# Patient Record
Sex: MALE | Employment: UNEMPLOYED | URBAN - METROPOLITAN AREA
[De-identification: names, ages, dates, MRNs, and addresses within clinical notes are randomized per-mention and may not be internally consistent; named-entity substitution may affect disease eponyms.]

---

## 2017-06-28 ENCOUNTER — TELEPHONE (OUTPATIENT)
Dept: TRANSPLANT | Facility: CLINIC | Age: 32
End: 2017-06-28

## 2017-07-24 DIAGNOSIS — Z76.82 ORGAN TRANSPLANT CANDIDATE: Primary | ICD-10-CM

## 2017-08-02 ENCOUNTER — HOSPITAL ENCOUNTER (OUTPATIENT)
Dept: RADIOLOGY | Facility: HOSPITAL | Age: 32
Discharge: HOME | End: 2017-08-02
Attending: NURSE PRACTITIONER
Payer: MEDICARE

## 2017-08-02 ENCOUNTER — TELEPHONE (OUTPATIENT)
Dept: TRANSPLANT | Facility: CLINIC | Age: 32
End: 2017-08-02

## 2017-08-02 ENCOUNTER — OFFICE VISIT (OUTPATIENT)
Dept: TRANSPLANT | Facility: CLINIC | Age: 32
End: 2017-08-02
Payer: MEDICARE

## 2017-08-02 ENCOUNTER — HOSPITAL ENCOUNTER (OUTPATIENT)
Dept: RADIOLOGY | Facility: HOSPITAL | Age: 32
Discharge: HOME | End: 2017-08-02
Attending: TRANSPLANT SURGERY
Payer: MEDICARE

## 2017-08-02 ENCOUNTER — CLINICAL SUPPORT (OUTPATIENT)
Dept: INFECTIOUS DISEASES | Facility: CLINIC | Age: 32
End: 2017-08-02
Payer: MEDICARE

## 2017-08-02 VITALS
HEIGHT: 69 IN | HEART RATE: 74 BPM | WEIGHT: 162.69 LBS | DIASTOLIC BLOOD PRESSURE: 68 MMHG | OXYGEN SATURATION: 97 % | TEMPERATURE: 98 F | BODY MASS INDEX: 24.09 KG/M2 | SYSTOLIC BLOOD PRESSURE: 116 MMHG | RESPIRATION RATE: 19 BRPM

## 2017-08-02 DIAGNOSIS — Z76.82 ORGAN TRANSPLANT CANDIDATE: ICD-10-CM

## 2017-08-02 DIAGNOSIS — Z76.82 KIDNEY TRANSPLANT CANDIDATE: Primary | Chronic | ICD-10-CM

## 2017-08-02 DIAGNOSIS — Z86.69 HX OF SEIZURE DISORDER: Chronic | ICD-10-CM

## 2017-08-02 DIAGNOSIS — N18.6 ESRD ON DIALYSIS: Chronic | ICD-10-CM

## 2017-08-02 DIAGNOSIS — I15.0 RENOVASCULAR HYPERTENSION: ICD-10-CM

## 2017-08-02 DIAGNOSIS — N05.1 FSGS (FOCAL SEGMENTAL GLOMERULOSCLEROSIS): ICD-10-CM

## 2017-08-02 DIAGNOSIS — Z99.2 ESRD ON DIALYSIS: Chronic | ICD-10-CM

## 2017-08-02 DIAGNOSIS — Z01.818 PRE-TRANSPLANT EVALUATION FOR CHRONIC KIDNEY DISEASE: ICD-10-CM

## 2017-08-02 DIAGNOSIS — H90.3 SENSORINEURAL HEARING LOSS (SNHL) OF BOTH EARS: Chronic | ICD-10-CM

## 2017-08-02 DIAGNOSIS — Z86.73 HX-TIA (TRANSIENT ISCHEMIC ATTACK): Chronic | ICD-10-CM

## 2017-08-02 PROCEDURE — 99999 PR PBB SHADOW E&M-EST. PATIENT-LVL I: CPT | Mod: PBBFAC,TXP,,

## 2017-08-02 PROCEDURE — 76770 US EXAM ABDO BACK WALL COMP: CPT | Mod: TC,TXP

## 2017-08-02 PROCEDURE — 3008F BODY MASS INDEX DOCD: CPT | Mod: TXP,,, | Performed by: NURSE PRACTITIONER

## 2017-08-02 PROCEDURE — 71020 XR CHEST PA AND LATERAL: CPT | Mod: TC,TXP

## 2017-08-02 PROCEDURE — 99204 OFFICE O/P NEW MOD 45 MIN: CPT | Mod: S$PBB,TXP,, | Performed by: TRANSPLANT SURGERY

## 2017-08-02 PROCEDURE — 76770 US EXAM ABDO BACK WALL COMP: CPT | Mod: 26,GC,TXP, | Performed by: RADIOLOGY

## 2017-08-02 PROCEDURE — 90670 PCV13 VACCINE IM: CPT | Mod: PBBFAC,TXP

## 2017-08-02 PROCEDURE — 71020 XR CHEST PA AND LATERAL: CPT | Mod: 26,TXP,, | Performed by: RADIOLOGY

## 2017-08-02 PROCEDURE — 72170 X-RAY EXAM OF PELVIS: CPT | Mod: 26,TXP,, | Performed by: RADIOLOGY

## 2017-08-02 PROCEDURE — 99205 OFFICE O/P NEW HI 60 MIN: CPT | Mod: S$PBB,TXP,, | Performed by: NURSE PRACTITIONER

## 2017-08-02 PROCEDURE — 72170 X-RAY EXAM OF PELVIS: CPT | Mod: TC,TXP

## 2017-08-02 PROCEDURE — 99204 OFFICE O/P NEW MOD 45 MIN: CPT | Mod: S$PBB,TXP,, | Performed by: PHYSICIAN ASSISTANT

## 2017-08-02 PROCEDURE — 90632 HEPA VACCINE ADULT IM: CPT | Mod: PBBFAC,TXP

## 2017-08-02 PROCEDURE — 99999 PR PBB SHADOW E&M-EST. PATIENT-LVL III: CPT | Mod: PBBFAC,TXP,, | Performed by: NURSE PRACTITIONER

## 2017-08-02 PROCEDURE — 90472 IMMUNIZATION ADMIN EACH ADD: CPT | Mod: PBBFAC,TXP

## 2017-08-02 PROCEDURE — 90715 TDAP VACCINE 7 YRS/> IM: CPT | Mod: PBBFAC,TXP

## 2017-08-02 RX ORDER — CARVEDILOL 25 MG/1
25 TABLET ORAL 2 TIMES DAILY WITH MEALS
Status: ON HOLD | COMMUNITY
End: 2021-10-19 | Stop reason: SDUPTHER

## 2017-08-02 RX ORDER — AMLODIPINE BESYLATE 10 MG/1
10 TABLET ORAL NIGHTLY
COMMUNITY
End: 2019-09-05

## 2017-08-02 RX ORDER — MINOXIDIL 2.5 MG/1
5 TABLET ORAL NIGHTLY
COMMUNITY
End: 2018-08-28

## 2017-08-02 RX ORDER — ISOSORBIDE MONONITRATE 30 MG/1
30 TABLET, EXTENDED RELEASE ORAL DAILY
COMMUNITY
End: 2018-08-28

## 2017-08-02 RX ORDER — VALSARTAN 320 MG/1
320 TABLET ORAL NIGHTLY
Status: ON HOLD | COMMUNITY
End: 2021-10-19 | Stop reason: HOSPADM

## 2017-08-02 RX ORDER — HYDRALAZINE HYDROCHLORIDE 100 MG/1
100 TABLET, FILM COATED ORAL EVERY 8 HOURS
COMMUNITY
End: 2019-09-05

## 2017-08-02 NOTE — PROGRESS NOTES
PHARM.D. PRE-TRANSPLANT NOTE:    This patient's medication therapy was evaluated as part of his pre-transplant evaluation.    The following pharmacologic concerns were noted: none      Current Outpatient Prescriptions   Medication Sig Dispense Refill    amlodipine (NORVASC) 10 MG tablet Take 10 mg by mouth every evening.      carvedilol (COREG) 25 MG tablet Take 25 mg by mouth 2 (two) times daily with meals.      diphenhydramine-acetaminophen (TYLENOL PM)  mg Tab Take 1 tablet by mouth nightly as needed.      hydrALAZINE (APRESOLINE) 100 MG tablet Take 100 mg by mouth every 8 (eight) hours.      isosorbide mononitrate (IMDUR) 30 MG 24 hr tablet Take 30 mg by mouth once daily.      minoxidil (LONITEN) 2.5 MG tablet Take 5 mg by mouth every evening.      valsartan (DIOVAN) 320 MG tablet Take 320 mg by mouth every evening.       No current facility-administered medications for this visit.          Currently he is responsible for preparing / administering this patient's medications on a daily basis.  I am available for consultation and can be contacted, as needed by the other members of the Kidney Transplant team.

## 2017-08-02 NOTE — PROGRESS NOTES
Transplant Recipient Adult Psychosocial Assessment    Feliz Trevizo  [address]   [place] [address]    Telephone Information:   Mobile [phone]   Home  [phone] (home)  Work  [phone] (work)  E-mail  No e-mail address on record    Sex: male  YOB: 1985  Age: 32 y.o.    Encounter Date: 8/2/2017  [place] Citizen: yes  Primary Language: English   Needed: no    Emergency Contact:  Name: Meliza Trevizo  Relationship: sister  Address: [address] [place] [address] [address] [place] [address]  Phone Numbers:  [phone] (home),  same (mobile)    Family/Social Support:   Number of dependents/: 0  Marital history: pt reports not previous marriages  Other family dynamics: Pt's reports that both of his parents passed away two years ago. Pt reports that he has 2 brothers and 2 sisters that are supportive.    Household Composition:  Pt reports he lives alone.    Do you and your caregivers have access to reliable transportation? yes  PRIMARY CAREGIVER: Meliza Trevizo will be primary caregiver, phone number [phone].      provided in-depth information to patient and caregiver regarding pre- and post-transplant caregiver role.   strongly encourages patient and caregiver to have concrete plan regarding post-transplant care giving, including back-up caregiver(s) to ensure care giving needs are met as needed.    Patient and Caregiver states understanding all aspects of caregiver role/commitment and is able/willing/committed to being caregiver to the fullest extent necessary.    Patient and Caregiver verbalizes understanding of the education provided today and caregiver responsibilities.         remains available. Patient and Caregiver agree to contact  in a timely manner if concerns arise.      Able to take time off work without financial concerns: yes.     Additional Significant Others who will Assist with Transplant:  Name: Giuseppe Trevizo  Age: 37  City:  [place] State: [place]  Relationship: brother  Does person drive? yes    Name: Saira Dominguez  Age: 45  City: [facility] State: [place]  Relationship: sister  Does person drive? yes    Living Will: no  Healthcare Power of : no  Advance Directives on file: <<no information> per medical record.  Verbally reviewed LW/HCPA information.   provided patient with copy of LW/HCPA documents and provided education on completion of forms.    Living Donors: No.    Highest Education Level: High School (9-12) or GED  Reading Ability: 12th grade  Reports difficulty with: reading and hearing pt reports he lost his hearing when he was about 1 years old. Pt is able to hear some and can communicate well.   Learns Best By:  Hands on     Status: no  VA Benefits: no     Working for Income: No  If no, reason not working: Disability    Patient is disabled.  Prior to disability, patient  was employed as house keeper at local hospital..    Spouse/Significant Other Employment: Meliza reports she is retired from the     Disabled: yes: date disability began: 2014, due to: ESRD.    Monthly Income:   Disability: $1,060  Able to afford all costs now and if transplanted, including medications: yes  Patient and Caregiver verbalizes understanding of personal responsibilities related to transplant costs and the importance of having a financial plan to ensure that patients transplant costs are fully covered.      provided fundraising information/education.  Patient and Caregiver verbalizes understanding.   remains available.    Insurance:   Payor/Plan Subscr  Sex Relation Sub. Ins. ID Effective Group Num   1. MEDICARE - ME* JOSÉ DOMINGUEZ 1985 Male  [id number] 7/1/10                                    [address]   2. BLUE CROSS BL* JOSÉ DOMINGUEZ 1985 Male  [id number]* 6/1/10 [id number]                                   [address] [address]     Primary Insurance (for UNOS reporting):  Public Insurance - Medicare FFS (Fee For Service)  Secondary Insurance (for UNOS reporting): Private Insurance  Patient and Caregiver verbalizes clear understanding that patient may experience difficulty obtaining and/or be denied insurance coverage post-surgery. This includes and is not limited to disability insurance, life insurance, health insurance, burial insurance, long term care insurance, and other insurances.    Patient and Caregiver also reports understanding that future health concerns related to or unrelated to transplantation may not be covered by patient's insurance.  Resources and information provided and reviewed.      Patient and Caregiver provides verbal permission to release any necessary information to outside resources for patient care and discharge planning.  Resources and information provided are reviewed.      Dialysis Adherence:  Patient and Caregiver reports pt had issues in the past with transportation, but has since worked those complication out.  Dialysis center reports over last three months that the patient has had 0 AMAs, 1 no-shows on 7/4/17, and 0 signing off early. Spoke with  Kristan at home dialysis center. Kristan reports pt previously had issues with compliance when he first started, but she has seen a major improvement. Kristan reports she is extremely impressed with his family and they encouragement. Kristan reports that pt is in good mental health and previously struggled with depression due to the passing of his mother. Kristan reports pt is starting to become more independent and thinking ahead. Kristan recommends pt for transplant.     Infusion Service: patient utilizing? no  Home Health: patient utilizing? no  DME: yes pt reports he has BP cuff  Pulmonary/Cardiac Rehab: pt denies   ADLS:  Pt denies any issues with ADLs    Adherence:   Pt reports full adherence to all medical advice.  Adherence education and counseling provided.     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  Alcohol: none, patient denies any use.  Illicit Drugs/Non-prescribed Medications: none, patient denies any use.    Patient and Caregiver states clear understanding of the potential impact of substance use as it relates to transplant candidacy and is aware of possible random substance screening.  Substance abstinence/cessation counseling, education and resources provided and reviewed.     Arrests/DWI/Treatment/Rehab: patient denies    Psychiatric History:    Mental Health: depression pt reports he previously struggled with depression after his mother passed away.  Psychiatrist/Counselor: pt denies  Medications:  Pt reports he used to take anti-depressant, but no longer does.   Suicide/Homicide Issues: pt denies   Safety at home: pt confirms    Knowledge: Patient and Caregiver states having clear understanding and realistic expectations regarding the potential risks and potential benefits of organ transplantation and organ donation, agrees to discuss with health care team members and support system members and to utilize available resources and express questions and/or concerns in order to further facilitate the pt informed decision-making.  Resources and information provided and reviewed.     Patient and Caregiver is aware of [facility]'s affiliation and/or partnership with agencies in home health care, LTAC, SNF, [facility], and other hospitals and  clinics.    Understanding: Patient and Caregiver reports having a clear understanding of the many lifetime commitments involved with being a transplant recipient, including costs, compliance, medications, lab work, procedures, appointments, concrete and financial planning, preparedness, timely and appropriate communication of concerns, abstinence (ETOH, tobacco, illicit non-prescribed drugs), adherence to all health care team recommendations, support system and caregiver involvement, appropriate and timely resource utilization and follow-through, mental health counseling as needed/recommended, and patient and caregiver responsibilities.  Social Service Handbook, resources and detailed educational information provided and reviewed.  Educational information provided.    Patient and Caregiver also reports current and expected compliance with health care regime and states having a clear understanding of the importance of compliance.      Patient and Caregiver reports a clear understanding that risks and benefits may be involved with organ transplantation and with organ donation.      Patient and Caregiver also reports clear understanding that psychosocial risk factors may affect patient, and include but are not limited to feelings of depression, generalized anxiety, anxiety regarding dependence on others, post traumatic stress disorder, feelings of guilt and other emotional and/or mental concerns, and/or exacerbation of existing mental health concerns.  Detailed resources provided and discussed.     Patient and Caregiver agrees to access appropriate resources in a timely manner as needed and/or as recommended, and to communicate concerns appropriately.  Patient and Caregiver also reports a clear understanding of treatment options available.      reviewed education, provided additional information, and answered questions.    Feelings or Concerns: pt denies any feelings or concerns at this time.    Coping: pt  reports he likes to cope by coming to visit his family, attend his nephews football games, and watch tv.    Goals: pt would like to go back to work after transplant or major attend school.  Patient referred to Vocational Rehabilitation.    Interview Behavior: Patient and Caregiver presents as alert and oriented x 4, pleasant, good eye contact, well groomed, recall good, concentration/judgement good, average intelligence, calm, communicative, cooperative and asking and answering questions appropriately.          Transplant Social Work - Candidacy  Assessment/Plan:     Psychosocial Suitability: Patient presents as a suitable candidate for kidney transplant at this time. Based on psychosocial risk factors, patient presents as low risk, due to appropriate caregiver plan, stable financies, low risk mental health concerns, suitable environment, and positive report from dialysis unit. .    Recommendations/Additional Comments: LIBIA recommends that pt conduct fundraising to assist pt with pay for cost of medication, food,gas, and other transplant related expenses. LIBIA recommends that pt remain free of all tobacco,ETOH, and substance use. SW remains available to assist with any concerns that may arise as pt navigates through the transplant process.      Yoli De Anda LMSW

## 2017-08-02 NOTE — PROGRESS NOTES
Transplant Nephrology  Kidney Transplant Recipient Evaluation    Referring Physician: Ralf Crouch  Current Nephrologist: Ralf Crouch    Subjective:   CC:  Initial evaluation of kidney transplant candidacy.    HPI:  Mr. Trevizo is a 32 y.o. year old Patient Refused male who has presented to be evaluated as a potential kidney transplant recipient.  He has ESRD secondary to FSGS.  Patient is currently on hemodialysis started on  11/2/2014. Patient is dialyzing on TTS schedule, dialyzing for 4 hours.  Patient reports that he is tolerating dialysis well.. He has a LUE AV fistula for dialysis access.     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 Trevizo  has a past medical history of Anemia; Depression; Disorder of kidney and ureter; FSGS (focal segmental glomerulosclerosis); Glomerulonephritis; Hypertension; Seizures; Sensorineural hearing loss; and TIA (transient ischemic attack).  He has a past surgical history that includes AV fistula placement (Left).    Past Social and Family History: Mr. Trevizo reports that he has never smoked. He has never used smokeless tobacco. He reports that he does not drink alcohol or use drugs. His family history includes Birth defects in his maternal grandfather and maternal grandmother; Cancer in his mother; Heart defect in his brother; Heart disease in his maternal grandfather, maternal grandmother, and mother; Hypertension in his maternal grandfather, maternal grandmother, mother, and sister; Kidney disease in his father; Lung cancer in his maternal grandfather; No Known Problems in his sister; Seizures in his brother; Sickle cell trait in his brother;  Stomach cancer in his maternal grandmother.      Father  renal failure--unknown cause    HX TIA-- last episode about 10 years ago  Seizure d/o --last episode 15 years ago  Does not f/u with a provider c/o this    Has been knowing about kidney decline since . It was initially thought to be an infection .  In  he had a kidney BX showing FSGS.    (Per care everywhere)  Kidney BX at [facility]     End-stage renal disease 2014   Overview:     Notes from [facility] chart:   First HD [address] KRUGERU M/W/F  08 PATHOLOGIC DIAGNOSIS:     Kidney Biopsy (Needle):  Collapsing glomerulopathy (see report for   note). OF NOTE MINIMAL PROTEINURIA  Focal interstitial nephritis (see report for   note).    Trace mesangial staining for IgA but without   ultrastructural evidence of electron, dense   deposits in the mesangium (see report for   note).\         Review of Systems   Constitutional: Positive for fatigue. Negative for activity change, appetite change, chills, fever and unexpected weight change.   HENT: Positive for hearing loss. Negative for congestion, facial swelling, postnasal drip, rhinorrhea, sinus pressure, sore throat and trouble swallowing.         Sensorineural hearing loss     Eyes: Negative for pain, redness and visual disturbance.   Respiratory: Negative for cough, chest tightness, shortness of breath and wheezing.    Cardiovascular: Negative.  Negative for chest pain, palpitations and leg swelling.   Gastrointestinal: Positive for constipation. Negative for abdominal pain, diarrhea, nausea and vomiting.        Indigestion     Genitourinary: Positive for decreased urine volume. Negative for dysuria, flank pain and urgency.        Still makes some urine    Musculoskeletal: Positive for arthralgias. Negative for gait problem, neck pain and neck stiffness.   Skin: Negative for rash.   Allergic/Immunologic: Negative for environmental allergies, food allergies and immunocompromised state.  "  Neurological: Negative for dizziness, weakness, light-headedness and headaches.   Psychiatric/Behavioral: Negative for agitation and confusion. The patient is not nervous/anxious.        Objective:   Blood pressure 116/68, pulse 74, temperature 98.1 °F (36.7 °C), temperature source Oral, resp. rate 19, height 5' 9.29" (1.76 m), weight 73.8 kg (162 lb 11.2 oz), SpO2 97 %.body mass index is 23.83 kg/m².    Physical Exam   Constitutional: He is oriented to person, place, and time. He appears well-developed and well-nourished.   HENT:   Head: Normocephalic.   Mouth/Throat: Oropharynx is clear and moist. No oropharyngeal exudate.   Eyes: Conjunctivae and EOM are normal. Pupils are equal, round, and reactive to light. No scleral icterus.   Neck: Normal range of motion. Neck supple.   Cardiovascular: Normal rate, regular rhythm and normal heart sounds.    Pulmonary/Chest: Effort normal and breath sounds normal.   Abdominal: Soft. Normal appearance and bowel sounds are normal. He exhibits no distension and no mass. There is no splenomegaly or hepatomegaly. There is no tenderness. There is no rebound, no guarding, no CVA tenderness, no tenderness at McBurney's point and negative Loyd's sign.   Musculoskeletal: Normal range of motion. He exhibits no edema.        Arms:  Lymphadenopathy:     He has no cervical adenopathy.   Neurological: He is alert and oriented to person, place, and time. He exhibits normal muscle tone. Coordination normal.   Skin: Skin is warm and dry.   Psychiatric: He has a normal mood and affect. His behavior is normal.   Vitals reviewed.      Labs:  Lab Results   Component Value Date    WBC 5.17 08/02/2017    HGB 12.1 (L) 08/02/2017    HCT 38.8 (L) 08/02/2017     08/02/2017    K 4.2 08/02/2017     08/02/2017    CO2 24 08/02/2017    BUN 25 (H) 08/02/2017    CREATININE 10.9 (H) 08/02/2017    EGFRNONAA 5.4 (A) 08/02/2017    CALCIUM 9.3 08/02/2017    PHOS 6.2 (H) 08/02/2017    ALBUMIN 4.2 " 08/02/2017    AST 9 (L) 08/02/2017    ALT <5 (L) 08/02/2017       No results found for: PREALBUMIN, BILIRUBINUA, GGT, AMYLASE, LIPASE, PROTEINUA, NITRITE, RBCUA, WBCUA    No results found for: HLAABCTYPE    Labs were reviewed with the patient.    Assessment:     1. Kidney transplant candidate    2. Organ transplant candidate    3. Pre-transplant evaluation for chronic kidney disease    4. ESRD on dialysis    5. FSGS (focal segmental glomerulosclerosis)    6. Renovascular hypertension    7. Hx of seizure disorder    8. Hx-TIA (transient ischemic attack)    9. Sensorineural hearing loss (SNHL) of both ears        Plan:   Kidney BX pathology--> 2008 [facility]     Baseline UP       Transplant Candidacy:   Based on available information, Mr. Trevizo is a suitable kidney transplant candidate.  Final determination of transplant candidacy will be made once workup is complete and reviewed by the selection committee.    Cara Chavis NP       UNOS Patient Status  Functional Status: 60% - Requires occasional assistance but is able to care for needs  Physical Capacity: No Limitations

## 2017-08-02 NOTE — TELEPHONE ENCOUNTER
Reviewed pt transplant labs.  Notified dialysis unit dietitian of the following abnormal labs via fax.     Phosphorus   6.2mg/dl      Cara Mijares MS RD LDN

## 2017-08-02 NOTE — PROGRESS NOTES
Transplant Surgery  Kidney Transplant Recipient Evaluation    Referring Physician: Ralf Crouch  Current Nephrologist: Ralf Crouch    Subjective:     Reason for Visit: evaluate transplant candidacy    History of Present Illness: Feliz Trevizo is a 32 y.o. year old male undergoing transplant evaluation.    Dialysis History: Feliz is on hemodialysis.      Transplant History: N/A    Etiology of Renal Disease: Focal Glomerular Sclerosis (Focal Segmental - FSG) (based on medical records from referral).    Review of Systems    Objective:     Physical Exam:  Constitutional:   Vitals reviewed: yes   Well-nourished and well-groomed: yes  Eyes:   Sclerae icteric: no   Extraocular movements intact: yes  GI:    Bowel sounds normal: yes   Tenderness: no    If yes, quadrant/location: not applicable   Palpable masses: no    If yes, quadrant/location: not applicable   Hepatosplenomegaly: no   Ascites: no   Hernia: no    If yes, type/location: not applicable   Surgical scars: no    If yes, type/location: not applicable  Resp:   Effort normal: yes   Breath sounds normal: yes    CV:   Regular rate and rhythm: yes   Heart sounds normal: yes   Femoral pulses normal: yes   Extremities edematous: no  Skin:   Rashes or lesions present: no    If yes, describe:not applicable   Jaundice:: no    Musculoskeletal:   Gait normal: yes   Strength normal: yes  Psych:   Oriented to person, place, and time: yes   Affect and mood normal: yes    Additional comments: not applicable    Counseling: We provided Feliz Trevizo with a group education session today.  We discussed kidney transplantation at length with him, including risks, potential complications, and alternatives in the management of his renal failure.  The discussion included complications related to anesthesia, bleeding, infection, primary nonfunction, and ATN.  I discussed the typical postoperative course, length of hospitalization, the need for long-term immunosuppression, and  the need for long-term routine follow-up.  I discussed living-donor and -donor transplantation and the relative advantages and disadvantages of each.  I also discussed average waiting times for both living donation and  donation.  I discussed national and center-specific survival rates.  I also mentioned the potential benefit of multicenter listing to candidates listed with centers within more than one organ procurement organization.  All questions were answered.    Final determination of transplant candidacy will be made once evaluation is complete and reviewed by the Kidney & Kidney/Pancreas Selection Committee.         Transplant Surgery - Candidacy   Assessment/Plan:   Feliz Trevizo has end stage renal disease (ESRD) on dialysis. I see no surgical contraindication to placing a kidney transplant. Based on available information, Feliz Trevizo is an excellent kidney transplant candidate.     Bertin Cordova MD

## 2017-08-02 NOTE — PROGRESS NOTES
Pre Transplant Infectious Diseases Consult  Kidney Transplant Recipient Evaluation    Reason for Visit:    Chief Complaint   Patient presents with    Kidney Transplant Evaluation     History of Present Illness  Feliz Trevizo is a 32 y.o. year old Patient Refused male with advanced Kidney disease currently being evaluated for Kidney transplant. Patient is currently on HD since 2014. He had a LUE AV fistula for access.  Patient denies any recent fever, chills, or infective illnesses.      1) Do you have a history of:   YES NO   Diabetes      [] [x]     Diabetic Foot Infection/Bone Infection  []        [x]     Surgical Removal of Spleen   []  [x]                  2) Have you had recurrent infections involving:             YES NO  Sinus infections  []         [x]   Sore Throat   []         [x]                 Prostate Infections  []         [x]              Bladder Infections  []         [x]                     Kidney Infections  []         [x]                               Intestinal Infections  []         [x]      Skin Infections   []         [x]       Reproductive Infections          []  [x]   Periodontal Disease  []         [x]        3)Have you ever had: YES     NO UNKNOWN      Chicken Pox   []         []  [x]   Shingles   []         [x]  []   Orolabial Herpes             []  [x]  []   Genital Herpes  []         [x]  []   Cytomegalovirus  []         [x]  []   Josefa-Barr Virus  []         [x]  []   Hepatitis A   []         [x]  []   Hepatitis B   []         [x]  []   Hepatitis C   []         [x]  []   Syphilis   []         [x]  []   Gonorrhea   []         [x]  []   Pelvic Inflammatory   Disease   []         [x]  []   Chlamydia Infection  []         [x]  []   Intestinal parasites   or worms   []         [x]  []   Fungal Infections  []         [x]  []   Blood Infections  []         [x]  []           4) Have you ever been exposed   YES NO  To someone with tuberculosis?  []   [x]   If yes, what treatment did you  receive:     5) What states have you lived in?  [place]    6) What countries have you visited for more than 2 weeks? no                        YES NO  7) Did you have any associated infections?  []  [x]       8) Are you planning to travel outside the    []  [x]   [place] after your transplant?    9) Household                   YES NO  Do you have pets living in your house?    []         [x]   If yes, describe:     Do you spend time or live on a farm or     []         [x]   have livestock or other farm animals?  If yes, which ones:    Do you have a fish tank?          []  [x]       Do you have a litter box?      []         [x]     Do you fish or hunt?       []         [x]     Do you clean or skin fish or animals?    []         [x]     Do you consume raw or undercooked    []         [x]   meat, fish, or shellfish?      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           12)Do you garden or otherwise  YES NO   work in the soil?    []         [x]   13)Do you hike, camp, or spend     time in wooded areas?   []         [x]        14) The patient's immunization history was reviewed.    Have you ever received:  YES NO UNKNOWN DATES   Routine Childhood vaccines  [x]         []  []      Influenza vaccine   [x]  []  []    Pneumovax    []  []  [x]     Tetanus-diptheria   []         []  [x]    Hepatitis A vaccine series       []  [x]  []    Hepatitis B vaccine series         [x]  []  []    Meningitis vaccine   []         [x]  []    Varicella vaccine   []         [x]  []        Based on the patients immunization history and serologies, immunizations were ordered:         Ordered  Not Ordered  Influenza Vaccine     []    [x]   Hepatitis A series at 0,  6 months   [x]    []   Hepatitis B seriesat 0, 1, and 6 months  []    [x]   Hepatitis B High Dose 0,1, and 6 months  []    [x]   Prevnar      [x]    []   Pneumovax      []    [x]    TDap       [x]    []    Zoster       []     [x]   Menactra      []    [x]            The patient was encouraged to contact us about any problems that may develop after immunization and possible side effects were reviewed.      Previous Transplant: no    Etiology of Kidney Disease: HTN    Allergies: Review of patient's allergies indicates not on file.    There is no immunization history on file for this patient.  History reviewed. No pertinent past medical history.  History reviewed. No pertinent surgical history.   Social History     Social History    Marital status: Unknown     Spouse name: N/A    Number of children: N/A    Years of education: N/A     Occupational History    Not on file.     Social History Main Topics    Smoking status: Not on file    Smokeless tobacco: Not on file    Alcohol use Not on file    Drug use: Unknown    Sexual activity: Not on file     Other Topics Concern    Not on file     Social History Narrative    No narrative on file       Review of Systems   Constitution: Positive for decreased appetite, weakness and malaise/fatigue. Negative for chills, fever, night sweats, weight gain and weight loss.   HENT: Negative for congestion, ear pain, headaches, hearing loss, hoarse voice, sore throat and tinnitus.    Eyes: Negative for blurred vision, redness and visual disturbance.   Cardiovascular: Negative for chest pain, leg swelling and palpitations.   Respiratory: Negative for cough, hemoptysis, shortness of breath, sputum production and wheezing.    Endocrine: Negative for cold intolerance and heat intolerance.   Hematologic/Lymphatic: Negative for adenopathy. Does not bruise/bleed easily.   Skin: Negative for dry skin, itching, rash and suspicious lesions.   Musculoskeletal: Negative for back pain, joint pain, myalgias and neck pain.   Gastrointestinal: Negative for abdominal pain, constipation, diarrhea, heartburn, nausea and vomiting.   Genitourinary: Negative for dysuria, flank pain, frequency, hematuria, hesitancy and  urgency.   Neurological: Negative for dizziness, numbness and paresthesias.   Psychiatric/Behavioral: Negative for depression and memory loss. The patient does not have insomnia and is not nervous/anxious.    Allergic/Immunologic: Negative for environmental allergies, HIV exposure, hives and persistent infections.     Physical Exam   Constitutional: He is oriented to person, place, and time. He appears well-developed and well-nourished. No distress.   HENT:   Head: Normocephalic and atraumatic.   Mouth/Throat: Uvula is midline, oropharynx is clear and moist and mucous membranes are normal. No oral lesions.   Eyes: EOM are normal. Pupils are equal, round, and reactive to light. No scleral icterus.   Cardiovascular: Normal rate, regular rhythm and normal heart sounds.  Exam reveals no gallop and no friction rub.    No murmur heard.  Pulmonary/Chest: Effort normal and breath sounds normal. No respiratory distress. He has no wheezes. He has no rales.   Abdominal: Soft. Bowel sounds are normal. He exhibits no distension and no mass. There is no hepatosplenomegaly. There is no tenderness. There is no rebound and no guarding.   Musculoskeletal: He exhibits no edema.   Neurological: He is alert and oriented to person, place, and time.   Skin: Skin is warm, dry and intact. No rash noted.        Psychiatric: He has a normal mood and affect. His behavior is normal.     Diagnostics: No results found for: RPR  No results found for: CMVANTIBODIE  No results found for: HIV1X2  No results found for: HTLVIIIANTIB  No results found for: HEPBSAG  No results found for: HEPBCAB  No results found for: HEPCAB  No results found for: TOXOIGG  No components found for: TOXOIGGINTER  No results found for: NEJ6VJB  No results found for: ZVC0KQI  No results found for: VARICELLAZOS  No results found for: VARICELLAINT  No results found for: STRONGANTIGG  No results found for: EPSTEINBARRV  No results found for: HEPBSAB  No results found for:  QUANTIFERON  No results found for: HEPAIGM  No results found for: PPD  No results found for this or any previous visit.         Transplant Infectious Diseases - Candidacy    Assessment/Plan:     Transplant Candidacy: Based on available information, there are no identified significant barriers to transplantation from an infectious disease standpoint pending acceptable serologies.      Quantiferon gold, HIV, strongyloides and RPR pending. If positive, please refer to ID clinic.    Vaccines:  Prevnar-13 today; pneumovax- 23 in 8 weeks  Hepatitis A series initiated today  TDAP  If Hep B surface antibody negative, recommend high dose Hep B vaccine at dialysis unit.    Final determination of transplant candidacy will be made once evaluation is complete and reviewed by the Transplant Selection Committee.    AMIRAH Brown         Counseling:I discussed with Feliz the risk for increased susceptibility to infections following transplantation including increased risk for infection right after transplant and if rejection should occur.  The patients has been counseled on the importance of vaccinations including but not limited to a yearly flu vaccine.  Specific guidance has been provided to the patient regarding the patients occupation, hobbies and activities to avoid future infectious complications including but not limited to avoiding undercooked meats and seafood, proper hygiene, and contact with animals.

## 2017-08-02 NOTE — LETTER
August 3, 2017        Ralf Crouch  [address] [facility] [place] [address]  Phone: [phone]  Fax: [phone]             [facility]  [address]  [facility] [address]  Phone: [phone]   Patient: Feliz Trevizo   MR Number: [id number]   YOB: 1985   Date of Visit: 8/2/2017       Dear Dr. Ralf Crouch    Thank you for referring Feliz Trevizo to me for evaluation. Attached you will find relevant portions of my assessment and plan of care.    If you have questions, please do not hesitate to call me. I look forward to following Feliz Trevizo along with you.    Sincerely,    Cara Chavis, NP    Enclosure    If you would like to receive this communication electronically, please contact [email or web address] or [phone] to request [employer] access.    [employer] is a tool which provides read-only access to select patient information with whom you have a relationship. Its easy to use and provides real time access to review your patients record including encounter summaries, notes, results, and demographic information.    If you feel you have received this communication in error or would no longer like to receive these types of communications, please e-mail [email or web address]

## 2017-08-02 NOTE — PROGRESS NOTES
CLINIC TEACHING NOTE    Feliz Trevizo was seen in transplant clinic today.  Coordinator verified that the patient viewed the teaching video and received written educational material.    The following information was reviewed:  · Waiting list time for cadaveric vs. living donation  · Waiting list process including: back-up calls, notification of changes in contact information, dialysis/physician information to the transplant coordinator, notifications of changes in health or if hospitalized, and notification of travel out of the area  · Monthly antibody testing to be obtained by the dialysis unit or arranged through a local lab and mailed to the transplant center.  Patient informed that if blood is not sent monthly and a kidney becomes available, the patient will need to come to the hospital to provide a fresh sample of blood for testing.    Patient was instructed that once on the waiting list, an appointment with the transplant physician will be scheduled yearly or every 6 months to ensure patient remains an acceptable transplant candidate.    Patient also informed that at the time of transplant, participation in a research protocol may be offered.  Notified that this is strictly voluntary and will not affect the quality of care received.      The option to have name placed on multiple transplant lists to increase opportunity for transplant was reviewed.    All patient questions were answered.  Patient verbalized understanding of above information.    Patient presents as a suitable candidate for transplant.

## 2017-08-14 ENCOUNTER — TELEPHONE (OUTPATIENT)
Dept: TRANSPLANT | Facility: CLINIC | Age: 32
End: 2017-08-14

## 2017-08-14 NOTE — TELEPHONE ENCOUNTER
Nutritional assessment from dialysis unit received and reviewed--no nutritional changes to plan needed at this time.  Pt to continue to follow-up with renal dietitians recommendations.      Cara Mijares MS RD LDN

## 2017-08-16 ENCOUNTER — HOSPITAL ENCOUNTER (OUTPATIENT)
Dept: RADIOLOGY | Facility: HOSPITAL | Age: 32
Discharge: HOME | End: 2017-08-16
Attending: NURSE PRACTITIONER
Payer: MEDICARE

## 2017-08-16 ENCOUNTER — HOSPITAL ENCOUNTER (OUTPATIENT)
Dept: CARDIOLOGY | Facility: HOSPITAL | Age: 32
Discharge: HOME | End: 2017-08-16
Attending: NURSE PRACTITIONER
Payer: MEDICARE

## 2017-08-16 DIAGNOSIS — Z76.82 ORGAN TRANSPLANT CANDIDATE: ICD-10-CM

## 2017-08-16 DIAGNOSIS — I51.7 CARDIOMEGALY: ICD-10-CM

## 2017-08-16 LAB
DIASTOLIC DYSFUNCTION: NO
DIASTOLIC DYSFUNCTION: NO
ESTIMATED PA SYSTOLIC PRESSURE: 21.15
RETIRED EF AND QEF - SEE NOTES: 71 (ref 55–65)

## 2017-08-16 PROCEDURE — 93017 CV STRESS TEST TRACING ONLY: CPT | Mod: TXP

## 2017-08-16 PROCEDURE — 78452 HT MUSCLE IMAGE SPECT MULT: CPT | Mod: TC,TXP

## 2017-08-16 PROCEDURE — 93016 CV STRESS TEST SUPVJ ONLY: CPT | Mod: TXP,,, | Performed by: INTERNAL MEDICINE

## 2017-08-16 PROCEDURE — 78452 HT MUSCLE IMAGE SPECT MULT: CPT | Mod: 26,TXP,, | Performed by: INTERNAL MEDICINE

## 2017-08-16 PROCEDURE — A9502 TC99M TETROFOSMIN: HCPCS | Mod: TXP

## 2017-08-16 PROCEDURE — 93306 TTE W/DOPPLER COMPLETE: CPT | Mod: 26,TXP,, | Performed by: INTERNAL MEDICINE

## 2017-08-16 PROCEDURE — 63600175 PHARM REV CODE 636 W HCPCS: Mod: TXP

## 2017-08-16 PROCEDURE — 93306 TTE W/DOPPLER COMPLETE: CPT | Mod: TXP

## 2017-08-16 PROCEDURE — 93018 CV STRESS TEST I&R ONLY: CPT | Mod: TXP,,, | Performed by: INTERNAL MEDICINE

## 2017-08-16 RX ORDER — REGADENOSON 0.08 MG/ML
INJECTION, SOLUTION INTRAVENOUS
Status: DISPENSED
Start: 2017-08-16 | End: 2017-08-16

## 2017-09-01 ENCOUNTER — COMMITTEE REVIEW (OUTPATIENT)
Dept: TRANSPLANT | Facility: CLINIC | Age: 32
End: 2017-09-01

## 2017-09-01 DIAGNOSIS — Z01.818 PRE-OP EXAMINATION: Primary | ICD-10-CM

## 2017-09-01 NOTE — COMMITTEE REVIEW
Native Organ Dx: Focal Glomerular Sclerosis (Focal Segmental - FSG)      SELECTION COMMITTEE NOTE    Felizvicki Trevizo was presented at selection committee on 9/1/2017.  Patient met selection criteria for kidney transplant related to ESRD due to  FSGS Focal Glomerular Sclerosis (Focal Segmental - FSG).  No absolute contraindications to transplant at this time.  Patient will be placed on the cadaveric wait list pending final financial approval from insurance company.  Patient will return to clinic for routine appointment in 1 year. Patient does not meet criteria for High KDPI kidney offer due to age. Needs FSGS protocol - baseline PCR and daily after transplant.            Note written by Andreia Duong RN    ===============================================  I was present at the meeting and attest to the decision of the committee

## 2017-09-06 ENCOUNTER — TELEPHONE (OUTPATIENT)
Dept: TRANSPLANT | Facility: CLINIC | Age: 32
End: 2017-09-06

## 2017-09-06 DIAGNOSIS — Z76.82 AWAITING ORGAN TRANSPLANT STATUS: Primary | ICD-10-CM

## 2017-09-06 DIAGNOSIS — Z01.818 PREOP EXAMINATION: Primary | ICD-10-CM

## 2017-09-06 NOTE — TELEPHONE ENCOUNTER
"  KIDNEY WAIT LISTING NOTE    Date of Financial clearance to list: 17    [facility]:     Organ: Kidney  Name:       Feliz Trevizo   : 1985          Gender:     male    MRN#: [id number]                                 State of Permanent Residence:  [place] [address]  Ethnicity: Patient Refused   Race:      Patient Refused    CLINICAL INFORMATION   Candidate Medical Urgency Status: Active  Number of Previous Kidney Transplants:   Number of Previous Solid Organ Transplants:   Is this Candidate a Prior Living Donor: no  (If yes, please generate letter to [facility] with patient's date of donation, recipient SSN, signed by Surgical Director after patient is listed in order to receive priority points).      ABO  ABO Blood Group:   B POS     ABO Confirmation: (THESE DATES MUST BE PRIOR TO THE LIST DATE AND SUPPORTED BY SEPARATE LAB REPORTS)    Internal Results    Lab Results   Component Value Date    GROUPTRH B POS 2017     No results found for: ABO    External Results    ABO Date 1:   ABO Date 2: 11  Are either of these ABO results based on External Labs? yes  (If Yes, STOP and go to source document in Media Tab for verification).    VITALS  Height:  5'9.3"  Weight:  73.8 kg (162 lb, 11.2 oz)  (Use height from Transplant clinic visits only).  Did you enter height/weight? Yes    HLA    Class I:  Lab Results   Component Value Date    XNPR1QX 1 2017    KLEN9MA 66 2017    RMSA2HV 8 2017    YKSK5PC 81 2017    LJQKF0SS 6 2017    DRECP1ZB XX 2017    UDTZV5HQ 7 2017    UHCRR3QL 8 2017       Class II:  Lab Results   Component Value Date    WIVRZP79AE 9 2017    PLVKOE30QQ 16 2017    [id number] 53 2017    HELXNO2930 51 2017    PFHPU5HS 9 2017    XCKNL9SC 5 2017       Tested for HLA Antibodies: Yes, no antibodies detected     If result is "Positive" antibodies are detected     If result is "Negative or " "questionable" no antibodies detected    Lab Results   Component Value Date    CIPRAS Negative 08/02/2017    CIIPRAS Negative 08/02/2017       DIALYSIS INFORMATION  Is patient Pre-Dialysis: No     Report GFR being used as the criteria for placement on the kidney list. If not, leave blank  GFR < or = 20 ml/min? n/a  If Yes, Specify value  ___   ml/min     Initial date GFR became 20 or less:   Is GFR obtained from an Outside lab Result? No  (If YES verify with source document scanned into media)    If patient on Dialysis:    Is candidate currently on dialysis for ESRD? Yes  If Yes,  Date Chronic Dialysis Started:   11/2/2014  (verify with source document in Media Tab)   Dialysis Unit Name: [facility]  [facility] [facility] [address]                        Physician Name:  Dr. Dayanara Yi  NPI#: [id number]    DIABETES INFORMATION  Primary Native Kidney Diagnosis: Focal Glomerular Sclerosis (Focal Segmental - FSG)  C-Peptide Value - No results found for: CPEPTIDE  Current Diabetes Status: None    FOR NON-KIDNEY DEPARTMENT USE ONLY:  Additional Organs Registered? none    Maximum Acceptable Number of HLA Mismatches  ABDR:     6      (0-6)               AB:               (0-4)  ADR:   _____  (0-4)              BDR: _____ (0-4)  A:        _____  (0-2)              B:      _____ (0-2)          DR: ______ (0-2)    Will Recipient Accept?   Accept HBcAB Positive Organ:            Yes  Accept HBV MARGARET Positive Organ:        no  Accept HCV Antibody Positive Organ: no   Accept HCV MARGARET Positive Organ: no  Accept KDPI > 85 Donor ?: No                        Local: No                           Import: No    ### NURSE TO VERIFY CONSENT AND MAKE ANY NECESSARY CHANGES NEEDED IN UNET AT THE TIME OF VERIFICATION ###    Unacceptible Antigens  If yes, list     No results found for: YU1FCQN, CIABCLM, CIIAB, ABCMT    ### DO NOT LIST IF ANTIGEN VALUE WEAK ###    ### DO NOT LIST ANTIGEN AS UNACCEPTABLE IF IT HAS * OR : IN THE " RESULT  VALUE ###

## 2017-09-08 ENCOUNTER — LAB VISIT (OUTPATIENT)
Dept: LAB | Facility: HOSPITAL | Age: 32
End: 2017-09-08
Payer: MEDICARE

## 2017-09-08 DIAGNOSIS — Z01.818 PRE-OP EXAMINATION: ICD-10-CM

## 2017-09-08 PROCEDURE — 86833 HLA CLASS II HIGH DEFIN QUAL: CPT | Mod: PO,TXP

## 2017-09-08 PROCEDURE — 86832 HLA CLASS I HIGH DEFIN QUAL: CPT | Mod: PO,TXP

## 2017-09-21 LAB — HPRA INTERPRETATION: NORMAL

## 2017-10-04 LAB
CLASS I ANTIBODIES - LUMINEX: NORMAL
CLASS II ANTIBODY COMMENTS - LUMINEX: NORMAL
CPRA %: 1
SERUM COLLECTION DT - LUMINEX CLASS I: NORMAL
SERUM COLLECTION DT - LUMINEX CLASS II: NORMAL
SPCL1 TESTING DATE: NORMAL
SPCL2 TESTING DATE: NORMAL
SPCLU TESTING DATE: NORMAL

## 2017-12-07 ENCOUNTER — LAB VISIT (OUTPATIENT)
Dept: LAB | Facility: HOSPITAL | Age: 32
End: 2017-12-07
Payer: MEDICARE

## 2017-12-07 DIAGNOSIS — Z01.818 PRE-OP EXAMINATION: ICD-10-CM

## 2017-12-07 PROCEDURE — 86833 HLA CLASS II HIGH DEFIN QUAL: CPT | Mod: PO,TXP

## 2017-12-07 PROCEDURE — 86832 HLA CLASS I HIGH DEFIN QUAL: CPT | Mod: PO,TXP

## 2017-12-20 LAB — HPRA INTERPRETATION: NORMAL

## 2017-12-23 LAB
CLASS I ANTIBODIES - LUMINEX: NORMAL
CLASS II ANTIBODIES - LUMINEX: NEGATIVE
CPRA %: 0
SERUM COLLECTION DT - LUMINEX CLASS I: NORMAL
SERUM COLLECTION DT - LUMINEX CLASS II: NORMAL
SPCL1 TESTING DATE: NORMAL
SPCL2 TESTING DATE: NORMAL
SPCLU TESTING DATE: NORMAL

## 2018-03-09 ENCOUNTER — LAB VISIT (OUTPATIENT)
Dept: LAB | Facility: HOSPITAL | Age: 33
End: 2018-03-09
Payer: MEDICARE

## 2018-03-09 DIAGNOSIS — Z01.818 PRE-OP EXAMINATION: ICD-10-CM

## 2018-03-09 PROCEDURE — 86832 HLA CLASS I HIGH DEFIN QUAL: CPT | Mod: PO,TXP

## 2018-03-09 PROCEDURE — 86833 HLA CLASS II HIGH DEFIN QUAL: CPT | Mod: PO,TXP

## 2018-03-15 LAB — HPRA INTERPRETATION: NORMAL

## 2018-03-27 LAB
CLASS I ANTIBODIES - LUMINEX: NORMAL
CLASS II ANTIBODY COMMENTS - LUMINEX: NORMAL
CPRA %: 0
SERUM COLLECTION DT - LUMINEX CLASS I: NORMAL
SERUM COLLECTION DT - LUMINEX CLASS II: NORMAL
SPCL1 TESTING DATE: NORMAL
SPCL2 TESTING DATE: NORMAL
SPCLU TESTING DATE: NORMAL

## 2018-06-08 ENCOUNTER — LAB VISIT (OUTPATIENT)
Dept: LAB | Facility: HOSPITAL | Age: 33
End: 2018-06-08
Payer: MEDICARE

## 2018-06-08 DIAGNOSIS — Z01.818 PRE-OP EXAMINATION: ICD-10-CM

## 2018-06-08 PROCEDURE — 86832 HLA CLASS I HIGH DEFIN QUAL: CPT | Mod: PO,TXP

## 2018-06-08 PROCEDURE — 86833 HLA CLASS II HIGH DEFIN QUAL: CPT | Mod: PO,TXP

## 2018-06-16 LAB — HPRA INTERPRETATION: NORMAL

## 2018-07-16 DIAGNOSIS — Z76.82 ORGAN TRANSPLANT CANDIDATE: Primary | ICD-10-CM

## 2018-08-28 ENCOUNTER — HOSPITAL ENCOUNTER (OUTPATIENT)
Dept: CARDIOLOGY | Facility: CLINIC | Age: 33
Discharge: HOME | End: 2018-08-28
Attending: NURSE PRACTITIONER
Payer: MEDICARE

## 2018-08-28 ENCOUNTER — OFFICE VISIT (OUTPATIENT)
Dept: TRANSPLANT | Facility: CLINIC | Age: 33
End: 2018-08-28
Payer: MEDICARE

## 2018-08-28 ENCOUNTER — HOSPITAL ENCOUNTER (OUTPATIENT)
Dept: RADIOLOGY | Facility: HOSPITAL | Age: 33
Discharge: HOME | End: 2018-08-28
Attending: NURSE PRACTITIONER
Payer: MEDICARE

## 2018-08-28 VITALS
HEIGHT: 70 IN | RESPIRATION RATE: 18 BRPM | SYSTOLIC BLOOD PRESSURE: 137 MMHG | DIASTOLIC BLOOD PRESSURE: 97 MMHG | HEART RATE: 66 BPM | BODY MASS INDEX: 24.14 KG/M2 | OXYGEN SATURATION: 98 % | TEMPERATURE: 98 F | WEIGHT: 168.63 LBS

## 2018-08-28 DIAGNOSIS — Z99.2 ESRD ON DIALYSIS: Chronic | ICD-10-CM

## 2018-08-28 DIAGNOSIS — N18.6 ESRD ON DIALYSIS: Chronic | ICD-10-CM

## 2018-08-28 DIAGNOSIS — I15.0 RENOVASCULAR HYPERTENSION: ICD-10-CM

## 2018-08-28 DIAGNOSIS — N05.1 FSGS (FOCAL SEGMENTAL GLOMERULOSCLEROSIS): Primary | ICD-10-CM

## 2018-08-28 DIAGNOSIS — Z76.82 ORGAN TRANSPLANT CANDIDATE: ICD-10-CM

## 2018-08-28 DIAGNOSIS — H90.3 SENSORINEURAL HEARING LOSS (SNHL) OF BOTH EARS: Chronic | ICD-10-CM

## 2018-08-28 DIAGNOSIS — Z86.69 HX OF SEIZURE DISORDER: Chronic | ICD-10-CM

## 2018-08-28 DIAGNOSIS — Z76.82 PATIENT ON WAITING LIST FOR KIDNEY TRANSPLANT: Chronic | ICD-10-CM

## 2018-08-28 LAB
DIASTOLIC DYSFUNCTION: NO
ESTIMATED PA SYSTOLIC PRESSURE: 20.14
GLOBAL PERICARDIAL EFFUSION: NORMAL
MITRAL VALVE MOBILITY: NORMAL
MITRAL VALVE REGURGITATION: NORMAL
RETIRED EF AND QEF - SEE NOTES: 60 (ref 55–65)
TRICUSPID VALVE REGURGITATION: NORMAL

## 2018-08-28 PROCEDURE — 71046 X-RAY EXAM CHEST 2 VIEWS: CPT | Mod: 26,TXP,, | Performed by: RADIOLOGY

## 2018-08-28 PROCEDURE — 99214 OFFICE O/P EST MOD 30 MIN: CPT | Mod: PBBFAC,25,TXP | Performed by: NURSE PRACTITIONER

## 2018-08-28 PROCEDURE — 93306 TTE W/DOPPLER COMPLETE: CPT | Mod: TXP,,, | Performed by: INTERNAL MEDICINE

## 2018-08-28 PROCEDURE — 99999 PR PBB SHADOW E&M-EST. PATIENT-LVL IV: CPT | Mod: PBBFAC,TXP,, | Performed by: NURSE PRACTITIONER

## 2018-08-28 PROCEDURE — 99215 OFFICE O/P EST HI 40 MIN: CPT | Mod: S$PBB,TXP,, | Performed by: NURSE PRACTITIONER

## 2018-08-28 PROCEDURE — 71046 X-RAY EXAM CHEST 2 VIEWS: CPT | Mod: TC,TXP

## 2018-08-28 NOTE — PROGRESS NOTES
LISTED PATIENT EDUCATION NOTE    Mr. Feliz Trevizo was seen in pre-kidney transplant clinic for evaluation for kidney, kidney/pancreas or pancreas only transplant.  The patient attended a group education session that discussed/reviewed the following aspects of transplantation: evaluation and selection committee process, [facility] waitlist management/multiple listings, types of organs offered (KDPI < 85%, KDPI > 85%, PHS increased risk, DCD), financial aspects, surgical procedures, dietary instruction pre- and post-transplant, health maintenance pre- and post-transplant, post-transplant hospitalization and outpatient follow-up, potential to participate in a research protocol, and medication management and side effects.  A question and answer session was provided after the presentation.    The patient was seen by all members of the multi-disciplinary team to include: Nephrologist/PA, Surgeon, , Transplant Coordinator, , Pharmacist and Dietician (if applicable).    The patient reviewed and signed all consents for evaluation which were witnessed and sent to scanning into the EPIC chart.    The patient was given an education book and plan for further evaluation based on his individual assessment.      The patient was encouraged to call with any questions or concerns.

## 2018-08-28 NOTE — PROGRESS NOTES
Kidney Transplant Recipient Reevalulation    Referring Physician: Ralf Crouch  Current Nephrologist: Ralf Crouch  Waitlist Status: active  Dialysis Start Date: 11/2/2014    Subjective:     CC:  Annual reassessment of kidney transplant candidacy.    HPI:  Mr. Trevizo is a 33 y.o. year old Patient Refused male with ESRD secondary to FSGS.  He has been on the wait list for a kidney transplant at [facility] since 11/2/2014. Patient is currently on hemodialysis started on 11/2/2014. Patient is dialyzing on MWF schedule.  Patient reports that he is tolerating dialysis well.. He has a LUE AV fistula.  He dialyzes for 4 hours. Recent hospitalizations or ED visits.  Reports going to the ED, possibly [facility], at some point during the past year. He is uncertain as to when. He thinks it was due to High BP.   Care giver is not at the clinic visit. Pt reports he does have a caregiver.   Tries to stay active and likes to go to the [employer] to watch his nephew play.     8/28/2018 CXR  FINDINGS:  Heart size upper limit of normal or mildly enlarged.  The lungs are clear.  No pleural effusion      Impression     See above       8/28/2018 2 D echo  CONCLUSIONS     1 - Normal left ventricular systolic function (EF 60-65%).     2 - Normal right ventricular systolic function .     3 - Normal left ventricular diastolic function.     4 - Biatrial enlargement.     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   HENT: Negative for congestion, facial swelling, postnasal  "drip, rhinorrhea, sinus pressure, sore throat and trouble swallowing.    Eyes: Negative for pain, redness and visual disturbance.   Respiratory: Negative for cough, chest tightness, shortness of breath and wheezing.    Cardiovascular: Negative.  Negative for chest pain, palpitations and leg swelling.   Gastrointestinal: Negative for abdominal pain, diarrhea, nausea and vomiting.   Genitourinary: Negative for dysuria, flank pain and urgency.   Musculoskeletal: Negative for gait problem, neck pain and neck stiffness.   Skin: Negative for rash.   Allergic/Immunologic: Negative for environmental allergies, food allergies and immunocompromised state.   Neurological: Negative for dizziness, weakness, light-headedness and headaches.   Psychiatric/Behavioral: Negative for agitation and confusion. The patient is not nervous/anxious.        Objective:   body mass index is 24.55 kg/m².  BP (!) 137/97 (BP Location: Right arm, Patient Position: Sitting, BP Method: Medium (Automatic))   Pulse 66   Temp 97.5 °F (36.4 °C) (Oral)   Resp 18   Ht 5' 9.5" (1.765 m)   Wt 76.5 kg (168 lb 10.4 oz)   SpO2 98%   BMI 24.55 kg/m²     Physical Exam   Constitutional: He is oriented to person, place, and time. He appears well-developed and well-nourished.   HENT:   Head: Normocephalic.   Mouth/Throat: Oropharynx is clear and moist. No oropharyngeal exudate.   Eyes: Conjunctivae and EOM are normal. Pupils are equal, round, and reactive to light. No scleral icterus.   Neck: Normal range of motion. Neck supple.   Cardiovascular: Normal rate, regular rhythm and normal heart sounds.   Pulmonary/Chest: Effort normal and breath sounds normal.   Abdominal: Soft. Normal appearance and bowel sounds are normal. He exhibits no distension and no mass. There is no splenomegaly or hepatomegaly. There is no tenderness. There is no rebound, no guarding, no CVA tenderness, no tenderness at McBurney's point and negative Loyd's sign.   Musculoskeletal: " Normal range of motion. He exhibits no edema.        Arms:  Lymphadenopathy:     He has no cervical adenopathy.   Neurological: He is alert and oriented to person, place, and time. He exhibits normal muscle tone. Coordination normal.   Skin: Skin is warm and dry.   Psychiatric: He has a normal mood and affect. His behavior is normal.   Vitals reviewed.      Labs:  Lab Results   Component Value Date    PSA 0.59 08/28/2018       Lab Results   Component Value Date    WBC 5.17 08/02/2017    HGB 12.1 (L) 08/02/2017    HCT 38.8 (L) 08/02/2017     08/02/2017    K 4.2 08/02/2017     08/02/2017    CO2 24 08/02/2017    BUN 25 (H) 08/02/2017    CREATININE 10.9 (H) 08/02/2017    EGFRNONAA 5.4 (A) 08/02/2017    CALCIUM 9.3 08/02/2017    PHOS 6.2 (H) 08/02/2017    ALBUMIN 4.2 08/02/2017    AST 9 (L) 08/02/2017    ALT <5 (L) 08/02/2017    .0 (H) 08/28/2018       No results found for: PREALBUMIN, BILIRUBINUA, GGT, AMYLASE, LIPASE, PROTEINUA, NITRITE, RBCUA, WBCUA    No results found for: HLAABCTYPE    Lab Results   Component Value Date    CPRA 0 06/06/2018    HS2XZNX B76 06/06/2018    CIIAB Negative 06/06/2018    ABCMT UNDETERMINED 03/07/2018       Labs were reviewed with the patient.    Pre-transplant Workup:   Reviewed with the patient.    Assessment:     1. FSGS (focal segmental glomerulosclerosis)    2. Patient on waiting list for kidney transplant    3. Renovascular hypertension    4. Hx of seizure disorder    5. ESRD on dialysis    6. Sensorineural hearing loss (SNHL) of both ears        Plan:   Lab results from reviewed with patient today.  Fax labs to dialysis/nephrologist concerning  Elevated PTH    Transplant Candidacy:   Mr. Trevizo is a suitable kidney transplant candidate.  He remains in overall stable health, and will remain active on the transplant list.    Cara Chavis NP       Follow-up:   In addition to the tests noted in the plan, Mr. Trevizo will continue to have reevaluation as per the  standing pre-kidney transplant protocol:  1. Monthly blood for PRA  2. Annual return to clinic, except HIV positive, > 65 years of age, or pancreas transplant candidates who will be scheduled to see transplant every 6 months while in pre-transplant phase  3. Annual re-testing: CXR, EKG, yearly mammograms for women over 40 and PSA for males over 40, cardiology follow-up as recommended by initial cardiology pre-transplant evaluation  4. Renal ultrasound every 2 years  5. Baseline colonoscopy after age 50 and repeated as recommended    UNOS Patient Status  Functional Status: 60% - Requires occasional assistance but is able to care for needs  Physical Capacity: No Limitations

## 2018-08-28 NOTE — LETTER
August 29, 2018        Ralf Crouch  [address] [facility] [place] [address]  Phone: [phone]  Fax: [phone]             [facility]  [address]  [facility] [address]  Phone: [phone]   Patient: Feliz Trevizo   MR Number: [id number]   YOB: 1985   Date of Visit: 8/28/2018       Dear Dr. Ralf Crouch    Thank you for referring Feliz Trevizo to me for evaluation. Attached you will find relevant portions of my assessment and plan of care.    If you have questions, please do not hesitate to call me. I look forward to following Feliz Trevizo along with you.    Sincerely,    Cara Chavis, NP    Enclosure    If you would like to receive this communication electronically, please contact [email or web address] or [phone] to request C-Note Link access.    C-Note Link is a tool which provides read-only access to select patient information with whom you have a relationship. Its easy to use and provides real time access to review your patients record including encounter summaries, notes, results, and demographic information.    If you feel you have received this communication in error or would no longer like to receive these types of communications, please e-mail [email or web address]

## 2018-08-29 ENCOUNTER — TELEPHONE (OUTPATIENT)
Dept: TRANSPLANT | Facility: CLINIC | Age: 33
End: 2018-08-29

## 2018-08-29 NOTE — PROGRESS NOTES
"SW met with pt during [facility] to update pt's kidney transplant assessment. Pt did not present with a caregiver at this assessment. Pt reports that he knew he had to bring a caregiver, but reports that his sister: Meliza was not able to come. SW asked how come pt wasn't able to bring a back up caregiver. Pt reports that his brother:Giuseppe was out of town for work and reports that his sister: Saira would likely not want to do it.    SW explained that pt needs to talk with his family and come back for a social work only appointment to go to the education and meet with the  so that SW can re-evaluate pt's caregiver plan. Patient verbalized understanding and agreement. SW offered to call pt's sisters and brother to discuss what's needed from caregiver. SW gave permission to talk to pt's siblings.     LIBIA spoke with pt's sister: Meliza ([phone]). She reports that although she can be there for pt sometimes, but not all the time. She explained that she hurt her knee, is currently on crutches, and has a 3 year old son to care for. She reports that pt's other siblings need to assist as well. She also reports that pt has adult nieces who should be able to help. LIBIA called pt's brother: Giuseppe ([phone]) and left a voicemail. LIBIA also spoke to pt's sister: Saira ([phone], cell; [phone], work). She reports that she believed that her sister: Meliza was going to be pt's caregiver because Meliza doesn't work and she does. LIBIA explained that pt could have multiple caregivers step in to assist as needed. Saira reports that she would need to know in advance because she would need to call off from work. LIBIA explained that with  donor transplants, we are unable to know in advance. Saira reports that SW can "put me down as a back-up caregiver then". SW explained that pt would need to come back again to be seen by the  and attend education with his caregiver. LIBIA explained that " this would be a scheduled appointment and she could know in advance. Saira agreed to come to this appointment with pt.    LIBIA contacted pt and relayed the above information. LIBIA explained that as soon as pt's appointment is scheduled he needs to tell Saira so she can take off from work. Patient verbalized understanding and agreement. LIBIA also encouraged pt to talk to his brother and nieces that Meliza mentioned to serve as additional caregiver. Patient verbalized understanding and agreement.     LIBIA remains available to pt, pt's family, and transplant team at [phone].

## 2018-08-29 NOTE — TELEPHONE ENCOUNTER
SW returned pt's brother's call and left a voicemail. SW awaits a return call. SW remains available to pt, pt's family, and transplant team at [phone].    ----- Message from Daphney Salguero sent at 8/29/2018 11:28 AM CDT -----  Contact: Pt's brother-Dre Trevizo  Patient Returning Call from [facility]    Who Left Message for Patient: Home Deutsch  Communication Preference: [phone]  Additional Information: He stated between 1:30-3 o'clock would be best for a return call due to him being in [place] at a Seminar

## 2018-09-06 ENCOUNTER — OFFICE VISIT (OUTPATIENT)
Dept: TRANSPLANT | Facility: CLINIC | Age: 33
End: 2018-09-06
Payer: MEDICARE

## 2018-09-06 DIAGNOSIS — Z76.82 PATIENT ON WAITING LIST FOR KIDNEY TRANSPLANT: Primary | Chronic | ICD-10-CM

## 2018-09-06 PROCEDURE — 99212 OFFICE O/P EST SF 10 MIN: CPT | Mod: PBBFAC,TXP

## 2018-09-06 PROCEDURE — 99499 UNLISTED E&M SERVICE: CPT | Mod: S$PBB,TXP,, | Performed by: INTERNAL MEDICINE

## 2018-09-06 PROCEDURE — 99999 PR PBB SHADOW E&M-EST. PATIENT-LVL II: CPT | Mod: PBBFAC,TXP,,

## 2018-09-18 NOTE — PROGRESS NOTES
"Dialysis Adherence:    Ary Barrett, nurse at pt's dialysis unit reports over the last three months that patient has had 0 AMAs, 0 no shows and no issues with labs, transportation or caregiver support. Nurse reported pt is "a success story". Nurse had no concerns or questions.    SWI remains available at [phone].    "

## 2018-09-21 NOTE — PROGRESS NOTES
YEARLY LIST MANAGEMENT NOTE    Feliz Trevizo's kidney transplant listing status reviewed.  Patient is due for follow-up appointments on 8/2019.  Appointments will be scheduled per protocol.

## 2018-10-12 NOTE — PROGRESS NOTES
Transplant Recipient Adult Psychosocial Assessment (Last Assessment completed on 08/02/2017)    Feliz Trevizo  [address] [place] [address]    Telephone Information:   Mobile [phone]   Home  [phone] (home)  Work  [phone] (work)  E-mail  No e-mail address on record    Sex: male  YOB: 1985  Age: 33 y.o.    Encounter Date: 9/6/2018  [place] Citizen: yes  Primary Language: English   Needed: no (Pt has significant hearing loss since childhood, but reports is able to hear some and read lips)    Emergency Contact:  Name: Meliza Trevizo  Relationship: sister  Address: [address] [place] [address], [place] [address]  Phone Numbers:  [phone] (mobile)    Family/Social Support:   Number of dependents/: Pt reports no dependents.  Marital history: Pt reports never being . Pt reports no current significant other.  Other family dynamics: Pt's reports that both of his parents passed away two years ago. Pt reports that he has 2 brothers and 2 sisters that are supportive.    Household Composition:  Name: Feliz Trevizo  Age: 33  Relationship: patient  Does person drive? yes    Do you and your caregivers have access to reliable transportation? yes  PRIMARY CAREGIVER: Giuseppe Trevizo (brother) will be primary caregiver, phone number [phone]      provided in-depth information to patient and caregiver regarding pre- and post-transplant caregiver role.   strongly encourages patient and caregiver to have concrete plan regarding post-transplant care giving, including back-up caregiver(s) to ensure care giving needs are met as needed.    Patient and Caregiver states understanding all aspects of caregiver role/commitment and is able/willing/committed to being caregiver to the fullest extent necessary.    Patient and Caregiver verbalizes understanding of the education provided today and caregiver responsibilities.         remains available. Patient  and Caregiver agree to contact  in a timely manner if concerns arise.      Able to take time off work without financial concerns: yes. Giuseppe reports that his daughter will be cared for by her biological mother.    Additional Significant Others who will Assist with Transplant:  Name: Dimas Mckay  Age: 56  Phone: [phone]  City: [place] State: [place]  Relationship: Giuseppe's significant other  Does person drive? yes    Name: Saira Trevizo  Age: 45  Phone: [phone] (mobile) [phone] (work)  [facility] State: [place]  Relationship: sister  Does person drive? yes   +  Name: Shilpa Trevizo  Age: 48  Phone: [phone]  City: [place] State: [place]  Relationship: sister  Does person drive? yes    Living Will: no  Healthcare Power of : no  Advance Directives on file: <<no information> per medical record.  Verbally reviewed LW/HCPA information.   provided patient with copy of LW/HCPA documents and provided education on completion of forms.    Living Donors: No.    Highest Education Level: High School (9-12) or GED  Reading Ability: 12th grade  Reports difficulty with: reading and hearing pt reports he lost his hearing when he was about 1 years old. Pt is able to hear some and can communicate well by reading lips.   Learns Best By:  Pt reports learning best by verbal and visual instruction.     Status: no  VA Benefits: no     Working for Income: No  If no, reason not working: Disability    Patient is disabled.  Prior to disability, patient  was employed as house keeper at local hospital..    Spouse/Significant Other Employment: Meliza reports she is retired from the . Giuseppe reports that he is an [employer]igator    Disabled: yes: date disability began: June 2014, due to: ESRD.    Monthly Income:   Disability: $1,060  Food [place]: $17  Able to afford all costs now and if transplanted, including medications: yes. Giuseppe reports he will be able to assist as  needed  Patient and Caregiver verbalizes understanding of personal responsibilities related to transplant costs and the importance of having a financial plan to ensure that patients transplant costs are fully covered.      provided fundraising information/education.  Patient and Caregiver verbalizes understanding.   remains available.    Insurance:   Payor/Plan Subscr  Sex Relation Sub. Ins. ID Effective Group Num   1. MEDICARE - ME* JOSÉ DOMINGUEZ 1985 Male  [id number] 7/1/10                                    [address]   2. BLUE CROSS BL* JOSÉ DOMINGUEZ 1985 Male  [id number]* 6/1/10 [id number]                                   [address] [address]     Primary Insurance (for UNOS reporting): Public Insurance - Medicare FFS (Fee For Service)  Secondary Insurance (for UNOS reporting): Private Insurance  Pt reports BCBS Supplement is being paid for by the [employer]. SW provide patient and brother with information regarding pt's responsibility for payment after transplant. Patient and Caregiver verbalized understanding.     Patient and Caregiver verbalizes clear understanding that patient may experience difficulty obtaining and/or be denied insurance coverage post-surgery. This includes and is not limited to disability insurance, life insurance, health insurance, burial insurance, long term care insurance, and other insurances.    Patient and Caregiver also reports understanding that future health concerns related to or unrelated to transplantation may not be covered by patient's insurance.  Resources and information provided and reviewed.      Patient and Caregiver provides verbal permission to release any necessary information to outside resources for patient care and discharge planning.  Resources and information provided are reviewed.      Dialysis Adherence:  Patient and Caregiver reports being on hemodialysis in center, attending all dialysis appointments, and staying for  the entire course of treatment. Please see SWI note dated 10/08/2018 for adherence check. Pt deemed adherent.    Infusion Service: patient utilizing? no  Home Health: patient utilizing? no but reports using once in the past  DME: yes BP Cuff   Pulmonary/Cardiac Rehab: Pt denies   ADLS:  Pt reports no difficulties with driving, walking, bathing, cooking, housekeeping, eating, shopping, and taking medication.    Adherence:   Pt reports suitable adherence with medications, dialysis, and health regimen. Adherence education and counseling provided.     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  Alcohol: Pt reports social use.  Illicit Drugs/Non-prescribed Medications: none, patient denies any use.    Patient and Caregiver states clear understanding of the potential impact of substance use as it relates to transplant candidacy and is aware of possible random substance screening.  Substance abstinence/cessation counseling, education and resources provided and reviewed.     Arrests/DWI/Treatment/Rehab: patient denies    Psychiatric History:    Mental Health: depression pt reports he previously struggled with depression after his mother passed away. Pt reports no current issues with mental health.  Psychiatrist/Counselor: Pt denies seeing a mental health professional and reports being  open to seeing the psych department for talk therapy if necessary.  Medications:  Pt reports previously taking an antidepressant, but no longer takes.  Suicide/Homicide Issues: Pt denies any history of or current suicidal or homicidal ideations.   Safety at home: Pt reports no current or history of safety concerns in household; including mental, physical, verbal, or sexual abuse.    Knowledge: Patient and Caregiver states having clear understanding and realistic expectations regarding the potential risks and potential benefits of organ transplantation and organ donation, agrees to discuss with health care team members and support system members and to utilize available resources and express questions and/or concerns in order to further facilitate the pt informed decision-making.  Resources and information provided and reviewed.     Patient and Caregiver is aware of [facility] affiliation and/or partnership with agencies in home health care, LTAC, SNF, [facility], and other hospitals and clinics.    Understanding: Patient and Caregiver reports having a clear understanding of the many lifetime commitments involved with being a transplant recipient, including costs, compliance, medications, lab work, procedures, appointments, concrete and financial planning, preparedness, timely and appropriate communication of concerns, abstinence (ETOH, tobacco, illicit non-prescribed drugs), adherence to all health care team recommendations, support system and caregiver involvement, appropriate and timely resource utilization and follow-through, mental health counseling as needed/recommended, and patient and caregiver responsibilities.  Social Service Handbook, resources and detailed educational information provided and reviewed.  Educational information provided.    Patient and Caregiver also reports current and expected compliance with health care regime and states having a clear understanding of the importance of compliance.      Patient  and Caregiver reports a clear understanding that risks and benefits may be involved with organ transplantation and with organ donation.      Patient and Caregiver also reports clear understanding that psychosocial risk factors may affect patient, and include but are not limited to feelings of depression, generalized anxiety, anxiety regarding dependence on others, post traumatic stress disorder, feelings of guilt and other emotional and/or mental concerns, and/or exacerbation of existing mental health concerns.  Detailed resources provided and discussed.     Patient and Caregiver agrees to access appropriate resources in a timely manner as needed and/or as recommended, and to communicate concerns appropriately.  Patient and Caregiver also reports a clear understanding of treatment options available.      reviewed education, provided additional information, and answered questions.    Feelings or Concerns: Patient and Caregiver did not express any concerns at this time.    Coping: Pt reports coping well with the transplant process at this time and reports shopping, eating out, attending his [employer] football games at [facility], and spending time with family and friends as ways to cope.    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 Patient referred to Vocational Rehabilitation.    Interview Behavior: Patient and Caregiver presents as alert and oriented x 4, pleasant, good eye contact, well groomed, recall good, concentration/judgement good, average intelligence, calm, communicative, cooperative and asking and answering questions appropriately. Pt presents with Giuseppe Trevizo pt's brother at pt's request.         Transplant Social Work - Candidacy  Assessment/Plan:     Psychosocial Suitability: Patient presents as a suitable candidate for kidney transplant at this time. Based on psychosocial risk factors,  "patient presents as low risk, due to suitable caregiver plan (albeit it has changed since last assessment), financial plan in place with assistance form pt's brother, suitable dialysis adherence (dialysis unit called him "a success story").    Recommendations/Additional Comments: SW recommends that pt conduct fundraising to assist pt with pay for cost of medications, food, gas, and other transplant related needs. SW recommends that pt remain aware of potential mental health concerns and contact the team if any concerns arise. SW recommends that pt remain abstinent from tobacco, ETOH, and drug use. SW supports pt's continued adherence. SW remains available to answer any questions or concerns that arise as the pt moves through the transplant process.     Home Deutsch, ZOEW       "

## 2019-07-02 DIAGNOSIS — Z76.82 ORGAN TRANSPLANT CANDIDATE: Primary | ICD-10-CM

## 2019-07-19 ENCOUNTER — TELEPHONE (OUTPATIENT)
Dept: TRANSPLANT | Facility: CLINIC | Age: 34
End: 2019-07-19

## 2019-07-19 DIAGNOSIS — Z76.82 ORGAN TRANSPLANT CANDIDATE: Primary | ICD-10-CM

## 2019-08-09 DIAGNOSIS — Z76.82 ORGAN TRANSPLANT CANDIDATE: Primary | ICD-10-CM

## 2019-08-15 ENCOUNTER — HOSPITAL ENCOUNTER (OUTPATIENT)
Dept: RADIOLOGY | Facility: HOSPITAL | Age: 34
Discharge: HOME | End: 2019-08-15
Attending: NURSE PRACTITIONER
Payer: MEDICARE

## 2019-08-15 ENCOUNTER — HOSPITAL ENCOUNTER (OUTPATIENT)
Dept: CARDIOLOGY | Facility: HOSPITAL | Age: 34
Discharge: HOME | End: 2019-08-15
Attending: NURSE PRACTITIONER
Payer: MEDICARE

## 2019-08-15 VITALS — HEART RATE: 50 BPM | SYSTOLIC BLOOD PRESSURE: 163 MMHG | DIASTOLIC BLOOD PRESSURE: 100 MMHG

## 2019-08-15 DIAGNOSIS — Z76.82 ORGAN TRANSPLANT CANDIDATE: ICD-10-CM

## 2019-08-15 LAB
CV STRESS BASE HR: 48 BPM
DIASTOLIC BLOOD PRESSURE: 100 MMHG
OHS CV CPX 85 PERCENT MAX PREDICTED HEART RATE MALE: 156
OHS CV CPX MAX PREDICTED HEART RATE: 184
OHS CV CPX PATIENT IS FEMALE: 0
OHS CV CPX PATIENT IS MALE: 1
OHS CV CPX PEAK DIASTOLIC BLOOD PRESSURE: 107 MMHG
OHS CV CPX PEAK HEAR RATE: 101 BPM
OHS CV CPX PEAK RATE PRESSURE PRODUCT: NORMAL
OHS CV CPX PEAK SYSTOLIC BLOOD PRESSURE: 204 MMHG
OHS CV CPX PERCENT MAX PREDICTED HEART RATE ACHIEVED: 55
OHS CV CPX RATE PRESSURE PRODUCT PRESENTING: 7824
STRESS ECHO POST EXERCISE DUR MIN: 3 MINUTES
STRESS ECHO POST EXERCISE DUR SEC: 30 SECONDS
STRESS ECHO TARGET HR: 156.4 BPM
SYSTOLIC BLOOD PRESSURE: 163 MMHG

## 2019-08-15 PROCEDURE — 78452 NM MYOCARDIAL PERFUSION SPECT MULTI PHARM: ICD-10-PCS | Mod: 26,TXP,, | Performed by: RADIOLOGY

## 2019-08-15 PROCEDURE — 63600175 PHARM REV CODE 636 W HCPCS: Mod: TXP | Performed by: INTERNAL MEDICINE

## 2019-08-15 PROCEDURE — A9502 TC99M TETROFOSMIN: HCPCS | Mod: TXP

## 2019-08-15 PROCEDURE — 93016 CV STRESS TEST SUPVJ ONLY: CPT | Mod: TXP,,, | Performed by: INTERNAL MEDICINE

## 2019-08-15 PROCEDURE — 93018 TREADMILL STRESS TEST (CUPID ONLY): ICD-10-PCS | Mod: TXP,,, | Performed by: INTERNAL MEDICINE

## 2019-08-15 PROCEDURE — 93018 CV STRESS TEST I&R ONLY: CPT | Mod: TXP,,, | Performed by: INTERNAL MEDICINE

## 2019-08-15 PROCEDURE — 93016 TREADMILL STRESS TEST (CUPID ONLY): ICD-10-PCS | Mod: TXP,,, | Performed by: INTERNAL MEDICINE

## 2019-08-15 PROCEDURE — 93017 CV STRESS TEST TRACING ONLY: CPT | Mod: TXP

## 2019-08-15 PROCEDURE — 78452 HT MUSCLE IMAGE SPECT MULT: CPT | Mod: 26,TXP,, | Performed by: RADIOLOGY

## 2019-08-15 RX ORDER — REGADENOSON 0.08 MG/ML
0.4 INJECTION, SOLUTION INTRAVENOUS ONCE
Status: COMPLETED | OUTPATIENT
Start: 2019-08-15 | End: 2019-08-15

## 2019-08-15 RX ADMIN — REGADENOSON 0.4 MG: 0.08 INJECTION, SOLUTION INTRAVENOUS at 09:08

## 2019-09-05 ENCOUNTER — HOSPITAL ENCOUNTER (OUTPATIENT)
Dept: RADIOLOGY | Facility: HOSPITAL | Age: 34
Discharge: HOME | End: 2019-09-05
Attending: NURSE PRACTITIONER
Payer: MEDICARE

## 2019-09-05 ENCOUNTER — OFFICE VISIT (OUTPATIENT)
Dept: TRANSPLANT | Facility: CLINIC | Age: 34
End: 2019-09-05
Payer: MEDICARE

## 2019-09-05 VITALS
HEIGHT: 70 IN | BODY MASS INDEX: 24.37 KG/M2 | RESPIRATION RATE: 16 BRPM | WEIGHT: 170.19 LBS | SYSTOLIC BLOOD PRESSURE: 156 MMHG | DIASTOLIC BLOOD PRESSURE: 91 MMHG | OXYGEN SATURATION: 98 % | TEMPERATURE: 98 F | HEART RATE: 51 BPM

## 2019-09-05 DIAGNOSIS — Z76.82 PATIENT ON WAITING LIST FOR KIDNEY TRANSPLANT: Chronic | ICD-10-CM

## 2019-09-05 DIAGNOSIS — Z86.73 HX-TIA (TRANSIENT ISCHEMIC ATTACK): Chronic | ICD-10-CM

## 2019-09-05 DIAGNOSIS — Z99.2 ESRD ON DIALYSIS: Chronic | ICD-10-CM

## 2019-09-05 DIAGNOSIS — Z76.82 ORGAN TRANSPLANT CANDIDATE: ICD-10-CM

## 2019-09-05 DIAGNOSIS — H90.3 SENSORINEURAL HEARING LOSS (SNHL) OF BOTH EARS: Chronic | ICD-10-CM

## 2019-09-05 DIAGNOSIS — N05.1 FSGS (FOCAL SEGMENTAL GLOMERULOSCLEROSIS): Primary | ICD-10-CM

## 2019-09-05 DIAGNOSIS — Z86.69 HX OF SEIZURE DISORDER: Chronic | ICD-10-CM

## 2019-09-05 DIAGNOSIS — I15.0 RENOVASCULAR HYPERTENSION: ICD-10-CM

## 2019-09-05 DIAGNOSIS — N18.6 ESRD ON DIALYSIS: Chronic | ICD-10-CM

## 2019-09-05 DIAGNOSIS — Z76.82 AWAITING ORGAN TRANSPLANT STATUS: ICD-10-CM

## 2019-09-05 LAB
CREAT UR-MCNC: 56 MG/DL (ref 23–375)
PROT UR-MCNC: 243 MG/DL (ref 0–15)
PROT/CREAT UR: 4.34 MG/G{CREAT} (ref 0–0.2)

## 2019-09-05 PROCEDURE — 72170 X-RAY EXAM OF PELVIS: CPT | Mod: TC,TXP

## 2019-09-05 PROCEDURE — 99215 OFFICE O/P EST HI 40 MIN: CPT | Mod: S$PBB,TXP,, | Performed by: INTERNAL MEDICINE

## 2019-09-05 PROCEDURE — 99999 PR PBB SHADOW E&M-EST. PATIENT-LVL V: CPT | Mod: PBBFAC,TXP,, | Performed by: INTERNAL MEDICINE

## 2019-09-05 PROCEDURE — 72170 X-RAY EXAM OF PELVIS: CPT | Mod: 26,TXP,, | Performed by: RADIOLOGY

## 2019-09-05 PROCEDURE — 84156 ASSAY OF PROTEIN URINE: CPT | Mod: TXP

## 2019-09-05 PROCEDURE — 71046 X-RAY EXAM CHEST 2 VIEWS: CPT | Mod: TC,TXP

## 2019-09-05 PROCEDURE — 99999 PR PBB SHADOW E&M-EST. PATIENT-LVL V: ICD-10-PCS | Mod: PBBFAC,TXP,, | Performed by: INTERNAL MEDICINE

## 2019-09-05 PROCEDURE — 72170 XR PELVIS ROUTINE AP: ICD-10-PCS | Mod: 26,TXP,, | Performed by: RADIOLOGY

## 2019-09-05 PROCEDURE — 76770 US EXAM ABDO BACK WALL COMP: CPT | Mod: TC,TXP

## 2019-09-05 PROCEDURE — 99215 OFFICE O/P EST HI 40 MIN: CPT | Mod: PBBFAC,25,TXP | Performed by: INTERNAL MEDICINE

## 2019-09-05 PROCEDURE — 71046 XR CHEST PA AND LATERAL: ICD-10-PCS | Mod: 26,TXP,, | Performed by: RADIOLOGY

## 2019-09-05 PROCEDURE — 99215 PR OFFICE/OUTPT VISIT, EST, LEVL V, 40-54 MIN: ICD-10-PCS | Mod: S$PBB,TXP,, | Performed by: INTERNAL MEDICINE

## 2019-09-05 PROCEDURE — 76770 US EXAM ABDO BACK WALL COMP: CPT | Mod: 26,TXP,, | Performed by: RADIOLOGY

## 2019-09-05 PROCEDURE — 76770 US RETROPERITONEAL COMPLETE: ICD-10-PCS | Mod: 26,TXP,, | Performed by: RADIOLOGY

## 2019-09-05 PROCEDURE — 71046 X-RAY EXAM CHEST 2 VIEWS: CPT | Mod: 26,TXP,, | Performed by: RADIOLOGY

## 2019-09-05 RX ORDER — ISOSORBIDE DINITRATE 10 MG/1
1 TABLET ORAL 2 TIMES DAILY
Refills: 5 | Status: ON HOLD | COMMUNITY
Start: 2019-08-28 | End: 2021-10-21 | Stop reason: HOSPADM

## 2019-09-05 RX ORDER — MINOXIDIL 10 MG/1
1 TABLET ORAL 2 TIMES DAILY
Refills: 11 | Status: ON HOLD | COMMUNITY
Start: 2019-08-12 | End: 2021-10-21 | Stop reason: HOSPADM

## 2019-09-05 RX ORDER — ASPIRIN 81 MG/1
81 TABLET ORAL DAILY PRN
Status: ON HOLD | COMMUNITY
End: 2023-04-02 | Stop reason: SDUPTHER

## 2019-09-05 RX ORDER — LIDOCAINE AND PRILOCAINE 25; 25 MG/G; MG/G
CREAM TOPICAL
Refills: 0 | Status: ON HOLD | COMMUNITY
Start: 2019-07-22 | End: 2021-10-21 | Stop reason: HOSPADM

## 2019-09-05 RX ORDER — DILTIAZEM HYDROCHLORIDE 120 MG/1
120 CAPSULE, EXTENDED RELEASE ORAL DAILY
COMMUNITY
Start: 2019-02-11 | End: 2020-02-11

## 2019-09-05 RX ORDER — CALCIUM ACETATE 667 MG/1
2001 CAPSULE ORAL
Status: ON HOLD | COMMUNITY
End: 2021-10-21 | Stop reason: HOSPADM

## 2019-09-05 RX ORDER — SEVELAMER CARBONATE 800 MG/1
2400 TABLET, FILM COATED ORAL
Status: ON HOLD | COMMUNITY
Start: 2019-06-14 | End: 2021-10-21 | Stop reason: HOSPADM

## 2019-09-05 NOTE — PATIENT INSTRUCTIONS
1. You will need to see a dermatologist for the recurrent skin boils/infections you seem to be having   2. Try to exercise more   3. Have any potential donors contact the living donor coordinator   4. Work with your kidney doctor to better control your blood pressure   5. If your repeat blood pressure here in clinic is high we would recommend you to go to the ER  6. Your white blood cell counts have been low in the labs from [place] so we will have you see a blood specialist to help evaluate this further   7. Give a urine sample today

## 2019-09-05 NOTE — LETTER
September 6, 2019        Ralf Crouch  [address] [place] [place] [address]  Phone: [phone]  Fax: [phone]             [facility]  [address]  [facility] [address]  Phone: [phone]   Patient: Feliz Trevizo   MR Number: [id number]   YOB: 1985   Date of Visit: 9/5/2019       Dear Dr. Ralf Crouch    Thank you for referring Feliz Trevizo to me for evaluation. Attached you will find relevant portions of my assessment and plan of care.    If you have questions, please do not hesitate to call me. I look forward to following Feliz Trevizo along with you.    Sincerely,    Nadege Cedillo MD    Enclosure    If you would like to receive this communication electronically, please contact [email or web address] or [phone] to request [employer] Link access.    [employer] Link is a tool which provides read-only access to select patient information with whom you have a relationship. Its easy to use and provides real time access to review your patients record including encounter summaries, notes, results, and demographic information.    If you feel you have received this communication in error or would no longer like to receive these types of communications, please e-mail [email or web address]
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
## 2019-09-05 NOTE — PROGRESS NOTES
INITIAL PATIENT EDUCATION NOTE    Mr. Feliz Trevizo was seen in pre-kidney transplant clinic for evaluation for kidney, kidney/pancreas or pancreas only transplant.  The patient attended a group education session that discussed/reviewed the following aspects of transplantation: evaluation and selection committee process, UNOS waitlist management/multiple listings, types of organs offered (KDPI < 85%, KDPI > 85%, PHS increased risk, DCD, HCV+), financial aspects, surgical procedures, dietary instruction pre- and post-transplant, health maintenance pre- and post-transplant, post-transplant hospitalization and outpatient follow-up, potential to participate in a research protocol, and medication management and side effects.  A question and answer session was provided after the presentation.    The patient was seen by all members of the multi-disciplinary team to include: Nephrologist/PA,  , Transplant Coordinator, , Pharmacist and Dietician (if applicable).    The patient reviewed and signed all consents for evaluation which were witnessed and sent to scanning into the EPIC chart.    The patient was given an education book and plan for further evaluation based on his individual assessment.      The patient was encouraged to call with any questions or concerns.

## 2019-09-05 NOTE — PROGRESS NOTES
PHARM.D. PRE-TRANSPLANT NOTE:    This patient's medication therapy was evaluated as part of his pre-transplant evaluation.      The following general pharmacologic concerns were noted: diltiazem will interact with tacrolimus; hold aspirin perioperatively     The following pharmacologic concerns related to HCV therapy were noted: none      This patient's medication profile was reviewed for contraindications for DAA Hepatitis C therapy:    [x]  No current inducers of CYP 3A4 or PGP  [x]  No amiodarone on this patient's EMR profile in the last 24 months  [x]  No past or current atrial fibrillation on this patient's EMR profile       Current Outpatient Medications   Medication Sig Dispense Refill    aspirin (ECOTRIN) 81 MG EC tablet Take 81 mg by mouth once daily.      B complex-vitamin C-folic acid (NEPHRO-AVELINA) 0.8 mg Tab Take 0.8 mg by mouth once daily.      calcium acetate (PHOSLO) 667 mg capsule Take 2,001 mg by mouth 3 (three) times daily with meals.      carvedilol (COREG) 25 MG tablet Take 25 mg by mouth 2 (two) times daily with meals.      diltiaZEM HCl (TIAZAC) 120 mg 24 hr capsule Take 120 mg by mouth once daily.      diphenhydramine-acetaminophen (TYLENOL PM)  mg Tab Take 1 tablet by mouth nightly as needed.      isosorbide dinitrate (ISORDIL) 10 MG tablet Take 1 tablet by mouth 2 (two) times daily.  5    lidocaine-prilocaine (EMLA) cream APPLY TO ACCESS AREA 1 HOUR PRIOR TO TREATMENT  0    minoxidil (LONITEN) 10 MG Tab Take 1 tablet by mouth 2 (two) times daily.  11    sevelamer carbonate (RENVELA) 800 mg Tab Take 2,400 mg by mouth 3 (three) times daily with meals.      valsartan (DIOVAN) 320 MG tablet Take 320 mg by mouth every evening.       No current facility-administered medications for this visit.        I am available for consultation and can be contacted, as needed by the other members of the Kidney Transplant team.

## 2019-09-06 ENCOUNTER — TELEPHONE (OUTPATIENT)
Dept: TRANSPLANT | Facility: CLINIC | Age: 34
End: 2019-09-06

## 2019-09-06 DIAGNOSIS — Z76.82 ORGAN TRANSPLANT CANDIDATE: Primary | ICD-10-CM

## 2019-09-06 NOTE — PROGRESS NOTES
YEARLY LIST MANAGEMENT NOTE    Feliz Trevizo's kidney transplant listing status reviewed.  Patient is due for follow-up appointments on 9/5/2020.  Appointments will be scheduled per protocol.

## 2019-09-06 NOTE — TELEPHONE ENCOUNTER
----- Message from Cara Haysa sent at 9/6/2019 11:28 AM CDT -----  Regarding: Lab Client Services  Contact: [phone]  Hi my name is Cara I work in the Lab Client Services. We had a problem with some lab work on this patient. If someone from your office could call us at [phone] or ext [phone] that would be great. Anyone in my department can help. Thank you

## 2019-09-06 NOTE — PROGRESS NOTES
Transplant Recipient Adult Psychosocial Assessment UPDATE     Feliz Trevizo  [address] [place] [address]  Telephone Information:   Mobile [phone]   Home  [phone] (home)  Work  [phone] (work)  E-mail  No e-mail address on record    Sex: male  YOB: 1985  Age: 34 y.o.    Encounter Date: 9/5/2019  [place] Citizen: yes  Primary Language: English   Needed: no (Pt has significant hearing loss since childhood, but reports is able to hear some and read lips)    Emergency Contact:  Name: Meliza Trevizo  Relationship: sister  Address: [address] [place] [address], [place] [address]  Phone Numbers:  [phone] (mobile)    Family/Social Support:   Number of dependents/: Pt reports no dependents.  Marital history: Pt reports never being . Pt reports no current significant other.  Other family dynamics: Pt's reports that both of his parents passed away two years ago and he still struggles with this grief. Pt reports that he has 2 brothers and 2 sisters that are supportive.    Household Composition:  Name: Feliz Trevizo  Age: 34  Relationship: patient  Does person drive? yes    Do you and your caregivers have access to reliable transportation? yes  PRIMARY CAREGIVER: Giuseppe Trevizo (brother) will be primary caregiver, phone number [phone]      provided in-depth information to patient and caregiver regarding pre- and post-transplant caregiver role.   strongly encourages patient and caregiver to have concrete plan regarding post-transplant care giving, including back-up caregiver(s) to ensure care giving needs are met as needed.    Patient and Caregiver states understanding all aspects of caregiver role/commitment and is able/willing/committed to being caregiver to the fullest extent necessary.    Patient and Caregiver verbalizes understanding of the education provided today and caregiver responsibilities.         remains available. Patient  and Caregiver agree to contact  in a timely manner if concerns arise.      Able to take time off work without financial concerns: yes. Giuseppe reports that his daughter will be cared for by her biological mother.    Additional Significant Others who will Assist with Transplant:  Name: Dimas Mckay  Age: 56  Phone: [phone]  City: [place] State: [place]  Relationship: Giuseppe's significant other  Does person drive? yes    Name: Saira Trevizo  Age: 45  Phone: [phone] (mobile) [phone] (work)  City: [facility] State: [place]  Relationship: sister  Does person drive? yes   +  Name: Shilpa Trevizo  Age: 48  Phone: [phone]  City: [place] State: [place]  Relationship: sister  Does person drive? yes    Living Will: no  Healthcare Power of : nopt reports trusting siblings with medical decisions   Advance Directives on file: <<no information> per medical record.  Verbally reviewed LW/HCPA information.   provided patient with copy of LW/HCPA documents and provided education on completion of forms.    Living Donors: No.    Highest Education Level: High School (9-12) or GED  Reading Ability: 12th grade  Reports difficulty with: reading and hearing pt reports he lost his hearing when he was about 1 years old. Pt is able to hear some and can communicate well by reading lips.   Learns Best By:  Pt reports learning best by verbal and visual instruction.     Status: no  VA Benefits: no     Working for Income: No  If no, reason not working: Disability    Patient is disabled.  Prior to disability, patient  was employed as house keeper at local hospital..    Spouse/Significant Other Employment: Meliza reports she is retired from the . Giuseppe reports that he is an [employer]igator    Disabled: yes: date disability began: June 2014, due to: ESRD.    Monthly Income:   Disability: $1,060  [employer] [place]: $17  Able to afford all costs now and if transplanted, including medications:  yes. Pt reports Giuseppe  will be able to assist as needed  Patient and Caregiver verbalizes understanding of personal responsibilities related to transplant costs and the importance of having a financial plan to ensure that patients transplant costs are fully covered.      provided fundraising information/education.  Patient and Caregiver verbalizes understanding.   remains available.    Insurance:   Payor/Plan Subscr  Sex Relation Sub. Ins. ID Effective Group Num   1. MEDICARE - [place]* JOSÉ DOMINGUEZ 1985 Male  [id number] 7/1/10                                    [address]   2. BLUE CROSS BL* JOSÉ DOMINGUEZ 1985 Male  [id number]* 6/1/10 [id number]                                   [address] [address]     Primary Insurance (for UNOS reporting): Public Insurance - Medicare FFS (Fee For Service) Pt should not loose Medicare due to significant hearing loss. SW encouraged pt to confirm disability at the SS office.    Secondary Insurance (for UNOS reporting): Private Insurance  Pt reports BCBS Supplement is being paid for by the [employer]. SW provide patient and brother with information regarding pt's responsibility for payment after transplant. Patient and Caregiver verbalized understanding.     Patient and Caregiver verbalizes clear understanding that patient may experience difficulty obtaining and/or be denied insurance coverage post-surgery. This includes and is not limited to disability insurance, life insurance, health insurance, burial insurance, long term care insurance, and other insurances.    Patient and Caregiver also reports understanding that future health concerns related to or unrelated to transplantation may not be covered by patient's insurance.  Resources and information provided and reviewed.      Patient and Caregiver provides verbal permission to release any necessary information to outside resources for patient care and discharge planning.  Resources and  information provided are reviewed.      Dialysis Adherence:  Patient and Caregiver reports being on hemodialysis in center, attending all dialysis appointments, and staying for the entire course of treatment. Pt has history of good compliance.     Infusion Service: patient utilizing? no  Home Health: patient utilizing? no but reports using once in the past  DME: yes BP Cuff   Pulmonary/Cardiac Rehab: Pt denies   ADLS:  Pt reports no difficulties with driving, walking, bathing, cooking, housekeeping, eating, shopping, and taking medication.    Adherence:   Pt reports suitable adherence with medications, dialysis, and health regimen. Adherence education and counseling provided.     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  Alcohol: Pt reports social use.  Illicit Drugs/Non-prescribed Medications: none, patient denies any use.    Patient and Caregiver states clear understanding of the potential impact of substance use as it relates to transplant candidacy and is aware of possible random substance screening.  Substance abstinence/cessation counseling, education and resources provided and reviewed.     Arrests/DWI/Treatment/Rehab: patient denies    Psychiatric History:    Mental Health: depression pt reports he previously struggled with depression after his mother passed away. Pt  reports no current issues with mental health. Pt reports as highly supported by siblings.   Psychiatrist/Counselor: Pt denies seeing a mental health professional and reports being open to seeing the psych department for talk therapy if necessary.  Medications:  Pt reports previously taking an antidepressant, but no longer takes.  Suicide/Homicide Issues: Pt denies any history of or current suicidal or homicidal ideations.   Safety at home: Pt reports no current or history of safety concerns in household; including mental, physical, verbal, or sexual abuse.    Knowledge: Patient and Caregiver states having clear understanding and realistic expectations regarding the potential risks and potential benefits of organ transplantation and organ donation, agrees to discuss with health care team members and support system members and to utilize available resources and express questions and/or concerns in order to further facilitate the pt informed decision-making.  Resources and information provided and reviewed.     Patient and Caregiver is aware of [facility]'s affiliation and/or partnership with agencies in home health care, LTAC, SNF, [facility], [facility], and other hospitals and clinics.    Understanding: Patient and Caregiver reports having a clear understanding of the many lifetime commitments involved with being a transplant recipient, including costs, compliance, medications, lab work, procedures, appointments, concrete and financial planning, preparedness, timely and appropriate communication of concerns, abstinence (ETOH, tobacco, illicit non-prescribed drugs), adherence to all health care team recommendations, support system and caregiver involvement, appropriate and timely resource utilization and follow-through, mental health counseling as needed/recommended, and patient and caregiver responsibilities.  Social Service Handbook, resources and detailed educational information provided and reviewed. Educational information  provided.    Patient and Caregiver also reports current and expected compliance with health care regime and states having a clear understanding of the importance of compliance.      Patient and Caregiver reports a clear understanding that risks and benefits may be involved with organ transplantation and with organ donation.      Patient and Caregiver also reports clear understanding that psychosocial risk factors may affect patient, and include but are not limited to feelings of depression, generalized anxiety, anxiety regarding dependence on others, post traumatic stress disorder, feelings of guilt and other emotional and/or mental concerns, and/or exacerbation of existing mental health concerns.  Detailed resources provided and discussed.     Patient and Caregiver agrees to access appropriate resources in a timely manner as needed and/or as recommended, and to communicate concerns appropriately.  Patient and Caregiver also reports a clear understanding of treatment options available.      reviewed education, provided additional information, and answered questions.    Feelings or Concerns: Patient and Caregiver did not express any concerns at this time.    Coping: Pt reports coping well with the transplant process at this time and reports shopping, eating out, attending his [employer] football games at [facility], and spending time with family and friends as ways to cope.    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 Patient referred to Vocational Rehabilitation.    Interview Behavior: Patient and Caregiver presents as alert and oriented x 4, pleasant, good eye contact, well groomed, recall good, concentration/judgement good, average intelligence, calm, communicative, cooperative and asking and answering questions appropriately. Pt presents with Saira Trevizo, pt's brother at pt's request.          Transplant Social Work - Candidacy  Assessment/Plan:     Psychosocial Suitability: Patient presents as a suitable candidate for kidney transplant at this time. Based on psychosocial risk factors, patient presents as low risk, due to absence of overwhelming psychosocial concerns. .    Recommendations/Additional Comments: SW recommends that pt conduct fundraising to assist pt with pay for cost of medications, food, gas, and other transplant related needs. SW recommends that pt remain aware of potential mental health concerns and contact the team if any concerns arise. SW recommends that pt remain abstinent from tobacco, ETOH, and drug use. SW supports pt's continued adherence. SW remains available to answer any questions or concerns that arise as the pt moves through the transplant process.     Mendel Brown, PAULINE, LMSW

## 2019-09-09 ENCOUNTER — TELEPHONE (OUTPATIENT)
Dept: TRANSPLANT | Facility: CLINIC | Age: 34
End: 2019-09-09

## 2019-10-03 ENCOUNTER — TELEPHONE (OUTPATIENT)
Dept: HEMATOLOGY/ONCOLOGY | Facility: CLINIC | Age: 34
End: 2019-10-03

## 2019-10-07 ENCOUNTER — TELEPHONE (OUTPATIENT)
Dept: HEMATOLOGY/ONCOLOGY | Facility: CLINIC | Age: 34
End: 2019-10-07

## 2019-10-13 ENCOUNTER — TELEPHONE (OUTPATIENT)
Dept: TRANSPLANT | Facility: CLINIC | Age: 34
End: 2019-10-13

## 2019-10-13 NOTE — TELEPHONE ENCOUNTER
ON-CALL NOTE    UNOS# [id number]    Notified by Laith Velez, , that Feliz Trevizo is eligible for kidney offer. During chart review noted patient having recurrent skin infections (boils) and has dermatology appt upcoming. He currently has a hard, swollen area on his back that his dialysis unit is treating. This is reviewed with Dr Jules who gave VORB to make inactive until cleared by dermatology.

## 2019-10-14 DIAGNOSIS — Z76.82 ORGAN TRANSPLANT CANDIDATE: Primary | ICD-10-CM

## 2019-10-15 ENCOUNTER — TELEPHONE (OUTPATIENT)
Dept: HEMATOLOGY/ONCOLOGY | Facility: CLINIC | Age: 34
End: 2019-10-15

## 2019-11-04 ENCOUNTER — TELEPHONE (OUTPATIENT)
Dept: TRANSPLANT | Facility: CLINIC | Age: 34
End: 2019-11-04

## 2019-11-05 ENCOUNTER — INITIAL CONSULT (OUTPATIENT)
Dept: HEMATOLOGY/ONCOLOGY | Facility: CLINIC | Age: 34
End: 2019-11-05
Payer: MEDICARE

## 2019-11-05 ENCOUNTER — LAB VISIT (OUTPATIENT)
Dept: LAB | Facility: HOSPITAL | Age: 34
End: 2019-11-05
Attending: INTERNAL MEDICINE
Payer: MEDICARE

## 2019-11-05 VITALS
OXYGEN SATURATION: 98 % | WEIGHT: 170.19 LBS | HEIGHT: 70 IN | DIASTOLIC BLOOD PRESSURE: 102 MMHG | TEMPERATURE: 98 F | SYSTOLIC BLOOD PRESSURE: 166 MMHG | BODY MASS INDEX: 24.37 KG/M2 | HEART RATE: 73 BPM | RESPIRATION RATE: 16 BRPM

## 2019-11-05 DIAGNOSIS — D52.0 DIETARY FOLATE DEFICIENCY ANEMIA: ICD-10-CM

## 2019-11-05 DIAGNOSIS — Z76.82 KIDNEY TRANSPLANT CANDIDATE: ICD-10-CM

## 2019-11-05 DIAGNOSIS — E53.8 FOLIC ACID DEFICIENCY: Primary | ICD-10-CM

## 2019-11-05 DIAGNOSIS — D64.9 ANEMIA, UNSPECIFIED TYPE: ICD-10-CM

## 2019-11-05 DIAGNOSIS — D72.819 LEUKOPENIA, UNSPECIFIED TYPE: Primary | ICD-10-CM

## 2019-11-05 DIAGNOSIS — D72.819 LEUKOPENIA, UNSPECIFIED TYPE: ICD-10-CM

## 2019-11-05 LAB
ALBUMIN SERPL BCP-MCNC: 4.3 G/DL (ref 3.5–5.2)
ALP SERPL-CCNC: 50 U/L (ref 55–135)
ALT SERPL W/O P-5'-P-CCNC: 5 U/L (ref 10–44)
ANION GAP SERPL CALC-SCNC: 10 MMOL/L (ref 8–16)
ANISOCYTOSIS BLD QL SMEAR: SLIGHT
AST SERPL-CCNC: 10 U/L (ref 10–40)
BASOPHILS # BLD AUTO: 0.04 K/UL (ref 0–0.2)
BASOPHILS NFR BLD: 1 % (ref 0–1.9)
BILIRUB SERPL-MCNC: 0.7 MG/DL (ref 0.1–1)
BUN SERPL-MCNC: 28 MG/DL (ref 6–20)
CALCIUM SERPL-MCNC: 9.8 MG/DL (ref 8.7–10.5)
CHLORIDE SERPL-SCNC: 108 MMOL/L (ref 95–110)
CO2 SERPL-SCNC: 26 MMOL/L (ref 23–29)
CREAT SERPL-MCNC: 11.9 MG/DL (ref 0.5–1.4)
DIFFERENTIAL METHOD: ABNORMAL
EOSINOPHIL # BLD AUTO: 0.1 K/UL (ref 0–0.5)
EOSINOPHIL NFR BLD: 3.4 % (ref 0–8)
ERYTHROCYTE [DISTWIDTH] IN BLOOD BY AUTOMATED COUNT: 17.3 % (ref 11.5–14.5)
EST. GFR  (AFRICAN AMERICAN): 5.6 ML/MIN/1.73 M^2
EST. GFR  (NON AFRICAN AMERICAN): 4.8 ML/MIN/1.73 M^2
FERRITIN SERPL-MCNC: 248 NG/ML (ref 20–300)
FOLATE SERPL-MCNC: 2.8 NG/ML (ref 4–24)
GLUCOSE SERPL-MCNC: 87 MG/DL (ref 70–110)
HAPTOGLOB SERPL-MCNC: 45 MG/DL (ref 30–250)
HCT VFR BLD AUTO: 37.4 % (ref 40–54)
HGB BLD-MCNC: 11 G/DL (ref 14–18)
HYPOCHROMIA BLD QL SMEAR: ABNORMAL
IGA SERPL-MCNC: 174 MG/DL (ref 40–350)
IGG SERPL-MCNC: 1666 MG/DL (ref 650–1600)
IGM SERPL-MCNC: 76 MG/DL (ref 50–300)
IMM GRANULOCYTES # BLD AUTO: 0.01 K/UL (ref 0–0.04)
IMM GRANULOCYTES NFR BLD AUTO: 0.3 % (ref 0–0.5)
IRON SERPL-MCNC: 68 UG/DL (ref 45–160)
LDH SERPL L TO P-CCNC: 164 U/L (ref 110–260)
LYMPHOCYTES # BLD AUTO: 0.9 K/UL (ref 1–4.8)
LYMPHOCYTES NFR BLD: 22.3 % (ref 18–48)
MCH RBC QN AUTO: 23.6 PG (ref 27–31)
MCHC RBC AUTO-ENTMCNC: 29.4 G/DL (ref 32–36)
MCV RBC AUTO: 80 FL (ref 82–98)
MONOCYTES # BLD AUTO: 0.5 K/UL (ref 0.3–1)
MONOCYTES NFR BLD: 11.8 % (ref 4–15)
NEUTROPHILS # BLD AUTO: 2.3 K/UL (ref 1.8–7.7)
NEUTROPHILS NFR BLD: 61.2 % (ref 38–73)
NRBC BLD-RTO: 0 /100 WBC
PLATELET # BLD AUTO: 184 K/UL (ref 150–350)
PMV BLD AUTO: 9.7 FL (ref 9.2–12.9)
POLYCHROMASIA BLD QL SMEAR: ABNORMAL
POTASSIUM SERPL-SCNC: 4.3 MMOL/L (ref 3.5–5.1)
PROT SERPL-MCNC: 7.8 G/DL (ref 6–8.4)
RBC # BLD AUTO: 4.66 M/UL (ref 4.6–6.2)
RETICS/RBC NFR AUTO: 1.1 % (ref 0.4–2)
SATURATED IRON: 28 % (ref 20–50)
SODIUM SERPL-SCNC: 144 MMOL/L (ref 136–145)
TOTAL IRON BINDING CAPACITY: 240 UG/DL (ref 250–450)
TRANSFERRIN SERPL-MCNC: 162 MG/DL (ref 200–375)
VIT B12 SERPL-MCNC: 301 PG/ML (ref 210–950)
WBC # BLD AUTO: 3.82 K/UL (ref 3.9–12.7)

## 2019-11-05 PROCEDURE — 82746 ASSAY OF FOLIC ACID SERUM: CPT | Mod: TXP

## 2019-11-05 PROCEDURE — 99999 PR PBB SHADOW E&M-EST. PATIENT-LVL III: ICD-10-PCS | Mod: PBBFAC,,, | Performed by: INTERNAL MEDICINE

## 2019-11-05 PROCEDURE — 80053 COMPREHEN METABOLIC PANEL: CPT | Mod: TXP

## 2019-11-05 PROCEDURE — 83010 ASSAY OF HAPTOGLOBIN QUANT: CPT | Mod: TXP

## 2019-11-05 PROCEDURE — 82607 VITAMIN B-12: CPT | Mod: TXP

## 2019-11-05 PROCEDURE — 99213 OFFICE O/P EST LOW 20 MIN: CPT | Mod: PBBFAC | Performed by: INTERNAL MEDICINE

## 2019-11-05 PROCEDURE — 82728 ASSAY OF FERRITIN: CPT | Mod: TXP

## 2019-11-05 PROCEDURE — 86334 PATHOLOGIST INTERPRETATION IFE: ICD-10-PCS | Mod: 26,TXP,, | Performed by: PATHOLOGY

## 2019-11-05 PROCEDURE — 86334 IMMUNOFIX E-PHORESIS SERUM: CPT | Mod: 26,TXP,, | Performed by: PATHOLOGY

## 2019-11-05 PROCEDURE — 83540 ASSAY OF IRON: CPT | Mod: TXP

## 2019-11-05 PROCEDURE — 84165 PATHOLOGIST INTERPRETATION SPE: ICD-10-PCS | Mod: 26,TXP,, | Performed by: PATHOLOGY

## 2019-11-05 PROCEDURE — 99205 OFFICE O/P NEW HI 60 MIN: CPT | Mod: S$PBB,,, | Performed by: INTERNAL MEDICINE

## 2019-11-05 PROCEDURE — 99999 PR PBB SHADOW E&M-EST. PATIENT-LVL III: CPT | Mod: PBBFAC,,, | Performed by: INTERNAL MEDICINE

## 2019-11-05 PROCEDURE — 83615 LACTATE (LD) (LDH) ENZYME: CPT | Mod: TXP

## 2019-11-05 PROCEDURE — 86334 IMMUNOFIX E-PHORESIS SERUM: CPT | Mod: TXP

## 2019-11-05 PROCEDURE — 82784 ASSAY IGA/IGD/IGG/IGM EACH: CPT | Mod: 59,TXP

## 2019-11-05 PROCEDURE — 82525 ASSAY OF COPPER: CPT | Mod: TXP

## 2019-11-05 PROCEDURE — 84165 PROTEIN E-PHORESIS SERUM: CPT | Mod: 26,TXP,, | Performed by: PATHOLOGY

## 2019-11-05 PROCEDURE — 83021 HEMOGLOBIN CHROMOTOGRAPHY: CPT | Mod: TXP

## 2019-11-05 PROCEDURE — 85025 COMPLETE CBC W/AUTO DIFF WBC: CPT | Mod: TXP

## 2019-11-05 PROCEDURE — 85045 AUTOMATED RETICULOCYTE COUNT: CPT | Mod: TXP

## 2019-11-05 PROCEDURE — 84165 PROTEIN E-PHORESIS SERUM: CPT | Mod: TXP

## 2019-11-05 PROCEDURE — 36415 COLL VENOUS BLD VENIPUNCTURE: CPT | Mod: TXP

## 2019-11-05 PROCEDURE — 99205 PR OFFICE/OUTPT VISIT, NEW, LEVL V, 60-74 MIN: ICD-10-PCS | Mod: S$PBB,,, | Performed by: INTERNAL MEDICINE

## 2019-11-05 RX ORDER — FOLIC ACID 1 MG/1
1 TABLET ORAL DAILY
Qty: 100 TABLET | Refills: 3 | Status: ON HOLD | OUTPATIENT
Start: 2019-11-05 | End: 2021-10-21 | Stop reason: HOSPADM

## 2019-11-05 NOTE — PROGRESS NOTES
SECTION OF HEMATOLOGY AND BONE MARROW TRANSPLANT  New Patient Visit   11/06/2019  Referred by:  Dr. Nadege Cedillo  Referred for: leukopenia/anemia/pre renal transplant hematologic evaluation    CHIEF COMPLAINT: No chief complaint on file.      HISTORY OF PRESENT ILLNESS:   34 y.o. male; history of ESRD due to FSGS currently on TIW HD in [place].    Presents as pre renal transplant eval for leukopenia/anemia. Denies fever, chills, nightsweats, bleeding, brusing, lymphadenopathy, signs/symptoms of splenomegaly.    Upon review of historical labs, wbc count appears to have been normal until feb 2019 when declined mildly and has been stable since then with mild lymphopenia.  Denies mucositis, recurrent infections.    With regard to anemia appears baseline is 9-11 grams going back to at least 2016.  Denies bleeding, bruising.  Denies fever, chills, nightsweats, lymphadenopathy, signs/symptoms of splenomegaly.    Denies familial anemia.      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 He has never used smokeless tobacco. He reports that he does not drink alcohol or use drugs.    FAMILY HISTORY:  Family History   Problem Relation Age of Onset    Cancer Mother     Hypertension Mother     Heart disease Mother     Kidney disease Father     Hypertension Sister     Seizures Brother     Sickle cell trait Brother     Heart defect Brother     No Known Problems Sister     Heart disease Maternal Grandmother     Hypertension Maternal Grandmother     Birth defects Maternal  Grandmother     Stomach cancer Maternal Grandmother     Heart disease Maternal Grandfather     Hypertension Maternal Grandfather     Birth defects Maternal Grandfather     Lung cancer Maternal Grandfather        CURRENT MEDICATIONS:   Current Outpatient Medications   Medication Sig    aspirin (ECOTRIN) 81 MG EC tablet Take 81 mg by mouth once daily.    B complex-vitamin C-folic acid (NEPHRO-AVELINA) 0.8 mg Tab Take 0.8 mg by mouth once daily.    calcium acetate (PHOSLO) 667 mg capsule Take 2,001 mg by mouth 3 (three) times daily with meals.    carvedilol (COREG) 25 MG tablet Take 25 mg by mouth 2 (two) times daily with meals.    diltiaZEM HCl (TIAZAC) 120 mg 24 hr capsule Take 120 mg by mouth once daily.    diphenhydramine-acetaminophen (TYLENOL PM)  mg Tab Take 1 tablet by mouth nightly as needed.    isosorbide dinitrate (ISORDIL) 10 MG tablet Take 1 tablet by mouth 2 (two) times daily.    lidocaine-prilocaine (EMLA) cream APPLY TO ACCESS AREA 1 HOUR PRIOR TO TREATMENT    minoxidil (LONITEN) 10 MG Tab Take 1 tablet by mouth 2 (two) times daily.    sevelamer carbonate (RENVELA) 800 mg Tab Take 2,400 mg by mouth 3 (three) times daily with meals.    valsartan (DIOVAN) 320 MG tablet Take 320 mg by mouth every evening.    folic acid (FOLVITE) 1 MG tablet Take 1 tablet (1 mg total) by mouth once daily.     No current facility-administered medications for this visit.      ALLERGIES:   Review of patient's allergies indicates:  No Known Allergies          REVIEW OF SYSTEMS:   General ROS: negative  Psychological ROS: negative  Ophthalmic ROS: negative  ENT ROS: negative  Allergy and Immunology ROS: negative  Hematological and Lymphatic ROS: negative  Endocrine ROS: negative  Respiratory ROS: negative  Cardiovascular ROS: negative  Gastrointestinal ROS: negative  Genito-Urinary ROS: negative  Musculoskeletal ROS: negative  Neurological ROS: negative  Dermatological ROS: negative    PHYSICAL EXAM:    Vitals:    11/05/19 1651   BP: (!) 166/102   Pulse:    Resp:    Temp:        General - well developed, well nourished, no apparent distress  Head & Face - no sinus tenderness  Eyes - normal conjunctivae and lids   ENT - normal external auditory canals and tympanic membranes bilaterally oropharynx clear,  Normal dentition and gums  Neck - normal thyroid  Chest and Lung - normal respiratory effort, clear to auscultation bilaterally   Cardiovascular - RRR with no MGR, normal S1 and S2; no pedal edema  Abdomen -  soft, nontender, no palpable hepatomegaly or splenomegaly  Lymph - no palpable lymphadenopathy  Extremities - LUE fistula   Heme - no bruising, petechiae, pallor  Skin - no rashes or lesions  Psych - appropriate mood and affect      ECOG Performance Status: (foot note - ECOG PS provided by Eastern Cooperative Oncology Group) 1 - Symptomatic but completely ambulatory    Karnofsky Performance Score:  90%- Able to Carry on Normal Activity: Minor Symptoms of Disease  DATA:   Results for JOSÉ DOMINGUEZ (MRN [id number]) as of 11/6/2019 14:39   Ref. Range 11/5/2019 15:21   WBC Latest Ref Range: 3.90 - 12.70 K/uL 3.82 (L)   RBC Latest Ref Range: 4.60 - 6.20 M/uL 4.66   Hemoglobin Latest Ref Range: 14.0 - 18.0 g/dL 11.0 (L)   Hematocrit Latest Ref Range: 40.0 - 54.0 % 37.4 (L)   MCV Latest Ref Range: 82 - 98 fL 80 (L)   MCH Latest Ref Range: 27.0 - 31.0 pg 23.6 (L)   MCHC Latest Ref Range: 32.0 - 36.0 g/dL 29.4 (L)   RDW Latest Ref Range: 11.5 - 14.5 % 17.3 (H)   Platelets Latest Ref Range: 150 - 350 K/uL 184   MPV Latest Ref Range: 9.2 - 12.9 fL 9.7   Gran% Latest Ref Range: 38.0 - 73.0 % 61.2   Gran # (ANC) Latest Ref Range: 1.8 - 7.7 K/uL 2.3   Lymph% Latest Ref Range: 18.0 - 48.0 % 22.3   Lymph # Latest Ref Range: 1.0 - 4.8 K/uL 0.9 (L)   Mono% Latest Ref Range: 4.0 - 15.0 % 11.8   Mono # Latest Ref Range: 0.3 - 1.0 K/uL 0.5   Eosinophil% Latest Ref Range: 0.0 - 8.0 % 3.4   Eos # Latest Ref Range: 0.0 - 0.5 K/uL  0.1   Basophil% Latest Ref Range: 0.0 - 1.9 % 1.0   Baso # Latest Ref Range: 0.00 - 0.20 K/uL 0.04   nRBC Latest Ref Range: 0 /100 WBC 0   Aniso Unknown Slight   Poly Unknown Occasional   Hypo Unknown Occasional   Differential Method Unknown Automated   Immature Grans (Abs) Latest Ref Range: 0.00 - 0.04 K/uL 0.01   Immature Granulocytes Latest Ref Range: 0.0 - 0.5 % 0.3   Iron Latest Ref Range: 45 - 160 ug/dL 68   TIBC Latest Ref Range: 250 - 450 ug/dL 240 (L)   Saturated Iron Latest Ref Range: 20 - 50 % 28   Transferrin Latest Ref Range: 200 - 375 mg/dL 162 (L)   Ferritin Latest Ref Range: 20.0 - 300.0 ng/mL 248   Folate Latest Ref Range: 4.0 - 24.0 ng/mL 2.8 (L)   Vitamin B-12 Latest Ref Range: 210 - 950 pg/mL 301   Haptoglobin Latest Ref Range: 30 - 250 mg/dL 45   Retic Latest Ref Range: 0.4 - 2.0 % 1.1       ASSESSMENT AND PLAN:   Encounter Diagnoses   Name Primary?    Folic acid deficiency Yes    Anemia, unspecified type     Leukopenia, unspecified type     Kidney transplant candidate      -suspect anemia of renal insufficiency and folic acid def anemia; start folic acid 1mg daily (11/5/19)  -suspect benign physiologic leukopenia  -low clinical index of suspicion for underlying bone marrow process   -will complete peripheral nutritional/infectious/hemolytic/hereditary/TSH/plasma cell disorder workup for other etiologies of cytopenias  -if his cytopenias do not recover he will ultimately will need bone marrow biopsy as going for possible solid organ transplant   -will recheck his cbc see patient back 12/10/19 when he is back in town for another appt; if cbc normalized can likely defer marrow; if still with cytopenias will plan on obtaining bone marrow biopsy that day       Follow Up: cbc with DIFF, folic acid  Lab and MD appt, followed by in  clinic bone marrow biopsy on 12/10/19  David Adams MD  Hematology/Oncology/Bone Marrow Transplant

## 2019-11-05 NOTE — Clinical Note
cbc with diff, folic acid  Lab and MD appt, followed by in  clinic bone marrow biopsy on 12/10/19; all needs to be same day as travelling from out of town

## 2019-11-05 NOTE — LETTER
November 6, 2019      Nadege Cedillo MD  [address]  [facility] [address]           [facility]  [address]  [facility] [address]  Phone: [phone]          Patient: Feliz Trevizo   MR Number: [id number]   YOB: 1985   Date of Visit: 11/5/2019       Dear Dr. Nadege Cedillo:    Thank you for referring Feliz Trevizo to me for evaluation. Attached you will find relevant portions of my assessment and plan of care.    If you have questions, please do not hesitate to call me. I look forward to following Feliz Trevizo along with you.    Sincerely,    Bruno Adams MD    Enclosure  CC:  No Recipients    If you would like to receive this communication electronically, please contact [email or web address] or [phone] to request more information on [employer] Link access.    For providers and/or their staff who would like to refer a patient to [facility], please contact us through our one-stop-shop provider referral line, [facility], at [phone].    If you feel you have received this communication in error or would no longer like to receive these types of communications, please e-mail [email or web address]

## 2019-11-06 LAB
ALBUMIN SERPL ELPH-MCNC: 4.57 G/DL (ref 3.35–5.55)
ALPHA1 GLOB SERPL ELPH-MCNC: 0.32 G/DL (ref 0.17–0.41)
ALPHA2 GLOB SERPL ELPH-MCNC: 0.48 G/DL (ref 0.43–0.99)
B-GLOBULIN SERPL ELPH-MCNC: 0.67 G/DL (ref 0.5–1.1)
GAMMA GLOB SERPL ELPH-MCNC: 1.57 G/DL (ref 0.67–1.58)
INTERPRETATION SERPL IFE-IMP: NORMAL
PROT SERPL-MCNC: 7.6 G/DL (ref 6–8.4)

## 2019-11-07 LAB
HGB A2 MFR BLD HPLC: 3.5 % (ref 2.2–3.2)
HGB FRACT BLD ELPH-IMP: ABNORMAL
HGB FRACT BLD ELPH-IMP: ABNORMAL
PATHOLOGIST INTERPRETATION IFE: NORMAL
PATHOLOGIST INTERPRETATION SPE: NORMAL
STFR SERPL-MCNC: 3.1 MG/L (ref 1.8–4.6)

## 2019-11-08 ENCOUNTER — TELEPHONE (OUTPATIENT)
Dept: HEMATOLOGY/ONCOLOGY | Facility: CLINIC | Age: 34
End: 2019-11-08

## 2019-11-08 DIAGNOSIS — E53.8 FOLIC ACID DEFICIENCY: ICD-10-CM

## 2019-11-08 DIAGNOSIS — D64.9 ANEMIA, UNSPECIFIED TYPE: Primary | ICD-10-CM

## 2019-11-08 LAB — COPPER SERPL-MCNC: 749 UG/L (ref 665–1480)

## 2019-11-08 NOTE — TELEPHONE ENCOUNTER
----- Message from Pina Macdonald sent at 2019  3:53 PM CST -----  Regardin/10-bmbx,Dr. OSBORNE appt  Message   Received: Today   Message Contents   Bruno Adams MD  P [facility] Bmt  Pool         cbc with diff, folic acid  Lab and MD adamt, followed by in  clinic bone marrow biopsy on 12/10/19; all needs to be same day as travelling from out of [place]

## 2019-11-08 NOTE — TELEPHONE ENCOUNTER
Called pt to schedule all appts on the same day as pt travels from out of town. Also was able to reschedule derm appt to the same day as other BMT appts.

## 2019-11-13 LAB
ALPHA GLOBIN RELEASED BY: NORMAL
ALPHA-GLOBIN SPECIMEN: NORMAL
GENETICIST REVIEW: NORMAL
HBA1 GENE MUT ANL BLD/T: NORMAL
HBA1 GENE MUT ANL BLD/T: NORMAL
REF LAB TEST METHOD: NORMAL
SERVICE CMNT-IMP: NORMAL
SPECIMEN SOURCE: NORMAL

## 2019-12-17 ENCOUNTER — OFFICE VISIT (OUTPATIENT)
Dept: HEMATOLOGY/ONCOLOGY | Facility: CLINIC | Age: 34
End: 2019-12-17
Payer: MEDICARE

## 2019-12-17 ENCOUNTER — LAB VISIT (OUTPATIENT)
Dept: LAB | Facility: HOSPITAL | Age: 34
End: 2019-12-17
Payer: MEDICARE

## 2019-12-17 VITALS
HEART RATE: 70 BPM | SYSTOLIC BLOOD PRESSURE: 146 MMHG | DIASTOLIC BLOOD PRESSURE: 83 MMHG | TEMPERATURE: 98 F | RESPIRATION RATE: 20 BRPM | OXYGEN SATURATION: 98 %

## 2019-12-17 DIAGNOSIS — Z76.82 KIDNEY TRANSPLANT CANDIDATE: ICD-10-CM

## 2019-12-17 DIAGNOSIS — D64.9 ANEMIA, UNSPECIFIED TYPE: ICD-10-CM

## 2019-12-17 DIAGNOSIS — D72.819 LEUKOPENIA, UNSPECIFIED TYPE: ICD-10-CM

## 2019-12-17 DIAGNOSIS — E53.8 FOLIC ACID DEFICIENCY: ICD-10-CM

## 2019-12-17 DIAGNOSIS — D64.9 ANEMIA, UNSPECIFIED TYPE: Primary | ICD-10-CM

## 2019-12-17 LAB
BASOPHILS # BLD AUTO: 0.03 K/UL (ref 0–0.2)
BASOPHILS NFR BLD: 0.9 % (ref 0–1.9)
DIFFERENTIAL METHOD: ABNORMAL
EOSINOPHIL # BLD AUTO: 0.1 K/UL (ref 0–0.5)
EOSINOPHIL NFR BLD: 2.6 % (ref 0–8)
ERYTHROCYTE [DISTWIDTH] IN BLOOD BY AUTOMATED COUNT: 18.1 % (ref 11.5–14.5)
FOLATE SERPL-MCNC: >40 NG/ML (ref 4–24)
HCT VFR BLD AUTO: 38.3 % (ref 40–54)
HGB BLD-MCNC: 11.2 G/DL (ref 14–18)
IMM GRANULOCYTES # BLD AUTO: 0.02 K/UL (ref 0–0.04)
IMM GRANULOCYTES NFR BLD AUTO: 0.6 % (ref 0–0.5)
LYMPHOCYTES # BLD AUTO: 0.8 K/UL (ref 1–4.8)
LYMPHOCYTES NFR BLD: 23.8 % (ref 18–48)
MCH RBC QN AUTO: 23.9 PG (ref 27–31)
MCHC RBC AUTO-ENTMCNC: 29.2 G/DL (ref 32–36)
MCV RBC AUTO: 82 FL (ref 82–98)
MONOCYTES # BLD AUTO: 0.3 K/UL (ref 0.3–1)
MONOCYTES NFR BLD: 9.9 % (ref 4–15)
NEUTROPHILS # BLD AUTO: 2.1 K/UL (ref 1.8–7.7)
NEUTROPHILS NFR BLD: 62.2 % (ref 38–73)
NRBC BLD-RTO: 0 /100 WBC
PLATELET # BLD AUTO: 193 K/UL (ref 150–350)
PMV BLD AUTO: 10.5 FL (ref 9.2–12.9)
RBC # BLD AUTO: 4.68 M/UL (ref 4.6–6.2)
WBC # BLD AUTO: 3.44 K/UL (ref 3.9–12.7)

## 2019-12-17 PROCEDURE — 82746 ASSAY OF FOLIC ACID SERUM: CPT | Mod: TXP

## 2019-12-17 PROCEDURE — 88184 FLOWCYTOMETRY/ TC 1 MARKER: CPT | Performed by: PATHOLOGY

## 2019-12-17 PROCEDURE — 88305 TISSUE EXAM BY PATHOLOGIST: CPT | Mod: TXP | Performed by: PATHOLOGY

## 2019-12-17 PROCEDURE — 88341 IMHCHEM/IMCYTCHM EA ADD ANTB: CPT | Mod: TXP | Performed by: PATHOLOGY

## 2019-12-17 PROCEDURE — 88342 IMHCHEM/IMCYTCHM 1ST ANTB: CPT | Mod: TXP | Performed by: PATHOLOGY

## 2019-12-17 PROCEDURE — 38222 PR BONE MARROW BIOPSY(IES) W/ASPIRATION(S); DIAGNOSTIC: ICD-10-PCS | Mod: S$PBB,LT,, | Performed by: NURSE PRACTITIONER

## 2019-12-17 PROCEDURE — 88313 SPECIAL STAINS GROUP 2: CPT | Mod: 59,TXP | Performed by: PATHOLOGY

## 2019-12-17 PROCEDURE — 88305 TISSUE EXAM BY PATHOLOGIST: CPT | Mod: 26,,, | Performed by: PATHOLOGY

## 2019-12-17 PROCEDURE — 38221 DX BONE MARROW BIOPSIES: CPT | Mod: PBBFAC,TXP | Performed by: NURSE PRACTITIONER

## 2019-12-17 PROCEDURE — 88342 CHG IMMUNOCYTOCHEMISTRY: ICD-10-PCS | Mod: 26,59,, | Performed by: PATHOLOGY

## 2019-12-17 PROCEDURE — 38222 DX BONE MARROW BX & ASPIR: CPT | Mod: S$PBB,LT,, | Performed by: NURSE PRACTITIONER

## 2019-12-17 PROCEDURE — 99999 PR PBB SHADOW E&M-EST. PATIENT-LVL II: CPT | Mod: PBBFAC,,, | Performed by: NURSE PRACTITIONER

## 2019-12-17 PROCEDURE — 88341 PR IHC OR ICC EACH ADD'L SINGLE ANTIBODY  STAINPR: ICD-10-PCS | Mod: 26,59,, | Performed by: PATHOLOGY

## 2019-12-17 PROCEDURE — 88311 PR  DECALCIFY TISSUE: ICD-10-PCS | Mod: 26,,, | Performed by: PATHOLOGY

## 2019-12-17 PROCEDURE — 99499 NO LOS: ICD-10-PCS | Mod: S$PBB,,, | Performed by: NURSE PRACTITIONER

## 2019-12-17 PROCEDURE — 88185 FLOWCYTOMETRY/TC ADD-ON: CPT | Performed by: PATHOLOGY

## 2019-12-17 PROCEDURE — 36415 COLL VENOUS BLD VENIPUNCTURE: CPT | Mod: TXP

## 2019-12-17 PROCEDURE — 88189 FLOWCYTOMETRY/READ 16 & >: CPT | Mod: ,,, | Performed by: PATHOLOGY

## 2019-12-17 PROCEDURE — 99212 OFFICE O/P EST SF 10 MIN: CPT | Mod: PBBFAC,25,TXP | Performed by: NURSE PRACTITIONER

## 2019-12-17 PROCEDURE — 85025 COMPLETE CBC W/AUTO DIFF WBC: CPT | Mod: TXP

## 2019-12-17 PROCEDURE — 88313 SPECIAL STAINS GROUP 2: CPT | Mod: 26,,, | Performed by: PATHOLOGY

## 2019-12-17 PROCEDURE — 99499 UNLISTED E&M SERVICE: CPT | Mod: S$PBB,,, | Performed by: NURSE PRACTITIONER

## 2019-12-17 PROCEDURE — 88305 TISSUE EXAM BY PATHOLOGIST: ICD-10-PCS | Mod: 26,,, | Performed by: PATHOLOGY

## 2019-12-17 PROCEDURE — 38220 DX BONE MARROW ASPIRATIONS: CPT | Mod: PBBFAC | Performed by: NURSE PRACTITIONER

## 2019-12-17 PROCEDURE — 38222 DX BONE MARROW BX & ASPIR: CPT | Mod: PBBFAC,TXP | Performed by: NURSE PRACTITIONER

## 2019-12-17 PROCEDURE — 85097 PR  BONE MARROW,SMEAR INTERPRETATION: ICD-10-PCS | Mod: ,,, | Performed by: PATHOLOGY

## 2019-12-17 PROCEDURE — 88342 IMHCHEM/IMCYTCHM 1ST ANTB: CPT | Mod: 26,59,, | Performed by: PATHOLOGY

## 2019-12-17 PROCEDURE — 85097 BONE MARROW INTERPRETATION: CPT | Mod: ,,, | Performed by: PATHOLOGY

## 2019-12-17 PROCEDURE — 88341 IMHCHEM/IMCYTCHM EA ADD ANTB: CPT | Mod: 26,59,, | Performed by: PATHOLOGY

## 2019-12-17 PROCEDURE — 88311 DECALCIFY TISSUE: CPT | Mod: 26,,, | Performed by: PATHOLOGY

## 2019-12-17 PROCEDURE — 99999 PR PBB SHADOW E&M-EST. PATIENT-LVL II: ICD-10-PCS | Mod: PBBFAC,,, | Performed by: NURSE PRACTITIONER

## 2019-12-17 PROCEDURE — 88313 PR  SPECIAL STAINS,GROUP II: ICD-10-PCS | Mod: 26,,, | Performed by: PATHOLOGY

## 2019-12-17 PROCEDURE — 88189 PR  FLOWCYTOMETRY/READ, 16 & > MARKERS: ICD-10-PCS | Mod: ,,, | Performed by: PATHOLOGY

## 2019-12-17 PROCEDURE — 88305 TISSUE EXAM BY PATHOLOGIST: CPT | Mod: 59

## 2019-12-17 NOTE — PROCEDURES
Bone marrow  Date/Time: 12/17/2019 10:00 AM  Performed by: Ethel Echavarria NP  Authorized by: Ethel Echavarria NP     Aspiration?: Yes    Biopsy?: Yes      PROCEDURE NOTE:  Date of Procedure: 12/17/2019  Bone Marrow Biopsy and Aspiration  Indication: anemia, leukopenia, kidney transplant candidate  Consent: Informed consent was obtained from patient.  Timeout: Done and documented.  Position: prone  Site: Left posterior illiac crest.  Prep: Betadine.  Needle used: 11 gauge Jamshidi needle.  Anesthetic: 2% lidocaine 7 cc.  Biopsy: The biopsy needle was introduced into the marrow cavity and an aspirate was obtained without complications and sent for flow cytometry, dna/rna hold, and cytogenetics. Core biopsy obtained without difficulty and sent for routine histologic examination.  Complications: None.  Disposition: The patient was left in room with RN and instructed to remain on back for 20 minutes prior to d/c home. Instructed to remove bandaid in 24 hours.  Blood loss: Minimal.     Ethel Echavarria DNP, NP  Hematology/Oncology

## 2019-12-17 NOTE — PROGRESS NOTES
34 y.o. male with anemia and leukopenia, kidney transplant candidate here for bm bx. See procedure note. He was scheduled to see Dr. Adams today, but we will cancel appt and Dr. Adams will call pt in 1 week with bm bx results instead.    Ethel Echavarria, TRACY, NP  Hematology/Oncology

## 2019-12-19 LAB
BODY SITE - BONE MARROW: NORMAL
CLINICAL DIAGNOSIS - BONE MARROW: NORMAL
FLOW CYTOMETRY ANTIBODIES ANALYZED - BONE MARROW: NORMAL
FLOW CYTOMETRY COMMENT - BONE MARROW: NORMAL
FLOW CYTOMETRY INTERPRETATION - BONE MARROW: NORMAL

## 2019-12-21 LAB
DNA/RNA EXTRACT AND HOLD RESULT: NORMAL
DNA/RNA EXTRACTION: NORMAL
EXHR SPECIMEN TYPE: NORMAL

## 2019-12-23 DIAGNOSIS — Z76.82 ORGAN TRANSPLANT CANDIDATE: ICD-10-CM

## 2019-12-23 DIAGNOSIS — L02.92 RECURRENT BOILS: Primary | ICD-10-CM

## 2019-12-24 LAB
CHROM BANDING METHOD: NORMAL
CHROMOSOME ANALYSIS BM ADDITIONAL INFORMATION: NORMAL
CHROMOSOME ANALYSIS BM RELEASED BY: NORMAL
CHROMOSOME ANALYSIS BM RESULT SUMMARY: NORMAL
CLINICAL CYTOGENETICIST REVIEW: NORMAL
COMMENT: NORMAL
FINAL PATHOLOGIC DIAGNOSIS: NORMAL
GROSS: NORMAL
KARYOTYP MAR: NORMAL
Lab: NORMAL
MICROSCOPIC EXAM: NORMAL
REASON FOR REFERRAL (NARRATIVE): NORMAL
REF LAB TEST METHOD: NORMAL
SPECIMEN SOURCE: NORMAL
SPECIMEN: NORMAL
SUPPLEMENTAL DIAGNOSIS: NORMAL

## 2020-01-02 ENCOUNTER — TELEPHONE (OUTPATIENT)
Dept: TRANSPLANT | Facility: CLINIC | Age: 35
End: 2020-01-02

## 2020-01-27 ENCOUNTER — TELEPHONE (OUTPATIENT)
Dept: DERMATOLOGY | Facility: CLINIC | Age: 35
End: 2020-01-27

## 2020-01-27 NOTE — TELEPHONE ENCOUNTER
Called and spoke with  Pt. Rescheduled his appt with [facility] transplant clinic per [facility]. Pt was okay with rescheduling.    TB      ----- Message from Jes Villatoro PA-C sent at 1/27/2020  8:31 AM CST -----  Please try to reschedule pt with Dr. Patel - transplant clinic. Thanks

## 2020-02-11 ENCOUNTER — OFFICE VISIT (OUTPATIENT)
Dept: DERMATOLOGY | Facility: CLINIC | Age: 35
End: 2020-02-11
Payer: MEDICARE

## 2020-02-11 DIAGNOSIS — L81.4 LENTIGO: ICD-10-CM

## 2020-02-11 DIAGNOSIS — D22.9 MULTIPLE BENIGN NEVI: ICD-10-CM

## 2020-02-11 DIAGNOSIS — Z12.83 SCREENING EXAM FOR SKIN CANCER: Primary | ICD-10-CM

## 2020-02-11 PROCEDURE — 99203 OFFICE O/P NEW LOW 30 MIN: CPT | Mod: S$PBB,,, | Performed by: DERMATOLOGY

## 2020-02-11 PROCEDURE — 99999 PR PBB SHADOW E&M-EST. PATIENT-LVL II: ICD-10-PCS | Mod: PBBFAC,,, | Performed by: DERMATOLOGY

## 2020-02-11 PROCEDURE — 99212 OFFICE O/P EST SF 10 MIN: CPT | Mod: PBBFAC | Performed by: DERMATOLOGY

## 2020-02-11 PROCEDURE — 99203 PR OFFICE/OUTPT VISIT, NEW, LEVL III, 30-44 MIN: ICD-10-PCS | Mod: S$PBB,,, | Performed by: DERMATOLOGY

## 2020-02-11 PROCEDURE — 99999 PR PBB SHADOW E&M-EST. PATIENT-LVL II: CPT | Mod: PBBFAC,,, | Performed by: DERMATOLOGY

## 2020-02-11 NOTE — PROGRESS NOTES
Subjective:       Patient ID:  Feliz Trevizo is a 35 y.o. male who presents for   Chief Complaint   Patient presents with    Skin Check     tbse     Patient is a 36 yo male present for tbse. Patient is Pre Transplant. Patient has no new concerns No history of skin cancer   Pt with Hx FSGS, ESRD on dialysis comes for TBSE pre-renal transplant. Denied concerning skin lesions. Denied PMH or FH skin diseases.   The patient denies any moles or growths of the skin that are rapidly growing, hurting, itching, bleeding, or changing colors.    Review of Systems   Skin: Positive for wears hat. Negative for daily sunscreen use, activity-related sunscreen use and recent sunburn.   Hematologic/Lymphatic: Does not bruise/bleed easily.        Objective:    Physical Exam   Constitutional: He appears well-developed and well-nourished.   Neurological: He is alert and oriented to person, place, and time.   Psychiatric: He has a normal mood and affect.   Skin:   Areas Examined (abnormalities noted in diagram):   Scalp / Hair Palpated and Inspected  Head / Face Inspection Performed  Neck Inspection Performed  Chest / Axilla Inspection Performed  Abdomen Inspection Performed  Genitals / Buttocks / Groin Inspection Performed  Back Inspection Performed  RUE Inspected  LUE Inspection Performed  RLE Inspected  LLE Inspection Performed  Nails and Digits Inspection Performed                  Diagram Legend     Erythematous scaling macule/papule c/w actinic keratosis       Vascular papule c/w angioma      Pigmented verrucoid papule/plaque c/w seborrheic keratosis      Yellow umbilicated papule c/w sebaceous hyperplasia      Irregularly shaped tan macule c/w lentigo     1-2 mm smooth white papules consistent with Milia      Movable subcutaneous cyst with punctum c/w epidermal inclusion cyst      Subcutaneous movable cyst c/w pilar cyst      Firm pink to brown papule c/w dermatofibroma      Pedunculated fleshy papule(s) c/w skin tag(s)       Evenly pigmented macule c/w junctional nevus     Mildly variegated pigmented, slightly irregular-bordered macule c/w mildly atypical nevus      Flesh colored to evenly pigmented papule c/w intradermal nevus       Pink pearly papule/plaque c/w basal cell carcinoma      Erythematous hyperkeratotic cursted plaque c/w SCC      Surgical scar with no sign of skin cancer recurrence      Open and closed comedones      Inflammatory papules and pustules      Verrucoid papule consistent consistent with wart     Erythematous eczematous patches and plaques     Dystrophic onycholytic nail with subungual debris c/w onychomycosis     Umbilicated papule    Erythematous-base heme-crusted tan verrucoid plaque consistent with inflamed seborrheic keratosis     Erythematous Silvery Scaling Plaque c/w Psoriasis     See annotation      Assessment / Plan:        Screening exam for skin cancer    Total body skin examination performed today including at least 12 points as noted in physical examination. No lesions suspicious for malignancy noted.      The following benign skin lesions were found on today's skin exam and do not require treatment:    Multiple benign nevi    Lentigo             Follow up if symptoms worsen or fail to improve.

## 2020-02-11 NOTE — LETTER
February 11, 2020      Nadege Cedillo MD  [address]  [facility] [address]           [facility]  [address]  [facility] [address]  Phone: [phone]  Fax: [phone]          Patient: Feliz Trevizo   MR Number: [id number]   YOB: 1985   Date of Visit: 2/11/2020       Dear Dr. Nadege Cedillo:    Thank you for referring Feliz Trevizo to me for evaluation. Attached you will find relevant portions of my assessment and plan of care.    If you have questions, please do not hesitate to call me. I look forward to following Feliz Trevizo along with you.    Sincerely,    Nan Patel MD    Enclosure  CC:  No Recipients    If you would like to receive this communication electronically, please contact [email or web address] or [phone] to request more information on [employer] [facility] access.    For providers and/or their staff who would like to refer a patient to [facility], please contact us through our one-stop-shop provider referral line, [facility], at [phone].    If you feel you have received this communication in error or would no longer like to receive these types of communications, please e-mail [email or web address]

## 2020-02-13 ENCOUNTER — TELEPHONE (OUTPATIENT)
Dept: TRANSPLANT | Facility: CLINIC | Age: 35
End: 2020-02-13

## 2020-02-13 NOTE — TELEPHONE ENCOUNTER
Returned call, requested labs to assess WBC count. Pt is inactive pending Derm clearance for boils. He has been cleared. Awaiting labs to send message to Dr. Adams regarding leukopenia. Pt had bone marrow biopsy on 12/17/19.

## 2020-02-13 NOTE — TELEPHONE ENCOUNTER
----- Message from Cata West RN sent at 2/13/2020  3:13 PM CST -----  Contact: [facility]// Cherise Rosen      ----- Message -----  From: Meli PARKER August  Sent: 2/13/2020   2:11 PM CST  To: Kidney Waitlisted Coordinator    Reason: calling to speak with staff pertaining to transplant update status    Communication: [phone]

## 2020-03-12 ENCOUNTER — TELEPHONE (OUTPATIENT)
Dept: TRANSPLANT | Facility: CLINIC | Age: 35
End: 2020-03-12

## 2020-03-12 NOTE — TELEPHONE ENCOUNTER
----- Message from Bruno Adams MD sent at 3/12/2020  8:56 AM CDT -----  Apologies; normal bone marrow biopsy and unconcerning peripheral blood studies; can proceed with renal transplant from hematology perspective.     ----- Message -----  From: Jay Velasco MD  Sent: 3/11/2020   2:04 PM CDT  To: Andreia Duong, Bruno Adams MD    David,    This brian is a possible kidney transplant candidate with pancytopenia: Chronic. He was seen by hematology and a bone marrow biopsy was done in December 2019. He was scheduled to see you but apparently he saw Ms Charlene CARABALLO and she was going to review the bone marrow with you.  Also his Transferrin saturation last time was >90% (see care everywhere)    Is he OK from your point of view to be active on our waiting list or Does he need further work up. Thanks    Jay

## 2020-03-12 NOTE — TELEPHONE ENCOUNTER
----- Message from Jay Velasco MD sent at 3/12/2020  1:37 PM CDT -----  Let's discuss immunosuppression with the group. His WBC is low, I would recommend to consider induction with Simulect if he is called and the admit WBC is less than 3.5 or as per induction protocol. I think we have a cut off values for campath. otherwise from the hematologic point of view he was cleared by hematology    Jay   ----- Message -----  From: Bruno Adams MD  Sent: 3/12/2020   8:56 AM CDT  To: Jay Velasco MD, Andreia Duong    Apologies; normal bone marrow biopsy and unconcerning peripheral blood studies; can proceed with renal transplant from hematology perspective.     ----- Message -----  From: Jay Velasco MD  Sent: 3/11/2020   2:04 PM CDT  To: Andreia Duong, Bruno Adams MD    David,    This brian is a possible kidney transplant candidate with pancytopenia: Chronic. He was seen by hematology and a bone marrow biopsy was done in December 2019. He was scheduled to see you but apparently he saw Ms Charlene CARABALLO and she was going to review the bone marrow with you.  Also his Transferrin saturation last time was >90% (see care everywhere)    Is he OK from your point of view to be active on our waiting list or Does he need further work up. Thanks    Jay

## 2020-06-05 DIAGNOSIS — Z76.82 PRE-KIDNEY TRANSPLANT, LISTED: Primary | ICD-10-CM

## 2020-06-18 DIAGNOSIS — Z76.82 ORGAN TRANSPLANT CANDIDATE: Primary | ICD-10-CM

## 2020-09-07 PROCEDURE — 99001 SPECIMEN HANDLING PT-LAB: CPT | Mod: PO,TXP

## 2020-09-09 ENCOUNTER — LAB VISIT (OUTPATIENT)
Dept: LAB | Facility: HOSPITAL | Age: 35
End: 2020-09-09
Payer: MEDICARE

## 2020-09-09 DIAGNOSIS — Z76.82 PRE-KIDNEY TRANSPLANT, LISTED: ICD-10-CM

## 2020-09-11 ENCOUNTER — TELEPHONE (OUTPATIENT)
Dept: TRANSPLANT | Facility: CLINIC | Age: 35
End: 2020-09-11

## 2020-09-11 NOTE — TELEPHONE ENCOUNTER
Pt confirmed appt for 9/15/20. Appt detailed faxed to dialysis unit for them to give to him on Monday.

## 2020-09-15 ENCOUNTER — HOSPITAL ENCOUNTER (OUTPATIENT)
Dept: RADIOLOGY | Facility: HOSPITAL | Age: 35
Discharge: HOME | End: 2020-09-15
Attending: NURSE PRACTITIONER
Payer: MEDICARE

## 2020-09-15 ENCOUNTER — HOSPITAL ENCOUNTER (OUTPATIENT)
Dept: CARDIOLOGY | Facility: HOSPITAL | Age: 35
Discharge: HOME | End: 2020-09-15
Attending: NURSE PRACTITIONER
Payer: MEDICARE

## 2020-09-15 ENCOUNTER — OFFICE VISIT (OUTPATIENT)
Dept: TRANSPLANT | Facility: CLINIC | Age: 35
End: 2020-09-15
Payer: MEDICARE

## 2020-09-15 VITALS
SYSTOLIC BLOOD PRESSURE: 136 MMHG | DIASTOLIC BLOOD PRESSURE: 88 MMHG | HEIGHT: 70 IN | WEIGHT: 156.5 LBS | HEART RATE: 58 BPM | BODY MASS INDEX: 22.41 KG/M2

## 2020-09-15 VITALS
HEIGHT: 70 IN | BODY MASS INDEX: 23.95 KG/M2 | WEIGHT: 167.31 LBS | TEMPERATURE: 98 F | HEART RATE: 59 BPM | SYSTOLIC BLOOD PRESSURE: 130 MMHG | DIASTOLIC BLOOD PRESSURE: 67 MMHG | RESPIRATION RATE: 16 BRPM | OXYGEN SATURATION: 99 %

## 2020-09-15 DIAGNOSIS — Z76.82 ORGAN TRANSPLANT CANDIDATE: ICD-10-CM

## 2020-09-15 DIAGNOSIS — H90.3 SENSORINEURAL HEARING LOSS (SNHL) OF BOTH EARS: Chronic | ICD-10-CM

## 2020-09-15 DIAGNOSIS — Z99.2 ESRD ON DIALYSIS: Chronic | ICD-10-CM

## 2020-09-15 DIAGNOSIS — N05.1 FSGS (FOCAL SEGMENTAL GLOMERULOSCLEROSIS): ICD-10-CM

## 2020-09-15 DIAGNOSIS — I15.0 RENOVASCULAR HYPERTENSION: ICD-10-CM

## 2020-09-15 DIAGNOSIS — N25.81 SECONDARY HYPERPARATHYROIDISM: ICD-10-CM

## 2020-09-15 DIAGNOSIS — Z76.82 PATIENT ON WAITING LIST FOR KIDNEY TRANSPLANT: Primary | Chronic | ICD-10-CM

## 2020-09-15 DIAGNOSIS — D63.1 ANEMIA IN ESRD (END-STAGE RENAL DISEASE): ICD-10-CM

## 2020-09-15 DIAGNOSIS — N18.6 ANEMIA IN ESRD (END-STAGE RENAL DISEASE): ICD-10-CM

## 2020-09-15 DIAGNOSIS — N18.6 ESRD ON DIALYSIS: Chronic | ICD-10-CM

## 2020-09-15 LAB
ASCENDING AORTA: 2.88 CM
AV INDEX (PROSTH): 0.92
AV MEAN GRADIENT: 4 MMHG
AV PEAK GRADIENT: 9 MMHG
AV VALVE AREA: 4.48 CM2
AV VELOCITY RATIO: 0.86
BSA FOR ECHO PROCEDURE: 1.87 M2
CV ECHO LV RWT: 0.45 CM
DOP CALC AO PEAK VEL: 1.47 M/S
DOP CALC AO VTI: 30.93 CM
DOP CALC LVOT AREA: 4.9 CM2
DOP CALC LVOT DIAMETER: 2.49 CM
DOP CALC LVOT PEAK VEL: 1.27 M/S
DOP CALC LVOT STROKE VOLUME: 138.71 CM3
DOP CALCLVOT PEAK VEL VTI: 28.5 CM
E WAVE DECELERATION TIME: 194.39 MSEC
E/A RATIO: 1.35
E/E' RATIO: 7.05 M/S
ECHO LV POSTERIOR WALL: 1.07 CM (ref 0.6–1.1)
FRACTIONAL SHORTENING: 34 % (ref 28–44)
INTERVENTRICULAR SEPTUM: 1.07 CM (ref 0.6–1.1)
IVRT: 91.34 MSEC
LA MAJOR: 6.26 CM
LA MINOR: 6.1 CM
LA WIDTH: 4.31 CM
LEFT ATRIUM SIZE: 3.39 CM
LEFT ATRIUM VOLUME INDEX: 40.8 ML/M2
LEFT ATRIUM VOLUME: 76.74 CM3
LEFT INTERNAL DIMENSION IN SYSTOLE: 3.17 CM (ref 2.1–4)
LEFT VENTRICLE DIASTOLIC VOLUME INDEX: 56.58 ML/M2
LEFT VENTRICLE DIASTOLIC VOLUME: 106.42 ML
LEFT VENTRICLE MASS INDEX: 99 G/M2
LEFT VENTRICLE SYSTOLIC VOLUME INDEX: 21.2 ML/M2
LEFT VENTRICLE SYSTOLIC VOLUME: 39.93 ML
LEFT VENTRICULAR INTERNAL DIMENSION IN DIASTOLE: 4.78 CM (ref 3.5–6)
LEFT VENTRICULAR MASS: 185.44 G
LV LATERAL E/E' RATIO: 6.73 M/S
LV SEPTAL E/E' RATIO: 7.4 M/S
MV PEAK A VEL: 0.55 M/S
MV PEAK E VEL: 0.74 M/S
MV STENOSIS PRESSURE HALF TIME: 56.37 MS
MV VALVE AREA P 1/2 METHOD: 3.9 CM2
PISA TR MAX VEL: 2.08 M/S
PULM VEIN S/D RATIO: 1.36
PV PEAK D VEL: 0.42 M/S
PV PEAK S VEL: 0.57 M/S
RA MAJOR: 5.56 CM
RA PRESSURE: 3 MMHG
RA WIDTH: 4.62 CM
RIGHT VENTRICULAR END-DIASTOLIC DIMENSION: 4.5 CM
RV TISSUE DOPPLER FREE WALL SYSTOLIC VELOCITY 1 (APICAL 4 CHAMBER VIEW): 16.06 CM/S
SINUS: 3.86 CM
STJ: 2.99 CM
TDI LATERAL: 0.11 M/S
TDI SEPTAL: 0.1 M/S
TDI: 0.11 M/S
TR MAX PG: 17 MMHG
TRICUSPID ANNULAR PLANE SYSTOLIC EXCURSION: 2.64 CM
TV REST PULMONARY ARTERY PRESSURE: 20 MMHG

## 2020-09-15 PROCEDURE — 93356 ECHO (CUPID ONLY): ICD-10-PCS | Mod: 26,TXP,, | Performed by: INTERNAL MEDICINE

## 2020-09-15 PROCEDURE — 93306 TTE W/DOPPLER COMPLETE: CPT | Mod: 26,TXP,, | Performed by: INTERNAL MEDICINE

## 2020-09-15 PROCEDURE — 71046 XR CHEST PA AND LATERAL: ICD-10-PCS | Mod: 26,TXP,, | Performed by: RADIOLOGY

## 2020-09-15 PROCEDURE — 99999 PR PBB SHADOW E&M-EST. PATIENT-LVL IV: CPT | Mod: PBBFAC,TXP,, | Performed by: NURSE PRACTITIONER

## 2020-09-15 PROCEDURE — 71046 X-RAY EXAM CHEST 2 VIEWS: CPT | Mod: TC,TXP

## 2020-09-15 PROCEDURE — 93306 TTE W/DOPPLER COMPLETE: CPT | Mod: TXP

## 2020-09-15 PROCEDURE — 76770 US EXAM ABDO BACK WALL COMP: CPT | Mod: 26,TXP,, | Performed by: RADIOLOGY

## 2020-09-15 PROCEDURE — 76770 US EXAM ABDO BACK WALL COMP: CPT | Mod: TC,TXP

## 2020-09-15 PROCEDURE — 71046 X-RAY EXAM CHEST 2 VIEWS: CPT | Mod: 26,TXP,, | Performed by: RADIOLOGY

## 2020-09-15 PROCEDURE — 93356 MYOCRD STRAIN IMG SPCKL TRCK: CPT | Mod: 26,TXP,, | Performed by: INTERNAL MEDICINE

## 2020-09-15 PROCEDURE — 99215 PR OFFICE/OUTPT VISIT, EST, LEVL V, 40-54 MIN: ICD-10-PCS | Mod: S$PBB,TXP,, | Performed by: NURSE PRACTITIONER

## 2020-09-15 PROCEDURE — 99999 PR PBB SHADOW E&M-EST. PATIENT-LVL IV: ICD-10-PCS | Mod: PBBFAC,TXP,, | Performed by: NURSE PRACTITIONER

## 2020-09-15 PROCEDURE — 76770 US RETROPERITONEAL COMPLETE: ICD-10-PCS | Mod: 26,TXP,, | Performed by: RADIOLOGY

## 2020-09-15 PROCEDURE — 99215 OFFICE O/P EST HI 40 MIN: CPT | Mod: S$PBB,TXP,, | Performed by: NURSE PRACTITIONER

## 2020-09-15 PROCEDURE — 99214 OFFICE O/P EST MOD 30 MIN: CPT | Mod: PBBFAC,25,TXP | Performed by: NURSE PRACTITIONER

## 2020-09-15 PROCEDURE — 93306 ECHO (CUPID ONLY): ICD-10-PCS | Mod: 26,TXP,, | Performed by: INTERNAL MEDICINE

## 2020-09-15 NOTE — LETTER
September 17, 2020        Ralf Crouch  [address] [facility] [place] [address]  Phone: [phone]  Fax: [phone]             [facility]- Transplant 1st Fl  [address]  [facility] [address]  Phone: [phone]   Patient: Feliz Trevizo   MR Number: [id number]   YOB: 1985   Date of Visit: 9/15/2020       Dear Dr. Ralf Crouch    Thank you for referring Feliz Trevizo to me for evaluation. Attached you will find relevant portions of my assessment and plan of care.    If you have questions, please do not hesitate to call me. I look forward to following Feliz Trevizo along with you.    Sincerely,    Vernell Nicolas NP    Enclosure    If you would like to receive this communication electronically, please contact [email or web address] or [phone] to request [employer] Link access.    [employer] Link is a tool which provides read-only access to select patient information with whom you have a relationship. Its easy to use and provides real time access to review your patients record including encounter summaries, notes, results, and demographic information.    If you feel you have received this communication in error or would no longer like to receive these types of communications, please e-mail [email or web address]

## 2020-09-15 NOTE — PROGRESS NOTES
"   Kidney Transplant Recipient Reevalulation    Referring Physician: Ralf Crouch  Current Nephrologist: Ralf Crouch  Waitlist Status: active  Dialysis Start Date: 11/2/2014    Subjective:     CC:  Annual reassessment of kidney transplant candidacy.    HPI:  Mr. Trevizo is a 35 y.o. year old  male with ESRD secondary to FSGS.  He has been on the wait list for a kidney transplant at [facility] since 11/2/2014. Patient is currently on hemodialysis started on 11/2/2014. Patient is dialyzing on MWF schedule.  Patient reports that he is tolerating dialysis well.. He has a LUE AV fistula.  Running 4 hours each session, no issues with hypotension during sessions. Does not remember dry weight.    Patient denies recent hospitalizations and ER visits.     No caregiver present at today's visit. Pt reports that he came alone because all siblings had to work today.    History of recurrent boils/infections. Saw dermatology Dr. Patel 2/11/2020 for total body skin exam/screening exam for skin cancer. Findings of multiple benign nevi.  Patient reports no boils or infections since derm visit.    History of leukopenia. Saw heme-onc and had bone marrow biopsy.    Per Dr. Adams 3/12/2020 "normal bone marrow biopsy and unconcerning peripheral blood studies; can proceed with renal transplant from hematology perspective."    Functional Status: Not working, on disability. Does not exercise, but is able to climb a flight of stairs without CP, SOB, or leg cramping. Does not appear frail.  Previous Transplant: no  Previous Blood Transfusion: yes  Previous neurogenic bladder/ urine incontinence: no issues, no LUTS; voiding 3x/day.  Anticoagulation/ antiplatelet therapy and reason: no  Potential Donor: no  High KDPI candidate: no  Meets center eligibility for accepting HCV+ donor offer: yes    CXR 9/15/2020:   FINDINGS: Cardiac silhouette is at the upper limits of normal in size.  Mildly prominent interstitial lung markings " similar to prior exam.  No pleural effusion.  No pneumothorax.  Osseous structures are unremarkable.    Renal US 9/15/2020:  Impression: Renal cysts bilaterally.  No hydronephrosis or renal masses.    Echo 9/15/2020:   Results for orders placed during the hospital encounter of 09/15/20   Echo Color Flow Doppler? Yes    Narrative · Normal left ventricular systolic function. The estimated ejection   fraction is 65%.  · The left ventricular global longitudinal strain is -18%.  · Concentric left ventricular remodeling.  · Normal LV diastolic function.  · No wall motion abnormalities.  · Normal right ventricular systolic function.  · Mild biatrial enlargement.  · Mild tricuspid regurgitation.  · The estimated PA systolic pressure is 20 mmHg.  · Normal central venous pressure (3 mmHg).  · Trivial circumferential pericardial effusion.          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   HENT: Positive for hearing loss. Negative for congestion, facial swelling, postnasal drip, rhinorrhea, sinus pressure, sore throat and trouble swallowing.    Eyes: Negative for pain, redness and visual disturbance.   Respiratory: Negative for cough, chest tightness and shortness of breath.    Cardiovascular: Negative.  Negative for chest pain, palpitations and leg swelling.   Gastrointestinal: Negative for abdominal pain, diarrhea, nausea and vomiting.   Genitourinary: Positive for decreased urine volume (voiding 3x/day). Negative for dysuria, flank pain and urgency.   Musculoskeletal: Negative for gait problem, neck pain and neck stiffness.  "  Skin: Negative for rash and wound.   Allergic/Immunologic: Negative for environmental allergies, food allergies and immunocompromised state.   Neurological: Negative for dizziness, weakness, light-headedness and headaches.   Hematological: Does not bruise/bleed easily.   Psychiatric/Behavioral: Negative for agitation and confusion. The patient is not nervous/anxious.        Objective:   body mass index is 24.23 kg/m².  /67 (BP Location: Right arm, Patient Position: Sitting, BP Method: Medium (Automatic))   Pulse (!) 59   Temp 98 °F (36.7 °C) (Oral)   Resp 16   Ht 5' 9.69" (1.77 m)   Wt 75.9 kg (167 lb 5.3 oz)   SpO2 99%   BMI 24.23 kg/m²     Physical Exam  Vitals signs reviewed.   Constitutional:       Appearance: Normal appearance. He is well-developed.   HENT:      Head: Normocephalic.      Right Ear: Decreased hearing noted.      Left Ear: Decreased hearing noted.      Mouth/Throat:      Pharynx: No oropharyngeal exudate.   Eyes:      Conjunctiva/sclera: Conjunctivae normal.      Pupils: Pupils are equal, round, and reactive to light.   Neck:      Musculoskeletal: Normal range of motion and neck supple.   Cardiovascular:      Rate and Rhythm: Normal rate and regular rhythm.      Heart sounds: Normal heart sounds.   Pulmonary:      Effort: Pulmonary effort is normal.      Breath sounds: Normal breath sounds.   Abdominal:      General: Bowel sounds are normal. There is no distension.      Palpations: Abdomen is soft. There is no mass.      Tenderness: There is no abdominal tenderness. There is no guarding or rebound.   Musculoskeletal: Normal range of motion.        Arms:    Lymphadenopathy:      Cervical: No cervical adenopathy.   Skin:     General: Skin is warm and dry.      Findings: No abscess, lesion or wound.      Comments: No visible boils   Neurological:      Mental Status: He is alert and oriented to person, place, and time.      Motor: No abnormal muscle tone.      Coordination: " Coordination normal.   Psychiatric:         Behavior: Behavior normal.       Labs:  Lab Results   Component Value Date    PSA 0.59 08/28/2018       Lab Results   Component Value Date    WBC 3.44 (L) 12/17/2019    HGB 11.2 (L) 12/17/2019    HCT 38.3 (L) 12/17/2019     11/05/2019    K 4.3 11/05/2019     11/05/2019    CO2 26 11/05/2019    BUN 28 (H) 11/05/2019    CREATININE 11.9 (H) 11/05/2019    EGFRNONAA 4.8 (A) 11/05/2019    CALCIUM 9.8 11/05/2019    PHOS 6.2 (H) 08/02/2017    ALBUMIN 4.3 11/05/2019    AST 10 11/05/2019    ALT 5 (L) 11/05/2019    UTPCR 4.34 (H) 09/05/2019    .0 (H) 09/15/2020       Lab Results   Component Value Date    CPRA 0 08/03/2020    CPRA 0 08/03/2020    CPRA 0 08/03/2020    JL9FIXS Negative 08/03/2020    CIIAB Negative 08/03/2020    ABCMT UNDETERMINED 03/07/2018       Labs were reviewed with the patient.    Pre-transplant Workup:   Reviewed with the patient.    Assessment:     1. Patient on waiting list for kidney transplant    2. ESRD on dialysis    3. FSGS (focal segmental glomerulosclerosis)    4. Renovascular hypertension    5. Sensorineural hearing loss (SNHL) of both ears    6. Anemia in ESRD (end-stage renal disease)    7. Secondary hyperparathyroidism    8. BMI 24.0-24.9, adult        Plan:     Transplant Candidacy:   Mr. Trevizo is a suitable kidney transplant candidate.  Meets center eligibility for accepting HCV+ donor offer - yes.  Patient educated on HCV+ donors. Feliz is willing to accept HCV+ donor offer - yes   Patient is a candidate for KDPI > 85 kidney donor offer - no.  Mr. Trevizo is a suitable kidney transplant candidate.  He remains in overall stable health, and will remain active on the transplant list.    Vernell Nicolas NP       Follow-up:   In addition to the tests noted in the plan, Mr. Trevizo will continue to have reevaluation as per the standing pre-kidney transplant protocol:  1. Monthly blood for PRA  2. Annual return to clinic, except HIV  positive, > 65 years of age, or pancreas transplant candidates who will be scheduled to see transplant every 6 months while in pre-transplant phase  3. Annual re-testing: CXR, EKG, yearly mammograms for women over 40 and PSA for males over 40, cardiology follow-up as recommended by initial cardiology pre-transplant evaluation  4. Renal ultrasound every 2 years  5. Baseline colonoscopy after age 50 and repeated as recommended    UNOS Patient Status  Functional Status: 60% - Requires occasional assistance but is able to care for needs  Physical Capacity: No Limitations

## 2020-09-16 LAB
HLA FREEZE AND HOLD INTERPRETATION: NORMAL
HLAFH COLLECTION DATE: NORMAL

## 2020-09-16 NOTE — PROGRESS NOTES
INITIAL PATIENT EDUCATION NOTE    Mr. Feliz Trevizo was seen in pre-kidney transplant clinic for evaluation for kidney, kidney/pancreas or pancreas only transplant.  The patient attended a an individual video education session that discussed/reviewed the following aspects of transplantation: evaluation and selection committee process, UNOS waitlist management/multiple listings, types of organs offered (KDPI < 85%, KDPI > 85%, PHS increased risk, DCD, HCV+, HIV+ for HIV+ recipients and enbloc/dual), financial aspects, surgical procedures, dietary instruction pre- and post-transplant, health maintenance pre- and post-transplant, post-transplant hospitalization and outpatient follow-up, potential to participate in a research protocol, and medication management and side effects.  A question and answer session was provided after the presentation.    The patient was seen by all members of the multi-disciplinary team to include: Nephrologist/PA, Surgeon, , Transplant Coordinator, , Pharmacist and Dietician (if applicable).    The patient reviewed and signed all consents for evaluation which were witnessed and sent to scanning into the [facility] chart.    The patient was given an education book and plan for further evaluation based on his individual assessment.      The patient was encouraged to call with any questions or concerns.

## 2020-09-16 NOTE — PROGRESS NOTES
"Transplant Recipient Adult Psychosocial Assessment UPDATE (Last update completed on 09/05/2019)    Patient presented to clinic without caregiver.  SW able to speak with pt's back-up caregiver Feliz Mckay via phone during visit to review and confirm caregiver plan.    Feliz Trevizo  [address] [facility] [address]  Telephone Information:   Mobile [phone]   Mobile [phone]   Home  [phone] (home)  Work  [phone] (work)  E-mail  No e-mail address on record     Pt reports currently residing with his sister Saira Banda (Shawn) in [place], [place].  Pt unable to provide address.    Sex: male  YOB: 1985  Age: 35 y.o.    Encounter Date: 9/15/2020  U.[place] Citizen: yes  Primary Language: English   Needed: no (Pt has significant hearing loss since childhood, but reports is able to hear some and read lips)    Emergency Contact:  Name: Meliza Trevizo  Relationship: sister  Address: [address] [place] [address]; [place] [address]  Phone Numbers:  [phone] (mobile)    Family/Social Support:   Number of dependents/: Pt reports no dependents.  Marital history: Pt reports never being . Pt reports no current significant other.  Other family dynamics: Pt's reports that both of his parents passed away 3 years ago. Pt reports that he has 2 brothers and 2 sisters that are supportive.    Household Composition:  Name: Feliz Trevizo  Age: 35  Relationship: patient  Does person drive? yes     Name: Saira Banda (Shawn)  Age: 48  Relationship: sister  Does person drive? yes    Do you and your caregivers have access to reliable transportation? yes  PRIMARY CAREGIVER: Giuseppe Trevizo (pt's brother) will be primary caregiver, phone number [phone].  Giuseppe was not present for assessment update on 9/15/2020.  Pt reported Giuseppe was working at time of update.  SW able to speak with Giuseppe's significant other / pt's back-up caregiver Feliz Mckay via phone during visit to " "confirm caregiver plan.  Per Feliz, Giuseppe remains pt's primary caregiver and available to provide caregiver support.  Giuseppe works for the [employer].  Feliz also works but reports having a flexible schedule and will be available to provide back up caregiver support for pt as needed.     provided in-depth information to patient and caregiver regarding pre- and post-transplant caregiver role.   strongly encourages patient and caregiver to have concrete plan regarding post-transplant care giving, including back-up caregiver(s) to ensure care giving needs are met as needed.    Patient and Caregiver states understanding all aspects of caregiver role/commitment and is able/willing/committed to being caregiver to the fullest extent necessary.    Patient and Caregiver verbalizes understanding of the education provided today and caregiver responsibilities.         remains available. Patient and Caregiver agree to contact  in a timely manner if concerns arise.      Able to take time off work without financial concerns: yes. Giuseppe reports that his daughter will be cared for by her biological mother.    Additional Significant Others who will Assist with Transplant:  Name: Feliz Mckay  Age: 58  Phone: [phone]  City: [place] State: [place]  Relationship: Giuseppe's significant other  Does person drive? yes    Name: Saira Banda (Shawn)  Age: 48  Phone: [phone]  City: [facility] State: [place]  Relationship: sister  Does person drive? yes     Name: Shilpa Trevizo  Age: 47  Phone: unknown - pt to provide at a later time  City: [place] State: [place]  Relationship: sister  Does person drive? yes    Living Will: no  Healthcare Power of : no - pt reports trusting siblings with medical decisions   Advance Directives on file: <<no information> per medical record.  Verbally reviewed LW/HCPA information.   provided patient with copy of LW/HCPA " documents and provided education on completion of forms.    Living Donors: No.    Highest Education Level: High School (9-12) or GED  Reading Ability: 12th grade  Reports difficulty with: reading and hearing pt reports he lost his hearing when he was about 1 years old. Pt is able to hear some and can communicate well by reading lips.   Learns Best By:  Pt reports learning best by verbal and visual instruction.     Status: no  VA Benefits: no     Working for Income: No  If no, reason not working: Disability    Patient is disabled.  Prior to disability, patient  was employed as house keeper at local hospital..    Spouse/Significant Other Employment: Meliza has previously reported she is retired from the . Giuseppe has previously reported that he is an [employer].    Disabled: yes: date disability began: 2014, due to: ESRD.  SW encouraged pt to contact  office to confirm pt's hearing loss is documented to maintain disability eligibility outside of ESRD diagnosis.    Monthly Income:   Disability: $1,160  Able to afford all costs now and if transplanted, including medications: yes. Pt reports Giuseppe will be able to assist as needed.  Patient and Caregiver verbalizes understanding of personal responsibilities related to transplant costs and the importance of having a financial plan to ensure that patients transplant costs are fully covered.      provided fundraising information/education.  Patient and Caregiver verbalizes understanding.   remains available.    Insurance:   Payor/Plan Subscr  Sex Relation Sub. Ins. ID Effective Group Num   1. BLUE CROSS OF* JOSÉ DOMINGUEZ 1985 Male  [id number]* 6/1/10 [id number]                                   [address] [address], [place] [address]   2. MEDICARE - [place]* JOSÉ DOMINGUEZ 1985 Male  [id number] 7/1/10                                    [address]     Primary Insurance (for UNOS reporting): Public Insurance -  Medicare FFS (Fee For Service) Pt should not lose Medicare due to disability eligibility related to significant hearing loss. SW encouraged pt to confirm disability at the SS office.    Secondary Insurance (for UNOS reporting): Private Insurance - Pt reports BCBS Supplement is being paid for by the [employer]. SW provide patient with information regarding pt's responsibility for payment after transplant. Patient verbalized understanding.     Patient and Caregiver verbalizes clear understanding that patient may experience difficulty obtaining and/or be denied insurance coverage post-surgery. This includes and is not limited to disability insurance, life insurance, health insurance, burial insurance, long term care insurance, and other insurances.    Patient and Caregiver also reports understanding that future health concerns related to or unrelated to transplantation may not be covered by patient's insurance.  Resources and information provided and reviewed.      Patient and Caregiver provides verbal permission to release any necessary information to outside resources for patient care and discharge planning.  Resources and information provided are reviewed.      Dialysis Adherence:  Patient reports being on hemodialysis in center, attending all dialysis appointments, and staying for the entire course of treatment. Pt reports being on HD for past 5 years and has history of good compliance.  [employer] faxed adherence form to pt's dialysis unit and will await returned form to complete compliance check.    Infusion Service: patient utilizing? no  Home Health: patient utilizing? no  DME: yes BP Cuff   Pulmonary/Cardiac Rehab: Pt denies   ADLS:  Pt reports no difficulties with driving, walking, bathing, cooking, housekeeping, eating, shopping, and taking medication.    Adherence:   Pt reports suitable adherence with medications, dialysis, and health regimen. Adherence education and counseling provided.     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  Alcohol: Pt reports social use.  Illicit Drugs/Non-prescribed Medications: none, patient denies any use.    Patient and Caregiver states clear understanding of the potential impact of substance use as it relates to transplant candidacy and is aware of possible random substance screening.  Substance abstinence/cessation counseling, education and resources provided and reviewed.     Arrests/DWI/Treatment/Rehab: patient denies    Psychiatric History:    Mental Health: depression pt reports he previously struggled with depression after his mother passed away. Pt reports no current issues with mental health. Pt reports as highly supported by siblings.   Psychiatrist/Counselor: Pt denies seeing a mental health professional and reports being open to seeing the psych department for talk therapy if necessary.  Medications:  Pt reports previously taking an antidepressant, but no longer takes.  Suicide/Homicide Issues: Pt denies any history of or current suicidal or homicidal ideations.   Safety at home: Pt reports no current or history of safety concerns in household; including mental, physical, verbal, or sexual abuse.    Knowledge: Patient and Caregiver states having clear understanding and realistic expectations regarding the potential risks and  potential benefits of organ transplantation and organ donation, agrees to discuss with health care team members and support system members and to utilize available resources and express questions and/or concerns in order to further facilitate the pt informed decision-making.  Resources and information provided and reviewed.     Patient and Caregiver is aware of [facility]'s affiliation and/or partnership with agencies in home health care, LTAC, SNF, [facility], and other hospitals and clinics.    Understanding: Patient and Caregiver reports having a clear understanding of the many lifetime commitments involved with being a transplant recipient, including costs, compliance, medications, lab work, procedures, appointments, concrete and financial planning, preparedness, timely and appropriate communication of concerns, abstinence (ETOH, tobacco, illicit non-prescribed drugs), adherence to all health care team recommendations, support system and caregiver involvement, appropriate and timely resource utilization and follow-through, mental health counseling as needed/recommended, and patient and caregiver responsibilities.  Social Service Handbook, resources and detailed educational information provided and reviewed. Educational information provided.    Patient and Caregiver also reports current and expected compliance with health care regime and states having a clear understanding of the importance of compliance.      Patient and Caregiver reports a clear understanding that risks and benefits may be involved with organ transplantation and with organ donation.      Patient and Caregiver also reports clear understanding that psychosocial risk factors may affect patient, and include but are not limited to feelings of depression, generalized anxiety, anxiety regarding dependence on others, post traumatic stress disorder, feelings of guilt and other emotional and/or mental concerns, and/or exacerbation of existing mental health  concerns.  Detailed resources provided and discussed.     Patient and Caregiver agrees to access appropriate resources in a timely manner as needed and/or as recommended, and to communicate concerns appropriately.  Patient and Caregiver also reports a clear understanding of treatment options available.      reviewed education, provided additional information, and answered questions.    Feelings or Concerns: Patient and Caregiver did not express any concerns at this time.  Pt verbalizes motivation to continue pursuing transplant at this time.    Coping: Pt reports coping well with the transplant process at this time and reports shopping, eating out, attending his [employer] games at [facility], and spending time with family and friends as ways to cope.    Goals: Pt reports getting off dialysis and starting a career as a  as goals for post transplant. Patient referred to Vocational Rehabilitation.    Interview Behavior: Patient and Caregiver presents as alert and oriented x 4, pleasant, good eye contact, well groomed, recall good, concentration/judgement good, average intelligence, calm, communicative, cooperative and asking and answering questions appropriately. Pt presents without caregiver for assessment update.  SW able to speak with Feliz Mckay, pt's brother's significant other / pt's back-up caregiver via phone during visit.  Feliz confirmed caregiver plan and expressed family commitment to providing pt with necessary caregiver support for transplant.         Transplant Social Work - Candidacy  Assessment/Plan:     Psychosocial Suitability: Patient presents as a suitable candidate for kidney transplant at this time. Based on psychosocial risk factors, patient presents as low risk, due to absence of overwhelming psychosocial concerns.  Dialysis compliance check is pending.  SW concern raised due to pt presenting to clinic without caregiver.  SW discussed with pt  importance of having caregiver present during pt's return visits to clinic to confirm caregiver availability.  SW strongly emphasized need for pt to have caregiver present with him at subsequent update visits.  Pt verbalized understanding regarding same.    Recommendations/Additional Comments: SW recommends that pt conduct fundraising to assist pt with pay for cost of medications, food, gas, and other transplant related needs. SW recommends that pt remain aware of potential mental health concerns and contact the team if any concerns arise. SW recommends that pt remain abstinent from tobacco, ETOH, and drug use. SW supports pt's continued adherence. SW remains available to answer any questions or concerns that arise as the pt moves through the transplant process.     Obdulio Kuhn

## 2020-09-18 LAB — HPRA INTERPRETATION: NORMAL

## 2020-09-21 NOTE — PROGRESS NOTES
YEARLY LIST MANAGEMENT NOTE    Feliz Trevizo's kidney transplant listing status reviewed.  Patient is due for follow-up appointments on 9/15/21.  Appointments will be scheduled per protocol.

## 2020-11-15 ENCOUNTER — TELEPHONE (OUTPATIENT)
Dept: TRANSPLANT | Facility: CLINIC | Age: 35
End: 2020-11-15

## 2020-11-16 NOTE — TELEPHONE ENCOUNTER
ON-CALL NOTE    UNOS# [id number]  11/15/20 at 1900    Notified by Jakob Medel, , that Feliz Trevizo is eligible for kidney offer.  Spoke with patient and identified no acute medical issues with telephone assessment. Protocol script read to patient regarding N/A, standard donor offer. Patient verbalized understanding, all questions answered, patient accepts organ offer. Notified by Jakob Medel that virtual crossmatch is negative.  Current sample of blood is available from date 10/5/20 for crossmatch.  Patient reports no sensitizing event since last blood sample for PRA received. Notified Sherman in [facility] Lab to perform a retrospective  crossmatch per guideline.    Patient notified of plan that he is a backup at this time and states understanding.  Instructed to go to dialysis tomorrow morning as scheduled and keep his phone nearby for updates related to the case.

## 2020-11-17 ENCOUNTER — TELEPHONE (OUTPATIENT)
Dept: TRANSPLANT | Facility: CLINIC | Age: 35
End: 2020-11-17

## 2020-11-17 NOTE — TELEPHONE ENCOUNTER
ON-CALL NOTE  11/16/2020 16:00pm Care assume report received from LISANDRO Pope RN.  Initial assessment completed by RAFI Duong RN on 11/15/2020.     UNOS# [id number]    Notified by Jakob Medel, , that Feliz Trevizo is eligible for a Back up  kidney offer.  Spoke with patient and identified no acute medical issues with telephone assessment. Protocol script read to patient regarding N/A, standard donor offer. Patient verbalized understanding, all questions answered, patient accepts organ offer. Notified by Jakob Medel that virtual crossmatch is negative.  Current sample of blood is available from date 10/5/2020  for crossmatch.  Patient reports no sensitizing event since last blood sample for PRA received. Notified By LISANDRO Medel to release patient. Kidney to other listed patient.     Patient notified of plan released to normal activity  and states understanding.

## 2020-12-20 ENCOUNTER — TELEPHONE (OUTPATIENT)
Dept: TRANSPLANT | Facility: CLINIC | Age: 35
End: 2020-12-20

## 2020-12-21 NOTE — TELEPHONE ENCOUNTER
ON-CALL NOTE    [facility]# [id number]    Notified by Chris Dye, , that Feliz Trevizo is eligible for kidney offer.  Spoke with patient and identified no acute medical issues with telephone assessment. Protocol script read to patient regarding PHS increased risk and HCV+ donor offer. Patient verbalized understanding, all questions answered, patient accepts organ offer. Notified by Chris Dye that virtual crossmatch is negative.  Current sample of blood is available from date 12/7/20 for crossmatch.  Patient reports no sensitizing event since last blood sample for PRA received.     Patient notified of plan that organ went to primary recipient and states understanding.

## 2021-03-16 DIAGNOSIS — Z76.82 PRE-KIDNEY TRANSPLANT, LISTED: Primary | ICD-10-CM

## 2021-05-26 ENCOUNTER — TELEPHONE (OUTPATIENT)
Dept: TRANSPLANT | Facility: HOSPITAL | Age: 36
End: 2021-05-26

## 2021-05-26 ENCOUNTER — TELEPHONE (OUTPATIENT)
Dept: TRANSPLANT | Facility: CLINIC | Age: 36
End: 2021-05-26

## 2021-05-27 ENCOUNTER — TELEPHONE (OUTPATIENT)
Dept: TRANSPLANT | Facility: CLINIC | Age: 36
End: 2021-05-27

## 2021-07-01 ENCOUNTER — PATIENT MESSAGE (OUTPATIENT)
Dept: ADMINISTRATIVE | Facility: OTHER | Age: 36
End: 2021-07-01

## 2021-07-12 ENCOUNTER — TELEPHONE (OUTPATIENT)
Dept: TRANSPLANT | Facility: CLINIC | Age: 36
End: 2021-07-12

## 2021-07-12 DIAGNOSIS — Z76.82 AWAITING ORGAN TRANSPLANT STATUS: Primary | ICD-10-CM

## 2021-07-13 ENCOUNTER — TELEPHONE (OUTPATIENT)
Dept: TRANSPLANT | Facility: CLINIC | Age: 36
End: 2021-07-13

## 2021-07-15 ENCOUNTER — TELEPHONE (OUTPATIENT)
Dept: TRANSPLANT | Facility: CLINIC | Age: 36
End: 2021-07-15

## 2021-07-15 DIAGNOSIS — Z76.82 ORGAN TRANSPLANT CANDIDATE: Primary | ICD-10-CM

## 2021-07-16 ENCOUNTER — TELEPHONE (OUTPATIENT)
Dept: TRANSPLANT | Facility: CLINIC | Age: 36
End: 2021-07-16

## 2021-09-11 ENCOUNTER — TELEPHONE (OUTPATIENT)
Dept: TRANSPLANT | Facility: CLINIC | Age: 36
End: 2021-09-11

## 2021-10-04 PROCEDURE — 86833 HLA CLASS II HIGH DEFIN QUAL: CPT | Mod: PO | Performed by: NURSE PRACTITIONER

## 2021-10-04 PROCEDURE — 86977 RBC SERUM PRETX INCUBJ/INHIB: CPT | Mod: PO | Performed by: NURSE PRACTITIONER

## 2021-10-04 PROCEDURE — 86832 HLA CLASS I HIGH DEFIN QUAL: CPT | Mod: PO | Performed by: NURSE PRACTITIONER

## 2021-10-05 ENCOUNTER — LAB VISIT (OUTPATIENT)
Dept: LAB | Facility: HOSPITAL | Age: 36
End: 2021-10-05
Payer: MEDICARE

## 2021-10-05 ENCOUNTER — TELEPHONE (OUTPATIENT)
Dept: CARDIOLOGY | Facility: CLINIC | Age: 36
End: 2021-10-05

## 2021-10-05 DIAGNOSIS — Z76.82 PRE-KIDNEY TRANSPLANT, LISTED: ICD-10-CM

## 2021-10-07 ENCOUNTER — HOSPITAL ENCOUNTER (OUTPATIENT)
Dept: RADIOLOGY | Facility: HOSPITAL | Age: 36
Discharge: HOME | End: 2021-10-07
Attending: NURSE PRACTITIONER
Payer: MEDICARE

## 2021-10-07 ENCOUNTER — HOSPITAL ENCOUNTER (OUTPATIENT)
Dept: CARDIOLOGY | Facility: HOSPITAL | Age: 36
Discharge: HOME | End: 2021-10-07
Attending: NURSE PRACTITIONER
Payer: MEDICARE

## 2021-10-07 ENCOUNTER — OFFICE VISIT (OUTPATIENT)
Dept: TRANSPLANT | Facility: CLINIC | Age: 36
End: 2021-10-07
Attending: NURSE PRACTITIONER
Payer: MEDICARE

## 2021-10-07 VITALS
DIASTOLIC BLOOD PRESSURE: 78 MMHG | HEART RATE: 58 BPM | SYSTOLIC BLOOD PRESSURE: 127 MMHG | RESPIRATION RATE: 16 BRPM | WEIGHT: 170.44 LBS | HEIGHT: 70 IN | BODY MASS INDEX: 24.4 KG/M2 | TEMPERATURE: 97 F | OXYGEN SATURATION: 100 %

## 2021-10-07 VITALS — HEIGHT: 69 IN | WEIGHT: 157 LBS | BODY MASS INDEX: 23.25 KG/M2

## 2021-10-07 DIAGNOSIS — H90.3 SENSORINEURAL HEARING LOSS (SNHL) OF BOTH EARS: Chronic | ICD-10-CM

## 2021-10-07 DIAGNOSIS — Z86.69 HX OF SEIZURE DISORDER: Chronic | ICD-10-CM

## 2021-10-07 DIAGNOSIS — Z76.82 ORGAN TRANSPLANT CANDIDATE: ICD-10-CM

## 2021-10-07 DIAGNOSIS — N05.1 FSGS (FOCAL SEGMENTAL GLOMERULOSCLEROSIS): ICD-10-CM

## 2021-10-07 DIAGNOSIS — Z99.2 ESRD ON DIALYSIS: Chronic | ICD-10-CM

## 2021-10-07 DIAGNOSIS — N18.6 ESRD ON DIALYSIS: Chronic | ICD-10-CM

## 2021-10-07 DIAGNOSIS — Z86.73 HX-TIA (TRANSIENT ISCHEMIC ATTACK): Chronic | ICD-10-CM

## 2021-10-07 DIAGNOSIS — Z76.82 PATIENT ON WAITING LIST FOR KIDNEY TRANSPLANT: Primary | Chronic | ICD-10-CM

## 2021-10-07 DIAGNOSIS — I15.0 RENOVASCULAR HYPERTENSION: ICD-10-CM

## 2021-10-07 LAB
CV PHARM DOSE: 0.4 MG
CV STRESS BASE HR: 64 BPM
DIASTOLIC BLOOD PRESSURE: 86 MMHG
EJECTION FRACTION- HIGH: 65 %
END DIASTOLIC INDEX-HIGH: 153 ML/M2
END DIASTOLIC INDEX-LOW: 93 ML/M2
END SYSTOLIC INDEX-HIGH: 71 ML/M2
END SYSTOLIC INDEX-LOW: 31 ML/M2
NUC REST DIASTOLIC VOLUME INDEX: 143
NUC REST EJECTION FRACTION: 57
NUC REST SYSTOLIC VOLUME INDEX: 61
NUC STRESS DIASTOLIC VOLUME INDEX: 156
NUC STRESS EJECTION FRACTION: 70 %
NUC STRESS SYSTOLIC VOLUME INDEX: 47
OHS CV CPX 1 MINUTE RECOVERY HEART RATE: 90 BPM
OHS CV CPX 85 PERCENT MAX PREDICTED HEART RATE MALE: 155
OHS CV CPX MAX PREDICTED HEART RATE: 182
OHS CV CPX PATIENT IS FEMALE: 0
OHS CV CPX PATIENT IS MALE: 1
OHS CV CPX PEAK DIASTOLIC BLOOD PRESSURE: 86 MMHG
OHS CV CPX PEAK HEAR RATE: 71 BPM
OHS CV CPX PEAK RATE PRESSURE PRODUCT: 9940
OHS CV CPX PEAK SYSTOLIC BLOOD PRESSURE: 140 MMHG
OHS CV CPX PERCENT MAX PREDICTED HEART RATE ACHIEVED: 39
OHS CV CPX RATE PRESSURE PRODUCT PRESENTING: 8960
RETIRED EF AND QEF - SEE NOTES: 53 %
SYSTOLIC BLOOD PRESSURE: 140 MMHG

## 2021-10-07 PROCEDURE — 78452 STRESS TEST WITH MYOCARDIAL PERFUSION (CUPID ONLY): ICD-10-PCS | Mod: 26,TXP,, | Performed by: INTERNAL MEDICINE

## 2021-10-07 PROCEDURE — 99999 PR PBB SHADOW E&M-EST. PATIENT-LVL IV: CPT | Mod: PBBFAC,TXP,, | Performed by: NURSE PRACTITIONER

## 2021-10-07 PROCEDURE — 71046 XR CHEST PA AND LATERAL: ICD-10-PCS | Mod: 26,TXP,, | Performed by: RADIOLOGY

## 2021-10-07 PROCEDURE — 78452 HT MUSCLE IMAGE SPECT MULT: CPT | Mod: TXP

## 2021-10-07 PROCEDURE — 93018 STRESS TEST WITH MYOCARDIAL PERFUSION (CUPID ONLY): ICD-10-PCS | Mod: TXP,,, | Performed by: INTERNAL MEDICINE

## 2021-10-07 PROCEDURE — 72170 X-RAY EXAM OF PELVIS: CPT | Mod: TC,FY,TXP

## 2021-10-07 PROCEDURE — 78452 HT MUSCLE IMAGE SPECT MULT: CPT | Mod: 26,TXP,, | Performed by: INTERNAL MEDICINE

## 2021-10-07 PROCEDURE — 72170 XR PELVIS ROUTINE AP: ICD-10-PCS | Mod: 26,TXP,, | Performed by: RADIOLOGY

## 2021-10-07 PROCEDURE — 99215 OFFICE O/P EST HI 40 MIN: CPT | Mod: S$PBB,TXP,, | Performed by: NURSE PRACTITIONER

## 2021-10-07 PROCEDURE — 71046 X-RAY EXAM CHEST 2 VIEWS: CPT | Mod: 26,TXP,, | Performed by: RADIOLOGY

## 2021-10-07 PROCEDURE — 93018 CV STRESS TEST I&R ONLY: CPT | Mod: TXP,,, | Performed by: INTERNAL MEDICINE

## 2021-10-07 PROCEDURE — 72170 X-RAY EXAM OF PELVIS: CPT | Mod: 26,TXP,, | Performed by: RADIOLOGY

## 2021-10-07 PROCEDURE — 99999 PR PBB SHADOW E&M-EST. PATIENT-LVL IV: ICD-10-PCS | Mod: PBBFAC,TXP,, | Performed by: NURSE PRACTITIONER

## 2021-10-07 PROCEDURE — 71046 X-RAY EXAM CHEST 2 VIEWS: CPT | Mod: TC,FY,TXP

## 2021-10-07 PROCEDURE — 99215 PR OFFICE/OUTPT VISIT, EST, LEVL V, 40-54 MIN: ICD-10-PCS | Mod: S$PBB,TXP,, | Performed by: NURSE PRACTITIONER

## 2021-10-07 PROCEDURE — 63600175 PHARM REV CODE 636 W HCPCS: Mod: TXP | Performed by: NURSE PRACTITIONER

## 2021-10-07 PROCEDURE — 93016 STRESS TEST WITH MYOCARDIAL PERFUSION (CUPID ONLY): ICD-10-PCS | Mod: TXP,,, | Performed by: INTERNAL MEDICINE

## 2021-10-07 PROCEDURE — 93016 CV STRESS TEST SUPVJ ONLY: CPT | Mod: TXP,,, | Performed by: INTERNAL MEDICINE

## 2021-10-07 PROCEDURE — 99214 OFFICE O/P EST MOD 30 MIN: CPT | Mod: PBBFAC,25,TXP | Performed by: NURSE PRACTITIONER

## 2021-10-07 PROCEDURE — A9502 TC99M TETROFOSMIN: HCPCS | Mod: TXP

## 2021-10-07 RX ORDER — REGADENOSON 0.08 MG/ML
0.4 INJECTION, SOLUTION INTRAVENOUS ONCE
Status: COMPLETED | OUTPATIENT
Start: 2021-10-07 | End: 2021-10-07

## 2021-10-07 RX ADMIN — REGADENOSON 0.4 MG: 0.08 INJECTION, SOLUTION INTRAVENOUS at 10:10

## 2021-10-11 ENCOUNTER — TELEPHONE (OUTPATIENT)
Dept: TRANSPLANT | Facility: CLINIC | Age: 36
End: 2021-10-11

## 2021-10-18 ENCOUNTER — HOSPITAL ENCOUNTER (INPATIENT)
Facility: HOSPITAL | Age: 36
LOS: 4 days | Discharge: HOME HEALTH | DRG: 652 | End: 2021-10-22
Attending: TRANSPLANT SURGERY | Admitting: TRANSPLANT SURGERY
Payer: MEDICARE

## 2021-10-18 ENCOUNTER — TELEPHONE (OUTPATIENT)
Dept: TRANSPLANT | Facility: CLINIC | Age: 36
End: 2021-10-18

## 2021-10-18 ENCOUNTER — ANESTHESIA (OUTPATIENT)
Dept: SURGERY | Facility: HOSPITAL | Age: 36
DRG: 652 | End: 2021-10-18
Payer: MEDICARE

## 2021-10-18 ENCOUNTER — ANESTHESIA EVENT (OUTPATIENT)
Dept: SURGERY | Facility: HOSPITAL | Age: 36
DRG: 652 | End: 2021-10-18
Payer: MEDICARE

## 2021-10-18 DIAGNOSIS — Z79.60 LONG-TERM USE OF IMMUNOSUPPRESSANT MEDICATION: ICD-10-CM

## 2021-10-18 DIAGNOSIS — Z01.818 PRE-OP EVALUATION: ICD-10-CM

## 2021-10-18 DIAGNOSIS — Z76.82 AWAITING ORGAN TRANSPLANT STATUS: ICD-10-CM

## 2021-10-18 DIAGNOSIS — Z76.82 ORGAN TRANSPLANT CANDIDATE: ICD-10-CM

## 2021-10-18 DIAGNOSIS — Z29.89 PROPHYLACTIC IMMUNOTHERAPY: ICD-10-CM

## 2021-10-18 DIAGNOSIS — N05.1 FSGS (FOCAL SEGMENTAL GLOMERULOSCLEROSIS): ICD-10-CM

## 2021-10-18 DIAGNOSIS — I15.0 RENOVASCULAR HYPERTENSION: ICD-10-CM

## 2021-10-18 DIAGNOSIS — Z01.818 PRE-OP EXAM: ICD-10-CM

## 2021-10-18 DIAGNOSIS — N18.6 ESRD (END STAGE RENAL DISEASE): ICD-10-CM

## 2021-10-18 DIAGNOSIS — N25.81 SECONDARY HYPERPARATHYROIDISM: ICD-10-CM

## 2021-10-18 DIAGNOSIS — D63.8 ANEMIA OF CHRONIC DISEASE: ICD-10-CM

## 2021-10-18 DIAGNOSIS — N18.6 ESRD ON DIALYSIS: Chronic | ICD-10-CM

## 2021-10-18 DIAGNOSIS — Z91.89 AT RISK FOR OPPORTUNISTIC INFECTIONS: ICD-10-CM

## 2021-10-18 DIAGNOSIS — Z99.2 ESRD ON DIALYSIS: Chronic | ICD-10-CM

## 2021-10-18 DIAGNOSIS — H90.3 SENSORINEURAL HEARING LOSS (SNHL) OF BOTH EARS: Chronic | ICD-10-CM

## 2021-10-18 DIAGNOSIS — Z76.82 AWAITING ORGAN TRANSPLANT STATUS: Primary | ICD-10-CM

## 2021-10-18 DIAGNOSIS — Z94.0 S/P KIDNEY TRANSPLANT: Primary | ICD-10-CM

## 2021-10-18 DIAGNOSIS — D72.819 CHRONIC LEUKOPENIA: ICD-10-CM

## 2021-10-18 DIAGNOSIS — Z76.82 PATIENT ON WAITING LIST FOR KIDNEY TRANSPLANT: Primary | ICD-10-CM

## 2021-10-18 LAB
ASCENDING AORTA: 3.22 CM
AV INDEX (PROSTH): 1.02
AV MEAN GRADIENT: 4 MMHG
AV PEAK GRADIENT: 7 MMHG
AV VALVE AREA: 4.28 CM2
AV VELOCITY RATIO: 0.91
BSA FOR ECHO PROCEDURE: 1.91 M2
CREAT UR-MCNC: 41 MG/DL (ref 23–375)
CV ECHO LV RWT: 0.56 CM
DOP CALC AO PEAK VEL: 1.31 M/S
DOP CALC AO VTI: 26.52 CM
DOP CALC LVOT AREA: 4.2 CM2
DOP CALC LVOT DIAMETER: 2.31 CM
DOP CALC LVOT PEAK VEL: 1.19 M/S
DOP CALC LVOT STROKE VOLUME: 113.43 CM3
DOP CALCLVOT PEAK VEL VTI: 27.08 CM
E WAVE DECELERATION TIME: 237.82 MSEC
E/A RATIO: 1
E/E' RATIO: 8 M/S
ECHO LV POSTERIOR WALL: 1.24 CM (ref 0.6–1.1)
EJECTION FRACTION: 65 %
FRACTIONAL SHORTENING: 41 % (ref 28–44)
HPRA INTERPRETATION: NORMAL
INTERVENTRICULAR SEPTUM: 1.26 CM (ref 0.6–1.1)
LA MAJOR: 6.49 CM
LA MINOR: 5.68 CM
LA WIDTH: 3.41 CM
LEFT ATRIUM SIZE: 3.84 CM
LEFT ATRIUM VOLUME INDEX: 35.5 ML/M2
LEFT ATRIUM VOLUME: 67.43 CM3
LEFT INTERNAL DIMENSION IN SYSTOLE: 2.59 CM (ref 2.1–4)
LEFT VENTRICLE DIASTOLIC VOLUME INDEX: 45.96 ML/M2
LEFT VENTRICLE DIASTOLIC VOLUME: 87.33 ML
LEFT VENTRICLE MASS INDEX: 106 G/M2
LEFT VENTRICLE SYSTOLIC VOLUME INDEX: 12.8 ML/M2
LEFT VENTRICLE SYSTOLIC VOLUME: 24.29 ML
LEFT VENTRICULAR INTERNAL DIMENSION IN DIASTOLE: 4.39 CM (ref 3.5–6)
LEFT VENTRICULAR MASS: 202.34 G
LV LATERAL E/E' RATIO: 7.38 M/S
LV SEPTAL E/E' RATIO: 8.73 M/S
MV PEAK A VEL: 0.96 M/S
MV PEAK E VEL: 0.96 M/S
MV STENOSIS PRESSURE HALF TIME: 68.97 MS
MV VALVE AREA P 1/2 METHOD: 3.19 CM2
PISA TR MAX VEL: 1.92 M/S
PROT UR-MCNC: 97 MG/DL (ref 0–15)
PROT/CREAT UR: 2.37 MG/G{CREAT} (ref 0–0.2)
RA MAJOR: 4.86 CM
RA PRESSURE: 8 MMHG
RA WIDTH: 3.18 CM
RIGHT VENTRICULAR END-DIASTOLIC DIMENSION: 3.24 CM
SINUS: 3.37 CM
STJ: 3.51 CM
TDI LATERAL: 0.13 M/S
TDI SEPTAL: 0.11 M/S
TDI: 0.12 M/S
TR MAX PG: 15 MMHG
TRICUSPID ANNULAR PLANE SYSTOLIC EXCURSION: 2.7 CM
TV REST PULMONARY ARTERY PRESSURE: 23 MMHG

## 2021-10-18 PROCEDURE — 93010 ELECTROCARDIOGRAM REPORT: CPT | Mod: NTX,,, | Performed by: INTERNAL MEDICINE

## 2021-10-18 PROCEDURE — 36620 PR INSERT CATH,ART,PERCUT,SHORTTERM: ICD-10-PCS | Mod: 59,,, | Performed by: ANESTHESIOLOGY

## 2021-10-18 PROCEDURE — 84156 ASSAY OF PROTEIN URINE: CPT | Mod: NTX | Performed by: NURSE PRACTITIONER

## 2021-10-18 PROCEDURE — 71000033 HC RECOVERY, INTIAL HOUR: Performed by: TRANSPLANT SURGERY

## 2021-10-18 PROCEDURE — 71000015 HC POSTOP RECOV 1ST HR: Performed by: TRANSPLANT SURGERY

## 2021-10-18 PROCEDURE — D9220A PRA ANESTHESIA: Mod: ANES,,, | Performed by: ANESTHESIOLOGY

## 2021-10-18 PROCEDURE — 36620 INSERTION CATHETER ARTERY: CPT | Mod: 59,,, | Performed by: ANESTHESIOLOGY

## 2021-10-18 PROCEDURE — 37000009 HC ANESTHESIA EA ADD 15 MINS: Performed by: TRANSPLANT SURGERY

## 2021-10-18 PROCEDURE — 20600001 HC STEP DOWN PRIVATE ROOM: Mod: NTX

## 2021-10-18 PROCEDURE — D9220A PRA ANESTHESIA: ICD-10-PCS | Mod: CRNA,,, | Performed by: NURSE ANESTHETIST, CERTIFIED REGISTERED

## 2021-10-18 PROCEDURE — 99223 1ST HOSP IP/OBS HIGH 75: CPT | Mod: AI,NTX,, | Performed by: NURSE PRACTITIONER

## 2021-10-18 PROCEDURE — 36000931 HC OR TIME LEV VII EA ADD 15 MIN: Performed by: TRANSPLANT SURGERY

## 2021-10-18 PROCEDURE — 37000008 HC ANESTHESIA 1ST 15 MINUTES: Performed by: TRANSPLANT SURGERY

## 2021-10-18 PROCEDURE — 27800505 HC CATH, RADIAL ARTERY KIT: Mod: NTX | Performed by: ANESTHESIOLOGY

## 2021-10-18 PROCEDURE — 27100025 HC TUBING, SET FLUID WARMER: Mod: NTX | Performed by: ANESTHESIOLOGY

## 2021-10-18 PROCEDURE — 99223 PR INITIAL HOSPITAL CARE,LEVL III: ICD-10-PCS | Mod: AI,NTX,, | Performed by: NURSE PRACTITIONER

## 2021-10-18 PROCEDURE — 71000039 HC RECOVERY, EACH ADD'L HOUR: Performed by: TRANSPLANT SURGERY

## 2021-10-18 PROCEDURE — C2617 STENT, NON-COR, TEM W/O DEL: HCPCS | Performed by: TRANSPLANT SURGERY

## 2021-10-18 PROCEDURE — 12000002 HC ACUTE/MED SURGE SEMI-PRIVATE ROOM

## 2021-10-18 PROCEDURE — 93005 ELECTROCARDIOGRAM TRACING: CPT | Mod: NTX

## 2021-10-18 PROCEDURE — 27201423 OPTIME MED/SURG SUP & DEVICES STERILE SUPPLY: Performed by: TRANSPLANT SURGERY

## 2021-10-18 PROCEDURE — D9220A PRA ANESTHESIA: ICD-10-PCS | Mod: ANES,,, | Performed by: ANESTHESIOLOGY

## 2021-10-18 PROCEDURE — 93010 EKG 12-LEAD: ICD-10-PCS | Mod: NTX,,, | Performed by: INTERNAL MEDICINE

## 2021-10-18 PROCEDURE — D9220A PRA ANESTHESIA: Mod: CRNA,,, | Performed by: NURSE ANESTHETIST, CERTIFIED REGISTERED

## 2021-10-18 PROCEDURE — 36000930 HC OR TIME LEV VII 1ST 15 MIN: Performed by: TRANSPLANT SURGERY

## 2021-10-18 RX ORDER — ACETAMINOPHEN 650 MG/20.3ML
650 LIQUID ORAL ONCE
Status: COMPLETED | OUTPATIENT
Start: 2021-10-18 | End: 2021-10-19

## 2021-10-18 RX ORDER — ACETAMINOPHEN 650 MG/20.3ML
650 LIQUID ORAL ONCE
Status: DISCONTINUED | OUTPATIENT
Start: 2021-10-18 | End: 2021-10-19

## 2021-10-18 RX ORDER — CEFAZOLIN SODIUM 1 G/3ML
2 INJECTION, POWDER, FOR SOLUTION INTRAMUSCULAR; INTRAVENOUS
Status: COMPLETED | OUTPATIENT
Start: 2021-10-18 | End: 2021-10-19

## 2021-10-18 RX ORDER — MUPIROCIN 20 MG/G
OINTMENT TOPICAL
Status: DISCONTINUED | OUTPATIENT
Start: 2021-10-18 | End: 2021-10-19

## 2021-10-18 RX ORDER — PREGABALIN 75 MG/1
75 CAPSULE ORAL
Status: DISCONTINUED | OUTPATIENT
Start: 2021-10-18 | End: 2021-10-19

## 2021-10-18 RX ORDER — DIPHENHYDRAMINE HYDROCHLORIDE 50 MG/ML
50 INJECTION INTRAMUSCULAR; INTRAVENOUS ONCE
Status: COMPLETED | OUTPATIENT
Start: 2021-10-18 | End: 2021-10-19

## 2021-10-18 RX ORDER — HEPARIN SODIUM 5000 [USP'U]/ML
5000 INJECTION, SOLUTION INTRAVENOUS; SUBCUTANEOUS ONCE
Status: DISCONTINUED | OUTPATIENT
Start: 2021-10-18 | End: 2021-10-19

## 2021-10-19 ENCOUNTER — TELEPHONE (OUTPATIENT)
Dept: TRANSPLANT | Facility: CLINIC | Age: 36
End: 2021-10-19

## 2021-10-19 PROBLEM — N25.81 SECONDARY HYPERPARATHYROIDISM: Status: ACTIVE | Noted: 2021-10-19

## 2021-10-19 PROBLEM — Z29.89 PROPHYLACTIC IMMUNOTHERAPY: Status: ACTIVE | Noted: 2021-10-19

## 2021-10-19 PROBLEM — D63.8 ANEMIA OF CHRONIC DISEASE: Status: ACTIVE | Noted: 2021-10-19

## 2021-10-19 PROBLEM — Z79.60 LONG-TERM USE OF IMMUNOSUPPRESSANT MEDICATION: Status: ACTIVE | Noted: 2021-10-19

## 2021-10-19 PROBLEM — Z99.2 ESRD ON DIALYSIS: Chronic | Status: RESOLVED | Noted: 2017-08-02 | Resolved: 2021-10-19

## 2021-10-19 PROBLEM — N18.6 ESRD ON DIALYSIS: Chronic | Status: RESOLVED | Noted: 2017-08-02 | Resolved: 2021-10-19

## 2021-10-19 PROBLEM — Z91.89 AT RISK FOR OPPORTUNISTIC INFECTIONS: Status: ACTIVE | Noted: 2021-10-19

## 2021-10-19 PROBLEM — Z94.0 S/P KIDNEY TRANSPLANT: Status: ACTIVE | Noted: 2021-10-19

## 2021-10-19 PROBLEM — N18.6 ESRD (END STAGE RENAL DISEASE): Status: RESOLVED | Noted: 2021-10-18 | Resolved: 2021-10-19

## 2021-10-19 LAB
ALBUMIN SERPL BCP-MCNC: 3.5 G/DL (ref 3.5–5.2)
ALBUMIN SERPL BCP-MCNC: 3.5 G/DL (ref 3.5–5.2)
ANION GAP SERPL CALC-SCNC: 12 MMOL/L (ref 8–16)
ANION GAP SERPL CALC-SCNC: 13 MMOL/L (ref 8–16)
ANION GAP SERPL CALC-SCNC: 14 MMOL/L (ref 8–16)
ANION GAP SERPL CALC-SCNC: 15 MMOL/L (ref 8–16)
BASOPHILS # BLD AUTO: 0.01 K/UL (ref 0–0.2)
BASOPHILS NFR BLD: 0.1 % (ref 0–1.9)
BUN SERPL-MCNC: 31 MG/DL (ref 6–20)
BUN SERPL-MCNC: 32 MG/DL (ref 6–20)
BUN SERPL-MCNC: 34 MG/DL (ref 6–20)
BUN SERPL-MCNC: 35 MG/DL (ref 6–20)
CALCIUM SERPL-MCNC: 8.1 MG/DL (ref 8.7–10.5)
CALCIUM SERPL-MCNC: 8.1 MG/DL (ref 8.7–10.5)
CALCIUM SERPL-MCNC: 8.2 MG/DL (ref 8.7–10.5)
CALCIUM SERPL-MCNC: 8.4 MG/DL (ref 8.7–10.5)
CHLORIDE SERPL-SCNC: 102 MMOL/L (ref 95–110)
CHLORIDE SERPL-SCNC: 105 MMOL/L (ref 95–110)
CO2 SERPL-SCNC: 18 MMOL/L (ref 23–29)
CO2 SERPL-SCNC: 20 MMOL/L (ref 23–29)
CO2 SERPL-SCNC: 20 MMOL/L (ref 23–29)
CO2 SERPL-SCNC: 21 MMOL/L (ref 23–29)
CREAT SERPL-MCNC: 10.4 MG/DL (ref 0.5–1.4)
CREAT SERPL-MCNC: 11.9 MG/DL (ref 0.5–1.4)
CREAT SERPL-MCNC: 11.9 MG/DL (ref 0.5–1.4)
CREAT SERPL-MCNC: 9.1 MG/DL (ref 0.5–1.4)
CREAT UR-MCNC: 59 MG/DL (ref 23–375)
DIFFERENTIAL METHOD: ABNORMAL
EOSINOPHIL # BLD AUTO: 0 K/UL (ref 0–0.5)
EOSINOPHIL NFR BLD: 0 % (ref 0–8)
ERYTHROCYTE [DISTWIDTH] IN BLOOD BY AUTOMATED COUNT: 16.2 % (ref 11.5–14.5)
ERYTHROCYTE [DISTWIDTH] IN BLOOD BY AUTOMATED COUNT: 16.8 % (ref 11.5–14.5)
ERYTHROCYTE [DISTWIDTH] IN BLOOD BY AUTOMATED COUNT: 16.9 % (ref 11.5–14.5)
EST. GFR  (AFRICAN AMERICAN): 5.5 ML/MIN/1.73 M^2
EST. GFR  (AFRICAN AMERICAN): 5.5 ML/MIN/1.73 M^2
EST. GFR  (AFRICAN AMERICAN): 6.5 ML/MIN/1.73 M^2
EST. GFR  (AFRICAN AMERICAN): 7.6 ML/MIN/1.73 M^2
EST. GFR  (NON AFRICAN AMERICAN): 4.8 ML/MIN/1.73 M^2
EST. GFR  (NON AFRICAN AMERICAN): 4.8 ML/MIN/1.73 M^2
EST. GFR  (NON AFRICAN AMERICAN): 5.6 ML/MIN/1.73 M^2
EST. GFR  (NON AFRICAN AMERICAN): 6.6 ML/MIN/1.73 M^2
GLUCOSE SERPL-MCNC: 128 MG/DL (ref 70–110)
GLUCOSE SERPL-MCNC: 130 MG/DL (ref 70–110)
GLUCOSE SERPL-MCNC: 181 MG/DL (ref 70–110)
GLUCOSE SERPL-MCNC: 201 MG/DL (ref 70–110)
HCT VFR BLD AUTO: 29.7 % (ref 40–54)
HCT VFR BLD AUTO: 31.5 % (ref 40–54)
HCT VFR BLD AUTO: 31.5 % (ref 40–54)
HCT VFR BLD AUTO: 32.3 % (ref 40–54)
HGB BLD-MCNC: 10 G/DL (ref 14–18)
HGB BLD-MCNC: 10 G/DL (ref 14–18)
HGB BLD-MCNC: 10.1 G/DL (ref 14–18)
IMM GRANULOCYTES # BLD AUTO: 0.04 K/UL (ref 0–0.04)
IMM GRANULOCYTES # BLD AUTO: 0.05 K/UL (ref 0–0.04)
IMM GRANULOCYTES # BLD AUTO: 0.09 K/UL (ref 0–0.04)
IMM GRANULOCYTES NFR BLD AUTO: 0.5 % (ref 0–0.5)
IMM GRANULOCYTES NFR BLD AUTO: 0.5 % (ref 0–0.5)
IMM GRANULOCYTES NFR BLD AUTO: 0.7 % (ref 0–0.5)
LYMPHOCYTES # BLD AUTO: 0.1 K/UL (ref 1–4.8)
LYMPHOCYTES NFR BLD: 0.7 % (ref 18–48)
LYMPHOCYTES NFR BLD: 0.9 % (ref 18–48)
LYMPHOCYTES NFR BLD: 1 % (ref 18–48)
MAGNESIUM SERPL-MCNC: 1.9 MG/DL (ref 1.6–2.6)
MCH RBC QN AUTO: 24.4 PG (ref 27–31)
MCH RBC QN AUTO: 24.4 PG (ref 27–31)
MCH RBC QN AUTO: 24.6 PG (ref 27–31)
MCHC RBC AUTO-ENTMCNC: 31 G/DL (ref 32–36)
MCHC RBC AUTO-ENTMCNC: 31.7 G/DL (ref 32–36)
MCHC RBC AUTO-ENTMCNC: 32.1 G/DL (ref 32–36)
MCV RBC AUTO: 76 FL (ref 82–98)
MCV RBC AUTO: 78 FL (ref 82–98)
MCV RBC AUTO: 79 FL (ref 82–98)
MONOCYTES # BLD AUTO: 0.2 K/UL (ref 0.3–1)
MONOCYTES # BLD AUTO: 0.2 K/UL (ref 0.3–1)
MONOCYTES # BLD AUTO: 0.3 K/UL (ref 0.3–1)
MONOCYTES NFR BLD: 1.6 % (ref 4–15)
MONOCYTES NFR BLD: 2.2 % (ref 4–15)
MONOCYTES NFR BLD: 3.7 % (ref 4–15)
NEUTROPHILS # BLD AUTO: 12.3 K/UL (ref 1.8–7.7)
NEUTROPHILS # BLD AUTO: 8.4 K/UL (ref 1.8–7.7)
NEUTROPHILS # BLD AUTO: 9.4 K/UL (ref 1.8–7.7)
NEUTROPHILS NFR BLD: 94.8 % (ref 38–73)
NEUTROPHILS NFR BLD: 96.5 % (ref 38–73)
NEUTROPHILS NFR BLD: 96.6 % (ref 38–73)
NRBC BLD-RTO: 0 /100 WBC
PHOSPHATE SERPL-MCNC: 3.5 MG/DL (ref 2.7–4.5)
PHOSPHATE SERPL-MCNC: 3.5 MG/DL (ref 2.7–4.5)
PHOSPHATE SERPL-MCNC: 3.6 MG/DL (ref 2.7–4.5)
PLATELET # BLD AUTO: 178 K/UL (ref 150–450)
PLATELET # BLD AUTO: 179 K/UL (ref 150–450)
PLATELET # BLD AUTO: 193 K/UL (ref 150–450)
PMV BLD AUTO: 10.8 FL (ref 9.2–12.9)
PMV BLD AUTO: 11.1 FL (ref 9.2–12.9)
PMV BLD AUTO: 9.7 FL (ref 9.2–12.9)
POCT GLUCOSE: 173 MG/DL (ref 70–110)
POCT GLUCOSE: 178 MG/DL (ref 70–110)
POCT GLUCOSE: 234 MG/DL (ref 70–110)
POTASSIUM SERPL-SCNC: 3.8 MMOL/L (ref 3.5–5.1)
POTASSIUM SERPL-SCNC: 3.8 MMOL/L (ref 3.5–5.1)
POTASSIUM SERPL-SCNC: 4.3 MMOL/L (ref 3.5–5.1)
POTASSIUM SERPL-SCNC: 4.3 MMOL/L (ref 3.5–5.1)
PROT UR-MCNC: 61 MG/DL (ref 0–15)
PROT/CREAT UR: 1.03 MG/G{CREAT} (ref 0–0.2)
RBC # BLD AUTO: 4.06 M/UL (ref 4.6–6.2)
RBC # BLD AUTO: 4.1 M/UL (ref 4.6–6.2)
RBC # BLD AUTO: 4.14 M/UL (ref 4.6–6.2)
SODIUM SERPL-SCNC: 135 MMOL/L (ref 136–145)
SODIUM SERPL-SCNC: 136 MMOL/L (ref 136–145)
SODIUM SERPL-SCNC: 139 MMOL/L (ref 136–145)
SODIUM SERPL-SCNC: 140 MMOL/L (ref 136–145)
TACROLIMUS BLD-MCNC: <2 NG/ML (ref 5–15)
TACROLIMUS, NORMALIZED: <2 NG/ML (ref 5–15)
WBC # BLD AUTO: 12.75 K/UL (ref 3.9–12.7)
WBC # BLD AUTO: 8.85 K/UL (ref 3.9–12.7)
WBC # BLD AUTO: 9.71 K/UL (ref 3.9–12.7)

## 2021-10-19 PROCEDURE — S5010 5% DEXTROSE AND 0.45% SALINE: HCPCS | Performed by: STUDENT IN AN ORGANIZED HEALTH CARE EDUCATION/TRAINING PROGRAM

## 2021-10-19 PROCEDURE — 50360 RNL ALTRNSPLJ W/O RCP NFRCT: CPT | Mod: LT,GC,, | Performed by: TRANSPLANT SURGERY

## 2021-10-19 PROCEDURE — 50605 INSERT URETERAL SUPPORT: CPT | Mod: 51,LT,GC, | Performed by: TRANSPLANT SURGERY

## 2021-10-19 PROCEDURE — 80197 ASSAY OF TACROLIMUS: CPT | Performed by: TRANSPLANT SURGERY

## 2021-10-19 PROCEDURE — 99233 PR SUBSEQUENT HOSPITAL CARE,LEVL III: ICD-10-PCS | Mod: ,,, | Performed by: NURSE PRACTITIONER

## 2021-10-19 PROCEDURE — 25000003 PHARM REV CODE 250: Performed by: NURSE ANESTHETIST, CERTIFIED REGISTERED

## 2021-10-19 PROCEDURE — 25000003 PHARM REV CODE 250: Mod: NTX | Performed by: NURSE PRACTITIONER

## 2021-10-19 PROCEDURE — 82570 ASSAY OF URINE CREATININE: CPT | Performed by: NURSE PRACTITIONER

## 2021-10-19 PROCEDURE — 63600175 PHARM REV CODE 636 W HCPCS: Performed by: NURSE PRACTITIONER

## 2021-10-19 PROCEDURE — 81200001 HC KIDNEY ACQUISITION - CADAVER

## 2021-10-19 PROCEDURE — 50360 PR TRANSPLANTATION OF KIDNEY: ICD-10-PCS | Mod: LT,GC,, | Performed by: TRANSPLANT SURGERY

## 2021-10-19 PROCEDURE — 63600175 PHARM REV CODE 636 W HCPCS: Performed by: STUDENT IN AN ORGANIZED HEALTH CARE EDUCATION/TRAINING PROGRAM

## 2021-10-19 PROCEDURE — 36415 COLL VENOUS BLD VENIPUNCTURE: CPT | Performed by: NURSE PRACTITIONER

## 2021-10-19 PROCEDURE — 83735 ASSAY OF MAGNESIUM: CPT | Performed by: TRANSPLANT SURGERY

## 2021-10-19 PROCEDURE — 25000003 PHARM REV CODE 250: Performed by: NURSE PRACTITIONER

## 2021-10-19 PROCEDURE — 63600175 PHARM REV CODE 636 W HCPCS: Mod: NTX | Performed by: PHYSICIAN ASSISTANT

## 2021-10-19 PROCEDURE — 85025 COMPLETE CBC W/AUTO DIFF WBC: CPT | Mod: 91 | Performed by: NURSE PRACTITIONER

## 2021-10-19 PROCEDURE — 20600001 HC STEP DOWN PRIVATE ROOM

## 2021-10-19 PROCEDURE — 63600175 PHARM REV CODE 636 W HCPCS: Mod: NTX | Performed by: NURSE PRACTITIONER

## 2021-10-19 PROCEDURE — 50605 PR URETEROTOMY TO INSERT STENT: ICD-10-PCS | Mod: 51,LT,GC, | Performed by: TRANSPLANT SURGERY

## 2021-10-19 PROCEDURE — 25000003 PHARM REV CODE 250: Performed by: STUDENT IN AN ORGANIZED HEALTH CARE EDUCATION/TRAINING PROGRAM

## 2021-10-19 PROCEDURE — 63600175 PHARM REV CODE 636 W HCPCS: Performed by: NURSE ANESTHETIST, CERTIFIED REGISTERED

## 2021-10-19 PROCEDURE — 63600175 PHARM REV CODE 636 W HCPCS: Performed by: TRANSPLANT SURGERY

## 2021-10-19 PROCEDURE — 80069 RENAL FUNCTION PANEL: CPT | Mod: 91 | Performed by: TRANSPLANT SURGERY

## 2021-10-19 PROCEDURE — 85014 HEMATOCRIT: CPT | Performed by: STUDENT IN AN ORGANIZED HEALTH CARE EDUCATION/TRAINING PROGRAM

## 2021-10-19 PROCEDURE — 25000003 PHARM REV CODE 250: Performed by: PHYSICIAN ASSISTANT

## 2021-10-19 PROCEDURE — 85025 COMPLETE CBC W/AUTO DIFF WBC: CPT | Mod: 91 | Performed by: TRANSPLANT SURGERY

## 2021-10-19 PROCEDURE — 80048 BASIC METABOLIC PNL TOTAL CA: CPT | Performed by: NURSE PRACTITIONER

## 2021-10-19 PROCEDURE — 63600175 PHARM REV CODE 636 W HCPCS: Performed by: ANESTHESIOLOGY

## 2021-10-19 PROCEDURE — 50323 PR TRANSPLANT,PREP CADAVER RENAL GRAFT: ICD-10-PCS | Mod: 51,LT,GC, | Performed by: TRANSPLANT SURGERY

## 2021-10-19 PROCEDURE — 36415 COLL VENOUS BLD VENIPUNCTURE: CPT | Performed by: TRANSPLANT SURGERY

## 2021-10-19 PROCEDURE — 81300002 HC KIDNEY TRANSPORT, GROUND 4-5 HOURS

## 2021-10-19 PROCEDURE — 80069 RENAL FUNCTION PANEL: CPT | Performed by: STUDENT IN AN ORGANIZED HEALTH CARE EDUCATION/TRAINING PROGRAM

## 2021-10-19 PROCEDURE — 99233 SBSQ HOSP IP/OBS HIGH 50: CPT | Mod: ,,, | Performed by: NURSE PRACTITIONER

## 2021-10-19 PROCEDURE — 25000003 PHARM REV CODE 250: Mod: NTX | Performed by: PHYSICIAN ASSISTANT

## 2021-10-19 DEVICE — STENT DOUBLE J 7FRX12CM
Type: IMPLANTABLE DEVICE | Site: URETER | Status: NON-FUNCTIONAL
Removed: 2021-11-10

## 2021-10-19 RX ORDER — TRAMADOL HYDROCHLORIDE 50 MG/1
50 TABLET ORAL EVERY 6 HOURS PRN
Status: DISCONTINUED | OUTPATIENT
Start: 2021-10-19 | End: 2021-10-22 | Stop reason: HOSPADM

## 2021-10-19 RX ORDER — DEXTROSE MONOHYDRATE AND SODIUM CHLORIDE 5; .45 G/100ML; G/100ML
INJECTION, SOLUTION INTRAVENOUS CONTINUOUS
Status: DISCONTINUED | OUTPATIENT
Start: 2021-10-19 | End: 2021-10-20

## 2021-10-19 RX ORDER — TACROLIMUS 1 MG/1
1 CAPSULE ORAL 2 TIMES DAILY
Status: DISCONTINUED | OUTPATIENT
Start: 2021-10-19 | End: 2021-10-22

## 2021-10-19 RX ORDER — DOCUSATE SODIUM 100 MG/1
100 CAPSULE, LIQUID FILLED ORAL 3 TIMES DAILY PRN
Refills: 0 | Status: ON HOLD | COMMUNITY
Start: 2021-10-19 | End: 2021-11-16 | Stop reason: SDUPTHER

## 2021-10-19 RX ORDER — MUPIROCIN 20 MG/G
1 OINTMENT TOPICAL 2 TIMES DAILY
Status: DISCONTINUED | OUTPATIENT
Start: 2021-10-19 | End: 2021-10-22 | Stop reason: HOSPADM

## 2021-10-19 RX ORDER — TACROLIMUS 1 MG/1
3 CAPSULE ORAL 2 TIMES DAILY
Status: DISCONTINUED | OUTPATIENT
Start: 2021-10-19 | End: 2021-10-19

## 2021-10-19 RX ORDER — INSULIN ASPART 100 [IU]/ML
0-5 INJECTION, SOLUTION INTRAVENOUS; SUBCUTANEOUS
Status: DISCONTINUED | OUTPATIENT
Start: 2021-10-19 | End: 2021-10-22 | Stop reason: HOSPADM

## 2021-10-19 RX ORDER — IBUPROFEN 200 MG
24 TABLET ORAL
Status: DISCONTINUED | OUTPATIENT
Start: 2021-10-19 | End: 2021-10-22 | Stop reason: HOSPADM

## 2021-10-19 RX ORDER — FENTANYL CITRATE 50 UG/ML
INJECTION, SOLUTION INTRAMUSCULAR; INTRAVENOUS
Status: DISCONTINUED | OUTPATIENT
Start: 2021-10-19 | End: 2021-10-19

## 2021-10-19 RX ORDER — PHENYLEPHRINE HYDROCHLORIDE 10 MG/ML
INJECTION INTRAVENOUS
Status: DISCONTINUED | OUTPATIENT
Start: 2021-10-19 | End: 2021-10-19

## 2021-10-19 RX ORDER — PROPOFOL 10 MG/ML
VIAL (ML) INTRAVENOUS
Status: DISCONTINUED | OUTPATIENT
Start: 2021-10-19 | End: 2021-10-19

## 2021-10-19 RX ORDER — HEPARIN SODIUM 10000 [USP'U]/ML
INJECTION, SOLUTION INTRAVENOUS; SUBCUTANEOUS
Status: DISCONTINUED | OUTPATIENT
Start: 2021-10-19 | End: 2021-10-19

## 2021-10-19 RX ORDER — BISACODYL 5 MG
10 TABLET, DELAYED RELEASE (ENTERIC COATED) ORAL NIGHTLY
Status: DISCONTINUED | OUTPATIENT
Start: 2021-10-19 | End: 2021-10-22 | Stop reason: HOSPADM

## 2021-10-19 RX ORDER — FAMOTIDINE 20 MG/1
20 TABLET, FILM COATED ORAL NIGHTLY
Qty: 30 TABLET | Refills: 1 | Status: SHIPPED | OUTPATIENT
Start: 2021-10-19 | End: 2022-05-02

## 2021-10-19 RX ORDER — MANNITOL 250 MG/ML
INJECTION, SOLUTION INTRAVENOUS
Status: DISCONTINUED | OUTPATIENT
Start: 2021-10-19 | End: 2021-10-19

## 2021-10-19 RX ORDER — NYSTATIN 100000 [USP'U]/ML
500000 SUSPENSION ORAL
Status: DISCONTINUED | OUTPATIENT
Start: 2021-10-19 | End: 2021-10-19

## 2021-10-19 RX ORDER — POSACONAZOLE 100 MG/1
300 TABLET, DELAYED RELEASE ORAL 2 TIMES DAILY
Status: COMPLETED | OUTPATIENT
Start: 2021-10-19 | End: 2021-10-19

## 2021-10-19 RX ORDER — IBUPROFEN 200 MG
16 TABLET ORAL
Status: DISCONTINUED | OUTPATIENT
Start: 2021-10-19 | End: 2021-10-22 | Stop reason: HOSPADM

## 2021-10-19 RX ORDER — EPINEPHRINE 1 MG/ML
1 INJECTION, SOLUTION INTRACARDIAC; INTRAMUSCULAR; INTRAVENOUS; SUBCUTANEOUS ONCE AS NEEDED
Status: DISCONTINUED | OUTPATIENT
Start: 2021-10-19 | End: 2021-10-22 | Stop reason: HOSPADM

## 2021-10-19 RX ORDER — DIPHENHYDRAMINE HCL 50 MG
50 CAPSULE ORAL ONCE AS NEEDED
Status: DISCONTINUED | OUTPATIENT
Start: 2021-10-19 | End: 2021-10-22 | Stop reason: HOSPADM

## 2021-10-19 RX ORDER — POSACONAZOLE 100 MG/1
300 TABLET, DELAYED RELEASE ORAL DAILY
Status: DISCONTINUED | OUTPATIENT
Start: 2021-10-20 | End: 2021-10-22 | Stop reason: HOSPADM

## 2021-10-19 RX ORDER — BISACODYL 5 MG
10 TABLET, DELAYED RELEASE (ENTERIC COATED) ORAL DAILY PRN
Refills: 0 | Status: ON HOLD | COMMUNITY
Start: 2021-10-19 | End: 2021-11-05 | Stop reason: HOSPADM

## 2021-10-19 RX ORDER — LIDOCAINE HYDROCHLORIDE 20 MG/ML
INJECTION INTRAVENOUS
Status: DISCONTINUED | OUTPATIENT
Start: 2021-10-19 | End: 2021-10-19

## 2021-10-19 RX ORDER — FENTANYL CITRATE 50 UG/ML
25 INJECTION, SOLUTION INTRAMUSCULAR; INTRAVENOUS EVERY 5 MIN PRN
Status: COMPLETED | OUTPATIENT
Start: 2021-10-19 | End: 2021-10-19

## 2021-10-19 RX ORDER — HEPARIN SODIUM 5000 [USP'U]/ML
5000 INJECTION, SOLUTION INTRAVENOUS; SUBCUTANEOUS EVERY 8 HOURS
Status: DISCONTINUED | OUTPATIENT
Start: 2021-10-19 | End: 2021-10-22 | Stop reason: HOSPADM

## 2021-10-19 RX ORDER — OXYCODONE HYDROCHLORIDE 5 MG/1
5 TABLET ORAL EVERY 6 HOURS PRN
Status: DISCONTINUED | OUTPATIENT
Start: 2021-10-19 | End: 2021-10-22 | Stop reason: HOSPADM

## 2021-10-19 RX ORDER — PREDNISONE 20 MG/1
20 TABLET ORAL DAILY
Status: DISCONTINUED | OUTPATIENT
Start: 2021-10-22 | End: 2021-10-22 | Stop reason: HOSPADM

## 2021-10-19 RX ORDER — VALGANCICLOVIR 450 MG/1
450 TABLET, FILM COATED ORAL EVERY MORNING
Status: DISCONTINUED | OUTPATIENT
Start: 2021-10-29 | End: 2021-10-22 | Stop reason: HOSPADM

## 2021-10-19 RX ORDER — METHYLPREDNISOLONE SOD SUCC 125 MG
125 VIAL (EA) INJECTION ONCE
Status: COMPLETED | OUTPATIENT
Start: 2021-10-21 | End: 2021-10-21

## 2021-10-19 RX ORDER — EPHEDRINE SULFATE 50 MG/ML
INJECTION, SOLUTION INTRAVENOUS
Status: DISCONTINUED | OUTPATIENT
Start: 2021-10-19 | End: 2021-10-19

## 2021-10-19 RX ORDER — DOCUSATE SODIUM 100 MG/1
100 CAPSULE, LIQUID FILLED ORAL 3 TIMES DAILY
Status: DISCONTINUED | OUTPATIENT
Start: 2021-10-19 | End: 2021-10-22 | Stop reason: HOSPADM

## 2021-10-19 RX ORDER — ACETAMINOPHEN 325 MG/1
650 TABLET ORAL
Status: DISPENSED | OUTPATIENT
Start: 2021-10-20 | End: 2021-10-22

## 2021-10-19 RX ORDER — CEFAZOLIN SODIUM 1 G/3ML
INJECTION, POWDER, FOR SOLUTION INTRAMUSCULAR; INTRAVENOUS
Status: DISCONTINUED | OUTPATIENT
Start: 2021-10-19 | End: 2021-10-19

## 2021-10-19 RX ORDER — CARVEDILOL 25 MG/1
25 TABLET ORAL 2 TIMES DAILY WITH MEALS
Qty: 60 TABLET | Refills: 11 | Status: ON HOLD | OUTPATIENT
Start: 2021-10-19 | End: 2023-03-31 | Stop reason: SDUPTHER

## 2021-10-19 RX ORDER — FUROSEMIDE 10 MG/ML
INJECTION INTRAMUSCULAR; INTRAVENOUS
Status: DISCONTINUED | OUTPATIENT
Start: 2021-10-19 | End: 2021-10-19

## 2021-10-19 RX ORDER — HYDRALAZINE HYDROCHLORIDE 20 MG/ML
10 INJECTION INTRAMUSCULAR; INTRAVENOUS EVERY 6 HOURS PRN
Status: DISCONTINUED | OUTPATIENT
Start: 2021-10-19 | End: 2021-10-21

## 2021-10-19 RX ORDER — CARVEDILOL 25 MG/1
25 TABLET ORAL 2 TIMES DAILY
Status: DISCONTINUED | OUTPATIENT
Start: 2021-10-19 | End: 2021-10-22 | Stop reason: HOSPADM

## 2021-10-19 RX ORDER — DIPHENHYDRAMINE HCL 25 MG
25 CAPSULE ORAL
Status: COMPLETED | OUTPATIENT
Start: 2021-10-20 | End: 2021-10-21

## 2021-10-19 RX ORDER — MYCOPHENOLATE MOFETIL 250 MG/1
1000 CAPSULE ORAL 2 TIMES DAILY
Qty: 240 CAPSULE | Refills: 11 | Status: ON HOLD | OUTPATIENT
Start: 2021-10-19 | End: 2021-11-08 | Stop reason: HOSPADM

## 2021-10-19 RX ORDER — SULFAMETHOXAZOLE AND TRIMETHOPRIM 400; 80 MG/1; MG/1
1 TABLET ORAL EVERY MORNING
Qty: 30 TABLET | Refills: 5 | Status: SHIPPED | OUTPATIENT
Start: 2021-10-19 | End: 2022-02-08 | Stop reason: ALTCHOICE

## 2021-10-19 RX ORDER — METHYLPREDNISOLONE SODIUM SUCCINATE 500 MG/8ML
INJECTION INTRAMUSCULAR; INTRAVENOUS
Status: DISCONTINUED | OUTPATIENT
Start: 2021-10-19 | End: 2021-10-19

## 2021-10-19 RX ORDER — CISATRACURIUM BESYLATE 2 MG/ML
INJECTION, SOLUTION INTRAVENOUS
Status: DISCONTINUED | OUTPATIENT
Start: 2021-10-19 | End: 2021-10-19

## 2021-10-19 RX ORDER — PREDNISONE 5 MG/1
TABLET ORAL
Qty: 120 TABLET | Refills: 11 | Status: SHIPPED | OUTPATIENT
Start: 2021-10-19 | End: 2021-12-15 | Stop reason: SDUPTHER

## 2021-10-19 RX ORDER — SULFAMETHOXAZOLE AND TRIMETHOPRIM 400; 80 MG/1; MG/1
1 TABLET ORAL EVERY MORNING
Status: DISCONTINUED | OUTPATIENT
Start: 2021-10-20 | End: 2021-10-20

## 2021-10-19 RX ORDER — LORAZEPAM 2 MG/ML
0.25 INJECTION INTRAMUSCULAR ONCE AS NEEDED
Status: DISCONTINUED | OUTPATIENT
Start: 2021-10-19 | End: 2021-10-19 | Stop reason: HOSPADM

## 2021-10-19 RX ORDER — ONDANSETRON 2 MG/ML
4 INJECTION INTRAMUSCULAR; INTRAVENOUS ONCE AS NEEDED
Status: DISCONTINUED | OUTPATIENT
Start: 2021-10-19 | End: 2021-10-22 | Stop reason: HOSPADM

## 2021-10-19 RX ORDER — ACETAMINOPHEN 500 MG
1000 TABLET ORAL EVERY 8 HOURS
Status: COMPLETED | OUTPATIENT
Start: 2021-10-19 | End: 2021-10-20

## 2021-10-19 RX ORDER — FAMOTIDINE 20 MG/1
20 TABLET, FILM COATED ORAL NIGHTLY
Status: DISCONTINUED | OUTPATIENT
Start: 2021-10-19 | End: 2021-10-22 | Stop reason: HOSPADM

## 2021-10-19 RX ORDER — DIPHENHYDRAMINE HYDROCHLORIDE 50 MG/ML
25 INJECTION INTRAMUSCULAR; INTRAVENOUS EVERY 6 HOURS PRN
Status: DISCONTINUED | OUTPATIENT
Start: 2021-10-19 | End: 2021-10-19 | Stop reason: HOSPADM

## 2021-10-19 RX ORDER — MIDAZOLAM HYDROCHLORIDE 1 MG/ML
INJECTION, SOLUTION INTRAMUSCULAR; INTRAVENOUS
Status: DISCONTINUED | OUTPATIENT
Start: 2021-10-19 | End: 2021-10-19

## 2021-10-19 RX ORDER — SODIUM CHLORIDE 9 MG/ML
INJECTION, SOLUTION INTRAVENOUS CONTINUOUS
Status: DISCONTINUED | OUTPATIENT
Start: 2021-10-19 | End: 2021-10-20

## 2021-10-19 RX ORDER — NEOSTIGMINE METHYLSULFATE 0.5 MG/ML
INJECTION, SOLUTION INTRAVENOUS
Status: DISCONTINUED | OUTPATIENT
Start: 2021-10-19 | End: 2021-10-19

## 2021-10-19 RX ORDER — VALGANCICLOVIR 450 MG/1
900 TABLET, FILM COATED ORAL EVERY MORNING
Qty: 60 TABLET | Refills: 2 | Status: SHIPPED | OUTPATIENT
Start: 2021-10-19 | End: 2021-10-21

## 2021-10-19 RX ORDER — MYCOPHENOLATE MOFETIL 250 MG/1
1000 CAPSULE ORAL 2 TIMES DAILY
Status: DISCONTINUED | OUTPATIENT
Start: 2021-10-19 | End: 2021-10-22 | Stop reason: HOSPADM

## 2021-10-19 RX ORDER — TACROLIMUS 1 MG/1
6 CAPSULE ORAL EVERY 12 HOURS
Qty: 360 CAPSULE | Refills: 11 | Status: SHIPPED | OUTPATIENT
Start: 2021-10-19 | End: 2021-10-21

## 2021-10-19 RX ORDER — GLUCAGON 1 MG
1 KIT INJECTION CONTINUOUS PRN
Status: DISCONTINUED | OUTPATIENT
Start: 2021-10-19 | End: 2021-10-22 | Stop reason: HOSPADM

## 2021-10-19 RX ORDER — CEFAZOLIN SODIUM 1 G/3ML
2 INJECTION, POWDER, FOR SOLUTION INTRAMUSCULAR; INTRAVENOUS
Status: DISPENSED | OUTPATIENT
Start: 2021-10-19 | End: 2021-10-19

## 2021-10-19 RX ADMIN — FENTANYL CITRATE 50 MCG: 50 INJECTION, SOLUTION INTRAMUSCULAR; INTRAVENOUS at 12:10

## 2021-10-19 RX ADMIN — HYDRALAZINE HYDROCHLORIDE 10 MG: 20 INJECTION, SOLUTION INTRAMUSCULAR; INTRAVENOUS at 05:10

## 2021-10-19 RX ADMIN — OXYCODONE 5 MG: 5 TABLET ORAL at 11:10

## 2021-10-19 RX ADMIN — OXYCODONE 5 MG: 5 TABLET ORAL at 04:10

## 2021-10-19 RX ADMIN — DOCUSATE SODIUM 100 MG: 100 CAPSULE, LIQUID FILLED ORAL at 07:10

## 2021-10-19 RX ADMIN — MULTIPLE VITAMINS W/ MINERALS TAB 1 TABLET: TAB at 07:10

## 2021-10-19 RX ADMIN — HEPARIN SODIUM 5000 UNITS: 5000 INJECTION INTRAVENOUS; SUBCUTANEOUS at 02:10

## 2021-10-19 RX ADMIN — DOCUSATE SODIUM 100 MG: 100 CAPSULE, LIQUID FILLED ORAL at 08:10

## 2021-10-19 RX ADMIN — METHYLPREDNISOLONE SODIUM SUCCINATE 500 MG: 500 INJECTION, POWDER, FOR SOLUTION INTRAMUSCULAR; INTRAVENOUS at 12:10

## 2021-10-19 RX ADMIN — PROPOFOL 200 MG: 10 INJECTION, EMULSION INTRAVENOUS at 12:10

## 2021-10-19 RX ADMIN — DEXTROSE AND SODIUM CHLORIDE: 5; .45 INJECTION, SOLUTION INTRAVENOUS at 03:10

## 2021-10-19 RX ADMIN — NYSTATIN 500000 UNITS: 100000 SUSPENSION ORAL at 08:10

## 2021-10-19 RX ADMIN — ACETAMINOPHEN 650 MG: 650 SOLUTION ORAL at 12:10

## 2021-10-19 RX ADMIN — FENTANYL CITRATE 25 MCG: 50 INJECTION INTRAMUSCULAR; INTRAVENOUS at 04:10

## 2021-10-19 RX ADMIN — ANTI-THYMOCYTE GLOBULIN (RABBIT) 100 MG: 5 INJECTION, POWDER, LYOPHILIZED, FOR SOLUTION INTRAVENOUS at 01:10

## 2021-10-19 RX ADMIN — LIDOCAINE HYDROCHLORIDE 100 MG: 20 INJECTION, SOLUTION INTRAVENOUS at 12:10

## 2021-10-19 RX ADMIN — CEFAZOLIN 2 G: 330 INJECTION, POWDER, FOR SOLUTION INTRAMUSCULAR; INTRAVENOUS at 07:10

## 2021-10-19 RX ADMIN — MUPIROCIN 1 G: 20 OINTMENT TOPICAL at 08:10

## 2021-10-19 RX ADMIN — SODIUM CHLORIDE 0.25 MCG/KG/MIN: 9 INJECTION, SOLUTION INTRAVENOUS at 01:10

## 2021-10-19 RX ADMIN — FENTANYL CITRATE 25 MCG: 50 INJECTION INTRAMUSCULAR; INTRAVENOUS at 03:10

## 2021-10-19 RX ADMIN — CEFAZOLIN 2 G: 330 INJECTION, POWDER, FOR SOLUTION INTRAMUSCULAR; INTRAVENOUS at 12:10

## 2021-10-19 RX ADMIN — MIDAZOLAM HYDROCHLORIDE 2 MG: 1 INJECTION, SOLUTION INTRAMUSCULAR; INTRAVENOUS at 12:10

## 2021-10-19 RX ADMIN — SODIUM CHLORIDE 550 ML/HR: 0.9 INJECTION, SOLUTION INTRAVENOUS at 05:10

## 2021-10-19 RX ADMIN — NEOSTIGMINE METHYLSULFATE 4 MG: 0.5 INJECTION INTRAVENOUS at 02:10

## 2021-10-19 RX ADMIN — HEPARIN SODIUM 5000 UNITS: 5000 INJECTION INTRAVENOUS; SUBCUTANEOUS at 08:10

## 2021-10-19 RX ADMIN — FENTANYL CITRATE 25 MCG: 50 INJECTION INTRAMUSCULAR; INTRAVENOUS at 05:10

## 2021-10-19 RX ADMIN — TRAMADOL HYDROCHLORIDE 50 MG: 50 TABLET, COATED ORAL at 06:10

## 2021-10-19 RX ADMIN — MYCOPHENOLATE MOFETIL 1000 MG: 250 CAPSULE ORAL at 08:10

## 2021-10-19 RX ADMIN — POSACONAZOLE 300 MG: 100 TABLET, DELAYED RELEASE ORAL at 11:10

## 2021-10-19 RX ADMIN — MANNITOL 25 G: 250 INJECTION, SOLUTION INTRAVENOUS at 01:10

## 2021-10-19 RX ADMIN — CARVEDILOL 25 MG: 25 TABLET, FILM COATED ORAL at 08:10

## 2021-10-19 RX ADMIN — INSULIN ASPART 2 UNITS: 100 INJECTION, SOLUTION INTRAVENOUS; SUBCUTANEOUS at 11:10

## 2021-10-19 RX ADMIN — FENTANYL CITRATE 100 MCG: 50 INJECTION, SOLUTION INTRAMUSCULAR; INTRAVENOUS at 12:10

## 2021-10-19 RX ADMIN — FAMOTIDINE 20 MG: 20 TABLET ORAL at 04:10

## 2021-10-19 RX ADMIN — PHENYLEPHRINE HYDROCHLORIDE 50 MCG: 10 INJECTION INTRAVENOUS at 01:10

## 2021-10-19 RX ADMIN — TACROLIMUS 3 MG: 1 CAPSULE ORAL at 07:10

## 2021-10-19 RX ADMIN — DOCUSATE SODIUM 100 MG: 100 CAPSULE, LIQUID FILLED ORAL at 02:10

## 2021-10-19 RX ADMIN — CISATRACURIUM BESYLATE 12 MG: 2 INJECTION INTRAVENOUS at 12:10

## 2021-10-19 RX ADMIN — ACETAMINOPHEN 1000 MG: 500 TABLET ORAL at 05:10

## 2021-10-19 RX ADMIN — FENTANYL CITRATE 50 MCG: 50 INJECTION, SOLUTION INTRAMUSCULAR; INTRAVENOUS at 02:10

## 2021-10-19 RX ADMIN — CARVEDILOL 25 MG: 25 TABLET, FILM COATED ORAL at 07:10

## 2021-10-19 RX ADMIN — SODIUM CHLORIDE 1000 ML: 0.9 INJECTION, SOLUTION INTRAVENOUS at 04:10

## 2021-10-19 RX ADMIN — ACETAMINOPHEN 1000 MG: 500 TABLET ORAL at 08:10

## 2021-10-19 RX ADMIN — HEPARIN SODIUM 5000 UNITS: 5000 INJECTION INTRAVENOUS; SUBCUTANEOUS at 05:10

## 2021-10-19 RX ADMIN — MUPIROCIN 1 G: 20 OINTMENT TOPICAL at 07:10

## 2021-10-19 RX ADMIN — TACROLIMUS 1 MG: 1 CAPSULE ORAL at 04:10

## 2021-10-19 RX ADMIN — GLYCOPYRROLATE 0.4 MG: 0.2 INJECTION, SOLUTION INTRAMUSCULAR; INTRAVITREAL at 02:10

## 2021-10-19 RX ADMIN — CISATRACURIUM BESYLATE 6 MG: 2 INJECTION INTRAVENOUS at 12:10

## 2021-10-19 RX ADMIN — POSACONAZOLE 300 MG: 100 TABLET, DELAYED RELEASE ORAL at 08:10

## 2021-10-19 RX ADMIN — BISACODYL 10 MG: 5 TABLET, COATED ORAL at 08:10

## 2021-10-19 RX ADMIN — ACETAMINOPHEN 1000 MG: 500 TABLET ORAL at 02:10

## 2021-10-19 RX ADMIN — EPHEDRINE SULFATE 10 MG: 50 INJECTION INTRAVENOUS at 01:10

## 2021-10-19 RX ADMIN — CISATRACURIUM BESYLATE 2 MG: 2 INJECTION INTRAVENOUS at 02:10

## 2021-10-19 RX ADMIN — FAMOTIDINE 20 MG: 20 TABLET ORAL at 08:10

## 2021-10-19 RX ADMIN — DIPHENHYDRAMINE HYDROCHLORIDE 50 MG: 50 INJECTION INTRAMUSCULAR; INTRAVENOUS at 12:10

## 2021-10-19 RX ADMIN — FUROSEMIDE 100 MG: 10 INJECTION, SOLUTION INTRAMUSCULAR; INTRAVENOUS at 01:10

## 2021-10-20 LAB
ALBUMIN SERPL BCP-MCNC: 3.4 G/DL (ref 3.5–5.2)
ANION GAP SERPL CALC-SCNC: 11 MMOL/L (ref 8–16)
BASOPHILS # BLD AUTO: 0.01 K/UL (ref 0–0.2)
BASOPHILS NFR BLD: 0.1 % (ref 0–1.9)
BUN SERPL-MCNC: 37 MG/DL (ref 6–20)
CALCIUM SERPL-MCNC: 8.9 MG/DL (ref 8.7–10.5)
CHLORIDE SERPL-SCNC: 104 MMOL/L (ref 95–110)
CLASS I ANTIBODIES - LUMINEX: NORMAL
CLASS II ANTIBODIES - LUMINEX: NEGATIVE
CO2 SERPL-SCNC: 22 MMOL/L (ref 23–29)
CPRA %: 0
CREAT SERPL-MCNC: 7.3 MG/DL (ref 0.5–1.4)
CREAT UR-MCNC: 164 MG/DL (ref 23–375)
DIFFERENTIAL METHOD: ABNORMAL
EOSINOPHIL # BLD AUTO: 0 K/UL (ref 0–0.5)
EOSINOPHIL NFR BLD: 0 % (ref 0–8)
ERYTHROCYTE [DISTWIDTH] IN BLOOD BY AUTOMATED COUNT: 16.7 % (ref 11.5–14.5)
EST. GFR  (AFRICAN AMERICAN): 9.9 ML/MIN/1.73 M^2
EST. GFR  (NON AFRICAN AMERICAN): 8.6 ML/MIN/1.73 M^2
GLUCOSE SERPL-MCNC: 128 MG/DL (ref 70–110)
HCT VFR BLD AUTO: 31.9 % (ref 40–54)
HGB BLD-MCNC: 9.8 G/DL (ref 14–18)
IMM GRANULOCYTES # BLD AUTO: 0.09 K/UL (ref 0–0.04)
IMM GRANULOCYTES NFR BLD AUTO: 0.6 % (ref 0–0.5)
LYMPHOCYTES # BLD AUTO: 0.2 K/UL (ref 1–4.8)
LYMPHOCYTES NFR BLD: 1.5 % (ref 18–48)
MAGNESIUM SERPL-MCNC: 1.8 MG/DL (ref 1.6–2.6)
MCH RBC QN AUTO: 24.3 PG (ref 27–31)
MCHC RBC AUTO-ENTMCNC: 30.7 G/DL (ref 32–36)
MCV RBC AUTO: 79 FL (ref 82–98)
MONOCYTES # BLD AUTO: 0.7 K/UL (ref 0.3–1)
MONOCYTES NFR BLD: 4.8 % (ref 4–15)
NEUTROPHILS # BLD AUTO: 13.4 K/UL (ref 1.8–7.7)
NEUTROPHILS NFR BLD: 93 % (ref 38–73)
NRBC BLD-RTO: 0 /100 WBC
PHOSPHATE SERPL-MCNC: 4.7 MG/DL (ref 2.7–4.5)
PHOSPHATE SERPL-MCNC: 4.7 MG/DL (ref 2.7–4.5)
PLATELET # BLD AUTO: 214 K/UL (ref 150–450)
PMV BLD AUTO: 10.9 FL (ref 9.2–12.9)
POCT GLUCOSE: 117 MG/DL (ref 70–110)
POCT GLUCOSE: 121 MG/DL (ref 70–110)
POCT GLUCOSE: 188 MG/DL (ref 70–110)
POTASSIUM SERPL-SCNC: 4.2 MMOL/L (ref 3.5–5.1)
PROT UR-MCNC: 80 MG/DL (ref 0–15)
PROT/CREAT UR: 0.49 MG/G{CREAT} (ref 0–0.2)
RBC # BLD AUTO: 4.04 M/UL (ref 4.6–6.2)
SERUM COLLECTION DT - LUMINEX CLASS I: NORMAL
SERUM COLLECTION DT - LUMINEX CLASS II: NORMAL
SODIUM SERPL-SCNC: 137 MMOL/L (ref 136–145)
SPCL1 TESTING DATE: NORMAL
SPCL2 TESTING DATE: NORMAL
SPLUA TESTING DATE: NORMAL
TACROLIMUS BLD-MCNC: 4 NG/ML (ref 5–15)
TACROLIMUS, NORMALIZED: 3.6 NG/ML (ref 5–15)
WBC # BLD AUTO: 14.44 K/UL (ref 3.9–12.7)

## 2021-10-20 PROCEDURE — 83735 ASSAY OF MAGNESIUM: CPT | Performed by: STUDENT IN AN ORGANIZED HEALTH CARE EDUCATION/TRAINING PROGRAM

## 2021-10-20 PROCEDURE — 36415 COLL VENOUS BLD VENIPUNCTURE: CPT | Performed by: STUDENT IN AN ORGANIZED HEALTH CARE EDUCATION/TRAINING PROGRAM

## 2021-10-20 PROCEDURE — 25000003 PHARM REV CODE 250: Performed by: NURSE PRACTITIONER

## 2021-10-20 PROCEDURE — 25000003 PHARM REV CODE 250: Performed by: PHYSICIAN ASSISTANT

## 2021-10-20 PROCEDURE — 99233 SBSQ HOSP IP/OBS HIGH 50: CPT | Mod: ,,, | Performed by: PHYSICIAN ASSISTANT

## 2021-10-20 PROCEDURE — 80069 RENAL FUNCTION PANEL: CPT | Performed by: STUDENT IN AN ORGANIZED HEALTH CARE EDUCATION/TRAINING PROGRAM

## 2021-10-20 PROCEDURE — 63600175 PHARM REV CODE 636 W HCPCS: Performed by: NURSE PRACTITIONER

## 2021-10-20 PROCEDURE — 85025 COMPLETE CBC W/AUTO DIFF WBC: CPT | Performed by: STUDENT IN AN ORGANIZED HEALTH CARE EDUCATION/TRAINING PROGRAM

## 2021-10-20 PROCEDURE — 82570 ASSAY OF URINE CREATININE: CPT | Performed by: NURSE PRACTITIONER

## 2021-10-20 PROCEDURE — 99233 PR SUBSEQUENT HOSPITAL CARE,LEVL III: ICD-10-PCS | Mod: ,,, | Performed by: PHYSICIAN ASSISTANT

## 2021-10-20 PROCEDURE — 80197 ASSAY OF TACROLIMUS: CPT | Performed by: STUDENT IN AN ORGANIZED HEALTH CARE EDUCATION/TRAINING PROGRAM

## 2021-10-20 PROCEDURE — 20600001 HC STEP DOWN PRIVATE ROOM

## 2021-10-20 PROCEDURE — 25000003 PHARM REV CODE 250: Performed by: STUDENT IN AN ORGANIZED HEALTH CARE EDUCATION/TRAINING PROGRAM

## 2021-10-20 PROCEDURE — 63600175 PHARM REV CODE 636 W HCPCS: Performed by: STUDENT IN AN ORGANIZED HEALTH CARE EDUCATION/TRAINING PROGRAM

## 2021-10-20 RX ORDER — SULFAMETHOXAZOLE AND TRIMETHOPRIM 400; 80 MG/1; MG/1
1 TABLET ORAL
Status: DISCONTINUED | OUTPATIENT
Start: 2021-10-22 | End: 2021-10-22

## 2021-10-20 RX ORDER — MINOXIDIL 2.5 MG/1
5 TABLET ORAL DAILY
Status: DISCONTINUED | OUTPATIENT
Start: 2021-10-20 | End: 2021-10-21

## 2021-10-20 RX ADMIN — MYCOPHENOLATE MOFETIL 1000 MG: 250 CAPSULE ORAL at 07:10

## 2021-10-20 RX ADMIN — ANTI-THYMOCYTE GLOBULIN (RABBIT) 100 MG: 5 INJECTION, POWDER, LYOPHILIZED, FOR SOLUTION INTRAVENOUS at 03:10

## 2021-10-20 RX ADMIN — MUPIROCIN 1 G: 20 OINTMENT TOPICAL at 08:10

## 2021-10-20 RX ADMIN — HYDRALAZINE HYDROCHLORIDE 10 MG: 20 INJECTION, SOLUTION INTRAMUSCULAR; INTRAVENOUS at 11:10

## 2021-10-20 RX ADMIN — BISACODYL 10 MG: 5 TABLET, COATED ORAL at 07:10

## 2021-10-20 RX ADMIN — CARVEDILOL 25 MG: 25 TABLET, FILM COATED ORAL at 07:10

## 2021-10-20 RX ADMIN — DOCUSATE SODIUM 100 MG: 100 CAPSULE, LIQUID FILLED ORAL at 02:10

## 2021-10-20 RX ADMIN — HEPARIN SODIUM 5000 UNITS: 5000 INJECTION INTRAVENOUS; SUBCUTANEOUS at 07:10

## 2021-10-20 RX ADMIN — DOCUSATE SODIUM 100 MG: 100 CAPSULE, LIQUID FILLED ORAL at 08:10

## 2021-10-20 RX ADMIN — FAMOTIDINE 20 MG: 20 TABLET ORAL at 07:10

## 2021-10-20 RX ADMIN — ACETAMINOPHEN 1000 MG: 500 TABLET ORAL at 07:10

## 2021-10-20 RX ADMIN — ACETAMINOPHEN 1000 MG: 500 TABLET ORAL at 06:10

## 2021-10-20 RX ADMIN — CARVEDILOL 25 MG: 25 TABLET, FILM COATED ORAL at 08:10

## 2021-10-20 RX ADMIN — MULTIPLE VITAMINS W/ MINERALS TAB 1 TABLET: TAB at 08:10

## 2021-10-20 RX ADMIN — MUPIROCIN 1 G: 20 OINTMENT TOPICAL at 07:10

## 2021-10-20 RX ADMIN — TACROLIMUS 1 MG: 1 CAPSULE ORAL at 08:10

## 2021-10-20 RX ADMIN — HEPARIN SODIUM 5000 UNITS: 5000 INJECTION INTRAVENOUS; SUBCUTANEOUS at 06:10

## 2021-10-20 RX ADMIN — MINOXIDIL 5 MG: 2.5 TABLET ORAL at 11:10

## 2021-10-20 RX ADMIN — MYCOPHENOLATE MOFETIL 1000 MG: 250 CAPSULE ORAL at 08:10

## 2021-10-20 RX ADMIN — TRAMADOL HYDROCHLORIDE 50 MG: 50 TABLET, COATED ORAL at 07:10

## 2021-10-20 RX ADMIN — TACROLIMUS 1 MG: 1 CAPSULE ORAL at 05:10

## 2021-10-20 RX ADMIN — SULFAMETHOXAZOLE AND TRIMETHOPRIM 1 TABLET: 400; 80 TABLET ORAL at 08:10

## 2021-10-20 RX ADMIN — DEXTROSE 250 MG: 50 INJECTION, SOLUTION INTRAVENOUS at 01:10

## 2021-10-20 RX ADMIN — OXYCODONE 5 MG: 5 TABLET ORAL at 08:10

## 2021-10-20 RX ADMIN — DIPHENHYDRAMINE HYDROCHLORIDE 25 MG: 25 CAPSULE ORAL at 02:10

## 2021-10-20 RX ADMIN — ACETAMINOPHEN 1000 MG: 500 TABLET ORAL at 02:10

## 2021-10-20 RX ADMIN — POSACONAZOLE 300 MG: 100 TABLET, DELAYED RELEASE ORAL at 08:10

## 2021-10-20 RX ADMIN — HEPARIN SODIUM 5000 UNITS: 5000 INJECTION INTRAVENOUS; SUBCUTANEOUS at 02:10

## 2021-10-20 RX ADMIN — DOCUSATE SODIUM 100 MG: 100 CAPSULE, LIQUID FILLED ORAL at 07:10

## 2021-10-21 LAB
ALBUMIN SERPL BCP-MCNC: 3.4 G/DL (ref 3.5–5.2)
ANION GAP SERPL CALC-SCNC: 12 MMOL/L (ref 8–16)
BASOPHILS # BLD AUTO: 0.01 K/UL (ref 0–0.2)
BASOPHILS NFR BLD: 0.1 % (ref 0–1.9)
BUN SERPL-MCNC: 34 MG/DL (ref 6–20)
CALCIUM SERPL-MCNC: 9 MG/DL (ref 8.7–10.5)
CHLORIDE SERPL-SCNC: 109 MMOL/L (ref 95–110)
CO2 SERPL-SCNC: 19 MMOL/L (ref 23–29)
CREAT SERPL-MCNC: 4.2 MG/DL (ref 0.5–1.4)
CREAT UR-MCNC: 183 MG/DL (ref 23–375)
DIFFERENTIAL METHOD: ABNORMAL
EOSINOPHIL # BLD AUTO: 0 K/UL (ref 0–0.5)
EOSINOPHIL NFR BLD: 0.1 % (ref 0–8)
ERYTHROCYTE [DISTWIDTH] IN BLOOD BY AUTOMATED COUNT: 16.8 % (ref 11.5–14.5)
EST. GFR  (AFRICAN AMERICAN): 19.4 ML/MIN/1.73 M^2
EST. GFR  (NON AFRICAN AMERICAN): 16.8 ML/MIN/1.73 M^2
GLUCOSE SERPL-MCNC: 116 MG/DL (ref 70–110)
HCT VFR BLD AUTO: 34.1 % (ref 40–54)
HGB BLD-MCNC: 10.5 G/DL (ref 14–18)
IMM GRANULOCYTES # BLD AUTO: 0.15 K/UL (ref 0–0.04)
IMM GRANULOCYTES NFR BLD AUTO: 1.1 % (ref 0–0.5)
LYMPHOCYTES # BLD AUTO: 0.1 K/UL (ref 1–4.8)
LYMPHOCYTES NFR BLD: 0.9 % (ref 18–48)
MAGNESIUM SERPL-MCNC: 1.8 MG/DL (ref 1.6–2.6)
MCH RBC QN AUTO: 24.1 PG (ref 27–31)
MCHC RBC AUTO-ENTMCNC: 30.8 G/DL (ref 32–36)
MCV RBC AUTO: 78 FL (ref 82–98)
MONOCYTES # BLD AUTO: 0.4 K/UL (ref 0.3–1)
MONOCYTES NFR BLD: 3 % (ref 4–15)
NEUTROPHILS # BLD AUTO: 12.9 K/UL (ref 1.8–7.7)
NEUTROPHILS NFR BLD: 94.8 % (ref 38–73)
NRBC BLD-RTO: 0 /100 WBC
PHOSPHATE SERPL-MCNC: 3.9 MG/DL (ref 2.7–4.5)
PLATELET # BLD AUTO: 187 K/UL (ref 150–450)
PMV BLD AUTO: 11.3 FL (ref 9.2–12.9)
POCT GLUCOSE: 117 MG/DL (ref 70–110)
POCT GLUCOSE: 126 MG/DL (ref 70–110)
POCT GLUCOSE: 159 MG/DL (ref 70–110)
POTASSIUM SERPL-SCNC: 4.6 MMOL/L (ref 3.5–5.1)
PROT UR-MCNC: 65 MG/DL (ref 0–15)
PROT/CREAT UR: 0.36 MG/G{CREAT} (ref 0–0.2)
RBC # BLD AUTO: 4.35 M/UL (ref 4.6–6.2)
SODIUM SERPL-SCNC: 140 MMOL/L (ref 136–145)
TACROLIMUS BLD-MCNC: 7.9 NG/ML (ref 5–15)
TACROLIMUS, NORMALIZED: 7.3 NG/ML (ref 5–15)
WBC # BLD AUTO: 13.63 K/UL (ref 3.9–12.7)

## 2021-10-21 PROCEDURE — 85025 COMPLETE CBC W/AUTO DIFF WBC: CPT | Performed by: STUDENT IN AN ORGANIZED HEALTH CARE EDUCATION/TRAINING PROGRAM

## 2021-10-21 PROCEDURE — 20600001 HC STEP DOWN PRIVATE ROOM

## 2021-10-21 PROCEDURE — 99233 PR SUBSEQUENT HOSPITAL CARE,LEVL III: ICD-10-PCS | Mod: ,,, | Performed by: NURSE PRACTITIONER

## 2021-10-21 PROCEDURE — 63600175 PHARM REV CODE 636 W HCPCS: Performed by: NURSE PRACTITIONER

## 2021-10-21 PROCEDURE — 25000003 PHARM REV CODE 250: Performed by: PHYSICIAN ASSISTANT

## 2021-10-21 PROCEDURE — 80069 RENAL FUNCTION PANEL: CPT | Performed by: STUDENT IN AN ORGANIZED HEALTH CARE EDUCATION/TRAINING PROGRAM

## 2021-10-21 PROCEDURE — 63600175 PHARM REV CODE 636 W HCPCS: Performed by: STUDENT IN AN ORGANIZED HEALTH CARE EDUCATION/TRAINING PROGRAM

## 2021-10-21 PROCEDURE — 25000003 PHARM REV CODE 250: Performed by: STUDENT IN AN ORGANIZED HEALTH CARE EDUCATION/TRAINING PROGRAM

## 2021-10-21 PROCEDURE — 36415 COLL VENOUS BLD VENIPUNCTURE: CPT | Performed by: STUDENT IN AN ORGANIZED HEALTH CARE EDUCATION/TRAINING PROGRAM

## 2021-10-21 PROCEDURE — 99233 SBSQ HOSP IP/OBS HIGH 50: CPT | Mod: ,,, | Performed by: NURSE PRACTITIONER

## 2021-10-21 PROCEDURE — 84156 ASSAY OF PROTEIN URINE: CPT | Performed by: NURSE PRACTITIONER

## 2021-10-21 PROCEDURE — 80197 ASSAY OF TACROLIMUS: CPT | Performed by: STUDENT IN AN ORGANIZED HEALTH CARE EDUCATION/TRAINING PROGRAM

## 2021-10-21 PROCEDURE — 83735 ASSAY OF MAGNESIUM: CPT | Performed by: STUDENT IN AN ORGANIZED HEALTH CARE EDUCATION/TRAINING PROGRAM

## 2021-10-21 PROCEDURE — 25000003 PHARM REV CODE 250: Performed by: NURSE PRACTITIONER

## 2021-10-21 RX ORDER — CLONIDINE HYDROCHLORIDE 0.1 MG/1
0.1 TABLET ORAL EVERY 6 HOURS PRN
Status: DISCONTINUED | OUTPATIENT
Start: 2021-10-21 | End: 2021-10-22 | Stop reason: HOSPADM

## 2021-10-21 RX ORDER — MINOXIDIL 2.5 MG/1
10 TABLET ORAL 2 TIMES DAILY
Status: DISCONTINUED | OUTPATIENT
Start: 2021-10-21 | End: 2021-10-22 | Stop reason: HOSPADM

## 2021-10-21 RX ORDER — TACROLIMUS 0.5 MG/1
1 CAPSULE ORAL EVERY 12 HOURS
Qty: 120 CAPSULE | Refills: 11 | Status: SHIPPED | OUTPATIENT
Start: 2021-10-21 | End: 2021-10-22

## 2021-10-21 RX ORDER — LIDOCAINE HYDROCHLORIDE 10 MG/ML
1 INJECTION INFILTRATION; PERINEURAL ONCE
Status: DISCONTINUED | OUTPATIENT
Start: 2021-10-21 | End: 2021-10-22 | Stop reason: HOSPADM

## 2021-10-21 RX ORDER — NIFEDIPINE 30 MG/1
30 TABLET, EXTENDED RELEASE ORAL DAILY
Status: DISCONTINUED | OUTPATIENT
Start: 2021-10-21 | End: 2021-10-22

## 2021-10-21 RX ORDER — POSACONAZOLE 100 MG/1
300 TABLET, DELAYED RELEASE ORAL DAILY
Qty: 84 TABLET | Refills: 0 | Status: ON HOLD | OUTPATIENT
Start: 2021-10-22 | End: 2021-11-16 | Stop reason: HOSPADM

## 2021-10-21 RX ORDER — VALGANCICLOVIR 450 MG/1
450 TABLET, FILM COATED ORAL
Qty: 12 TABLET | Refills: 2 | Status: ON HOLD | OUTPATIENT
Start: 2021-10-22 | End: 2021-11-05 | Stop reason: SDUPTHER

## 2021-10-21 RX ORDER — MINOXIDIL 10 MG/1
10 TABLET ORAL DAILY
Qty: 30 TABLET | Refills: 11 | Status: SHIPPED | OUTPATIENT
Start: 2021-10-22 | End: 2021-10-22

## 2021-10-21 RX ORDER — MINOXIDIL 2.5 MG/1
10 TABLET ORAL DAILY
Status: DISCONTINUED | OUTPATIENT
Start: 2021-10-21 | End: 2021-10-21

## 2021-10-21 RX ADMIN — HEPARIN SODIUM 5000 UNITS: 5000 INJECTION INTRAVENOUS; SUBCUTANEOUS at 08:10

## 2021-10-21 RX ADMIN — POSACONAZOLE 300 MG: 100 TABLET, DELAYED RELEASE ORAL at 08:10

## 2021-10-21 RX ADMIN — MUPIROCIN 1 G: 20 OINTMENT TOPICAL at 08:10

## 2021-10-21 RX ADMIN — TACROLIMUS 1 MG: 1 CAPSULE ORAL at 05:10

## 2021-10-21 RX ADMIN — CARVEDILOL 25 MG: 25 TABLET, FILM COATED ORAL at 08:10

## 2021-10-21 RX ADMIN — METHYLPREDNISOLONE SODIUM SUCCINATE 125 MG: 125 INJECTION, POWDER, FOR SOLUTION INTRAMUSCULAR; INTRAVENOUS at 12:10

## 2021-10-21 RX ADMIN — HEPARIN SODIUM 5000 UNITS: 5000 INJECTION INTRAVENOUS; SUBCUTANEOUS at 02:10

## 2021-10-21 RX ADMIN — MINOXIDIL 10 MG: 2.5 TABLET ORAL at 10:10

## 2021-10-21 RX ADMIN — ANTI-THYMOCYTE GLOBULIN (RABBIT) 100 MG: 5 INJECTION, POWDER, LYOPHILIZED, FOR SOLUTION INTRAVENOUS at 01:10

## 2021-10-21 RX ADMIN — FAMOTIDINE 20 MG: 20 TABLET ORAL at 08:10

## 2021-10-21 RX ADMIN — DOCUSATE SODIUM 100 MG: 100 CAPSULE, LIQUID FILLED ORAL at 08:10

## 2021-10-21 RX ADMIN — MULTIPLE VITAMINS W/ MINERALS TAB 1 TABLET: TAB at 08:10

## 2021-10-21 RX ADMIN — TRAMADOL HYDROCHLORIDE 50 MG: 50 TABLET, COATED ORAL at 10:10

## 2021-10-21 RX ADMIN — ACETAMINOPHEN 650 MG: 325 TABLET ORAL at 12:10

## 2021-10-21 RX ADMIN — NIFEDIPINE 30 MG: 30 TABLET, FILM COATED, EXTENDED RELEASE ORAL at 03:10

## 2021-10-21 RX ADMIN — HEPARIN SODIUM 5000 UNITS: 5000 INJECTION INTRAVENOUS; SUBCUTANEOUS at 06:10

## 2021-10-21 RX ADMIN — TACROLIMUS 1 MG: 1 CAPSULE ORAL at 08:10

## 2021-10-21 RX ADMIN — MYCOPHENOLATE MOFETIL 1000 MG: 250 CAPSULE ORAL at 08:10

## 2021-10-21 RX ADMIN — BISACODYL 10 MG: 5 TABLET, COATED ORAL at 08:10

## 2021-10-21 RX ADMIN — MINOXIDIL 10 MG: 2.5 TABLET ORAL at 08:10

## 2021-10-21 RX ADMIN — DOCUSATE SODIUM 100 MG: 100 CAPSULE, LIQUID FILLED ORAL at 02:10

## 2021-10-21 RX ADMIN — DIPHENHYDRAMINE HYDROCHLORIDE 25 MG: 25 CAPSULE ORAL at 12:10

## 2021-10-22 VITALS
SYSTOLIC BLOOD PRESSURE: 154 MMHG | DIASTOLIC BLOOD PRESSURE: 91 MMHG | RESPIRATION RATE: 18 BRPM | HEIGHT: 69 IN | OXYGEN SATURATION: 98 % | WEIGHT: 170 LBS | TEMPERATURE: 98 F | HEART RATE: 92 BPM | BODY MASS INDEX: 25.18 KG/M2

## 2021-10-22 DIAGNOSIS — Z94.0 KIDNEY REPLACED BY TRANSPLANT: Primary | ICD-10-CM

## 2021-10-22 LAB
ALBUMIN SERPL BCP-MCNC: 3.2 G/DL (ref 3.5–5.2)
ANION GAP SERPL CALC-SCNC: 10 MMOL/L (ref 8–16)
BASOPHILS # BLD AUTO: 0.01 K/UL (ref 0–0.2)
BASOPHILS NFR BLD: 0.1 % (ref 0–1.9)
BUN SERPL-MCNC: 28 MG/DL (ref 6–20)
CALCIUM SERPL-MCNC: 9.9 MG/DL (ref 8.7–10.5)
CHLORIDE SERPL-SCNC: 111 MMOL/L (ref 95–110)
CO2 SERPL-SCNC: 20 MMOL/L (ref 23–29)
CREAT SERPL-MCNC: 3 MG/DL (ref 0.5–1.4)
CREAT UR-MCNC: 124 MG/DL (ref 23–375)
DIFFERENTIAL METHOD: ABNORMAL
EOSINOPHIL # BLD AUTO: 0 K/UL (ref 0–0.5)
EOSINOPHIL NFR BLD: 0 % (ref 0–8)
ERYTHROCYTE [DISTWIDTH] IN BLOOD BY AUTOMATED COUNT: 16.7 % (ref 11.5–14.5)
EST. GFR  (AFRICAN AMERICAN): 29.1 ML/MIN/1.73 M^2
EST. GFR  (NON AFRICAN AMERICAN): 25.2 ML/MIN/1.73 M^2
GLUCOSE SERPL-MCNC: 105 MG/DL (ref 70–110)
HCT VFR BLD AUTO: 32.7 % (ref 40–54)
HGB BLD-MCNC: 10 G/DL (ref 14–18)
IMM GRANULOCYTES # BLD AUTO: 0.14 K/UL (ref 0–0.04)
IMM GRANULOCYTES NFR BLD AUTO: 1.3 % (ref 0–0.5)
LYMPHOCYTES # BLD AUTO: 0.1 K/UL (ref 1–4.8)
LYMPHOCYTES NFR BLD: 1.1 % (ref 18–48)
MAGNESIUM SERPL-MCNC: 1.7 MG/DL (ref 1.6–2.6)
MCH RBC QN AUTO: 24.5 PG (ref 27–31)
MCHC RBC AUTO-ENTMCNC: 30.6 G/DL (ref 32–36)
MCV RBC AUTO: 80 FL (ref 82–98)
MONOCYTES # BLD AUTO: 1.2 K/UL (ref 0.3–1)
MONOCYTES NFR BLD: 10.9 % (ref 4–15)
NEUTROPHILS # BLD AUTO: 9.3 K/UL (ref 1.8–7.7)
NEUTROPHILS NFR BLD: 86.6 % (ref 38–73)
NRBC BLD-RTO: 0 /100 WBC
PHOSPHATE SERPL-MCNC: 2.7 MG/DL (ref 2.7–4.5)
PLATELET # BLD AUTO: 169 K/UL (ref 150–450)
PMV BLD AUTO: 11.2 FL (ref 9.2–12.9)
POTASSIUM SERPL-SCNC: 4.1 MMOL/L (ref 3.5–5.1)
PROT UR-MCNC: 38 MG/DL (ref 0–15)
PROT/CREAT UR: 0.31 MG/G{CREAT} (ref 0–0.2)
RBC # BLD AUTO: 4.08 M/UL (ref 4.6–6.2)
SODIUM SERPL-SCNC: 141 MMOL/L (ref 136–145)
TACROLIMUS BLD-MCNC: 3.5 NG/ML (ref 5–15)
WBC # BLD AUTO: 10.78 K/UL (ref 3.9–12.7)

## 2021-10-22 PROCEDURE — 63600175 PHARM REV CODE 636 W HCPCS: Performed by: STUDENT IN AN ORGANIZED HEALTH CARE EDUCATION/TRAINING PROGRAM

## 2021-10-22 PROCEDURE — 25000003 PHARM REV CODE 250: Performed by: NURSE PRACTITIONER

## 2021-10-22 PROCEDURE — 99239 HOSP IP/OBS DSCHRG MGMT >30: CPT | Mod: 24,,, | Performed by: NURSE PRACTITIONER

## 2021-10-22 PROCEDURE — 99239 PR HOSPITAL DISCHARGE DAY,>30 MIN: ICD-10-PCS | Mod: 24,,, | Performed by: NURSE PRACTITIONER

## 2021-10-22 PROCEDURE — 85025 COMPLETE CBC W/AUTO DIFF WBC: CPT | Performed by: TRANSPLANT SURGERY

## 2021-10-22 PROCEDURE — 36415 COLL VENOUS BLD VENIPUNCTURE: CPT | Performed by: STUDENT IN AN ORGANIZED HEALTH CARE EDUCATION/TRAINING PROGRAM

## 2021-10-22 PROCEDURE — 36415 COLL VENOUS BLD VENIPUNCTURE: CPT | Performed by: TRANSPLANT SURGERY

## 2021-10-22 PROCEDURE — 25000003 PHARM REV CODE 250: Performed by: PHYSICIAN ASSISTANT

## 2021-10-22 PROCEDURE — 63600175 PHARM REV CODE 636 W HCPCS: Performed by: NURSE PRACTITIONER

## 2021-10-22 PROCEDURE — 80069 RENAL FUNCTION PANEL: CPT | Performed by: STUDENT IN AN ORGANIZED HEALTH CARE EDUCATION/TRAINING PROGRAM

## 2021-10-22 PROCEDURE — 83735 ASSAY OF MAGNESIUM: CPT | Performed by: STUDENT IN AN ORGANIZED HEALTH CARE EDUCATION/TRAINING PROGRAM

## 2021-10-22 PROCEDURE — 82570 ASSAY OF URINE CREATININE: CPT | Performed by: NURSE PRACTITIONER

## 2021-10-22 PROCEDURE — 25000003 PHARM REV CODE 250: Performed by: STUDENT IN AN ORGANIZED HEALTH CARE EDUCATION/TRAINING PROGRAM

## 2021-10-22 PROCEDURE — 80197 ASSAY OF TACROLIMUS: CPT | Performed by: STUDENT IN AN ORGANIZED HEALTH CARE EDUCATION/TRAINING PROGRAM

## 2021-10-22 RX ORDER — MINOXIDIL 10 MG/1
10 TABLET ORAL 2 TIMES DAILY
Qty: 60 TABLET | Refills: 11 | Status: ON HOLD | OUTPATIENT
Start: 2021-10-22 | End: 2021-11-07 | Stop reason: SDUPTHER

## 2021-10-22 RX ORDER — TACROLIMUS 0.5 MG/1
1.5 CAPSULE ORAL EVERY 12 HOURS
Qty: 180 CAPSULE | Refills: 11 | Status: SHIPPED | OUTPATIENT
Start: 2021-10-22 | End: 2021-10-29 | Stop reason: SDUPTHER

## 2021-10-22 RX ORDER — TACROLIMUS 0.5 MG/1
0.5 CAPSULE ORAL ONCE
Status: COMPLETED | OUTPATIENT
Start: 2021-10-22 | End: 2021-10-22

## 2021-10-22 RX ORDER — NIFEDIPINE 30 MG/1
30 TABLET, EXTENDED RELEASE ORAL 2 TIMES DAILY
Status: DISCONTINUED | OUTPATIENT
Start: 2021-10-22 | End: 2021-10-22 | Stop reason: HOSPADM

## 2021-10-22 RX ORDER — SULFAMETHOXAZOLE AND TRIMETHOPRIM 400; 80 MG/1; MG/1
1 TABLET ORAL DAILY
Status: DISCONTINUED | OUTPATIENT
Start: 2021-10-23 | End: 2021-10-22 | Stop reason: HOSPADM

## 2021-10-22 RX ORDER — NIFEDIPINE 30 MG/1
30 TABLET, EXTENDED RELEASE ORAL DAILY
Qty: 30 TABLET | Refills: 11 | Status: SHIPPED | OUTPATIENT
Start: 2021-10-23 | End: 2021-10-22

## 2021-10-22 RX ORDER — NIFEDIPINE 30 MG/1
30 TABLET, EXTENDED RELEASE ORAL 2 TIMES DAILY
Qty: 60 TABLET | Refills: 11 | Status: ON HOLD | OUTPATIENT
Start: 2021-10-22 | End: 2023-03-31

## 2021-10-22 RX ORDER — TRAMADOL HYDROCHLORIDE 50 MG/1
50 TABLET ORAL EVERY 6 HOURS PRN
Qty: 28 TABLET | Refills: 0 | Status: ON HOLD | OUTPATIENT
Start: 2021-10-22 | End: 2021-11-16 | Stop reason: HOSPADM

## 2021-10-22 RX ADMIN — TACROLIMUS 1 MG: 1 CAPSULE ORAL at 08:10

## 2021-10-22 RX ADMIN — POSACONAZOLE 300 MG: 100 TABLET, DELAYED RELEASE ORAL at 08:10

## 2021-10-22 RX ADMIN — MINOXIDIL 10 MG: 2.5 TABLET ORAL at 08:10

## 2021-10-22 RX ADMIN — CARVEDILOL 25 MG: 25 TABLET, FILM COATED ORAL at 08:10

## 2021-10-22 RX ADMIN — HEPARIN SODIUM 5000 UNITS: 5000 INJECTION INTRAVENOUS; SUBCUTANEOUS at 06:10

## 2021-10-22 RX ADMIN — SULFAMETHOXAZOLE AND TRIMETHOPRIM 1 TABLET: 400; 80 TABLET ORAL at 08:10

## 2021-10-22 RX ADMIN — TACROLIMUS 0.5 MG: 0.5 CAPSULE ORAL at 10:10

## 2021-10-22 RX ADMIN — MYCOPHENOLATE MOFETIL 1000 MG: 250 CAPSULE ORAL at 08:10

## 2021-10-22 RX ADMIN — NIFEDIPINE 30 MG: 30 TABLET, FILM COATED, EXTENDED RELEASE ORAL at 08:10

## 2021-10-22 RX ADMIN — MULTIPLE VITAMINS W/ MINERALS TAB 1 TABLET: TAB at 08:10

## 2021-10-22 RX ADMIN — OXYCODONE 5 MG: 5 TABLET ORAL at 11:10

## 2021-10-22 RX ADMIN — DOCUSATE SODIUM 100 MG: 100 CAPSULE, LIQUID FILLED ORAL at 08:10

## 2021-10-22 RX ADMIN — PREDNISONE 20 MG: 20 TABLET ORAL at 08:10

## 2021-10-23 PROCEDURE — G0180 MD CERTIFICATION HHA PATIENT: HCPCS | Mod: ,,, | Performed by: TRANSPLANT SURGERY

## 2021-10-23 PROCEDURE — G0180 PR HOME HEALTH MD CERTIFICATION: ICD-10-PCS | Mod: ,,, | Performed by: TRANSPLANT SURGERY

## 2021-10-24 ENCOUNTER — TELEPHONE (OUTPATIENT)
Dept: TRANSPLANT | Facility: CLINIC | Age: 36
End: 2021-10-24
Payer: MEDICARE

## 2021-10-25 ENCOUNTER — LAB VISIT (OUTPATIENT)
Dept: LAB | Facility: HOSPITAL | Age: 36
End: 2021-10-25
Attending: INTERNAL MEDICINE
Payer: MEDICARE

## 2021-10-25 ENCOUNTER — CLINICAL SUPPORT (OUTPATIENT)
Dept: TRANSPLANT | Facility: CLINIC | Age: 36
End: 2021-10-25
Payer: MEDICARE

## 2021-10-25 ENCOUNTER — TELEPHONE (OUTPATIENT)
Dept: TRANSPLANT | Facility: CLINIC | Age: 36
End: 2021-10-25

## 2021-10-25 ENCOUNTER — DOCUMENTATION ONLY (OUTPATIENT)
Dept: TRANSPLANT | Facility: CLINIC | Age: 36
End: 2021-10-25

## 2021-10-25 ENCOUNTER — NURSE TRIAGE (OUTPATIENT)
Dept: ADMINISTRATIVE | Facility: CLINIC | Age: 36
End: 2021-10-25
Payer: MEDICARE

## 2021-10-25 VITALS
DIASTOLIC BLOOD PRESSURE: 84 MMHG | OXYGEN SATURATION: 96 % | RESPIRATION RATE: 20 BRPM | TEMPERATURE: 97 F | HEART RATE: 85 BPM | HEIGHT: 70 IN | BODY MASS INDEX: 24.2 KG/M2 | WEIGHT: 169.06 LBS | SYSTOLIC BLOOD PRESSURE: 156 MMHG

## 2021-10-25 DIAGNOSIS — Z94.0 KIDNEY REPLACED BY TRANSPLANT: ICD-10-CM

## 2021-10-25 DIAGNOSIS — E83.39 HYPOPHOSPHATEMIA: ICD-10-CM

## 2021-10-25 DIAGNOSIS — Z94.0 KIDNEY REPLACED BY TRANSPLANT: Primary | ICD-10-CM

## 2021-10-25 LAB
ALBUMIN SERPL BCP-MCNC: 3.7 G/DL (ref 3.5–5.2)
ANION GAP SERPL CALC-SCNC: 7 MMOL/L (ref 8–16)
BASOPHILS # BLD AUTO: 0.02 K/UL (ref 0–0.2)
BASOPHILS NFR BLD: 0.3 % (ref 0–1.9)
BUN SERPL-MCNC: 16 MG/DL (ref 6–20)
CALCIUM SERPL-MCNC: 10.2 MG/DL (ref 8.7–10.5)
CHLORIDE SERPL-SCNC: 106 MMOL/L (ref 95–110)
CO2 SERPL-SCNC: 30 MMOL/L (ref 23–29)
CREAT SERPL-MCNC: 2.2 MG/DL (ref 0.5–1.4)
DIFFERENTIAL METHOD: ABNORMAL
EOSINOPHIL # BLD AUTO: 0.2 K/UL (ref 0–0.5)
EOSINOPHIL NFR BLD: 3.5 % (ref 0–8)
ERYTHROCYTE [DISTWIDTH] IN BLOOD BY AUTOMATED COUNT: 17.4 % (ref 11.5–14.5)
EST. GFR  (AFRICAN AMERICAN): 42.4 ML/MIN/1.73 M^2
EST. GFR  (NON AFRICAN AMERICAN): 36.6 ML/MIN/1.73 M^2
GLUCOSE SERPL-MCNC: 127 MG/DL (ref 70–110)
HCT VFR BLD AUTO: 32.4 % (ref 40–54)
HGB BLD-MCNC: 9.8 G/DL (ref 14–18)
IMM GRANULOCYTES # BLD AUTO: 0.14 K/UL (ref 0–0.04)
IMM GRANULOCYTES NFR BLD AUTO: 2.1 % (ref 0–0.5)
LYMPHOCYTES # BLD AUTO: 0.4 K/UL (ref 1–4.8)
LYMPHOCYTES NFR BLD: 5.8 % (ref 18–48)
MAGNESIUM SERPL-MCNC: 1.3 MG/DL (ref 1.6–2.6)
MCH RBC QN AUTO: 24.4 PG (ref 27–31)
MCHC RBC AUTO-ENTMCNC: 30.2 G/DL (ref 32–36)
MCV RBC AUTO: 81 FL (ref 82–98)
MONOCYTES # BLD AUTO: 1 K/UL (ref 0.3–1)
MONOCYTES NFR BLD: 15.7 % (ref 4–15)
NEUTROPHILS # BLD AUTO: 4.8 K/UL (ref 1.8–7.7)
NEUTROPHILS NFR BLD: 72.6 % (ref 38–73)
NRBC BLD-RTO: 0 /100 WBC
PHOSPHATE SERPL-MCNC: 1.4 MG/DL (ref 2.7–4.5)
PLATELET # BLD AUTO: 154 K/UL (ref 150–450)
PMV BLD AUTO: 11 FL (ref 9.2–12.9)
POTASSIUM SERPL-SCNC: 3.8 MMOL/L (ref 3.5–5.1)
RBC # BLD AUTO: 4.01 M/UL (ref 4.6–6.2)
SODIUM SERPL-SCNC: 143 MMOL/L (ref 136–145)
TACROLIMUS BLD-MCNC: 14.9 NG/ML (ref 5–15)
WBC # BLD AUTO: 6.58 K/UL (ref 3.9–12.7)

## 2021-10-25 PROCEDURE — 99999 PR PBB SHADOW E&M-EST. PATIENT-LVL II: ICD-10-PCS | Mod: PBBFAC,,,

## 2021-10-25 PROCEDURE — 36415 COLL VENOUS BLD VENIPUNCTURE: CPT | Performed by: INTERNAL MEDICINE

## 2021-10-25 PROCEDURE — 83735 ASSAY OF MAGNESIUM: CPT | Performed by: INTERNAL MEDICINE

## 2021-10-25 PROCEDURE — 99212 OFFICE O/P EST SF 10 MIN: CPT | Mod: PBBFAC

## 2021-10-25 PROCEDURE — 80069 RENAL FUNCTION PANEL: CPT | Performed by: INTERNAL MEDICINE

## 2021-10-25 PROCEDURE — 80197 ASSAY OF TACROLIMUS: CPT | Performed by: INTERNAL MEDICINE

## 2021-10-25 PROCEDURE — 99999 PR PBB SHADOW E&M-EST. PATIENT-LVL II: CPT | Mod: PBBFAC,,,

## 2021-10-25 PROCEDURE — 85025 COMPLETE CBC W/AUTO DIFF WBC: CPT | Performed by: INTERNAL MEDICINE

## 2021-10-25 NOTE — NURSING
"1ST NURSING VISIT POST DISCHARGE NOTE    1st RN appointment with Feliz Trevizo post discharge 10/21/21 s/p kidney transplant 10/19/21.  Patient's caregiver did not  accompany him.  Patient reports none.  Patient says that he that he is sleeping well.  Incision intact with staples.  Patient has no yuan or keerthi drain, both removed prior to discharge.  Patient that he is able to explain daily incision care and showering instructions.  Reviewed I&O monitoring, measuring, and recording, and the need for hydration (i.e., at least 2 liters of water daily with minimal caffeine and no grapefruit products).  Medication list and rationale were reviewed.  Patient did bring blue medication card and medication bottles for review.  Patient reports that he has stopped Dulcolax and has not continued Colace.  Patient has had a bowel movement.  Patient expressed understanding of daily care including BID VS, medications, and I&O documentation.  Patient made aware of today's creatinine level: 2.2.  Patient aware that coordinator will review today's labs with a transplant physician and call the patient with any dose changes indicated.  Next lab appointment scheduled for 10/28.  First post-operative transplant team appointment with labs scheduled for 11/01 Dr Velasco, 11/10 Surgery clinic.    Using the Kidney Transplant Patient Reference Manual, the patient submitted his open book "Self-assessment of Kidney Transplant Patient Knowledge" test, which was completed in the transplant clinic this morning before 1st nursing visit.  This test includes questions regarding critical dose medications commonly used after kidney transplant, medication dosing and side effects, importance of timed lab draws, important signs and symptoms to report 24/7 immediately post-transplant as well as how to contact the transplant team 24/7.    Test Score: 25/25, was completed with coordinator     After completing the test, the patient was given a copy of the " Self-assessment Answer Key to reinforce accurate learning of test content.  Patient expressed his understanding of the value of the information included in the self-assessment test.

## 2021-10-26 ENCOUNTER — TELEPHONE (OUTPATIENT)
Dept: TRANSPLANT | Facility: CLINIC | Age: 36
End: 2021-10-26
Payer: MEDICARE

## 2021-10-26 ENCOUNTER — LAB VISIT (OUTPATIENT)
Dept: LAB | Facility: HOSPITAL | Age: 36
End: 2021-10-26
Attending: INTERNAL MEDICINE
Payer: MEDICARE

## 2021-10-26 DIAGNOSIS — Z94.0 KIDNEY REPLACED BY TRANSPLANT: ICD-10-CM

## 2021-10-26 LAB — TACROLIMUS BLD-MCNC: 17.2 NG/ML (ref 5–15)

## 2021-10-26 PROCEDURE — 80197 ASSAY OF TACROLIMUS: CPT | Performed by: INTERNAL MEDICINE

## 2021-10-26 PROCEDURE — 36415 COLL VENOUS BLD VENIPUNCTURE: CPT | Performed by: INTERNAL MEDICINE

## 2021-10-26 NOTE — PROGRESS NOTES
Please call me if you have any questions or concerns. Thank you for using [facility].  Cindy Harris, RN   Transplant Coordinator [facility]   [phone]

## 2021-10-27 ENCOUNTER — NURSE TRIAGE (OUTPATIENT)
Dept: ADMINISTRATIVE | Facility: CLINIC | Age: 36
End: 2021-10-27
Payer: MEDICARE

## 2021-10-27 ENCOUNTER — TELEPHONE (OUTPATIENT)
Dept: TRANSPLANT | Facility: CLINIC | Age: 36
End: 2021-10-27
Payer: MEDICARE

## 2021-10-27 ENCOUNTER — LAB VISIT (OUTPATIENT)
Dept: LAB | Facility: HOSPITAL | Age: 36
End: 2021-10-27
Attending: INTERNAL MEDICINE
Payer: MEDICARE

## 2021-10-27 DIAGNOSIS — N25.81 SECONDARY HYPERPARATHYROIDISM: ICD-10-CM

## 2021-10-27 DIAGNOSIS — Z11.4 ENCOUNTER FOR SPECIAL SCREENING EXAMINATION FOR HIV: ICD-10-CM

## 2021-10-27 DIAGNOSIS — Z94.0 KIDNEY REPLACED BY TRANSPLANT: Primary | ICD-10-CM

## 2021-10-27 DIAGNOSIS — Z94.0 KIDNEY REPLACED BY TRANSPLANT: ICD-10-CM

## 2021-10-27 DIAGNOSIS — Z57.8 OCCUPATIONAL EXPOSURE TO OTHER RISK FACTORS: ICD-10-CM

## 2021-10-27 LAB — TACROLIMUS BLD-MCNC: 20.3 NG/ML (ref 5–15)

## 2021-10-27 PROCEDURE — 36415 COLL VENOUS BLD VENIPUNCTURE: CPT | Performed by: INTERNAL MEDICINE

## 2021-10-27 PROCEDURE — 80197 ASSAY OF TACROLIMUS: CPT | Performed by: INTERNAL MEDICINE

## 2021-10-27 SDOH — SOCIAL DETERMINANTS OF HEALTH (SDOH): OCCUPATIONAL EXPOSURE TO OTHER RISK FACTORS: Z57.8

## 2021-10-28 ENCOUNTER — LAB VISIT (OUTPATIENT)
Dept: LAB | Facility: HOSPITAL | Age: 36
End: 2021-10-28
Attending: INTERNAL MEDICINE
Payer: MEDICARE

## 2021-10-28 DIAGNOSIS — Z94.0 KIDNEY REPLACED BY TRANSPLANT: ICD-10-CM

## 2021-10-28 DIAGNOSIS — R39.89 SUSPECTED UTI: Primary | ICD-10-CM

## 2021-10-28 LAB
ALBUMIN SERPL BCP-MCNC: 3.7 G/DL (ref 3.5–5.2)
ANION GAP SERPL CALC-SCNC: 10 MMOL/L (ref 8–16)
BASOPHILS # BLD AUTO: 0.02 K/UL (ref 0–0.2)
BASOPHILS NFR BLD: 0.2 % (ref 0–1.9)
BUN SERPL-MCNC: 18 MG/DL (ref 6–20)
CALCIUM SERPL-MCNC: 10.3 MG/DL (ref 8.7–10.5)
CHLORIDE SERPL-SCNC: 106 MMOL/L (ref 95–110)
CO2 SERPL-SCNC: 25 MMOL/L (ref 23–29)
CREAT SERPL-MCNC: 2.2 MG/DL (ref 0.5–1.4)
DIFFERENTIAL METHOD: ABNORMAL
EOSINOPHIL # BLD AUTO: 0.2 K/UL (ref 0–0.5)
EOSINOPHIL NFR BLD: 1.7 % (ref 0–8)
ERYTHROCYTE [DISTWIDTH] IN BLOOD BY AUTOMATED COUNT: 17.7 % (ref 11.5–14.5)
EST. GFR  (AFRICAN AMERICAN): 42.4 ML/MIN/1.73 M^2
EST. GFR  (NON AFRICAN AMERICAN): 36.6 ML/MIN/1.73 M^2
GLUCOSE SERPL-MCNC: 99 MG/DL (ref 70–110)
HCT VFR BLD AUTO: 32.1 % (ref 40–54)
HGB BLD-MCNC: 9.7 G/DL (ref 14–18)
IMM GRANULOCYTES # BLD AUTO: 0.13 K/UL (ref 0–0.04)
IMM GRANULOCYTES NFR BLD AUTO: 1.5 % (ref 0–0.5)
LYMPHOCYTES # BLD AUTO: 0.4 K/UL (ref 1–4.8)
LYMPHOCYTES NFR BLD: 5 % (ref 18–48)
MAGNESIUM SERPL-MCNC: 1.3 MG/DL (ref 1.6–2.6)
MCH RBC QN AUTO: 24.2 PG (ref 27–31)
MCHC RBC AUTO-ENTMCNC: 30.2 G/DL (ref 32–36)
MCV RBC AUTO: 80 FL (ref 82–98)
MONOCYTES # BLD AUTO: 1.1 K/UL (ref 0.3–1)
MONOCYTES NFR BLD: 12.9 % (ref 4–15)
NEUTROPHILS # BLD AUTO: 6.9 K/UL (ref 1.8–7.7)
NEUTROPHILS NFR BLD: 78.7 % (ref 38–73)
NRBC BLD-RTO: 0 /100 WBC
PHOSPHATE SERPL-MCNC: 1.6 MG/DL (ref 2.7–4.5)
PLATELET # BLD AUTO: 243 K/UL (ref 150–450)
PMV BLD AUTO: 11.5 FL (ref 9.2–12.9)
POTASSIUM SERPL-SCNC: 4 MMOL/L (ref 3.5–5.1)
RBC # BLD AUTO: 4.01 M/UL (ref 4.6–6.2)
SODIUM SERPL-SCNC: 141 MMOL/L (ref 136–145)
TACROLIMUS BLD-MCNC: 14.2 NG/ML (ref 5–15)
WBC # BLD AUTO: 8.77 K/UL (ref 3.9–12.7)

## 2021-10-28 PROCEDURE — 85025 COMPLETE CBC W/AUTO DIFF WBC: CPT | Performed by: INTERNAL MEDICINE

## 2021-10-28 PROCEDURE — 80197 ASSAY OF TACROLIMUS: CPT | Performed by: INTERNAL MEDICINE

## 2021-10-28 PROCEDURE — 83735 ASSAY OF MAGNESIUM: CPT | Performed by: INTERNAL MEDICINE

## 2021-10-28 PROCEDURE — 36415 COLL VENOUS BLD VENIPUNCTURE: CPT | Performed by: INTERNAL MEDICINE

## 2021-10-28 PROCEDURE — 80069 RENAL FUNCTION PANEL: CPT | Performed by: INTERNAL MEDICINE

## 2021-10-29 ENCOUNTER — LAB VISIT (OUTPATIENT)
Dept: LAB | Facility: HOSPITAL | Age: 36
End: 2021-10-29
Attending: INTERNAL MEDICINE
Payer: MEDICARE

## 2021-10-29 DIAGNOSIS — Z94.0 KIDNEY REPLACED BY TRANSPLANT: ICD-10-CM

## 2021-10-29 PROBLEM — N18.31 STAGE 3A CHRONIC KIDNEY DISEASE: Status: ACTIVE | Noted: 2021-10-29

## 2021-10-29 LAB — TACROLIMUS BLD-MCNC: 13.3 NG/ML (ref 5–15)

## 2021-10-29 PROCEDURE — 80197 ASSAY OF TACROLIMUS: CPT | Performed by: INTERNAL MEDICINE

## 2021-10-29 PROCEDURE — 36415 COLL VENOUS BLD VENIPUNCTURE: CPT | Performed by: INTERNAL MEDICINE

## 2021-11-01 ENCOUNTER — OFFICE VISIT (OUTPATIENT)
Dept: TRANSPLANT | Facility: CLINIC | Age: 36
End: 2021-11-01
Payer: MEDICARE

## 2021-11-01 VITALS
WEIGHT: 161.81 LBS | HEIGHT: 70 IN | RESPIRATION RATE: 16 BRPM | HEART RATE: 90 BPM | BODY MASS INDEX: 23.17 KG/M2 | SYSTOLIC BLOOD PRESSURE: 132 MMHG | TEMPERATURE: 97 F | OXYGEN SATURATION: 98 % | DIASTOLIC BLOOD PRESSURE: 75 MMHG

## 2021-11-01 DIAGNOSIS — N25.81 SECONDARY HYPERPARATHYROIDISM: ICD-10-CM

## 2021-11-01 DIAGNOSIS — Z79.60 LONG-TERM USE OF IMMUNOSUPPRESSANT MEDICATION: ICD-10-CM

## 2021-11-01 DIAGNOSIS — N05.1 FSGS (FOCAL SEGMENTAL GLOMERULOSCLEROSIS): ICD-10-CM

## 2021-11-01 DIAGNOSIS — H90.3 SENSORINEURAL HEARING LOSS (SNHL) OF BOTH EARS: Primary | Chronic | ICD-10-CM

## 2021-11-01 DIAGNOSIS — Z91.89 AT RISK FOR OPPORTUNISTIC INFECTIONS: ICD-10-CM

## 2021-11-01 DIAGNOSIS — Z94.0 S/P KIDNEY TRANSPLANT: ICD-10-CM

## 2021-11-01 DIAGNOSIS — N18.31 STAGE 3A CHRONIC KIDNEY DISEASE: ICD-10-CM

## 2021-11-01 PROCEDURE — 99999 PR PBB SHADOW E&M-EST. PATIENT-LVL IV: CPT | Mod: PBBFAC,,, | Performed by: INTERNAL MEDICINE

## 2021-11-01 PROCEDURE — 99215 PR OFFICE/OUTPT VISIT, EST, LEVL V, 40-54 MIN: ICD-10-PCS | Mod: S$PBB,,, | Performed by: INTERNAL MEDICINE

## 2021-11-01 PROCEDURE — 99214 OFFICE O/P EST MOD 30 MIN: CPT | Mod: PBBFAC | Performed by: INTERNAL MEDICINE

## 2021-11-01 PROCEDURE — 99215 OFFICE O/P EST HI 40 MIN: CPT | Mod: S$PBB,,, | Performed by: INTERNAL MEDICINE

## 2021-11-01 PROCEDURE — 99999 PR PBB SHADOW E&M-EST. PATIENT-LVL IV: ICD-10-PCS | Mod: PBBFAC,,, | Performed by: INTERNAL MEDICINE

## 2021-11-02 ENCOUNTER — TELEPHONE (OUTPATIENT)
Dept: TRANSPLANT | Facility: CLINIC | Age: 36
End: 2021-11-02
Payer: MEDICARE

## 2021-11-02 ENCOUNTER — EXTERNAL HOME HEALTH (OUTPATIENT)
Dept: HOME HEALTH SERVICES | Facility: HOSPITAL | Age: 36
End: 2021-11-02
Payer: MEDICARE

## 2021-11-04 ENCOUNTER — TELEPHONE (OUTPATIENT)
Dept: TRANSPLANT | Facility: CLINIC | Age: 36
End: 2021-11-04
Payer: MEDICARE

## 2021-11-05 ENCOUNTER — DOCUMENT SCAN (OUTPATIENT)
Dept: HOME HEALTH SERVICES | Facility: HOSPITAL | Age: 36
End: 2021-11-05
Payer: MEDICARE

## 2021-11-05 ENCOUNTER — HOSPITAL ENCOUNTER (INPATIENT)
Facility: HOSPITAL | Age: 36
LOS: 3 days | Discharge: HOME | DRG: 392 | End: 2021-11-08
Attending: EMERGENCY MEDICINE | Admitting: TRANSPLANT SURGERY
Payer: MEDICARE

## 2021-11-05 DIAGNOSIS — R10.9 ABDOMINAL PAIN: ICD-10-CM

## 2021-11-05 DIAGNOSIS — N05.1 FSGS (FOCAL SEGMENTAL GLOMERULOSCLEROSIS): ICD-10-CM

## 2021-11-05 DIAGNOSIS — I15.0 RENOVASCULAR HYPERTENSION: ICD-10-CM

## 2021-11-05 DIAGNOSIS — R10.9 ABDOMINAL PAIN, UNSPECIFIED ABDOMINAL LOCATION: ICD-10-CM

## 2021-11-05 DIAGNOSIS — D72.819 CHRONIC LEUKOPENIA: ICD-10-CM

## 2021-11-05 DIAGNOSIS — E83.39 HYPOPHOSPHATEMIA: ICD-10-CM

## 2021-11-05 DIAGNOSIS — R10.9 FLANK PAIN: ICD-10-CM

## 2021-11-05 DIAGNOSIS — Z79.60 LONG-TERM USE OF IMMUNOSUPPRESSANT MEDICATION: ICD-10-CM

## 2021-11-05 DIAGNOSIS — Z91.89 AT RISK FOR OPPORTUNISTIC INFECTIONS: ICD-10-CM

## 2021-11-05 DIAGNOSIS — Z94.0 S/P KIDNEY TRANSPLANT: ICD-10-CM

## 2021-11-05 DIAGNOSIS — Z29.89 PROPHYLACTIC IMMUNOTHERAPY: ICD-10-CM

## 2021-11-05 DIAGNOSIS — D63.8 ANEMIA OF CHRONIC DISEASE: ICD-10-CM

## 2021-11-05 DIAGNOSIS — R00.0 TACHYCARDIA: ICD-10-CM

## 2021-11-05 DIAGNOSIS — R10.31 RIGHT LOWER QUADRANT ABDOMINAL PAIN: Primary | ICD-10-CM

## 2021-11-05 LAB
ALBUMIN SERPL BCP-MCNC: 4.1 G/DL (ref 3.5–5.2)
ALP SERPL-CCNC: 76 U/L (ref 55–135)
ALT SERPL W/O P-5'-P-CCNC: 19 U/L (ref 10–44)
ANION GAP SERPL CALC-SCNC: 11 MMOL/L (ref 8–16)
AST SERPL-CCNC: 15 U/L (ref 10–40)
BASOPHILS # BLD AUTO: 0.05 K/UL (ref 0–0.2)
BASOPHILS NFR BLD: 0.6 % (ref 0–1.9)
BILIRUB SERPL-MCNC: 0.4 MG/DL (ref 0.1–1)
BILIRUB UR QL STRIP: NEGATIVE
BUN SERPL-MCNC: 20 MG/DL (ref 6–20)
BUN SERPL-MCNC: 22 MG/DL (ref 6–30)
CALCIUM SERPL-MCNC: 9.7 MG/DL (ref 8.7–10.5)
CHLORIDE SERPL-SCNC: 107 MMOL/L (ref 95–110)
CHLORIDE SERPL-SCNC: 109 MMOL/L (ref 95–110)
CLARITY UR REFRACT.AUTO: CLEAR
CO2 SERPL-SCNC: 22 MMOL/L (ref 23–29)
COLOR UR AUTO: NORMAL
CREAT SERPL-MCNC: 2.3 MG/DL (ref 0.5–1.4)
CREAT SERPL-MCNC: 2.4 MG/DL (ref 0.5–1.4)
CREAT UR-MCNC: 77 MG/DL (ref 23–375)
DIFFERENTIAL METHOD: ABNORMAL
EOSINOPHIL # BLD AUTO: 0.2 K/UL (ref 0–0.5)
EOSINOPHIL NFR BLD: 1.7 % (ref 0–8)
ERYTHROCYTE [DISTWIDTH] IN BLOOD BY AUTOMATED COUNT: 17.8 % (ref 11.5–14.5)
EST. GFR  (AFRICAN AMERICAN): 40.1 ML/MIN/1.73 M^2
EST. GFR  (NON AFRICAN AMERICAN): 34.7 ML/MIN/1.73 M^2
GLUCOSE SERPL-MCNC: 125 MG/DL (ref 70–110)
GLUCOSE SERPL-MCNC: 129 MG/DL (ref 70–110)
GLUCOSE UR QL STRIP: NEGATIVE
HCT VFR BLD AUTO: 33.1 % (ref 40–54)
HCT VFR BLD CALC: 32 %PCV (ref 36–54)
HGB BLD-MCNC: 9.7 G/DL (ref 14–18)
HGB UR QL STRIP: NEGATIVE
IMM GRANULOCYTES # BLD AUTO: 0.05 K/UL (ref 0–0.04)
IMM GRANULOCYTES NFR BLD AUTO: 0.6 % (ref 0–0.5)
KETONES UR QL STRIP: NEGATIVE
LACTATE SERPL-SCNC: 1.1 MMOL/L (ref 0.5–2.2)
LEUKOCYTE ESTERASE UR QL STRIP: NEGATIVE
LIPASE SERPL-CCNC: 39 U/L (ref 4–60)
LYMPHOCYTES # BLD AUTO: 0.6 K/UL (ref 1–4.8)
LYMPHOCYTES NFR BLD: 7.1 % (ref 18–48)
MAGNESIUM SERPL-MCNC: 1.4 MG/DL (ref 1.6–2.6)
MCH RBC QN AUTO: 24.2 PG (ref 27–31)
MCHC RBC AUTO-ENTMCNC: 29.3 G/DL (ref 32–36)
MCV RBC AUTO: 83 FL (ref 82–98)
MONOCYTES # BLD AUTO: 0.9 K/UL (ref 0.3–1)
MONOCYTES NFR BLD: 9.9 % (ref 4–15)
NEUTROPHILS # BLD AUTO: 7.3 K/UL (ref 1.8–7.7)
NEUTROPHILS NFR BLD: 80.1 % (ref 38–73)
NITRITE UR QL STRIP: NEGATIVE
NRBC BLD-RTO: 0 /100 WBC
PH UR STRIP: 6 [PH] (ref 5–8)
PHOSPHATE SERPL-MCNC: 2 MG/DL (ref 2.7–4.5)
PLATELET # BLD AUTO: 282 K/UL (ref 150–450)
PMV BLD AUTO: 10.2 FL (ref 9.2–12.9)
POC IONIZED CALCIUM: 1.24 MMOL/L (ref 1.06–1.42)
POC TCO2 (MEASURED): 24 MMOL/L (ref 23–29)
POTASSIUM BLD-SCNC: 4.2 MMOL/L (ref 3.5–5.1)
POTASSIUM SERPL-SCNC: 4.2 MMOL/L (ref 3.5–5.1)
PROT SERPL-MCNC: 8 G/DL (ref 6–8.4)
PROT UR QL STRIP: NEGATIVE
PROT UR-MCNC: 13 MG/DL (ref 0–15)
PROT/CREAT UR: 0.17 MG/G{CREAT} (ref 0–0.2)
RBC # BLD AUTO: 4.01 M/UL (ref 4.6–6.2)
SAMPLE: ABNORMAL
SODIUM BLD-SCNC: 143 MMOL/L (ref 136–145)
SODIUM SERPL-SCNC: 140 MMOL/L (ref 136–145)
SP GR UR STRIP: 1.01 (ref 1–1.03)
TACROLIMUS BLD-MCNC: 21.1 NG/ML (ref 5–15)
URN SPEC COLLECT METH UR: NORMAL
WBC # BLD AUTO: 9.07 K/UL (ref 3.9–12.7)

## 2021-11-05 PROCEDURE — 80197 ASSAY OF TACROLIMUS: CPT | Performed by: PHYSICIAN ASSISTANT

## 2021-11-05 PROCEDURE — 84100 ASSAY OF PHOSPHORUS: CPT | Performed by: STUDENT IN AN ORGANIZED HEALTH CARE EDUCATION/TRAINING PROGRAM

## 2021-11-05 PROCEDURE — 83735 ASSAY OF MAGNESIUM: CPT | Performed by: STUDENT IN AN ORGANIZED HEALTH CARE EDUCATION/TRAINING PROGRAM

## 2021-11-05 PROCEDURE — 25000003 PHARM REV CODE 250: Performed by: PHYSICIAN ASSISTANT

## 2021-11-05 PROCEDURE — 83690 ASSAY OF LIPASE: CPT | Performed by: STUDENT IN AN ORGANIZED HEALTH CARE EDUCATION/TRAINING PROGRAM

## 2021-11-05 PROCEDURE — 81003 URINALYSIS AUTO W/O SCOPE: CPT | Performed by: STUDENT IN AN ORGANIZED HEALTH CARE EDUCATION/TRAINING PROGRAM

## 2021-11-05 PROCEDURE — 63600175 PHARM REV CODE 636 W HCPCS: Performed by: PHYSICIAN ASSISTANT

## 2021-11-05 PROCEDURE — 93010 ELECTROCARDIOGRAM REPORT: CPT | Mod: ,,, | Performed by: INTERNAL MEDICINE

## 2021-11-05 PROCEDURE — 63600175 PHARM REV CODE 636 W HCPCS: Performed by: STUDENT IN AN ORGANIZED HEALTH CARE EDUCATION/TRAINING PROGRAM

## 2021-11-05 PROCEDURE — 85025 COMPLETE CBC W/AUTO DIFF WBC: CPT | Performed by: STUDENT IN AN ORGANIZED HEALTH CARE EDUCATION/TRAINING PROGRAM

## 2021-11-05 PROCEDURE — 93010 EKG 12-LEAD: ICD-10-PCS | Mod: ,,, | Performed by: INTERNAL MEDICINE

## 2021-11-05 PROCEDURE — 83605 ASSAY OF LACTIC ACID: CPT | Performed by: STUDENT IN AN ORGANIZED HEALTH CARE EDUCATION/TRAINING PROGRAM

## 2021-11-05 PROCEDURE — 80047 BASIC METABLC PNL IONIZED CA: CPT | Mod: 91

## 2021-11-05 PROCEDURE — 96374 THER/PROPH/DIAG INJ IV PUSH: CPT

## 2021-11-05 PROCEDURE — 93005 ELECTROCARDIOGRAM TRACING: CPT

## 2021-11-05 PROCEDURE — 80053 COMPREHEN METABOLIC PANEL: CPT | Performed by: STUDENT IN AN ORGANIZED HEALTH CARE EDUCATION/TRAINING PROGRAM

## 2021-11-05 PROCEDURE — 82570 ASSAY OF URINE CREATININE: CPT | Performed by: PHYSICIAN ASSISTANT

## 2021-11-05 PROCEDURE — 99291 CRITICAL CARE FIRST HOUR: CPT | Mod: GC,,, | Performed by: EMERGENCY MEDICINE

## 2021-11-05 PROCEDURE — 99285 EMERGENCY DEPT VISIT HI MDM: CPT | Mod: 25

## 2021-11-05 PROCEDURE — 20600001 HC STEP DOWN PRIVATE ROOM

## 2021-11-05 PROCEDURE — 99291 PR CRITICAL CARE, E/M 30-74 MINUTES: ICD-10-PCS | Mod: GC,,, | Performed by: EMERGENCY MEDICINE

## 2021-11-05 RX ORDER — HYDROMORPHONE HYDROCHLORIDE 1 MG/ML
1 INJECTION, SOLUTION INTRAMUSCULAR; INTRAVENOUS; SUBCUTANEOUS
Status: COMPLETED | OUTPATIENT
Start: 2021-11-05 | End: 2021-11-05

## 2021-11-05 RX ORDER — PSEUDOEPHEDRINE/ACETAMINOPHEN 30MG-500MG
100 TABLET ORAL
Status: COMPLETED | OUTPATIENT
Start: 2021-11-05 | End: 2021-11-05

## 2021-11-05 RX ORDER — NIFEDIPINE 30 MG/1
30 TABLET, EXTENDED RELEASE ORAL 2 TIMES DAILY
Status: DISCONTINUED | OUTPATIENT
Start: 2021-11-05 | End: 2021-11-08 | Stop reason: HOSPADM

## 2021-11-05 RX ORDER — SYRING-NEEDL,DISP,INSUL,0.3 ML 29 G X1/2"
296 SYRINGE, EMPTY DISPOSABLE MISCELLANEOUS
Status: COMPLETED | OUTPATIENT
Start: 2021-11-05 | End: 2021-11-05

## 2021-11-05 RX ORDER — SODIUM CHLORIDE 0.9 % (FLUSH) 0.9 %
10 SYRINGE (ML) INJECTION
Status: DISCONTINUED | OUTPATIENT
Start: 2021-11-05 | End: 2021-11-08 | Stop reason: HOSPADM

## 2021-11-05 RX ORDER — TALC
6 POWDER (GRAM) TOPICAL NIGHTLY PRN
Status: DISCONTINUED | OUTPATIENT
Start: 2021-11-05 | End: 2021-11-08 | Stop reason: HOSPADM

## 2021-11-05 RX ORDER — ACETAMINOPHEN 325 MG/1
650 TABLET ORAL EVERY 8 HOURS PRN
Status: DISCONTINUED | OUTPATIENT
Start: 2021-11-05 | End: 2021-11-08 | Stop reason: HOSPADM

## 2021-11-05 RX ORDER — CARVEDILOL 25 MG/1
25 TABLET ORAL 2 TIMES DAILY WITH MEALS
Status: DISCONTINUED | OUTPATIENT
Start: 2021-11-05 | End: 2021-11-08 | Stop reason: HOSPADM

## 2021-11-05 RX ORDER — LANOLIN ALCOHOL/MO/W.PET/CERES
400 CREAM (GRAM) TOPICAL 2 TIMES DAILY
Status: DISCONTINUED | OUTPATIENT
Start: 2021-11-05 | End: 2021-11-08 | Stop reason: HOSPADM

## 2021-11-05 RX ORDER — MINOXIDIL 2.5 MG/1
10 TABLET ORAL 2 TIMES DAILY
Status: DISCONTINUED | OUTPATIENT
Start: 2021-11-05 | End: 2021-11-06

## 2021-11-05 RX ORDER — BISACODYL 10 MG
10 SUPPOSITORY, RECTAL RECTAL DAILY PRN
Status: DISCONTINUED | OUTPATIENT
Start: 2021-11-05 | End: 2021-11-08 | Stop reason: HOSPADM

## 2021-11-05 RX ORDER — HYDROMORPHONE HYDROCHLORIDE 1 MG/ML
1 INJECTION, SOLUTION INTRAMUSCULAR; INTRAVENOUS; SUBCUTANEOUS EVERY 4 HOURS PRN
Status: DISCONTINUED | OUTPATIENT
Start: 2021-11-05 | End: 2021-11-07

## 2021-11-05 RX ORDER — POSACONAZOLE 100 MG/1
300 TABLET, DELAYED RELEASE ORAL DAILY
Status: DISCONTINUED | OUTPATIENT
Start: 2021-11-05 | End: 2021-11-08 | Stop reason: HOSPADM

## 2021-11-05 RX ORDER — SODIUM CHLORIDE 9 MG/ML
INJECTION, SOLUTION INTRAVENOUS CONTINUOUS
Status: DISCONTINUED | OUTPATIENT
Start: 2021-11-05 | End: 2021-11-06

## 2021-11-05 RX ORDER — PREDNISONE 20 MG/1
20 TABLET ORAL DAILY
Status: DISCONTINUED | OUTPATIENT
Start: 2021-11-06 | End: 2021-11-08 | Stop reason: HOSPADM

## 2021-11-05 RX ORDER — TACROLIMUS 0.5 MG/1
0.5 CAPSULE ORAL 2 TIMES DAILY
Status: DISCONTINUED | OUTPATIENT
Start: 2021-11-05 | End: 2021-11-05

## 2021-11-05 RX ORDER — BISACODYL 5 MG
10 TABLET, DELAYED RELEASE (ENTERIC COATED) ORAL DAILY PRN
Status: DISCONTINUED | OUTPATIENT
Start: 2021-11-05 | End: 2021-11-08 | Stop reason: HOSPADM

## 2021-11-05 RX ORDER — DOCUSATE SODIUM 100 MG/1
100 CAPSULE, LIQUID FILLED ORAL 3 TIMES DAILY PRN
Status: DISCONTINUED | OUTPATIENT
Start: 2021-11-05 | End: 2021-11-08 | Stop reason: HOSPADM

## 2021-11-05 RX ORDER — HEPARIN SODIUM 5000 [USP'U]/ML
5000 INJECTION, SOLUTION INTRAVENOUS; SUBCUTANEOUS EVERY 8 HOURS
Status: DISCONTINUED | OUTPATIENT
Start: 2021-11-05 | End: 2021-11-08 | Stop reason: HOSPADM

## 2021-11-05 RX ORDER — FAMOTIDINE 20 MG/1
20 TABLET, FILM COATED ORAL NIGHTLY
Status: DISCONTINUED | OUTPATIENT
Start: 2021-11-05 | End: 2021-11-08 | Stop reason: HOSPADM

## 2021-11-05 RX ORDER — ONDANSETRON 8 MG/1
8 TABLET, ORALLY DISINTEGRATING ORAL EVERY 8 HOURS PRN
Status: DISCONTINUED | OUTPATIENT
Start: 2021-11-05 | End: 2021-11-08 | Stop reason: HOSPADM

## 2021-11-05 RX ORDER — VALGANCICLOVIR 450 MG/1
450 TABLET, FILM COATED ORAL DAILY
Status: DISCONTINUED | OUTPATIENT
Start: 2021-11-05 | End: 2021-11-08 | Stop reason: HOSPADM

## 2021-11-05 RX ORDER — HYDROMORPHONE HYDROCHLORIDE 1 MG/ML
0.5 INJECTION, SOLUTION INTRAMUSCULAR; INTRAVENOUS; SUBCUTANEOUS EVERY 6 HOURS PRN
Status: DISCONTINUED | OUTPATIENT
Start: 2021-11-05 | End: 2021-11-07

## 2021-11-05 RX ORDER — HYDROMORPHONE HYDROCHLORIDE 1 MG/ML
0.5 INJECTION, SOLUTION INTRAMUSCULAR; INTRAVENOUS; SUBCUTANEOUS ONCE
Status: COMPLETED | OUTPATIENT
Start: 2021-11-05 | End: 2021-11-05

## 2021-11-05 RX ORDER — SULFAMETHOXAZOLE AND TRIMETHOPRIM 400; 80 MG/1; MG/1
1 TABLET ORAL EVERY MORNING
Status: DISCONTINUED | OUTPATIENT
Start: 2021-11-05 | End: 2021-11-08 | Stop reason: HOSPADM

## 2021-11-05 RX ORDER — VALGANCICLOVIR 450 MG/1
450 TABLET, FILM COATED ORAL
Status: DISCONTINUED | OUTPATIENT
Start: 2021-11-05 | End: 2021-11-05

## 2021-11-05 RX ORDER — VALGANCICLOVIR 450 MG/1
450 TABLET, FILM COATED ORAL DAILY
Qty: 30 TABLET | Refills: 2 | Status: SHIPPED | OUTPATIENT
Start: 2021-11-05 | End: 2022-02-07 | Stop reason: SINTOL

## 2021-11-05 RX ADMIN — TACROLIMUS 0.5 MG: 0.5 CAPSULE ORAL at 08:11

## 2021-11-05 RX ADMIN — Medication 400 MG: at 07:11

## 2021-11-05 RX ADMIN — SULFAMETHOXAZOLE AND TRIMETHOPRIM 1 TABLET: 400; 80 TABLET ORAL at 06:11

## 2021-11-05 RX ADMIN — SODIUM CHLORIDE: 0.9 INJECTION, SOLUTION INTRAVENOUS at 07:11

## 2021-11-05 RX ADMIN — POSACONAZOLE 300 MG: 100 TABLET, DELAYED RELEASE ORAL at 08:11

## 2021-11-05 RX ADMIN — HEPARIN SODIUM 5000 UNITS: 5000 INJECTION INTRAVENOUS; SUBCUTANEOUS at 03:11

## 2021-11-05 RX ADMIN — HEPARIN SODIUM 5000 UNITS: 5000 INJECTION INTRAVENOUS; SUBCUTANEOUS at 07:11

## 2021-11-05 RX ADMIN — CARVEDILOL 25 MG: 25 TABLET, FILM COATED ORAL at 05:11

## 2021-11-05 RX ADMIN — NIFEDIPINE 30 MG: 30 TABLET, FILM COATED, EXTENDED RELEASE ORAL at 07:11

## 2021-11-05 RX ADMIN — Medication 100 ML: at 09:11

## 2021-11-05 RX ADMIN — NIFEDIPINE 30 MG: 30 TABLET, FILM COATED, EXTENDED RELEASE ORAL at 08:11

## 2021-11-05 RX ADMIN — VALGANCICLOVIR 450 MG: 450 TABLET, FILM COATED ORAL at 09:11

## 2021-11-05 RX ADMIN — SODIUM CHLORIDE: 0.9 INJECTION, SOLUTION INTRAVENOUS at 06:11

## 2021-11-05 RX ADMIN — ACETAMINOPHEN 650 MG: 325 TABLET ORAL at 03:11

## 2021-11-05 RX ADMIN — HYDROMORPHONE HYDROCHLORIDE 1 MG: 1 INJECTION, SOLUTION INTRAMUSCULAR; INTRAVENOUS; SUBCUTANEOUS at 08:11

## 2021-11-05 RX ADMIN — MINOXIDIL 10 MG: 2.5 TABLET ORAL at 08:11

## 2021-11-05 RX ADMIN — HYDROMORPHONE HYDROCHLORIDE 0.5 MG: 1 INJECTION, SOLUTION INTRAMUSCULAR; INTRAVENOUS; SUBCUTANEOUS at 07:11

## 2021-11-05 RX ADMIN — FAMOTIDINE 20 MG: 20 TABLET ORAL at 07:11

## 2021-11-05 RX ADMIN — MAGNESIUM CITRATE 296 ML: 1.75 LIQUID ORAL at 09:11

## 2021-11-05 RX ADMIN — Medication 400 MG: at 08:11

## 2021-11-05 RX ADMIN — SODIUM CHLORIDE 500 ML: 0.9 INJECTION, SOLUTION INTRAVENOUS at 06:11

## 2021-11-05 RX ADMIN — Medication 6 MG: at 07:11

## 2021-11-05 RX ADMIN — PREDNISONE 15 MG: 5 TABLET ORAL at 08:11

## 2021-11-05 RX ADMIN — HYDROMORPHONE HYDROCHLORIDE 1 MG: 1 INJECTION, SOLUTION INTRAMUSCULAR; INTRAVENOUS; SUBCUTANEOUS at 03:11

## 2021-11-05 RX ADMIN — CARVEDILOL 25 MG: 25 TABLET, FILM COATED ORAL at 06:11

## 2021-11-06 PROBLEM — E83.39 HYPOPHOSPHATEMIA: Status: ACTIVE | Noted: 2021-11-06

## 2021-11-06 LAB
ALBUMIN SERPL BCP-MCNC: 3.5 G/DL (ref 3.5–5.2)
ANION GAP SERPL CALC-SCNC: 9 MMOL/L (ref 8–16)
BASOPHILS # BLD AUTO: 0.02 K/UL (ref 0–0.2)
BASOPHILS NFR BLD: 0.2 % (ref 0–1.9)
BUN SERPL-MCNC: 17 MG/DL (ref 6–20)
CALCIUM SERPL-MCNC: 9.6 MG/DL (ref 8.7–10.5)
CHLORIDE SERPL-SCNC: 107 MMOL/L (ref 95–110)
CO2 SERPL-SCNC: 22 MMOL/L (ref 23–29)
CREAT SERPL-MCNC: 1.9 MG/DL (ref 0.5–1.4)
DIFFERENTIAL METHOD: ABNORMAL
EOSINOPHIL # BLD AUTO: 0 K/UL (ref 0–0.5)
EOSINOPHIL NFR BLD: 0.4 % (ref 0–8)
ERYTHROCYTE [DISTWIDTH] IN BLOOD BY AUTOMATED COUNT: 17.6 % (ref 11.5–14.5)
EST. GFR  (AFRICAN AMERICAN): 50.6 ML/MIN/1.73 M^2
EST. GFR  (NON AFRICAN AMERICAN): 43.7 ML/MIN/1.73 M^2
GLUCOSE SERPL-MCNC: 92 MG/DL (ref 70–110)
HCT VFR BLD AUTO: 30.2 % (ref 40–54)
HGB BLD-MCNC: 8.9 G/DL (ref 14–18)
IMM GRANULOCYTES # BLD AUTO: 0.04 K/UL (ref 0–0.04)
IMM GRANULOCYTES NFR BLD AUTO: 0.4 % (ref 0–0.5)
LYMPHOCYTES # BLD AUTO: 0.3 K/UL (ref 1–4.8)
LYMPHOCYTES NFR BLD: 3.1 % (ref 18–48)
MAGNESIUM SERPL-MCNC: 1.3 MG/DL (ref 1.6–2.6)
MCH RBC QN AUTO: 23.9 PG (ref 27–31)
MCHC RBC AUTO-ENTMCNC: 29.5 G/DL (ref 32–36)
MCV RBC AUTO: 81 FL (ref 82–98)
MONOCYTES # BLD AUTO: 0.9 K/UL (ref 0.3–1)
MONOCYTES NFR BLD: 9.6 % (ref 4–15)
NEUTROPHILS # BLD AUTO: 8 K/UL (ref 1.8–7.7)
NEUTROPHILS NFR BLD: 86.3 % (ref 38–73)
NRBC BLD-RTO: 0 /100 WBC
PHOSPHATE SERPL-MCNC: 2.1 MG/DL (ref 2.7–4.5)
PLATELET # BLD AUTO: 231 K/UL (ref 150–450)
PMV BLD AUTO: 11 FL (ref 9.2–12.9)
POTASSIUM SERPL-SCNC: 4.9 MMOL/L (ref 3.5–5.1)
RBC # BLD AUTO: 3.73 M/UL (ref 4.6–6.2)
SODIUM SERPL-SCNC: 138 MMOL/L (ref 136–145)
TACROLIMUS BLD-MCNC: 13.9 NG/ML (ref 5–15)
WBC # BLD AUTO: 9.23 K/UL (ref 3.9–12.7)

## 2021-11-06 PROCEDURE — 63600175 PHARM REV CODE 636 W HCPCS: Performed by: PHYSICIAN ASSISTANT

## 2021-11-06 PROCEDURE — 80069 RENAL FUNCTION PANEL: CPT | Performed by: PHYSICIAN ASSISTANT

## 2021-11-06 PROCEDURE — 25000003 PHARM REV CODE 250: Performed by: NURSE PRACTITIONER

## 2021-11-06 PROCEDURE — 80197 ASSAY OF TACROLIMUS: CPT | Performed by: PHYSICIAN ASSISTANT

## 2021-11-06 PROCEDURE — 63600175 PHARM REV CODE 636 W HCPCS: Performed by: STUDENT IN AN ORGANIZED HEALTH CARE EDUCATION/TRAINING PROGRAM

## 2021-11-06 PROCEDURE — 99233 PR SUBSEQUENT HOSPITAL CARE,LEVL III: ICD-10-PCS | Mod: GC,,, | Performed by: INTERNAL MEDICINE

## 2021-11-06 PROCEDURE — 83735 ASSAY OF MAGNESIUM: CPT | Performed by: PHYSICIAN ASSISTANT

## 2021-11-06 PROCEDURE — 25000003 PHARM REV CODE 250: Performed by: INTERNAL MEDICINE

## 2021-11-06 PROCEDURE — 99233 SBSQ HOSP IP/OBS HIGH 50: CPT | Mod: GC,,, | Performed by: INTERNAL MEDICINE

## 2021-11-06 PROCEDURE — 25000003 PHARM REV CODE 250: Performed by: PHYSICIAN ASSISTANT

## 2021-11-06 PROCEDURE — 87799 DETECT AGENT NOS DNA QUANT: CPT | Performed by: PHYSICIAN ASSISTANT

## 2021-11-06 PROCEDURE — 20600001 HC STEP DOWN PRIVATE ROOM

## 2021-11-06 PROCEDURE — 85025 COMPLETE CBC W/AUTO DIFF WBC: CPT | Performed by: PHYSICIAN ASSISTANT

## 2021-11-06 RX ORDER — MAGNESIUM SULFATE HEPTAHYDRATE 40 MG/ML
2 INJECTION, SOLUTION INTRAVENOUS ONCE
Status: COMPLETED | OUTPATIENT
Start: 2021-11-06 | End: 2021-11-06

## 2021-11-06 RX ORDER — PSEUDOEPHEDRINE/ACETAMINOPHEN 30MG-500MG
100 TABLET ORAL ONCE
Status: COMPLETED | OUTPATIENT
Start: 2021-11-06 | End: 2021-11-06

## 2021-11-06 RX ORDER — MINOXIDIL 2.5 MG/1
10 TABLET ORAL DAILY
Status: DISCONTINUED | OUTPATIENT
Start: 2021-11-07 | End: 2021-11-08 | Stop reason: HOSPADM

## 2021-11-06 RX ORDER — SYRING-NEEDL,DISP,INSUL,0.3 ML 29 G X1/2"
296 SYRINGE, EMPTY DISPOSABLE MISCELLANEOUS
Status: COMPLETED | OUTPATIENT
Start: 2021-11-06 | End: 2021-11-06

## 2021-11-06 RX ADMIN — MAGNESIUM SULFATE 2 G: 2 INJECTION INTRAVENOUS at 01:11

## 2021-11-06 RX ADMIN — NIFEDIPINE 30 MG: 30 TABLET, FILM COATED, EXTENDED RELEASE ORAL at 08:11

## 2021-11-06 RX ADMIN — HYDROMORPHONE HYDROCHLORIDE 1 MG: 1 INJECTION, SOLUTION INTRAMUSCULAR; INTRAVENOUS; SUBCUTANEOUS at 09:11

## 2021-11-06 RX ADMIN — HEPARIN SODIUM 5000 UNITS: 5000 INJECTION INTRAVENOUS; SUBCUTANEOUS at 01:11

## 2021-11-06 RX ADMIN — Medication 400 MG: at 09:11

## 2021-11-06 RX ADMIN — FAMOTIDINE 20 MG: 20 TABLET ORAL at 08:11

## 2021-11-06 RX ADMIN — MINOXIDIL 10 MG: 2.5 TABLET ORAL at 09:11

## 2021-11-06 RX ADMIN — HYDROMORPHONE HYDROCHLORIDE 1 MG: 1 INJECTION, SOLUTION INTRAMUSCULAR; INTRAVENOUS; SUBCUTANEOUS at 03:11

## 2021-11-06 RX ADMIN — Medication 6 MG: at 08:11

## 2021-11-06 RX ADMIN — Medication 100 ML: at 05:11

## 2021-11-06 RX ADMIN — DIBASIC SODIUM PHOSPHATE, MONOBASIC POTASSIUM PHOSPHATE AND MONOBASIC SODIUM PHOSPHATE 2 TABLET: 852; 155; 130 TABLET ORAL at 09:11

## 2021-11-06 RX ADMIN — VALGANCICLOVIR 450 MG: 450 TABLET, FILM COATED ORAL at 08:11

## 2021-11-06 RX ADMIN — HEPARIN SODIUM 5000 UNITS: 5000 INJECTION INTRAVENOUS; SUBCUTANEOUS at 09:11

## 2021-11-06 RX ADMIN — PREDNISONE 20 MG: 20 TABLET ORAL at 09:11

## 2021-11-06 RX ADMIN — POSACONAZOLE 300 MG: 100 TABLET, DELAYED RELEASE ORAL at 08:11

## 2021-11-06 RX ADMIN — Medication 400 MG: at 08:11

## 2021-11-06 RX ADMIN — MAGNESIUM CITRATE 296 ML: 1.75 LIQUID ORAL at 05:11

## 2021-11-06 RX ADMIN — HEPARIN SODIUM 5000 UNITS: 5000 INJECTION INTRAVENOUS; SUBCUTANEOUS at 05:11

## 2021-11-06 RX ADMIN — CARVEDILOL 25 MG: 25 TABLET, FILM COATED ORAL at 05:11

## 2021-11-06 RX ADMIN — SODIUM CHLORIDE 500 ML: 0.9 INJECTION, SOLUTION INTRAVENOUS at 03:11

## 2021-11-06 RX ADMIN — SULFAMETHOXAZOLE AND TRIMETHOPRIM 1 TABLET: 400; 80 TABLET ORAL at 08:11

## 2021-11-06 RX ADMIN — DIBASIC SODIUM PHOSPHATE, MONOBASIC POTASSIUM PHOSPHATE AND MONOBASIC SODIUM PHOSPHATE 2 TABLET: 852; 155; 130 TABLET ORAL at 08:11

## 2021-11-06 RX ADMIN — SODIUM CHLORIDE: 0.9 INJECTION, SOLUTION INTRAVENOUS at 09:11

## 2021-11-06 RX ADMIN — CARVEDILOL 25 MG: 25 TABLET, FILM COATED ORAL at 08:11

## 2021-11-07 LAB
ALBUMIN SERPL BCP-MCNC: 3.3 G/DL (ref 3.5–5.2)
ANION GAP SERPL CALC-SCNC: 10 MMOL/L (ref 8–16)
BASOPHILS # BLD AUTO: 0.02 K/UL (ref 0–0.2)
BASOPHILS NFR BLD: 0.2 % (ref 0–1.9)
BUN SERPL-MCNC: 16 MG/DL (ref 6–20)
CALCIUM SERPL-MCNC: 9.9 MG/DL (ref 8.7–10.5)
CHLORIDE SERPL-SCNC: 106 MMOL/L (ref 95–110)
CO2 SERPL-SCNC: 21 MMOL/L (ref 23–29)
CREAT SERPL-MCNC: 1.9 MG/DL (ref 0.5–1.4)
DIFFERENTIAL METHOD: ABNORMAL
EOSINOPHIL # BLD AUTO: 0.1 K/UL (ref 0–0.5)
EOSINOPHIL NFR BLD: 1 % (ref 0–8)
ERYTHROCYTE [DISTWIDTH] IN BLOOD BY AUTOMATED COUNT: 17.6 % (ref 11.5–14.5)
EST. GFR  (AFRICAN AMERICAN): 50.6 ML/MIN/1.73 M^2
EST. GFR  (NON AFRICAN AMERICAN): 43.7 ML/MIN/1.73 M^2
GLUCOSE SERPL-MCNC: 96 MG/DL (ref 70–110)
HCT VFR BLD AUTO: 28.4 % (ref 40–54)
HGB BLD-MCNC: 8.7 G/DL (ref 14–18)
IMM GRANULOCYTES # BLD AUTO: 0.05 K/UL (ref 0–0.04)
IMM GRANULOCYTES NFR BLD AUTO: 0.6 % (ref 0–0.5)
LYMPHOCYTES # BLD AUTO: 0.4 K/UL (ref 1–4.8)
LYMPHOCYTES NFR BLD: 4.2 % (ref 18–48)
MAGNESIUM SERPL-MCNC: 1.4 MG/DL (ref 1.6–2.6)
MCH RBC QN AUTO: 24.2 PG (ref 27–31)
MCHC RBC AUTO-ENTMCNC: 30.6 G/DL (ref 32–36)
MCV RBC AUTO: 79 FL (ref 82–98)
MONOCYTES # BLD AUTO: 0.9 K/UL (ref 0.3–1)
MONOCYTES NFR BLD: 11.2 % (ref 4–15)
NEUTROPHILS # BLD AUTO: 6.8 K/UL (ref 1.8–7.7)
NEUTROPHILS NFR BLD: 82.8 % (ref 38–73)
NRBC BLD-RTO: 0 /100 WBC
PHOSPHATE SERPL-MCNC: 2 MG/DL (ref 2.7–4.5)
PLATELET # BLD AUTO: 227 K/UL (ref 150–450)
PMV BLD AUTO: 11.7 FL (ref 9.2–12.9)
POTASSIUM SERPL-SCNC: 4.4 MMOL/L (ref 3.5–5.1)
RBC # BLD AUTO: 3.59 M/UL (ref 4.6–6.2)
SODIUM SERPL-SCNC: 137 MMOL/L (ref 136–145)
TACROLIMUS BLD-MCNC: 8.3 NG/ML (ref 5–15)
WBC # BLD AUTO: 8.24 K/UL (ref 3.9–12.7)

## 2021-11-07 PROCEDURE — 25000003 PHARM REV CODE 250: Performed by: NURSE PRACTITIONER

## 2021-11-07 PROCEDURE — 80069 RENAL FUNCTION PANEL: CPT | Performed by: STUDENT IN AN ORGANIZED HEALTH CARE EDUCATION/TRAINING PROGRAM

## 2021-11-07 PROCEDURE — 25000003 PHARM REV CODE 250: Performed by: INTERNAL MEDICINE

## 2021-11-07 PROCEDURE — 94760 N-INVAS EAR/PLS OXIMETRY 1: CPT

## 2021-11-07 PROCEDURE — 63600175 PHARM REV CODE 636 W HCPCS: Performed by: PHYSICIAN ASSISTANT

## 2021-11-07 PROCEDURE — 20600001 HC STEP DOWN PRIVATE ROOM

## 2021-11-07 PROCEDURE — 83735 ASSAY OF MAGNESIUM: CPT | Performed by: PHYSICIAN ASSISTANT

## 2021-11-07 PROCEDURE — 99239 HOSP IP/OBS DSCHRG MGMT >30: CPT | Mod: ,,, | Performed by: NURSE PRACTITIONER

## 2021-11-07 PROCEDURE — 25000003 PHARM REV CODE 250: Performed by: PHYSICIAN ASSISTANT

## 2021-11-07 PROCEDURE — 36415 COLL VENOUS BLD VENIPUNCTURE: CPT | Performed by: PHYSICIAN ASSISTANT

## 2021-11-07 PROCEDURE — 99239 PR HOSPITAL DISCHARGE DAY,>30 MIN: ICD-10-PCS | Mod: ,,, | Performed by: NURSE PRACTITIONER

## 2021-11-07 PROCEDURE — 80197 ASSAY OF TACROLIMUS: CPT | Performed by: STUDENT IN AN ORGANIZED HEALTH CARE EDUCATION/TRAINING PROGRAM

## 2021-11-07 PROCEDURE — 63600175 PHARM REV CODE 636 W HCPCS: Performed by: NURSE PRACTITIONER

## 2021-11-07 PROCEDURE — 85025 COMPLETE CBC W/AUTO DIFF WBC: CPT | Performed by: PHYSICIAN ASSISTANT

## 2021-11-07 RX ORDER — MAGNESIUM SULFATE HEPTAHYDRATE 40 MG/ML
2 INJECTION, SOLUTION INTRAVENOUS ONCE
Status: COMPLETED | OUTPATIENT
Start: 2021-11-07 | End: 2021-11-07

## 2021-11-07 RX ORDER — TACROLIMUS 0.5 MG/1
0.5 CAPSULE ORAL EVERY MORNING
Status: DISCONTINUED | OUTPATIENT
Start: 2021-11-08 | End: 2021-11-08 | Stop reason: HOSPADM

## 2021-11-07 RX ORDER — OXYCODONE HYDROCHLORIDE 5 MG/1
5 TABLET ORAL EVERY 6 HOURS PRN
Status: DISCONTINUED | OUTPATIENT
Start: 2021-11-07 | End: 2021-11-08 | Stop reason: HOSPADM

## 2021-11-07 RX ORDER — TACROLIMUS 0.5 MG/1
0.5 CAPSULE ORAL DAILY
Qty: 30 CAPSULE | Refills: 11 | Status: ON HOLD | OUTPATIENT
Start: 2021-11-07 | End: 2021-11-16 | Stop reason: SDUPTHER

## 2021-11-07 RX ORDER — MINOXIDIL 10 MG/1
10 TABLET ORAL DAILY
Qty: 30 TABLET | Refills: 11 | Status: SHIPPED | OUTPATIENT
Start: 2021-11-07 | End: 2022-04-21

## 2021-11-07 RX ADMIN — DIBASIC SODIUM PHOSPHATE, MONOBASIC POTASSIUM PHOSPHATE AND MONOBASIC SODIUM PHOSPHATE 2 TABLET: 852; 155; 130 TABLET ORAL at 08:11

## 2021-11-07 RX ADMIN — HEPARIN SODIUM 5000 UNITS: 5000 INJECTION INTRAVENOUS; SUBCUTANEOUS at 09:11

## 2021-11-07 RX ADMIN — NIFEDIPINE 30 MG: 30 TABLET, FILM COATED, EXTENDED RELEASE ORAL at 08:11

## 2021-11-07 RX ADMIN — POSACONAZOLE 300 MG: 100 TABLET, DELAYED RELEASE ORAL at 09:11

## 2021-11-07 RX ADMIN — MINOXIDIL 10 MG: 2.5 TABLET ORAL at 09:11

## 2021-11-07 RX ADMIN — Medication 400 MG: at 09:11

## 2021-11-07 RX ADMIN — DIBASIC SODIUM PHOSPHATE, MONOBASIC POTASSIUM PHOSPHATE AND MONOBASIC SODIUM PHOSPHATE 2 TABLET: 852; 155; 130 TABLET ORAL at 09:11

## 2021-11-07 RX ADMIN — MAGNESIUM SULFATE 2 G: 2 INJECTION INTRAVENOUS at 09:11

## 2021-11-07 RX ADMIN — Medication 400 MG: at 08:11

## 2021-11-07 RX ADMIN — FAMOTIDINE 20 MG: 20 TABLET ORAL at 08:11

## 2021-11-07 RX ADMIN — OXYCODONE 5 MG: 5 TABLET ORAL at 08:11

## 2021-11-07 RX ADMIN — BISACODYL 10 MG: 5 TABLET, COATED ORAL at 09:11

## 2021-11-07 RX ADMIN — DOCUSATE SODIUM 100 MG: 100 CAPSULE, LIQUID FILLED ORAL at 09:11

## 2021-11-07 RX ADMIN — SULFAMETHOXAZOLE AND TRIMETHOPRIM 1 TABLET: 400; 80 TABLET ORAL at 09:11

## 2021-11-07 RX ADMIN — CARVEDILOL 25 MG: 25 TABLET, FILM COATED ORAL at 09:11

## 2021-11-07 RX ADMIN — PREDNISONE 20 MG: 20 TABLET ORAL at 09:11

## 2021-11-07 RX ADMIN — HYDROMORPHONE HYDROCHLORIDE 1 MG: 1 INJECTION, SOLUTION INTRAMUSCULAR; INTRAVENOUS; SUBCUTANEOUS at 06:11

## 2021-11-07 RX ADMIN — HEPARIN SODIUM 5000 UNITS: 5000 INJECTION INTRAVENOUS; SUBCUTANEOUS at 06:11

## 2021-11-07 RX ADMIN — OXYCODONE 5 MG: 5 TABLET ORAL at 01:11

## 2021-11-07 RX ADMIN — NIFEDIPINE 30 MG: 30 TABLET, FILM COATED, EXTENDED RELEASE ORAL at 09:11

## 2021-11-07 RX ADMIN — VALGANCICLOVIR 450 MG: 450 TABLET, FILM COATED ORAL at 09:11

## 2021-11-07 RX ADMIN — CARVEDILOL 25 MG: 25 TABLET, FILM COATED ORAL at 05:11

## 2021-11-08 VITALS
SYSTOLIC BLOOD PRESSURE: 136 MMHG | DIASTOLIC BLOOD PRESSURE: 71 MMHG | TEMPERATURE: 99 F | WEIGHT: 162.06 LBS | OXYGEN SATURATION: 94 % | RESPIRATION RATE: 14 BRPM | HEART RATE: 80 BPM | HEIGHT: 70 IN | BODY MASS INDEX: 23.2 KG/M2

## 2021-11-08 PROBLEM — R10.31 RIGHT LOWER QUADRANT ABDOMINAL PAIN: Status: RESOLVED | Noted: 2021-11-05 | Resolved: 2021-11-08

## 2021-11-08 LAB
ALBUMIN SERPL BCP-MCNC: 3.4 G/DL (ref 3.5–5.2)
ANION GAP SERPL CALC-SCNC: 8 MMOL/L (ref 8–16)
BASOPHILS # BLD AUTO: 0.02 K/UL (ref 0–0.2)
BASOPHILS NFR BLD: 0.2 % (ref 0–1.9)
BUN SERPL-MCNC: 20 MG/DL (ref 6–20)
CALCIUM SERPL-MCNC: 9.7 MG/DL (ref 8.7–10.5)
CHLORIDE SERPL-SCNC: 102 MMOL/L (ref 95–110)
CO2 SERPL-SCNC: 26 MMOL/L (ref 23–29)
CREAT SERPL-MCNC: 2 MG/DL (ref 0.5–1.4)
CREAT UR-MCNC: 62 MG/DL (ref 23–375)
DIFFERENTIAL METHOD: ABNORMAL
EOSINOPHIL # BLD AUTO: 0.1 K/UL (ref 0–0.5)
EOSINOPHIL NFR BLD: 1.3 % (ref 0–8)
ERYTHROCYTE [DISTWIDTH] IN BLOOD BY AUTOMATED COUNT: 17.2 % (ref 11.5–14.5)
EST. GFR  (AFRICAN AMERICAN): 47.5 ML/MIN/1.73 M^2
EST. GFR  (NON AFRICAN AMERICAN): 41.1 ML/MIN/1.73 M^2
GLUCOSE SERPL-MCNC: 98 MG/DL (ref 70–110)
HCT VFR BLD AUTO: 29.6 % (ref 40–54)
HGB BLD-MCNC: 9.1 G/DL (ref 14–18)
IMM GRANULOCYTES # BLD AUTO: 0.11 K/UL (ref 0–0.04)
IMM GRANULOCYTES NFR BLD AUTO: 1.3 % (ref 0–0.5)
LYMPHOCYTES # BLD AUTO: 0.5 K/UL (ref 1–4.8)
LYMPHOCYTES NFR BLD: 5.5 % (ref 18–48)
MAGNESIUM SERPL-MCNC: 1.6 MG/DL (ref 1.6–2.6)
MCH RBC QN AUTO: 24.3 PG (ref 27–31)
MCHC RBC AUTO-ENTMCNC: 30.7 G/DL (ref 32–36)
MCV RBC AUTO: 79 FL (ref 82–98)
MONOCYTES # BLD AUTO: 1 K/UL (ref 0.3–1)
MONOCYTES NFR BLD: 12.1 % (ref 4–15)
NEUTROPHILS # BLD AUTO: 6.7 K/UL (ref 1.8–7.7)
NEUTROPHILS NFR BLD: 79.6 % (ref 38–73)
NRBC BLD-RTO: 0 /100 WBC
PHOSPHATE SERPL-MCNC: 2.1 MG/DL (ref 2.7–4.5)
PLATELET # BLD AUTO: 273 K/UL (ref 150–450)
PMV BLD AUTO: 12.2 FL (ref 9.2–12.9)
POTASSIUM SERPL-SCNC: 4.2 MMOL/L (ref 3.5–5.1)
PROT UR-MCNC: 8 MG/DL (ref 0–15)
PROT/CREAT UR: 0.13 MG/G{CREAT} (ref 0–0.2)
RBC # BLD AUTO: 3.75 M/UL (ref 4.6–6.2)
SODIUM SERPL-SCNC: 136 MMOL/L (ref 136–145)
TACROLIMUS BLD-MCNC: 5.3 NG/ML (ref 5–15)
WBC # BLD AUTO: 8.42 K/UL (ref 3.9–12.7)

## 2021-11-08 PROCEDURE — 99239 PR HOSPITAL DISCHARGE DAY,>30 MIN: ICD-10-PCS | Mod: 24,,, | Performed by: PHYSICIAN ASSISTANT

## 2021-11-08 PROCEDURE — 25000003 PHARM REV CODE 250: Performed by: PHYSICIAN ASSISTANT

## 2021-11-08 PROCEDURE — 84156 ASSAY OF PROTEIN URINE: CPT | Performed by: PHYSICIAN ASSISTANT

## 2021-11-08 PROCEDURE — 25000003 PHARM REV CODE 250: Performed by: TRANSPLANT SURGERY

## 2021-11-08 PROCEDURE — 83735 ASSAY OF MAGNESIUM: CPT | Performed by: PHYSICIAN ASSISTANT

## 2021-11-08 PROCEDURE — 63600175 PHARM REV CODE 636 W HCPCS: Performed by: PHYSICIAN ASSISTANT

## 2021-11-08 PROCEDURE — 80197 ASSAY OF TACROLIMUS: CPT | Performed by: STUDENT IN AN ORGANIZED HEALTH CARE EDUCATION/TRAINING PROGRAM

## 2021-11-08 PROCEDURE — 36415 COLL VENOUS BLD VENIPUNCTURE: CPT | Performed by: PHYSICIAN ASSISTANT

## 2021-11-08 PROCEDURE — 25000003 PHARM REV CODE 250: Performed by: INTERNAL MEDICINE

## 2021-11-08 PROCEDURE — 85025 COMPLETE CBC W/AUTO DIFF WBC: CPT | Performed by: PHYSICIAN ASSISTANT

## 2021-11-08 PROCEDURE — 99239 HOSP IP/OBS DSCHRG MGMT >30: CPT | Mod: 24,,, | Performed by: PHYSICIAN ASSISTANT

## 2021-11-08 PROCEDURE — 80069 RENAL FUNCTION PANEL: CPT | Performed by: STUDENT IN AN ORGANIZED HEALTH CARE EDUCATION/TRAINING PROGRAM

## 2021-11-08 RX ORDER — MYCOPHENOLIC ACID 180 MG/1
720 TABLET, DELAYED RELEASE ORAL 2 TIMES DAILY
Qty: 240 TABLET | Refills: 11 | Status: SHIPPED | OUTPATIENT
Start: 2021-11-08 | End: 2021-12-15 | Stop reason: SDUPTHER

## 2021-11-08 RX ADMIN — Medication 400 MG: at 09:11

## 2021-11-08 RX ADMIN — CARVEDILOL 25 MG: 25 TABLET, FILM COATED ORAL at 08:11

## 2021-11-08 RX ADMIN — SULFAMETHOXAZOLE AND TRIMETHOPRIM 1 TABLET: 400; 80 TABLET ORAL at 08:11

## 2021-11-08 RX ADMIN — POSACONAZOLE 300 MG: 100 TABLET, DELAYED RELEASE ORAL at 08:11

## 2021-11-08 RX ADMIN — TACROLIMUS 0.5 MG: 0.5 CAPSULE ORAL at 08:11

## 2021-11-08 RX ADMIN — NIFEDIPINE 30 MG: 30 TABLET, FILM COATED, EXTENDED RELEASE ORAL at 08:11

## 2021-11-08 RX ADMIN — DIBASIC SODIUM PHOSPHATE, MONOBASIC POTASSIUM PHOSPHATE AND MONOBASIC SODIUM PHOSPHATE 2 TABLET: 852; 155; 130 TABLET ORAL at 08:11

## 2021-11-08 RX ADMIN — PREDNISONE 20 MG: 20 TABLET ORAL at 08:11

## 2021-11-08 RX ADMIN — MINOXIDIL 10 MG: 2.5 TABLET ORAL at 08:11

## 2021-11-08 RX ADMIN — HEPARIN SODIUM 5000 UNITS: 5000 INJECTION INTRAVENOUS; SUBCUTANEOUS at 05:11

## 2021-11-08 RX ADMIN — VALGANCICLOVIR 450 MG: 450 TABLET, FILM COATED ORAL at 08:11

## 2021-11-09 DIAGNOSIS — R79.89 ELEVATED SERUM CREATININE: ICD-10-CM

## 2021-11-09 DIAGNOSIS — Z94.0 KIDNEY REPLACED BY TRANSPLANT: Primary | ICD-10-CM

## 2021-11-10 ENCOUNTER — OFFICE VISIT (OUTPATIENT)
Dept: TRANSPLANT | Facility: CLINIC | Age: 36
End: 2021-11-10
Payer: MEDICARE

## 2021-11-10 ENCOUNTER — PROCEDURE VISIT (OUTPATIENT)
Dept: UROLOGY | Facility: CLINIC | Age: 36
End: 2021-11-10
Payer: MEDICARE

## 2021-11-10 VITALS
TEMPERATURE: 98 F | DIASTOLIC BLOOD PRESSURE: 83 MMHG | HEIGHT: 70 IN | RESPIRATION RATE: 16 BRPM | SYSTOLIC BLOOD PRESSURE: 134 MMHG | WEIGHT: 158.69 LBS | BODY MASS INDEX: 22.72 KG/M2 | HEART RATE: 85 BPM

## 2021-11-10 VITALS
WEIGHT: 159.63 LBS | BODY MASS INDEX: 22.85 KG/M2 | RESPIRATION RATE: 18 BRPM | HEIGHT: 70 IN | TEMPERATURE: 97 F | DIASTOLIC BLOOD PRESSURE: 77 MMHG | OXYGEN SATURATION: 97 % | HEART RATE: 88 BPM | SYSTOLIC BLOOD PRESSURE: 144 MMHG

## 2021-11-10 DIAGNOSIS — Z94.0 KIDNEY REPLACED BY TRANSPLANT: Primary | ICD-10-CM

## 2021-11-10 DIAGNOSIS — Z79.60 LONG-TERM USE OF IMMUNOSUPPRESSANT MEDICATION: ICD-10-CM

## 2021-11-10 DIAGNOSIS — Z51.81 MEDICATION MONITORING ENCOUNTER: ICD-10-CM

## 2021-11-10 LAB
BKV DNA SERPL NAA+PROBE-ACNC: <125 COPIES/ML
BKV DNA SERPL NAA+PROBE-LOG#: <2.1 LOG (10) COPIES/ML
BKV DNA SERPL QL NAA+PROBE: NOT DETECTED

## 2021-11-10 PROCEDURE — 52310 CYSTOSCOPY AND TREATMENT: CPT | Mod: S$PBB,,, | Performed by: UROLOGY

## 2021-11-10 PROCEDURE — 99999 PR PBB SHADOW E&M-EST. PATIENT-LVL IV: ICD-10-PCS | Mod: PBBFAC,,,

## 2021-11-10 PROCEDURE — 99999 PR PBB SHADOW E&M-EST. PATIENT-LVL IV: CPT | Mod: PBBFAC,,,

## 2021-11-10 PROCEDURE — 52310 PR CYSTOSCOPY,REMV CALCULUS,SIMPLE: ICD-10-PCS | Mod: S$PBB,,, | Performed by: UROLOGY

## 2021-11-10 PROCEDURE — 99215 OFFICE O/P EST HI 40 MIN: CPT | Mod: S$PBB,,, | Performed by: TRANSPLANT SURGERY

## 2021-11-10 PROCEDURE — 52310 CYSTOSCOPY AND TREATMENT: CPT | Mod: PBBFAC | Performed by: UROLOGY

## 2021-11-10 PROCEDURE — 99215 PR OFFICE/OUTPT VISIT, EST, LEVL V, 40-54 MIN: ICD-10-PCS | Mod: S$PBB,,, | Performed by: TRANSPLANT SURGERY

## 2021-11-10 PROCEDURE — 99214 OFFICE O/P EST MOD 30 MIN: CPT | Mod: PBBFAC

## 2021-11-10 RX ORDER — LIDOCAINE HYDROCHLORIDE 20 MG/ML
JELLY TOPICAL
Status: COMPLETED | OUTPATIENT
Start: 2021-11-10 | End: 2021-11-10

## 2021-11-10 RX ADMIN — LIDOCAINE HYDROCHLORIDE: 20 JELLY TOPICAL at 01:11

## 2021-11-11 ENCOUNTER — LAB VISIT (OUTPATIENT)
Dept: LAB | Facility: HOSPITAL | Age: 36
End: 2021-11-11
Attending: INTERNAL MEDICINE
Payer: MEDICARE

## 2021-11-11 DIAGNOSIS — Z94.0 KIDNEY REPLACED BY TRANSPLANT: ICD-10-CM

## 2021-11-11 LAB
ALBUMIN SERPL BCP-MCNC: 3.8 G/DL (ref 3.5–5.2)
ANION GAP SERPL CALC-SCNC: 11 MMOL/L (ref 8–16)
BASOPHILS # BLD AUTO: 0.03 K/UL (ref 0–0.2)
BASOPHILS NFR BLD: 0.3 % (ref 0–1.9)
BUN SERPL-MCNC: 18 MG/DL (ref 6–20)
CALCIUM SERPL-MCNC: 10.1 MG/DL (ref 8.7–10.5)
CHLORIDE SERPL-SCNC: 106 MMOL/L (ref 95–110)
CO2 SERPL-SCNC: 25 MMOL/L (ref 23–29)
CREAT SERPL-MCNC: 2 MG/DL (ref 0.5–1.4)
DIFFERENTIAL METHOD: ABNORMAL
EOSINOPHIL # BLD AUTO: 0.2 K/UL (ref 0–0.5)
EOSINOPHIL NFR BLD: 1.6 % (ref 0–8)
ERYTHROCYTE [DISTWIDTH] IN BLOOD BY AUTOMATED COUNT: 18.1 % (ref 11.5–14.5)
EST. GFR  (AFRICAN AMERICAN): 47.5 ML/MIN/1.73 M^2
EST. GFR  (NON AFRICAN AMERICAN): 41.1 ML/MIN/1.73 M^2
GLUCOSE SERPL-MCNC: 95 MG/DL (ref 70–110)
HCT VFR BLD AUTO: 31 % (ref 40–54)
HGB BLD-MCNC: 9.6 G/DL (ref 14–18)
IMM GRANULOCYTES # BLD AUTO: 0.15 K/UL (ref 0–0.04)
IMM GRANULOCYTES NFR BLD AUTO: 1.3 % (ref 0–0.5)
LYMPHOCYTES # BLD AUTO: 0.7 K/UL (ref 1–4.8)
LYMPHOCYTES NFR BLD: 6.1 % (ref 18–48)
MAGNESIUM SERPL-MCNC: 1.7 MG/DL (ref 1.6–2.6)
MCH RBC QN AUTO: 24.9 PG (ref 27–31)
MCHC RBC AUTO-ENTMCNC: 31 G/DL (ref 32–36)
MCV RBC AUTO: 80 FL (ref 82–98)
MONOCYTES # BLD AUTO: 1 K/UL (ref 0.3–1)
MONOCYTES NFR BLD: 8.5 % (ref 4–15)
NEUTROPHILS # BLD AUTO: 9.5 K/UL (ref 1.8–7.7)
NEUTROPHILS NFR BLD: 82.2 % (ref 38–73)
NRBC BLD-RTO: 0 /100 WBC
PHOSPHATE SERPL-MCNC: 2.4 MG/DL (ref 2.7–4.5)
PLATELET # BLD AUTO: 324 K/UL (ref 150–450)
PMV BLD AUTO: 11.6 FL (ref 9.2–12.9)
POTASSIUM SERPL-SCNC: 4.1 MMOL/L (ref 3.5–5.1)
RBC # BLD AUTO: 3.86 M/UL (ref 4.6–6.2)
SODIUM SERPL-SCNC: 142 MMOL/L (ref 136–145)
TACROLIMUS BLD-MCNC: 3.5 NG/ML (ref 5–15)
WBC # BLD AUTO: 11.58 K/UL (ref 3.9–12.7)

## 2021-11-11 PROCEDURE — 80069 RENAL FUNCTION PANEL: CPT | Performed by: INTERNAL MEDICINE

## 2021-11-11 PROCEDURE — 85025 COMPLETE CBC W/AUTO DIFF WBC: CPT | Performed by: INTERNAL MEDICINE

## 2021-11-11 PROCEDURE — 80197 ASSAY OF TACROLIMUS: CPT | Performed by: INTERNAL MEDICINE

## 2021-11-11 PROCEDURE — 36415 COLL VENOUS BLD VENIPUNCTURE: CPT | Performed by: INTERNAL MEDICINE

## 2021-11-11 PROCEDURE — 83735 ASSAY OF MAGNESIUM: CPT | Performed by: INTERNAL MEDICINE

## 2021-11-12 ENCOUNTER — TELEPHONE (OUTPATIENT)
Dept: TRANSPLANT | Facility: CLINIC | Age: 36
End: 2021-11-12
Payer: MEDICARE

## 2021-11-14 ENCOUNTER — HOSPITAL ENCOUNTER (OUTPATIENT)
Facility: HOSPITAL | Age: 36
Discharge: HOME | End: 2021-11-16
Attending: EMERGENCY MEDICINE | Admitting: INTERNAL MEDICINE
Payer: MEDICARE

## 2021-11-14 DIAGNOSIS — Z79.60 LONG-TERM USE OF IMMUNOSUPPRESSANT MEDICATION: ICD-10-CM

## 2021-11-14 DIAGNOSIS — Z29.89 PROPHYLACTIC IMMUNOTHERAPY: ICD-10-CM

## 2021-11-14 DIAGNOSIS — Z91.89 AT RISK FOR OPPORTUNISTIC INFECTIONS: ICD-10-CM

## 2021-11-14 DIAGNOSIS — Z86.73 HX-TIA (TRANSIENT ISCHEMIC ATTACK): Chronic | ICD-10-CM

## 2021-11-14 DIAGNOSIS — R10.9 ABDOMINAL PAIN: Primary | ICD-10-CM

## 2021-11-14 DIAGNOSIS — N18.31 STAGE 3A CHRONIC KIDNEY DISEASE: ICD-10-CM

## 2021-11-14 DIAGNOSIS — R00.0 TACHYCARDIA: ICD-10-CM

## 2021-11-14 DIAGNOSIS — Z94.0 S/P KIDNEY TRANSPLANT: ICD-10-CM

## 2021-11-14 DIAGNOSIS — R10.31 ABDOMINAL PAIN, RLQ: ICD-10-CM

## 2021-11-14 DIAGNOSIS — I15.0 RENOVASCULAR HYPERTENSION: ICD-10-CM

## 2021-11-14 DIAGNOSIS — E87.5 HYPERKALEMIA: ICD-10-CM

## 2021-11-14 DIAGNOSIS — K59.00 CONSTIPATION, UNSPECIFIED CONSTIPATION TYPE: ICD-10-CM

## 2021-11-14 DIAGNOSIS — D63.8 ANEMIA OF CHRONIC DISEASE: ICD-10-CM

## 2021-11-14 LAB
ALBUMIN SERPL BCP-MCNC: 3.8 G/DL (ref 3.5–5.2)
ALP SERPL-CCNC: 78 U/L (ref 55–135)
ALT SERPL W/O P-5'-P-CCNC: 11 U/L (ref 10–44)
ANION GAP SERPL CALC-SCNC: 8 MMOL/L (ref 8–16)
AST SERPL-CCNC: 10 U/L (ref 10–40)
BASOPHILS # BLD AUTO: 0.02 K/UL (ref 0–0.2)
BASOPHILS NFR BLD: 0.2 % (ref 0–1.9)
BILIRUB SERPL-MCNC: 0.6 MG/DL (ref 0.1–1)
BILIRUB UR QL STRIP: NEGATIVE
BUN SERPL-MCNC: 18 MG/DL (ref 6–20)
CALCIUM SERPL-MCNC: 10 MG/DL (ref 8.7–10.5)
CHLORIDE SERPL-SCNC: 104 MMOL/L (ref 95–110)
CLARITY UR REFRACT.AUTO: CLEAR
CO2 SERPL-SCNC: 27 MMOL/L (ref 23–29)
COLOR UR AUTO: ABNORMAL
CREAT SERPL-MCNC: 1.8 MG/DL (ref 0.5–1.4)
CREAT UR-MCNC: 59 MG/DL (ref 23–375)
CTP QC/QA: YES
DIFFERENTIAL METHOD: ABNORMAL
EOSINOPHIL # BLD AUTO: 0.1 K/UL (ref 0–0.5)
EOSINOPHIL NFR BLD: 0.5 % (ref 0–8)
ERYTHROCYTE [DISTWIDTH] IN BLOOD BY AUTOMATED COUNT: 17.5 % (ref 11.5–14.5)
EST. GFR  (AFRICAN AMERICAN): 54 ML/MIN/1.73 M^2
EST. GFR  (NON AFRICAN AMERICAN): 46.7 ML/MIN/1.73 M^2
GLUCOSE SERPL-MCNC: 108 MG/DL (ref 70–110)
GLUCOSE UR QL STRIP: ABNORMAL
HCT VFR BLD AUTO: 33.4 % (ref 40–54)
HGB BLD-MCNC: 9.8 G/DL (ref 14–18)
HGB UR QL STRIP: NEGATIVE
IMM GRANULOCYTES # BLD AUTO: 0.11 K/UL (ref 0–0.04)
IMM GRANULOCYTES NFR BLD AUTO: 0.8 % (ref 0–0.5)
KETONES UR QL STRIP: NEGATIVE
LACTATE SERPL-SCNC: 0.9 MMOL/L (ref 0.5–2.2)
LEUKOCYTE ESTERASE UR QL STRIP: NEGATIVE
LYMPHOCYTES # BLD AUTO: 0.5 K/UL (ref 1–4.8)
LYMPHOCYTES NFR BLD: 3.7 % (ref 18–48)
MAGNESIUM SERPL-MCNC: 1.7 MG/DL (ref 1.6–2.6)
MCH RBC QN AUTO: 24.5 PG (ref 27–31)
MCHC RBC AUTO-ENTMCNC: 29.3 G/DL (ref 32–36)
MCV RBC AUTO: 84 FL (ref 82–98)
MONOCYTES # BLD AUTO: 1.1 K/UL (ref 0.3–1)
MONOCYTES NFR BLD: 8.4 % (ref 4–15)
NEUTROPHILS # BLD AUTO: 11.2 K/UL (ref 1.8–7.7)
NEUTROPHILS NFR BLD: 86.4 % (ref 38–73)
NITRITE UR QL STRIP: NEGATIVE
NRBC BLD-RTO: 0 /100 WBC
PH UR STRIP: 7 [PH] (ref 5–8)
PHOSPHATE SERPL-MCNC: 2.3 MG/DL (ref 2.7–4.5)
PLATELET # BLD AUTO: 327 K/UL (ref 150–450)
PMV BLD AUTO: 11.3 FL (ref 9.2–12.9)
POTASSIUM SERPL-SCNC: 4.4 MMOL/L (ref 3.5–5.1)
PROT SERPL-MCNC: 7.7 G/DL (ref 6–8.4)
PROT UR QL STRIP: NEGATIVE
PROT UR-MCNC: 25 MG/DL (ref 0–15)
PROT/CREAT UR: 0.42 MG/G{CREAT} (ref 0–0.2)
RBC # BLD AUTO: 4 M/UL (ref 4.6–6.2)
SARS-COV-2 RDRP RESP QL NAA+PROBE: NEGATIVE
SODIUM SERPL-SCNC: 139 MMOL/L (ref 136–145)
SP GR UR STRIP: 1.01 (ref 1–1.03)
TACROLIMUS BLD-MCNC: 2.1 NG/ML (ref 5–15)
URN SPEC COLLECT METH UR: ABNORMAL
WBC # BLD AUTO: 13 K/UL (ref 3.9–12.7)

## 2021-11-14 PROCEDURE — 83605 ASSAY OF LACTIC ACID: CPT | Performed by: STUDENT IN AN ORGANIZED HEALTH CARE EDUCATION/TRAINING PROGRAM

## 2021-11-14 PROCEDURE — 81003 URINALYSIS AUTO W/O SCOPE: CPT | Performed by: STUDENT IN AN ORGANIZED HEALTH CARE EDUCATION/TRAINING PROGRAM

## 2021-11-14 PROCEDURE — 93005 ELECTROCARDIOGRAM TRACING: CPT

## 2021-11-14 PROCEDURE — 96361 HYDRATE IV INFUSION ADD-ON: CPT

## 2021-11-14 PROCEDURE — 63600175 PHARM REV CODE 636 W HCPCS: Performed by: STUDENT IN AN ORGANIZED HEALTH CARE EDUCATION/TRAINING PROGRAM

## 2021-11-14 PROCEDURE — 99214 OFFICE O/P EST MOD 30 MIN: CPT | Mod: ,,, | Performed by: INTERNAL MEDICINE

## 2021-11-14 PROCEDURE — 93010 ELECTROCARDIOGRAM REPORT: CPT | Mod: ,,, | Performed by: INTERNAL MEDICINE

## 2021-11-14 PROCEDURE — 63600175 PHARM REV CODE 636 W HCPCS: Performed by: NURSE PRACTITIONER

## 2021-11-14 PROCEDURE — G0378 HOSPITAL OBSERVATION PER HR: HCPCS

## 2021-11-14 PROCEDURE — 99285 EMERGENCY DEPT VISIT HI MDM: CPT | Mod: 25,CS

## 2021-11-14 PROCEDURE — 96374 THER/PROPH/DIAG INJ IV PUSH: CPT

## 2021-11-14 PROCEDURE — 25000003 PHARM REV CODE 250: Performed by: NURSE PRACTITIONER

## 2021-11-14 PROCEDURE — 99285 PR EMERGENCY DEPT VISIT,LEVEL V: ICD-10-PCS | Mod: GC,CS,, | Performed by: EMERGENCY MEDICINE

## 2021-11-14 PROCEDURE — 80053 COMPREHEN METABOLIC PANEL: CPT | Performed by: STUDENT IN AN ORGANIZED HEALTH CARE EDUCATION/TRAINING PROGRAM

## 2021-11-14 PROCEDURE — 99215 OFFICE O/P EST HI 40 MIN: CPT | Mod: ,,, | Performed by: NURSE PRACTITIONER

## 2021-11-14 PROCEDURE — 93010 EKG 12-LEAD: ICD-10-PCS | Mod: ,,, | Performed by: INTERNAL MEDICINE

## 2021-11-14 PROCEDURE — 99285 EMERGENCY DEPT VISIT HI MDM: CPT | Mod: GC,CS,, | Performed by: EMERGENCY MEDICINE

## 2021-11-14 PROCEDURE — 84156 ASSAY OF PROTEIN URINE: CPT | Performed by: STUDENT IN AN ORGANIZED HEALTH CARE EDUCATION/TRAINING PROGRAM

## 2021-11-14 PROCEDURE — U0002 COVID-19 LAB TEST NON-CDC: HCPCS | Performed by: STUDENT IN AN ORGANIZED HEALTH CARE EDUCATION/TRAINING PROGRAM

## 2021-11-14 PROCEDURE — 80197 ASSAY OF TACROLIMUS: CPT | Performed by: STUDENT IN AN ORGANIZED HEALTH CARE EDUCATION/TRAINING PROGRAM

## 2021-11-14 PROCEDURE — 99214 PR OFFICE/OUTPT VISIT, EST, LEVL IV, 30-39 MIN: ICD-10-PCS | Mod: ,,, | Performed by: INTERNAL MEDICINE

## 2021-11-14 PROCEDURE — 63600175 PHARM REV CODE 636 W HCPCS: Performed by: INTERNAL MEDICINE

## 2021-11-14 PROCEDURE — 83735 ASSAY OF MAGNESIUM: CPT | Performed by: STUDENT IN AN ORGANIZED HEALTH CARE EDUCATION/TRAINING PROGRAM

## 2021-11-14 PROCEDURE — 96375 TX/PRO/DX INJ NEW DRUG ADDON: CPT

## 2021-11-14 PROCEDURE — 99215 PR OFFICE/OUTPT VISIT, EST, LEVL V, 40-54 MIN: ICD-10-PCS | Mod: ,,, | Performed by: NURSE PRACTITIONER

## 2021-11-14 PROCEDURE — 85025 COMPLETE CBC W/AUTO DIFF WBC: CPT | Performed by: STUDENT IN AN ORGANIZED HEALTH CARE EDUCATION/TRAINING PROGRAM

## 2021-11-14 PROCEDURE — 84100 ASSAY OF PHOSPHORUS: CPT | Performed by: STUDENT IN AN ORGANIZED HEALTH CARE EDUCATION/TRAINING PROGRAM

## 2021-11-14 RX ORDER — SULFAMETHOXAZOLE AND TRIMETHOPRIM 400; 80 MG/1; MG/1
1 TABLET ORAL EVERY MORNING
Status: DISCONTINUED | OUTPATIENT
Start: 2021-11-14 | End: 2021-11-16 | Stop reason: HOSPADM

## 2021-11-14 RX ORDER — TACROLIMUS 1 MG/1
1 CAPSULE ORAL 2 TIMES DAILY
Status: DISCONTINUED | OUTPATIENT
Start: 2021-11-14 | End: 2021-11-14

## 2021-11-14 RX ORDER — PREDNISONE 20 MG/1
20 TABLET ORAL DAILY
Status: DISCONTINUED | OUTPATIENT
Start: 2021-11-14 | End: 2021-11-16 | Stop reason: HOSPADM

## 2021-11-14 RX ORDER — MYCOPHENOLIC ACID 180 MG/1
720 TABLET, DELAYED RELEASE ORAL 2 TIMES DAILY
Status: DISCONTINUED | OUTPATIENT
Start: 2021-11-14 | End: 2021-11-16 | Stop reason: HOSPADM

## 2021-11-14 RX ORDER — FAMOTIDINE 20 MG/1
20 TABLET, FILM COATED ORAL NIGHTLY
Status: DISCONTINUED | OUTPATIENT
Start: 2021-11-14 | End: 2021-11-16 | Stop reason: HOSPADM

## 2021-11-14 RX ORDER — SODIUM CHLORIDE 0.9 % (FLUSH) 0.9 %
3 SYRINGE (ML) INJECTION EVERY 8 HOURS
Status: DISCONTINUED | OUTPATIENT
Start: 2021-11-14 | End: 2021-11-16 | Stop reason: HOSPADM

## 2021-11-14 RX ORDER — PSEUDOEPHEDRINE/ACETAMINOPHEN 30MG-500MG
100 TABLET ORAL
Status: COMPLETED | OUTPATIENT
Start: 2021-11-14 | End: 2021-11-14

## 2021-11-14 RX ORDER — CARVEDILOL 25 MG/1
25 TABLET ORAL 2 TIMES DAILY WITH MEALS
Status: DISCONTINUED | OUTPATIENT
Start: 2021-11-14 | End: 2021-11-16 | Stop reason: HOSPADM

## 2021-11-14 RX ORDER — NIFEDIPINE 30 MG/1
30 TABLET, EXTENDED RELEASE ORAL 2 TIMES DAILY
Status: DISCONTINUED | OUTPATIENT
Start: 2021-11-14 | End: 2021-11-16 | Stop reason: HOSPADM

## 2021-11-14 RX ORDER — ASPIRIN 81 MG/1
81 TABLET ORAL DAILY
Status: DISCONTINUED | OUTPATIENT
Start: 2021-11-14 | End: 2021-11-16 | Stop reason: HOSPADM

## 2021-11-14 RX ORDER — HYDROMORPHONE HYDROCHLORIDE 1 MG/ML
1 INJECTION, SOLUTION INTRAMUSCULAR; INTRAVENOUS; SUBCUTANEOUS
Status: COMPLETED | OUTPATIENT
Start: 2021-11-14 | End: 2021-11-14

## 2021-11-14 RX ORDER — LANOLIN ALCOHOL/MO/W.PET/CERES
400 CREAM (GRAM) TOPICAL 2 TIMES DAILY
Status: DISCONTINUED | OUTPATIENT
Start: 2021-11-14 | End: 2021-11-14

## 2021-11-14 RX ORDER — TACROLIMUS 1 MG/1
2 CAPSULE ORAL 2 TIMES DAILY
Status: DISCONTINUED | OUTPATIENT
Start: 2021-11-14 | End: 2021-11-16

## 2021-11-14 RX ORDER — TACROLIMUS 1 MG/1
1 CAPSULE ORAL ONCE
Status: COMPLETED | OUTPATIENT
Start: 2021-11-14 | End: 2021-11-14

## 2021-11-14 RX ORDER — VALGANCICLOVIR 450 MG/1
450 TABLET, FILM COATED ORAL DAILY
Status: DISCONTINUED | OUTPATIENT
Start: 2021-11-14 | End: 2021-11-16 | Stop reason: HOSPADM

## 2021-11-14 RX ORDER — POSACONAZOLE 100 MG/1
300 TABLET, DELAYED RELEASE ORAL DAILY
Status: DISCONTINUED | OUTPATIENT
Start: 2021-11-14 | End: 2021-11-14

## 2021-11-14 RX ORDER — ONDANSETRON 2 MG/ML
4 INJECTION INTRAMUSCULAR; INTRAVENOUS EVERY 12 HOURS PRN
Status: DISCONTINUED | OUTPATIENT
Start: 2021-11-14 | End: 2021-11-16 | Stop reason: HOSPADM

## 2021-11-14 RX ORDER — MINOXIDIL 2.5 MG/1
10 TABLET ORAL DAILY
Status: DISCONTINUED | OUTPATIENT
Start: 2021-11-14 | End: 2021-11-16 | Stop reason: HOSPADM

## 2021-11-14 RX ORDER — ACETAMINOPHEN 325 MG/1
650 TABLET ORAL EVERY 6 HOURS PRN
Status: DISCONTINUED | OUTPATIENT
Start: 2021-11-14 | End: 2021-11-16 | Stop reason: HOSPADM

## 2021-11-14 RX ORDER — SYRING-NEEDL,DISP,INSUL,0.3 ML 29 G X1/2"
296 SYRINGE, EMPTY DISPOSABLE MISCELLANEOUS
Status: COMPLETED | OUTPATIENT
Start: 2021-11-14 | End: 2021-11-14

## 2021-11-14 RX ORDER — ONDANSETRON 2 MG/ML
4 INJECTION INTRAMUSCULAR; INTRAVENOUS
Status: COMPLETED | OUTPATIENT
Start: 2021-11-14 | End: 2021-11-14

## 2021-11-14 RX ADMIN — DIBASIC SODIUM PHOSPHATE, MONOBASIC POTASSIUM PHOSPHATE AND MONOBASIC SODIUM PHOSPHATE 2 TABLET: 852; 155; 130 TABLET ORAL at 09:11

## 2021-11-14 RX ADMIN — MYCOPHENILIC ACID 720 MG: 180 TABLET, DELAYED RELEASE ORAL at 09:11

## 2021-11-14 RX ADMIN — MINOXIDIL 10 MG: 10 TABLET ORAL at 12:11

## 2021-11-14 RX ADMIN — CARVEDILOL 25 MG: 25 TABLET, FILM COATED ORAL at 07:11

## 2021-11-14 RX ADMIN — FAMOTIDINE 20 MG: 20 TABLET ORAL at 09:11

## 2021-11-14 RX ADMIN — SODIUM CHLORIDE 500 ML: 0.9 INJECTION, SOLUTION INTRAVENOUS at 06:11

## 2021-11-14 RX ADMIN — ASPIRIN 81 MG: 81 TABLET, COATED ORAL at 09:11

## 2021-11-14 RX ADMIN — NIFEDIPINE 30 MG: 30 TABLET, FILM COATED, EXTENDED RELEASE ORAL at 09:11

## 2021-11-14 RX ADMIN — CARVEDILOL 25 MG: 25 TABLET, FILM COATED ORAL at 05:11

## 2021-11-14 RX ADMIN — PREDNISONE 20 MG: 20 TABLET ORAL at 09:11

## 2021-11-14 RX ADMIN — MAGNESIUM CITRATE 296 ML: 1.75 LIQUID ORAL at 06:11

## 2021-11-14 RX ADMIN — TACROLIMUS 2 MG: 1 CAPSULE ORAL at 05:11

## 2021-11-14 RX ADMIN — TACROLIMUS 1 MG: 1 CAPSULE ORAL at 07:11

## 2021-11-14 RX ADMIN — SODIUM CHLORIDE, SODIUM LACTATE, POTASSIUM CHLORIDE, AND CALCIUM CHLORIDE 1000 ML: .6; .31; .03; .02 INJECTION, SOLUTION INTRAVENOUS at 04:11

## 2021-11-14 RX ADMIN — TACROLIMUS 1 MG: 1 CAPSULE ORAL at 12:11

## 2021-11-14 RX ADMIN — SULFAMETHOXAZOLE AND TRIMETHOPRIM 1 TABLET: 400; 80 TABLET ORAL at 07:11

## 2021-11-14 RX ADMIN — ONDANSETRON 4 MG: 2 INJECTION INTRAMUSCULAR; INTRAVENOUS at 03:11

## 2021-11-14 RX ADMIN — VALGANCICLOVIR 450 MG: 450 TABLET, FILM COATED ORAL at 09:11

## 2021-11-14 RX ADMIN — HYDROMORPHONE HYDROCHLORIDE 1 MG: 1 INJECTION, SOLUTION INTRAMUSCULAR; INTRAVENOUS; SUBCUTANEOUS at 03:11

## 2021-11-14 RX ADMIN — Medication 100 ML: at 06:11

## 2021-11-15 PROBLEM — E87.5 HYPERKALEMIA: Status: ACTIVE | Noted: 2021-11-15

## 2021-11-15 LAB
ALBUMIN SERPL BCP-MCNC: 3.3 G/DL (ref 3.5–5.2)
ANION GAP SERPL CALC-SCNC: 10 MMOL/L (ref 8–16)
BASOPHILS # BLD AUTO: 0.01 K/UL (ref 0–0.2)
BASOPHILS NFR BLD: 0.1 % (ref 0–1.9)
BUN SERPL-MCNC: 16 MG/DL (ref 6–20)
CALCIUM SERPL-MCNC: 9.9 MG/DL (ref 8.7–10.5)
CHLORIDE SERPL-SCNC: 102 MMOL/L (ref 95–110)
CO2 SERPL-SCNC: 22 MMOL/L (ref 23–29)
CREAT SERPL-MCNC: 1.6 MG/DL (ref 0.5–1.4)
DIFFERENTIAL METHOD: ABNORMAL
EOSINOPHIL # BLD AUTO: 0.2 K/UL (ref 0–0.5)
EOSINOPHIL NFR BLD: 2.9 % (ref 0–8)
ERYTHROCYTE [DISTWIDTH] IN BLOOD BY AUTOMATED COUNT: 17.2 % (ref 11.5–14.5)
EST. GFR  (AFRICAN AMERICAN): >60 ML/MIN/1.73 M^2
EST. GFR  (NON AFRICAN AMERICAN): 53.9 ML/MIN/1.73 M^2
GLUCOSE SERPL-MCNC: 81 MG/DL (ref 70–110)
HCT VFR BLD AUTO: 32.1 % (ref 40–54)
HGB BLD-MCNC: 9.3 G/DL (ref 14–18)
IMM GRANULOCYTES # BLD AUTO: 0.07 K/UL (ref 0–0.04)
IMM GRANULOCYTES NFR BLD AUTO: 0.9 % (ref 0–0.5)
LYMPHOCYTES # BLD AUTO: 0.5 K/UL (ref 1–4.8)
LYMPHOCYTES NFR BLD: 6.6 % (ref 18–48)
MAGNESIUM SERPL-MCNC: 1.7 MG/DL (ref 1.6–2.6)
MCH RBC QN AUTO: 24.3 PG (ref 27–31)
MCHC RBC AUTO-ENTMCNC: 29 G/DL (ref 32–36)
MCV RBC AUTO: 84 FL (ref 82–98)
MONOCYTES # BLD AUTO: 0.8 K/UL (ref 0.3–1)
MONOCYTES NFR BLD: 10.5 % (ref 4–15)
NEUTROPHILS # BLD AUTO: 6.3 K/UL (ref 1.8–7.7)
NEUTROPHILS NFR BLD: 79 % (ref 38–73)
NRBC BLD-RTO: 0 /100 WBC
PHOSPHATE SERPL-MCNC: 2.8 MG/DL (ref 2.7–4.5)
PLATELET # BLD AUTO: 274 K/UL (ref 150–450)
PMV BLD AUTO: 12.3 FL (ref 9.2–12.9)
POTASSIUM SERPL-SCNC: 5.3 MMOL/L (ref 3.5–5.1)
RBC # BLD AUTO: 3.83 M/UL (ref 4.6–6.2)
SODIUM SERPL-SCNC: 134 MMOL/L (ref 136–145)
TACROLIMUS BLD-MCNC: 10.4 NG/ML (ref 5–15)
WBC # BLD AUTO: 8.01 K/UL (ref 3.9–12.7)

## 2021-11-15 PROCEDURE — G0378 HOSPITAL OBSERVATION PER HR: HCPCS

## 2021-11-15 PROCEDURE — 85025 COMPLETE CBC W/AUTO DIFF WBC: CPT | Performed by: NURSE PRACTITIONER

## 2021-11-15 PROCEDURE — 25000003 PHARM REV CODE 250: Performed by: PHYSICIAN ASSISTANT

## 2021-11-15 PROCEDURE — 80197 ASSAY OF TACROLIMUS: CPT | Performed by: NURSE PRACTITIONER

## 2021-11-15 PROCEDURE — 25000003 PHARM REV CODE 250: Performed by: NURSE PRACTITIONER

## 2021-11-15 PROCEDURE — 94799 UNLISTED PULMONARY SVC/PX: CPT

## 2021-11-15 PROCEDURE — 80069 RENAL FUNCTION PANEL: CPT | Performed by: NURSE PRACTITIONER

## 2021-11-15 PROCEDURE — 83735 ASSAY OF MAGNESIUM: CPT | Performed by: NURSE PRACTITIONER

## 2021-11-15 PROCEDURE — 63600175 PHARM REV CODE 636 W HCPCS: Performed by: NURSE PRACTITIONER

## 2021-11-15 PROCEDURE — A4216 STERILE WATER/SALINE, 10 ML: HCPCS | Performed by: NURSE PRACTITIONER

## 2021-11-15 PROCEDURE — 63600175 PHARM REV CODE 636 W HCPCS: Performed by: INTERNAL MEDICINE

## 2021-11-15 PROCEDURE — 36415 COLL VENOUS BLD VENIPUNCTURE: CPT | Performed by: NURSE PRACTITIONER

## 2021-11-15 PROCEDURE — 99215 PR OFFICE/OUTPT VISIT, EST, LEVL V, 40-54 MIN: ICD-10-PCS | Mod: ,,, | Performed by: PHYSICIAN ASSISTANT

## 2021-11-15 PROCEDURE — 99215 OFFICE O/P EST HI 40 MIN: CPT | Mod: ,,, | Performed by: PHYSICIAN ASSISTANT

## 2021-11-15 RX ORDER — DOCUSATE SODIUM 100 MG/1
100 CAPSULE, LIQUID FILLED ORAL 3 TIMES DAILY
Status: DISCONTINUED | OUTPATIENT
Start: 2021-11-15 | End: 2021-11-16 | Stop reason: HOSPADM

## 2021-11-15 RX ORDER — BISACODYL 5 MG
10 TABLET, DELAYED RELEASE (ENTERIC COATED) ORAL DAILY
Status: DISCONTINUED | OUTPATIENT
Start: 2021-11-15 | End: 2021-11-16 | Stop reason: HOSPADM

## 2021-11-15 RX ORDER — BISACODYL 10 MG
10 SUPPOSITORY, RECTAL RECTAL DAILY PRN
Status: DISCONTINUED | OUTPATIENT
Start: 2021-11-15 | End: 2021-11-16 | Stop reason: HOSPADM

## 2021-11-15 RX ADMIN — MYCOPHENILIC ACID 720 MG: 180 TABLET, DELAYED RELEASE ORAL at 08:11

## 2021-11-15 RX ADMIN — DOCUSATE SODIUM 100 MG: 100 CAPSULE, LIQUID FILLED ORAL at 02:11

## 2021-11-15 RX ADMIN — DIBASIC SODIUM PHOSPHATE, MONOBASIC POTASSIUM PHOSPHATE AND MONOBASIC SODIUM PHOSPHATE 2 TABLET: 852; 155; 130 TABLET ORAL at 08:11

## 2021-11-15 RX ADMIN — CARVEDILOL 25 MG: 25 TABLET, FILM COATED ORAL at 08:11

## 2021-11-15 RX ADMIN — VALGANCICLOVIR 450 MG: 450 TABLET, FILM COATED ORAL at 08:11

## 2021-11-15 RX ADMIN — SULFAMETHOXAZOLE AND TRIMETHOPRIM 1 TABLET: 400; 80 TABLET ORAL at 06:11

## 2021-11-15 RX ADMIN — FAMOTIDINE 20 MG: 20 TABLET ORAL at 08:11

## 2021-11-15 RX ADMIN — ASPIRIN 81 MG: 81 TABLET, COATED ORAL at 08:11

## 2021-11-15 RX ADMIN — DOCUSATE SODIUM 100 MG: 100 CAPSULE, LIQUID FILLED ORAL at 08:11

## 2021-11-15 RX ADMIN — TACROLIMUS 2 MG: 1 CAPSULE ORAL at 06:11

## 2021-11-15 RX ADMIN — SODIUM CHLORIDE 3 ML: 9 INJECTION, SOLUTION INTRAMUSCULAR; INTRAVENOUS; SUBCUTANEOUS at 06:11

## 2021-11-15 RX ADMIN — PREDNISONE 20 MG: 20 TABLET ORAL at 08:11

## 2021-11-15 RX ADMIN — NIFEDIPINE 30 MG: 30 TABLET, FILM COATED, EXTENDED RELEASE ORAL at 08:11

## 2021-11-15 RX ADMIN — CARVEDILOL 25 MG: 25 TABLET, FILM COATED ORAL at 06:11

## 2021-11-15 RX ADMIN — TACROLIMUS 2 MG: 1 CAPSULE ORAL at 08:11

## 2021-11-15 RX ADMIN — BISACODYL 10 MG: 5 TABLET, COATED ORAL at 12:11

## 2021-11-16 VITALS
SYSTOLIC BLOOD PRESSURE: 141 MMHG | DIASTOLIC BLOOD PRESSURE: 71 MMHG | HEIGHT: 70 IN | RESPIRATION RATE: 18 BRPM | TEMPERATURE: 98 F | WEIGHT: 154.13 LBS | BODY MASS INDEX: 22.06 KG/M2 | HEART RATE: 86 BPM | OXYGEN SATURATION: 99 %

## 2021-11-16 PROBLEM — E87.5 HYPERKALEMIA: Status: RESOLVED | Noted: 2021-11-15 | Resolved: 2021-11-16

## 2021-11-16 LAB
ALBUMIN SERPL BCP-MCNC: 3.4 G/DL (ref 3.5–5.2)
ANION GAP SERPL CALC-SCNC: 11 MMOL/L (ref 8–16)
BASOPHILS # BLD AUTO: 0.02 K/UL (ref 0–0.2)
BASOPHILS NFR BLD: 0.2 % (ref 0–1.9)
BUN SERPL-MCNC: 18 MG/DL (ref 6–20)
CALCIUM SERPL-MCNC: 9.7 MG/DL (ref 8.7–10.5)
CHLORIDE SERPL-SCNC: 104 MMOL/L (ref 95–110)
CO2 SERPL-SCNC: 23 MMOL/L (ref 23–29)
CREAT SERPL-MCNC: 1.7 MG/DL (ref 0.5–1.4)
DIFFERENTIAL METHOD: ABNORMAL
EOSINOPHIL # BLD AUTO: 0.4 K/UL (ref 0–0.5)
EOSINOPHIL NFR BLD: 4.9 % (ref 0–8)
ERYTHROCYTE [DISTWIDTH] IN BLOOD BY AUTOMATED COUNT: 17 % (ref 11.5–14.5)
EST. GFR  (AFRICAN AMERICAN): 57.9 ML/MIN/1.73 M^2
EST. GFR  (NON AFRICAN AMERICAN): 50 ML/MIN/1.73 M^2
GLUCOSE SERPL-MCNC: 91 MG/DL (ref 70–110)
HCT VFR BLD AUTO: 31.3 % (ref 40–54)
HGB BLD-MCNC: 9.7 G/DL (ref 14–18)
IMM GRANULOCYTES # BLD AUTO: 0.11 K/UL (ref 0–0.04)
IMM GRANULOCYTES NFR BLD AUTO: 1.3 % (ref 0–0.5)
LYMPHOCYTES # BLD AUTO: 0.6 K/UL (ref 1–4.8)
LYMPHOCYTES NFR BLD: 7.8 % (ref 18–48)
MAGNESIUM SERPL-MCNC: 1.6 MG/DL (ref 1.6–2.6)
MCH RBC QN AUTO: 25 PG (ref 27–31)
MCHC RBC AUTO-ENTMCNC: 31 G/DL (ref 32–36)
MCV RBC AUTO: 81 FL (ref 82–98)
MONOCYTES # BLD AUTO: 0.8 K/UL (ref 0.3–1)
MONOCYTES NFR BLD: 9.5 % (ref 4–15)
NEUTROPHILS # BLD AUTO: 6.2 K/UL (ref 1.8–7.7)
NEUTROPHILS NFR BLD: 76.3 % (ref 38–73)
NRBC BLD-RTO: 0 /100 WBC
PHOSPHATE SERPL-MCNC: 2 MG/DL (ref 2.7–4.5)
PLATELET # BLD AUTO: 326 K/UL (ref 150–450)
PMV BLD AUTO: 11.6 FL (ref 9.2–12.9)
POTASSIUM SERPL-SCNC: 3.9 MMOL/L (ref 3.5–5.1)
RBC # BLD AUTO: 3.88 M/UL (ref 4.6–6.2)
SODIUM SERPL-SCNC: 138 MMOL/L (ref 136–145)
TACROLIMUS BLD-MCNC: 12.4 NG/ML (ref 5–15)
WBC # BLD AUTO: 8.18 K/UL (ref 3.9–12.7)

## 2021-11-16 PROCEDURE — 85025 COMPLETE CBC W/AUTO DIFF WBC: CPT | Performed by: NURSE PRACTITIONER

## 2021-11-16 PROCEDURE — 63600175 PHARM REV CODE 636 W HCPCS: Performed by: NURSE PRACTITIONER

## 2021-11-16 PROCEDURE — 99900035 HC TECH TIME PER 15 MIN (STAT)

## 2021-11-16 PROCEDURE — 99217 PR OBSERVATION CARE DISCHARGE: ICD-10-PCS | Mod: ,,, | Performed by: PHYSICIAN ASSISTANT

## 2021-11-16 PROCEDURE — 94761 N-INVAS EAR/PLS OXIMETRY MLT: CPT

## 2021-11-16 PROCEDURE — 25000003 PHARM REV CODE 250: Performed by: PHYSICIAN ASSISTANT

## 2021-11-16 PROCEDURE — 80069 RENAL FUNCTION PANEL: CPT | Performed by: PHYSICIAN ASSISTANT

## 2021-11-16 PROCEDURE — 83735 ASSAY OF MAGNESIUM: CPT | Performed by: NURSE PRACTITIONER

## 2021-11-16 PROCEDURE — 80197 ASSAY OF TACROLIMUS: CPT | Performed by: NURSE PRACTITIONER

## 2021-11-16 PROCEDURE — 63600175 PHARM REV CODE 636 W HCPCS: Performed by: INTERNAL MEDICINE

## 2021-11-16 PROCEDURE — G0378 HOSPITAL OBSERVATION PER HR: HCPCS

## 2021-11-16 PROCEDURE — 25000003 PHARM REV CODE 250: Performed by: NURSE PRACTITIONER

## 2021-11-16 PROCEDURE — 63600175 PHARM REV CODE 636 W HCPCS: Performed by: PHYSICIAN ASSISTANT

## 2021-11-16 PROCEDURE — 36415 COLL VENOUS BLD VENIPUNCTURE: CPT | Performed by: NURSE PRACTITIONER

## 2021-11-16 PROCEDURE — 99217 PR OBSERVATION CARE DISCHARGE: CPT | Mod: ,,, | Performed by: PHYSICIAN ASSISTANT

## 2021-11-16 RX ORDER — DOCUSATE SODIUM 100 MG/1
100 CAPSULE, LIQUID FILLED ORAL 2 TIMES DAILY
Qty: 60 CAPSULE | Refills: 1 | Status: SHIPPED | OUTPATIENT
Start: 2021-11-16 | End: 2022-05-02

## 2021-11-16 RX ORDER — BISACODYL 5 MG
5 TABLET, DELAYED RELEASE (ENTERIC COATED) ORAL DAILY PRN
Qty: 30 TABLET | Refills: 2 | Status: SHIPPED | OUTPATIENT
Start: 2021-11-16 | End: 2022-05-02

## 2021-11-16 RX ORDER — TACROLIMUS 1 MG/1
1 CAPSULE ORAL 2 TIMES DAILY
Status: DISCONTINUED | OUTPATIENT
Start: 2021-11-16 | End: 2021-11-16 | Stop reason: HOSPADM

## 2021-11-16 RX ORDER — TACROLIMUS 1 MG/1
1 CAPSULE ORAL EVERY 12 HOURS
Qty: 60 CAPSULE | Refills: 11 | Status: SHIPPED | OUTPATIENT
Start: 2021-11-16 | End: 2021-11-19

## 2021-11-16 RX ADMIN — VALGANCICLOVIR 450 MG: 450 TABLET, FILM COATED ORAL at 07:11

## 2021-11-16 RX ADMIN — PREDNISONE 20 MG: 20 TABLET ORAL at 07:11

## 2021-11-16 RX ADMIN — SULFAMETHOXAZOLE AND TRIMETHOPRIM 1 TABLET: 400; 80 TABLET ORAL at 07:11

## 2021-11-16 RX ADMIN — ASPIRIN 81 MG: 81 TABLET, COATED ORAL at 07:11

## 2021-11-16 RX ADMIN — DIBASIC SODIUM PHOSPHATE, MONOBASIC POTASSIUM PHOSPHATE AND MONOBASIC SODIUM PHOSPHATE 2 TABLET: 852; 155; 130 TABLET ORAL at 07:11

## 2021-11-16 RX ADMIN — DOCUSATE SODIUM 100 MG: 100 CAPSULE, LIQUID FILLED ORAL at 07:11

## 2021-11-16 RX ADMIN — MINOXIDIL 10 MG: 10 TABLET ORAL at 07:11

## 2021-11-16 RX ADMIN — DOCUSATE SODIUM 100 MG: 100 CAPSULE, LIQUID FILLED ORAL at 03:11

## 2021-11-16 RX ADMIN — CARVEDILOL 25 MG: 25 TABLET, FILM COATED ORAL at 07:11

## 2021-11-16 RX ADMIN — MYCOPHENILIC ACID 720 MG: 180 TABLET, DELAYED RELEASE ORAL at 07:11

## 2021-11-16 RX ADMIN — TACROLIMUS 1 MG: 1 CAPSULE ORAL at 05:11

## 2021-11-16 RX ADMIN — CARVEDILOL 25 MG: 25 TABLET, FILM COATED ORAL at 05:11

## 2021-11-16 RX ADMIN — TACROLIMUS 2 MG: 1 CAPSULE ORAL at 07:11

## 2021-11-16 RX ADMIN — NIFEDIPINE 30 MG: 30 TABLET, FILM COATED, EXTENDED RELEASE ORAL at 07:11

## 2021-11-16 RX ADMIN — BISACODYL 10 MG: 5 TABLET, COATED ORAL at 07:11

## 2021-11-17 ENCOUNTER — TELEPHONE (OUTPATIENT)
Dept: INTERVENTIONAL RADIOLOGY/VASCULAR | Facility: CLINIC | Age: 36
End: 2021-11-17
Payer: MEDICARE

## 2021-11-17 ENCOUNTER — PATIENT MESSAGE (OUTPATIENT)
Dept: PHARMACY | Facility: HOSPITAL | Age: 36
End: 2021-11-17
Payer: MEDICARE

## 2021-11-18 ENCOUNTER — TELEPHONE (OUTPATIENT)
Dept: TRANSPLANT | Facility: CLINIC | Age: 36
End: 2021-11-18
Payer: MEDICARE

## 2021-11-18 ENCOUNTER — LAB VISIT (OUTPATIENT)
Dept: LAB | Facility: HOSPITAL | Age: 36
End: 2021-11-18
Payer: MEDICARE

## 2021-11-18 DIAGNOSIS — Z94.0 KIDNEY REPLACED BY TRANSPLANT: ICD-10-CM

## 2021-11-18 LAB
BILIRUB UR QL STRIP: NEGATIVE
CLARITY UR REFRACT.AUTO: CLEAR
COLOR UR AUTO: YELLOW
CREAT UR-MCNC: 116 MG/DL (ref 23–375)
GLUCOSE UR QL STRIP: NEGATIVE
HGB UR QL STRIP: NEGATIVE
KETONES UR QL STRIP: NEGATIVE
LEUKOCYTE ESTERASE UR QL STRIP: NEGATIVE
NITRITE UR QL STRIP: NEGATIVE
PH UR STRIP: 6 [PH] (ref 5–8)
PROT UR QL STRIP: NEGATIVE
PROT UR-MCNC: 12 MG/DL (ref 0–15)
PROT/CREAT UR: 0.1 MG/G{CREAT} (ref 0–0.2)
SP GR UR STRIP: 1.01 (ref 1–1.03)
URN SPEC COLLECT METH UR: NORMAL

## 2021-11-18 PROCEDURE — 82570 ASSAY OF URINE CREATININE: CPT | Performed by: INTERNAL MEDICINE

## 2021-11-18 PROCEDURE — 81003 URINALYSIS AUTO W/O SCOPE: CPT | Performed by: INTERNAL MEDICINE

## 2021-11-19 DIAGNOSIS — Z94.0 KIDNEY REPLACED BY TRANSPLANT: Primary | ICD-10-CM

## 2021-11-19 RX ORDER — TACROLIMUS 1 MG/1
2 CAPSULE ORAL EVERY 12 HOURS
Qty: 120 CAPSULE | Refills: 11 | Status: SHIPPED | OUTPATIENT
Start: 2021-11-19 | End: 2021-11-22

## 2021-11-22 ENCOUNTER — LAB VISIT (OUTPATIENT)
Dept: LAB | Facility: HOSPITAL | Age: 36
End: 2021-11-22
Payer: MEDICARE

## 2021-11-22 ENCOUNTER — OUTPATIENT CASE MANAGEMENT (OUTPATIENT)
Dept: ADMINISTRATIVE | Facility: OTHER | Age: 36
End: 2021-11-22
Payer: MEDICARE

## 2021-11-22 ENCOUNTER — TELEPHONE (OUTPATIENT)
Dept: TRANSPLANT | Facility: CLINIC | Age: 36
End: 2021-11-22
Payer: MEDICARE

## 2021-11-22 DIAGNOSIS — Z57.8 OCCUPATIONAL EXPOSURE TO OTHER RISK FACTORS: ICD-10-CM

## 2021-11-22 DIAGNOSIS — Z94.0 KIDNEY REPLACED BY TRANSPLANT: ICD-10-CM

## 2021-11-22 DIAGNOSIS — Z11.4 ENCOUNTER FOR SPECIAL SCREENING EXAMINATION FOR HIV: ICD-10-CM

## 2021-11-22 LAB
ALBUMIN SERPL BCP-MCNC: 3.7 G/DL (ref 3.5–5.2)
ANION GAP SERPL CALC-SCNC: 7 MMOL/L (ref 8–16)
BASOPHILS # BLD AUTO: 0.06 K/UL (ref 0–0.2)
BASOPHILS NFR BLD: 0.7 % (ref 0–1.9)
BUN SERPL-MCNC: 13 MG/DL (ref 6–20)
CALCIUM SERPL-MCNC: 9.9 MG/DL (ref 8.7–10.5)
CHLORIDE SERPL-SCNC: 108 MMOL/L (ref 95–110)
CO2 SERPL-SCNC: 26 MMOL/L (ref 23–29)
CREAT SERPL-MCNC: 1.7 MG/DL (ref 0.5–1.4)
DIFFERENTIAL METHOD: ABNORMAL
EOSINOPHIL # BLD AUTO: 0.1 K/UL (ref 0–0.5)
EOSINOPHIL NFR BLD: 1.4 % (ref 0–8)
ERYTHROCYTE [DISTWIDTH] IN BLOOD BY AUTOMATED COUNT: 16.8 % (ref 11.5–14.5)
EST. GFR  (AFRICAN AMERICAN): 57.9 ML/MIN/1.73 M^2
EST. GFR  (NON AFRICAN AMERICAN): 50 ML/MIN/1.73 M^2
GLUCOSE SERPL-MCNC: 94 MG/DL (ref 70–110)
HCT VFR BLD AUTO: 35.2 % (ref 40–54)
HGB BLD-MCNC: 10.3 G/DL (ref 14–18)
IMM GRANULOCYTES # BLD AUTO: 0.08 K/UL (ref 0–0.04)
IMM GRANULOCYTES NFR BLD AUTO: 1 % (ref 0–0.5)
LYMPHOCYTES # BLD AUTO: 0.7 K/UL (ref 1–4.8)
LYMPHOCYTES NFR BLD: 7.8 % (ref 18–48)
MAGNESIUM SERPL-MCNC: 2 MG/DL (ref 1.6–2.6)
MCH RBC QN AUTO: 24.8 PG (ref 27–31)
MCHC RBC AUTO-ENTMCNC: 29.3 G/DL (ref 32–36)
MCV RBC AUTO: 85 FL (ref 82–98)
MONOCYTES # BLD AUTO: 0.7 K/UL (ref 0.3–1)
MONOCYTES NFR BLD: 8 % (ref 4–15)
NEUTROPHILS # BLD AUTO: 6.8 K/UL (ref 1.8–7.7)
NEUTROPHILS NFR BLD: 81.1 % (ref 38–73)
NRBC BLD-RTO: 0 /100 WBC
PHOSPHATE SERPL-MCNC: 2.5 MG/DL (ref 2.7–4.5)
PLATELET # BLD AUTO: 309 K/UL (ref 150–450)
PMV BLD AUTO: 11.2 FL (ref 9.2–12.9)
POTASSIUM SERPL-SCNC: 4.4 MMOL/L (ref 3.5–5.1)
RBC # BLD AUTO: 4.15 M/UL (ref 4.6–6.2)
SODIUM SERPL-SCNC: 141 MMOL/L (ref 136–145)
TACROLIMUS BLD-MCNC: 2.6 NG/ML (ref 5–15)
WBC # BLD AUTO: 8.33 K/UL (ref 3.9–12.7)

## 2021-11-22 PROCEDURE — 87517 HEPATITIS B DNA QUANT: CPT | Performed by: INTERNAL MEDICINE

## 2021-11-22 PROCEDURE — 80197 ASSAY OF TACROLIMUS: CPT | Performed by: INTERNAL MEDICINE

## 2021-11-22 PROCEDURE — 87536 HIV-1 QUANT&REVRSE TRNSCRPJ: CPT | Performed by: INTERNAL MEDICINE

## 2021-11-22 PROCEDURE — 83735 ASSAY OF MAGNESIUM: CPT | Performed by: INTERNAL MEDICINE

## 2021-11-22 PROCEDURE — 85025 COMPLETE CBC W/AUTO DIFF WBC: CPT | Performed by: INTERNAL MEDICINE

## 2021-11-22 PROCEDURE — 80069 RENAL FUNCTION PANEL: CPT | Performed by: INTERNAL MEDICINE

## 2021-11-22 RX ORDER — TACROLIMUS 1 MG/1
4 CAPSULE ORAL EVERY 12 HOURS
Qty: 240 CAPSULE | Refills: 11 | Status: SHIPPED | OUTPATIENT
Start: 2021-11-22 | End: 2021-12-15 | Stop reason: SDUPTHER

## 2021-11-22 SDOH — SOCIAL DETERMINANTS OF HEALTH (SDOH): OCCUPATIONAL EXPOSURE TO OTHER RISK FACTORS: Z57.8

## 2021-11-23 LAB
HEPATITIS C VIRUS (HCV) RNA DETECTION/QUANTIFICATION RT-PCR: NORMAL IU/ML
HIV1 RNA # PLAS NAA DL=20: NORMAL COPIES/ML

## 2021-11-24 LAB
HBV DNA SERPL NAA+PROBE-ACNC: <10 IU/ML
HBV DNA SERPL NAA+PROBE-LOG IU: <1 LOG (10) IU/ML
HBV DNA SERPL QL NAA+PROBE: NOT DETECTED

## 2021-11-29 ENCOUNTER — TELEPHONE (OUTPATIENT)
Dept: TRANSPLANT | Facility: CLINIC | Age: 36
End: 2021-11-29
Payer: MEDICARE

## 2021-12-07 DIAGNOSIS — Z94.0 S/P KIDNEY TRANSPLANT: Primary | ICD-10-CM

## 2021-12-13 ENCOUNTER — TELEPHONE (OUTPATIENT)
Dept: INTERVENTIONAL RADIOLOGY/VASCULAR | Facility: HOSPITAL | Age: 36
End: 2021-12-13
Payer: MEDICARE

## 2021-12-14 ENCOUNTER — HOSPITAL ENCOUNTER (OUTPATIENT)
Dept: INTERVENTIONAL RADIOLOGY/VASCULAR | Facility: HOSPITAL | Age: 36
Discharge: HOME | End: 2021-12-14
Attending: INTERNAL MEDICINE
Payer: MEDICARE

## 2021-12-14 VITALS
RESPIRATION RATE: 23 BRPM | WEIGHT: 160 LBS | OXYGEN SATURATION: 100 % | DIASTOLIC BLOOD PRESSURE: 65 MMHG | HEIGHT: 70 IN | TEMPERATURE: 99 F | SYSTOLIC BLOOD PRESSURE: 124 MMHG | BODY MASS INDEX: 22.9 KG/M2 | HEART RATE: 81 BPM

## 2021-12-14 DIAGNOSIS — Z94.0 KIDNEY REPLACED BY TRANSPLANT: ICD-10-CM

## 2021-12-14 DIAGNOSIS — R79.89 ELEVATED SERUM CREATININE: ICD-10-CM

## 2021-12-14 PROCEDURE — 76942 ECHO GUIDE FOR BIOPSY: CPT | Mod: TC | Performed by: RADIOLOGY

## 2021-12-14 PROCEDURE — 27201068 IR BIOPSY KIDNEY

## 2021-12-14 PROCEDURE — 50200 RENAL BIOPSY PERQ: CPT | Performed by: RADIOLOGY

## 2021-12-14 PROCEDURE — 76942 PR U/S GUIDANCE FOR NEEDLE GUIDANCE: ICD-10-PCS | Mod: 26,,, | Performed by: RADIOLOGY

## 2021-12-14 PROCEDURE — 25000003 PHARM REV CODE 250: Performed by: PHYSICIAN ASSISTANT

## 2021-12-14 PROCEDURE — 50200 IR BIOPSY KIDNEY: ICD-10-PCS | Mod: ,,, | Performed by: RADIOLOGY

## 2021-12-14 PROCEDURE — 76942 ECHO GUIDE FOR BIOPSY: CPT | Mod: 26,,, | Performed by: RADIOLOGY

## 2021-12-14 RX ORDER — FENTANYL CITRATE 50 UG/ML
50 INJECTION, SOLUTION INTRAMUSCULAR; INTRAVENOUS
Status: DISCONTINUED | OUTPATIENT
Start: 2021-12-14 | End: 2021-12-15 | Stop reason: HOSPADM

## 2021-12-14 RX ORDER — MIDAZOLAM HYDROCHLORIDE 1 MG/ML
1 INJECTION INTRAMUSCULAR; INTRAVENOUS
Status: DISCONTINUED | OUTPATIENT
Start: 2021-12-14 | End: 2021-12-15 | Stop reason: HOSPADM

## 2021-12-14 RX ORDER — SODIUM CHLORIDE 9 MG/ML
INJECTION, SOLUTION INTRAVENOUS CONTINUOUS
Status: DISCONTINUED | OUTPATIENT
Start: 2021-12-14 | End: 2021-12-15 | Stop reason: HOSPADM

## 2021-12-14 RX ADMIN — SODIUM CHLORIDE: 0.9 INJECTION, SOLUTION INTRAVENOUS at 10:12

## 2021-12-15 ENCOUNTER — TELEPHONE (OUTPATIENT)
Dept: TRANSPLANT | Facility: CLINIC | Age: 36
End: 2021-12-15
Payer: MEDICARE

## 2021-12-15 DIAGNOSIS — Z94.0 KIDNEY REPLACED BY TRANSPLANT: ICD-10-CM

## 2021-12-15 RX ORDER — PREDNISONE 5 MG/1
TABLET ORAL
Qty: 120 TABLET | Refills: 11 | Status: SHIPPED | OUTPATIENT
Start: 2021-12-15 | End: 2023-02-14

## 2021-12-15 RX ORDER — TACROLIMUS 1 MG/1
4 CAPSULE ORAL EVERY 12 HOURS
Qty: 240 CAPSULE | Refills: 11 | Status: CANCELLED | OUTPATIENT
Start: 2021-12-15 | End: 2022-12-15

## 2021-12-15 RX ORDER — TACROLIMUS 1 MG/1
4 CAPSULE ORAL EVERY 12 HOURS
Qty: 240 CAPSULE | Refills: 11 | Status: SHIPPED | OUTPATIENT
Start: 2021-12-15 | End: 2022-01-21

## 2021-12-15 RX ORDER — MYCOPHENOLIC ACID 180 MG/1
720 TABLET, DELAYED RELEASE ORAL 2 TIMES DAILY
Qty: 240 TABLET | Refills: 11 | Status: SHIPPED | OUTPATIENT
Start: 2021-12-15 | End: 2022-01-21

## 2021-12-17 LAB
FINAL PATHOLOGIC DIAGNOSIS: NORMAL
GROSS: NORMAL
Lab: NORMAL

## 2021-12-23 ENCOUNTER — DOCUMENT SCAN (OUTPATIENT)
Dept: HOME HEALTH SERVICES | Facility: HOSPITAL | Age: 36
End: 2021-12-23
Payer: MEDICARE

## 2021-12-27 ENCOUNTER — DOCUMENT SCAN (OUTPATIENT)
Dept: HOME HEALTH SERVICES | Facility: HOSPITAL | Age: 36
End: 2021-12-27
Payer: MEDICARE

## 2021-12-30 ENCOUNTER — DOCUMENTATION ONLY (OUTPATIENT)
Dept: TRANSPLANT | Facility: CLINIC | Age: 36
End: 2021-12-30
Payer: MEDICARE

## 2021-12-30 LAB
EXT ALBUMIN: 4.7 (ref 3.5–5)
EXT ANC: 4048
EXT BUN: 16 (ref 7–18)
EXT CALCIUM: 10.2 (ref 8.4–10.2)
EXT CHLORIDE: 105 (ref 101–111)
EXT CO2: 27 (ref 21–31)
EXT CREATININE: 1.77 MG/DL (ref 0.6–1.3)
EXT EOSINOPHIL%: 1.8 (ref 0–?)
EXT GFR MDRD AF AMER: 52
EXT GLUCOSE: 86 (ref 74–106)
EXT HEMATOCRIT: 38.1 (ref 42–52)
EXT HEMOGLOBIN: 11.6 (ref 14–16.5)
EXT LYMPH%: 4.1 (ref 20.5–51.1)
EXT MAGNESIUM: 1.7 (ref 1.7–2.8)
EXT MONOCYTES%: 10.9 (ref 1.7–9.3)
EXT PHOSPHORUS: 2.3 (ref 2.5–4.6)
EXT PLATELETS: 261 (ref 150–400)
EXT POTASSIUM: 4.1 (ref 3.5–5)
EXT PROTEIN TOTAL: ABNORMAL
EXT SODIUM: 137 MMOL/L (ref 137–145)
EXT TACROLIMUS LVL: 7.7 (ref 8–10)
EXT WBC: 4.6 (ref 4.8–10.8)

## 2022-01-12 ENCOUNTER — DOCUMENTATION ONLY (OUTPATIENT)
Dept: TRANSPLANT | Facility: CLINIC | Age: 37
End: 2022-01-12
Payer: MEDICARE

## 2022-01-12 ENCOUNTER — TELEPHONE (OUTPATIENT)
Dept: TRANSPLANT | Facility: CLINIC | Age: 37
End: 2022-01-12
Payer: MEDICARE

## 2022-01-12 LAB — EXT TACROLIMUS LVL: 7.7

## 2022-01-12 NOTE — TELEPHONE ENCOUNTER
Spoke to pt, he did not get labs drawn on Monday, he will go tomorrow morning.  Reviewed 12hr prograf level instructions, he will take his prograf at 8pm tonight and will have fasting labs drawn tomorrow morning at 8am and will take his prograf dose after labs are drawn.  Clinic apt rescheduled to weds, 01/26/22 at 3:30 with Cara.

## 2022-01-13 ENCOUNTER — TELEPHONE (OUTPATIENT)
Dept: TRANSPLANT | Facility: CLINIC | Age: 37
End: 2022-01-13
Payer: MEDICARE

## 2022-01-13 ENCOUNTER — DOCUMENTATION ONLY (OUTPATIENT)
Dept: TRANSPLANT | Facility: CLINIC | Age: 37
End: 2022-01-13
Payer: MEDICARE

## 2022-01-13 LAB
EXT ALBUMIN: 4.7 (ref 3.5–5)
EXT ALKALINE PHOSPHATASE: 100 (ref 38–126)
EXT ALT: 10 (ref 7–52)
EXT ANC: 1716
EXT AST: 11
EXT BILIRUBIN DIRECT: 0.1 MG/DL (ref 0–0.2)
EXT BILIRUBIN TOTAL: 0.2 (ref 0.2–1)
EXT BK VIRUS DNA QN PCR: NEGATIVE
EXT BUN: 18 (ref 7–18)
EXT CALCIUM: 10 (ref 8.4–10.2)
EXT CHLORIDE: 106 (ref 101–111)
EXT CHOLESTEROL (LIPID PANEL): 133 (ref 140–200)
EXT CMV DNA QUANT. BY PCR: NEGATIVE
EXT CO2: 28 (ref 21–31)
EXT CREATININE: 1.84 MG/DL (ref 0.6–1.3)
EXT GFR MDRD AF AMER: 50
EXT GLUCOSE: 101 (ref 74–106)
EXT HDL: 26 (ref 32–60)
EXT HEMATOCRIT: 40.1 (ref 42–52)
EXT HEMOGLOBIN: 12.3 (ref 14–16.5)
EXT LDL CHOLESTEROL: 78 (ref 60–129)
EXT LYMPH%: 16 (ref 20–33)
EXT MAGNESIUM: 1.4 (ref 1.7–2.8)
EXT MONOCYTES%: 6 (ref 2–5)
EXT PHOSPHORUS: 2.9 (ref 2.5–4.6)
EXT PLATELETS: 267 (ref 150–400)
EXT POTASSIUM: 4 (ref 3.6–5)
EXT PROT/CREAT RATIO UR: 0.09
EXT PROTEIN TOTAL: 7.3 (ref 6.4–8.2)
EXT PTH, INTACT: 246.8 (ref 7.5–53.5)
EXT SEGS%: 78 (ref 60–70)
EXT SODIUM: 141 MMOL/L (ref 137–145)
EXT TACROLIMUS LVL: 12 (ref 8–10)
EXT TRIGLYCERIDES: 144 (ref 35–200)
EXT URIC ACID: 6.2 (ref 2.6–7.2)
EXT VIT D 1 25 DIHYDROXY: 29.32
EXT WBC: 2.2 (ref 4.8–10.8)

## 2022-01-13 NOTE — PROGRESS NOTES
let's get a serum CMV pcr and plan to repeat labs on Monday next week, ANC does not meet criteria for neupogen, Lower MMF to 360 bid    Verify he is taking mag oxide correctly please

## 2022-01-13 NOTE — TELEPHONE ENCOUNTER
Incoming call from [employer]  at [facility].  Critical WBC 2.2, ANC 1496.0.  All appropriate personnel notified.  VORB From Dr Velasco .  let's get a serum CMV pcr and plan to repeat labs on Monday next week, ANC does not meet criteria for neupogen, Lower MMF to 360 bid.  Spoke with [employer]  at [facility] and CMV pcr added to today's labs.  Left voice message with patient to call coordinator.

## 2022-01-13 NOTE — TELEPHONE ENCOUNTER
Spoke to pt's niece, Deejay, reviewed below instructions, she verbalized understanding and agreed.  Pt will get lab on Monday 1/2422at [facility] lab.    ----- Message from Jay Velasco MD sent at 1/13/2022 10:45 AM CST -----  let's get a serum CMV pcr and plan to repeat labs on Monday next week, ANC does not meet criteria for neupogen, Lower MMF to 360 bid    Verify he is taking mag oxide correctly please

## 2022-01-13 NOTE — TELEPHONE ENCOUNTER
----- Message from Bright Maxwell sent at 1/13/2022  9:15 AM CST -----  Regarding: call back  Pt returning carolina call    Call

## 2022-01-13 NOTE — TELEPHONE ENCOUNTER
"Received and submitted PA request for Phospha Neutral via covermymeds:  Please do not fax this to the health plan - it is not a PA form. To submit this PA  request, use the "Send to Plan" button. This printout is provided as a convenience  and is intended to supplement your record keeping.  Request [id number]  Form: [employer] Electronic PA Form (2017 NCPDP)  Drug: Phospha 250 Neutral [phone] tablets  Prescriber Instructions  Patient  Name - Prefix:  Name - First: JOSÉ  Name - Middle:  Name - Last: ANGELICA  Name - Suffix:  YOB: 1985  Gender - : Male  Member ID - : *[id number]  Address - Street: [address]  [address] - [address]:  Address - City: [place]  Address - State: [place]  Address - Zip: [address]  Phone - : [phone]  Drug  Medication Name - : Phospha 250 Neutral [phone] tablets  Quantity - (Enter numeric value with up to 2 decimal places and a preceding zero before the decimal when  applicable. For example, .5 must be entered as 0.5. Please do not include any white spaces.): 180  Quantity - Confirm dosage form: Tablet  Days Supply - (up to 3 digits): 30  Provider  NPI - : [id number]  Name - First: Dayanara  Name - Last: Curtis  Address - Street: [address] [place] [address]  Address - Address Line 2 (optional):  Address - City: [place]  Address - State: [place]  Address - Zip: [address]  Phone - : [phone]  Fax - : [phone]  Type of Review  Are you requesting an URGENT review?: No  Prescriber Next Steps  [id number]  Do Not Send To Plan  For Records Only  Is the request for an Expedited Review? By selecting yes, I certify that applying the 72 hour standard review  timeframe may seriously jeopardize the life or health of the enrollee or the enrollee's ability to regain maximum  function: NO  1. Would all formulary agents be ineffective?: No  Yes:  No:  2. Would all formulary agents have adverse effects?: No  Yes:  No:  3. Does the patient prefer the " non-formulary drug?: No  Yes:  No:  Document Upload  Good news! [facility] does NOT need attachments for ePA cases. Answering all of the questions on the form will  be sufficient, unless an attachment is requested. Adding an attachment will require a secondary review, which could  prolong the decision process. Must be .jpg, .pdf, or .tif file format.: Do Not

## 2022-01-13 NOTE — TELEPHONE ENCOUNTER
----- Message from Jay Velasco MD sent at 1/13/2022  8:46 AM CST -----  let's get a serum CMV pcr and plan to repeat labs on Monday next week, ANC does not meet criteria for neupogen, Lower MMF to 360 bid

## 2022-01-21 DIAGNOSIS — Z94.0 KIDNEY REPLACED BY TRANSPLANT: ICD-10-CM

## 2022-01-21 RX ORDER — MYCOPHENOLIC ACID 180 MG/1
360 TABLET, DELAYED RELEASE ORAL 2 TIMES DAILY
Qty: 120 TABLET | Refills: 11 | Status: SHIPPED | OUTPATIENT
Start: 2022-01-21 | End: 2022-02-07 | Stop reason: DRUGHIGH

## 2022-01-21 NOTE — TELEPHONE ENCOUNTER
Spoke to pt's niece, Deejay, reviewed below instructions, she verbalized understanding and agreed.    ----- Message from Jay Velasco MD sent at 1/21/2022  3:34 PM CST -----  Lower prograf to 3 mg PO bid

## 2022-01-24 ENCOUNTER — DOCUMENTATION ONLY (OUTPATIENT)
Dept: TRANSPLANT | Facility: CLINIC | Age: 37
End: 2022-01-24
Payer: MEDICARE

## 2022-01-24 ENCOUNTER — TELEPHONE (OUTPATIENT)
Dept: TRANSPLANT | Facility: CLINIC | Age: 37
End: 2022-01-24
Payer: MEDICARE

## 2022-01-24 DIAGNOSIS — Z91.89 AT RISK FOR INFECTION DUE TO IMMUNOSUPPRESSION: ICD-10-CM

## 2022-01-24 DIAGNOSIS — Z94.0 KIDNEY REPLACED BY TRANSPLANT: Primary | ICD-10-CM

## 2022-01-24 DIAGNOSIS — D70.2 OTHER DRUG-INDUCED NEUTROPENIA: ICD-10-CM

## 2022-01-24 PROBLEM — D70.9 NEUTROPENIA, UNSPECIFIED: Status: ACTIVE | Noted: 2022-01-24

## 2022-01-24 LAB
EXT ANC: 660 (ref 1400–?)
EXT BUN: 10 (ref 7–?)
EXT CALCIUM: 10.3 (ref 8.4–?)
EXT CHLORIDE: 106 (ref 101–?)
EXT CMV DNA QUANT. BY PCR: ABNORMAL
EXT CO2: 29 (ref 21–?)
EXT CREATININE: 1.68 MG/DL (ref 0.6–?)
EXT EOSINOPHIL%: 10 (ref 10–?)
EXT GFR MDRD AF AMER: 55
EXT GFR MDRD NON AF AMER: ABNORMAL
EXT GLUCOSE: 94 (ref 74–106)
EXT HEMATOCRIT: 39.7 (ref 42–52)
EXT HEMOGLOBIN: 12.1 (ref 14–16.5)
EXT LYMPH%: 23 (ref 20–33)
EXT MONOCYTES%: 20 (ref 2–5)
EXT PLATELETS: ADEQUATE
EXT POTASSIUM: 4.7 (ref 3.63–?)
EXT SEGS%: 44 (ref 60–70)
EXT SODIUM: 140 MMOL/L (ref 137–?)
EXT TACROLIMUS LVL: 7.4
EXT WBC: 1.5 (ref 4.8–4.8)

## 2022-01-24 RX ORDER — TACROLIMUS 1 MG/1
3 CAPSULE ORAL EVERY 12 HOURS
Qty: 180 CAPSULE | Refills: 11 | Status: SHIPPED | OUTPATIENT
Start: 2022-01-24 | End: 2022-04-14 | Stop reason: DRUGHIGH

## 2022-01-24 NOTE — TELEPHONE ENCOUNTER
Spoke to pt's niece/caregiver, Sarath,  reviewed lab results and Dr Velasco orders for Neupogen.  The closest location to pt's home for neupogen injection is the [facility].  Spoke to Shelley in scheduling, confirmed that pt will be scheduled for injection but he must get there today before 4:00pm.    Notified Heydi of the plan:  1. To [facility] today before 4 for Neupogen injection  2. To 2nd floor lab, [facility] tomorrow 3:15 for lab draw (STAT CBC to determine need for 2nd dose of neupogen) then down to 1st floor transplant clinic for 3:30 appointment and will receive the 2nd dose of Neupogen if needed.     ----- Message from Jay Velasco MD sent at 1/24/2022  4:57 PM CST -----  Ok, serum CMV pcr now and weekly until he is due to complete prophylaxis/surveillance  cbc Thursday proceed with neupogen

## 2022-01-24 NOTE — PROGRESS NOTES
Ok, serum CMV pcr now and weekly until he is due to complete prophylaxis/surveillance  cbc Thursday proceed with neupogen

## 2022-01-25 PROBLEM — Z76.82 PATIENT ON WAITING LIST FOR KIDNEY TRANSPLANT: Chronic | Status: RESOLVED | Noted: 2018-08-28 | Resolved: 2022-01-25

## 2022-01-26 ENCOUNTER — OFFICE VISIT (OUTPATIENT)
Dept: TRANSPLANT | Facility: CLINIC | Age: 37
End: 2022-01-26
Payer: MEDICARE

## 2022-01-26 ENCOUNTER — LAB VISIT (OUTPATIENT)
Dept: LAB | Facility: HOSPITAL | Age: 37
End: 2022-01-26
Payer: MEDICARE

## 2022-01-26 VITALS
RESPIRATION RATE: 18 BRPM | TEMPERATURE: 98 F | OXYGEN SATURATION: 100 % | HEIGHT: 70 IN | WEIGHT: 153.44 LBS | DIASTOLIC BLOOD PRESSURE: 71 MMHG | HEART RATE: 87 BPM | SYSTOLIC BLOOD PRESSURE: 122 MMHG | BODY MASS INDEX: 21.97 KG/M2

## 2022-01-26 DIAGNOSIS — Z79.60 LONG-TERM USE OF IMMUNOSUPPRESSANT MEDICATION: Primary | ICD-10-CM

## 2022-01-26 DIAGNOSIS — D70.8 OTHER NEUTROPENIA: ICD-10-CM

## 2022-01-26 DIAGNOSIS — Z94.0 S/P KIDNEY TRANSPLANT: ICD-10-CM

## 2022-01-26 DIAGNOSIS — Z91.89 AT RISK FOR OPPORTUNISTIC INFECTIONS: ICD-10-CM

## 2022-01-26 DIAGNOSIS — Z91.89 AT RISK FOR INFECTION DUE TO IMMUNOSUPPRESSION: ICD-10-CM

## 2022-01-26 DIAGNOSIS — D70.2 OTHER DRUG-INDUCED NEUTROPENIA: ICD-10-CM

## 2022-01-26 DIAGNOSIS — Z94.0 KIDNEY REPLACED BY TRANSPLANT: ICD-10-CM

## 2022-01-26 DIAGNOSIS — Z29.89 PROPHYLACTIC IMMUNOTHERAPY: ICD-10-CM

## 2022-01-26 DIAGNOSIS — N18.31 STAGE 3A CHRONIC KIDNEY DISEASE: ICD-10-CM

## 2022-01-26 DIAGNOSIS — I15.0 RENOVASCULAR HYPERTENSION: ICD-10-CM

## 2022-01-26 LAB
BASOPHILS # BLD AUTO: 0.02 K/UL (ref 0–0.2)
BASOPHILS NFR BLD: 0.6 % (ref 0–1.9)
DIFFERENTIAL METHOD: ABNORMAL
EOSINOPHIL # BLD AUTO: 0.1 K/UL (ref 0–0.5)
EOSINOPHIL NFR BLD: 3.5 % (ref 0–8)
ERYTHROCYTE [DISTWIDTH] IN BLOOD BY AUTOMATED COUNT: 12.5 % (ref 11.5–14.5)
HCT VFR BLD AUTO: 41.3 % (ref 40–54)
HGB BLD-MCNC: 12.3 G/DL (ref 14–18)
IMM GRANULOCYTES # BLD AUTO: 0.02 K/UL (ref 0–0.04)
IMM GRANULOCYTES NFR BLD AUTO: 0.6 % (ref 0–0.5)
LYMPHOCYTES # BLD AUTO: 0.2 K/UL (ref 1–4.8)
LYMPHOCYTES NFR BLD: 7.6 % (ref 18–48)
MCH RBC QN AUTO: 24.8 PG (ref 27–31)
MCHC RBC AUTO-ENTMCNC: 29.8 G/DL (ref 32–36)
MCV RBC AUTO: 83 FL (ref 82–98)
MONOCYTES # BLD AUTO: 0.5 K/UL (ref 0.3–1)
MONOCYTES NFR BLD: 15.5 % (ref 4–15)
NEUTROPHILS # BLD AUTO: 2.3 K/UL (ref 1.8–7.7)
NEUTROPHILS NFR BLD: 72.2 % (ref 38–73)
NRBC BLD-RTO: 0 /100 WBC
PLATELET # BLD AUTO: 313 K/UL (ref 150–450)
PMV BLD AUTO: 11.3 FL (ref 9.2–12.9)
RBC # BLD AUTO: 4.96 M/UL (ref 4.6–6.2)
WBC # BLD AUTO: 3.17 K/UL (ref 3.9–12.7)

## 2022-01-26 PROCEDURE — 99215 OFFICE O/P EST HI 40 MIN: CPT | Mod: PBBFAC | Performed by: NURSE PRACTITIONER

## 2022-01-26 PROCEDURE — 85025 COMPLETE CBC W/AUTO DIFF WBC: CPT | Performed by: INTERNAL MEDICINE

## 2022-01-26 PROCEDURE — 36415 COLL VENOUS BLD VENIPUNCTURE: CPT | Performed by: INTERNAL MEDICINE

## 2022-01-26 PROCEDURE — 99215 PR OFFICE/OUTPT VISIT, EST, LEVL V, 40-54 MIN: ICD-10-PCS | Mod: S$PBB,,, | Performed by: NURSE PRACTITIONER

## 2022-01-26 PROCEDURE — 99999 PR PBB SHADOW E&M-EST. PATIENT-LVL V: CPT | Mod: PBBFAC,,, | Performed by: NURSE PRACTITIONER

## 2022-01-26 PROCEDURE — 99215 OFFICE O/P EST HI 40 MIN: CPT | Mod: S$PBB,,, | Performed by: NURSE PRACTITIONER

## 2022-01-26 PROCEDURE — 99999 PR PBB SHADOW E&M-EST. PATIENT-LVL V: ICD-10-PCS | Mod: PBBFAC,,, | Performed by: NURSE PRACTITIONER

## 2022-01-26 NOTE — PROGRESS NOTES
Kidney Post-Transplant Assessment    Referring Physician: Ralf Crouch  Current Nephrologist: Ralf Crouch    ORGAN: LEFT KIDNEY  Donor Type: donation after brain death  PHS Increased Risk: no  Cold Ischemia: 1,175 mins  Induction Medications: thymoglobulin    Subjective:     CC:  Reassessment of renal allograft function and management of chronic immunosuppression.    HPI:  Mr. Trevizo is a 37 y.o. 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 His most recent creatinine is1.68 . He takes mycophenolic acid, prednisone and tacrolimus for maintenance immunosuppression. His post transplant course has been complicated by neutropenia.  IMMUNOSUPPRESSION: Tacrolimus/Mycophenolate/Prednisone  PCP PROPHYLAXIS: Bactrim until 4/17/22  CMV PROPHYLAXIS: Valcyte until 1/17/22  FUNGAL PROPHYLAXIS: Posaconazole until 11/17/21      Interval HX:  Did not receive first dose Neupogen yesterday--will get dose #1 today in clinic    Denies any s/s illness    Intake adequate amount water intake  UOP np problems reported   BP   BP Readings from Last 3 Encounters:   01/26/22 122/71   12/14/21 124/65   11/16/21 (!) 141/71       Peripheral edema-none  Weight stable  Appetite-good     fx assessment Overall feels well. No health concerns today.   Denies chest pain, SOB, leg pain, abdominal pain or LUTs.    Lab /diagnostic results reviewed with patient today.   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   HENT: Positive for hearing loss. Negative for congestion, facial swelling, postnasal drip, rhinorrhea, sinus pressure, sore throat and trouble swallowing.    Eyes: Negative for pain, redness and visual disturbance.   Respiratory: Negative for cough, chest tightness, shortness of breath and wheezing.    Cardiovascular: Negative.  Negative for chest pain, palpitations and leg swelling.   Gastrointestinal: Negative for abdominal pain, constipation, diarrhea, nausea and vomiting.   Genitourinary: Negative for dysuria, flank pain and urgency.   Musculoskeletal: Negative for gait problem, neck pain and neck stiffness.   Skin: Negative for rash.   Allergic/Immunologic: Positive for immunocompromised state. Negative for environmental allergies and food allergies.   Neurological: Negative for dizziness, weakness, light-headedness and headaches.   Psychiatric/Behavioral: Negative for agitation and confusion. The patient is not nervous/anxious.        Objective:   Blood pressure 122/71, pulse 87, temperature 97.7 °F (36.5 °C), temperature source Temporal, resp. rate 18, height 5' 10" (1.778 m), weight 69.6 kg (153 lb 7 oz), SpO2 100 %.body mass index is 22.02 kg/m².    Physical Exam  Vitals reviewed.   Constitutional:       Appearance: Normal appearance. He is well-developed.   HENT:      Head: Normocephalic.   Eyes:      Pupils: Pupils are equal, round, and reactive to light.   Cardiovascular:      Rate and Rhythm: Normal rate and regular rhythm.      Heart sounds: Normal heart sounds.   Pulmonary:      Effort: Pulmonary effort is normal.      Breath sounds: Normal breath sounds.   Abdominal:      General: Bowel sounds are normal.      Palpations: Abdomen is soft.   Musculoskeletal:         General: Normal range of motion.      Cervical back: Normal range of motion and neck supple.   Skin:     General: Skin is warm and dry.   Neurological:      Mental Status: He is alert and " oriented to person, place, and time.      Motor: No abnormal muscle tone.   Psychiatric:         Behavior: Behavior normal.         Labs:  Lab Results   Component Value Date    WBC 3.70 (L) 12/14/2021    HGB 10.4 (L) 12/14/2021    HCT 35.5 (L) 12/14/2021     12/14/2021    K 4.4 12/14/2021     12/14/2021    CO2 24 12/14/2021    BUN 7 12/14/2021    CREATININE 1.7 (H) 12/14/2021    EGFRNONAA 50.0 (A) 12/14/2021    CALCIUM 9.8 12/14/2021    PHOS 2.5 (L) 11/22/2021    MG 1.8 12/14/2021    ALBUMIN 4.2 12/14/2021    AST 10 12/14/2021    ALT 8 (L) 12/14/2021    UTPCR Unable to calculate 12/14/2021    .7 (H) 10/18/2021    TACROLIMUS 4.9 (L) 12/14/2021       Lab Results   Component Value Date    EXTANC 660 (A) 01/24/2022    EXTWBC 1.5 (A) 01/24/2022    EXTSEGS 44 (A) 01/24/2022    EXTPLATELETS adequate 01/24/2022    EXTHEMOGLOBI 12.1 (A) 01/24/2022    EXTHEMATOCRI 39.7 (A) 01/24/2022    EXTCREATININ 1.68 01/24/2022    EXTSODIUM 140 01/24/2022    EXTPOTASSIUM 4.7 01/24/2022    EXTBUN 10 01/24/2022    EXTCO2 29 01/24/2022    EXTCALCIUM 10.3 01/24/2022    EXTPHOSPHORU 2.9 01/13/2022    EXTGLUCOSE 94 01/24/2022    EXTALBUMIN 4.7 01/13/2022    EXTAST 11 01/13/2022    EXTALT 10 01/13/2022    EXTBILITOTAL 0.2 01/13/2022       Lab Results   Component Value Date    EXTTACROLVL pending 01/24/2022    EXTPTHINTACT pend 01/13/2022       Labs were reviewed with the patient    Assessment:     1. Long-term use of immunosuppressant medication    2. S/P kidney transplant 10/19/21    3. Prophylactic immunotherapy    4. At risk for opportunistic infections    5. Renovascular hypertension    6. Stage 3a chronic kidney disease    7. Other neutropenia        Plan:    Prograf trough pending,  Yesterday-->: Neupogen/guidelines, repeat labs 1/27/22(CBC and CMV/weekly)  Per Dr Velasco     CBC today-results pending   Pt Did not receive first dose Neupogen yesterday--he will get dose #1 today in clinic   Repeat CBC  tomorrow with  #2 neupogen injection if warranted       Follow-up:   1. CKD stage: 3a  2. Immunosuppression:   Prograf trough pending,     therapeutic target 8-10. Continue  Prograf  3/3, MyF 360 Mg BID lowered on 1/13/2022 d/t  Leukopenia), and Prednisone  Taper, Bactrim 400/80 mg QD for PCP prophylaxis, Valcyte 450 mg QD for CMV prophylaxis. Will continue to monitor for drug toxicities    3. Allograft Function: Stable. Continue good po hydration.    1/24/2022      4. Hypertension management: advise low salt diet and home BP monitoring    ASA, Nifedipine, minoxidil, coreg      5. Metabolic Bone Disease/Secondary Hyperparathyroidism:stable  Will monitor PTH, CA and Vit D/guidelines,   KPN, Mg ox       1/24/2022  3mo 0wk   EXT Calcium 8.4 10.3   1/13/2022        6. Electrolytes:  Will monitor /guidelines   1/24/2022      7. Anemia: stable. No need for intervention    Will monitor /guidelines  1/24/2022        8.  Cytopenias: no significant cytopenias will monitor as per our guidelines. Medicine list reviewed including potential causes of drug-induced cytopenias  : Neupogen/guidelines, repeat labs 1/27/22(CBC and CMV/weekly)    CBC today-results pending   Pt Did not receive first dose Neupogen yesterday--he will get dose #1 today in clinic   Repeat CBC  tomorrow with #2 neupogen injection if warranted         9.Proteinuria: continue p/c ratio as per guidelines    12/14/2021  POD 56   Prot/Creat Ratio, Urine 0.00 - 0.20 Unable to calculate     10. BK virus infection screening:  will continue to monitor/ guidelines   leukopenia--CMV PCR weekly    1/24/2022  3mo 0wk   EXT CMV DNA QUANT. BY PCR pending       1/13/2022  POD 86   EXT CMV DNA QUANT. BY PCR negative negative       1/13/2022  POD 86   EXT BK Virus DNA QN PCR neg negative (C)         11. Weight education: provided during the clinic visit   Body mass index is 22.02 kg/m².          12.Patient safety education regarding immunosuppression including prophylaxis  posttransplant for CMV, PCP : Education provided about vaccination and prevention of infections            Follow-up:   Clinic: return to transplant clinic weekly for the first month after transplant; every 2 weeks during months 2-3; then at 6-, 9-, 12-, 18-, 24-, and 36- months post-transplant to reassess for complications from immunosuppression toxicity and monitor for rejection.  Annually thereafter.    Labs: since patient remains at high risk for rejection and drug-related complications that warrant close monitoring, labs will be ordered as follows: continue twice weekly CBC, renal panel, and drug level for first month; then same labs once weekly through 3rd month post-transplant.  Urine for UA and protein/creatinine ratio monthly.  Serum BK - PCR at 1-, 3-, 6-, 9-, 12-, 18-, 24-, 36-, 48-, and 60 months post-transplant.  Hepatic panel at 1-, 2-, 3-, 6-, 9-, 12-, 18-, 24-, and 36- months post-transplant.    Cara Chavis NP       Education:   Material provided to the patient.  Patient reminded to call with any health changes since these can be early signs of significant complications.  Also, I advised the patient to be sure any new medications or changes of old medications are discussed with either a pharmacist or physician knowledgeable with transplant to avoid rejection/drug toxicity related to significant drug interactions.    Patient advised that it is recommended that all transplanted patients, and their close contacts and household members receive Covid vaccination.

## 2022-01-26 NOTE — LETTER
January 26, 2022        Ralf Crouch  [address]  [facility] [place] [address]  Phone: [phone]  Fax: [phone]     Jay Velasco  [address]  [place] [address]  Phone: [phone]  Fax: [phone]             [facility] 1st Fl  [address]  [place] [address]  Phone: [phone]   Patient: Feliz Trevizo   MR Number: [id number]   YOB: 1985   Date of Visit: 1/26/2022       Dear Dr. Ralf Crouch, Jay Velasco    Thank you for referring Feliz Trevizo to me for evaluation. Attached you will find relevant portions of my assessment and plan of care.    If you have questions, please do not hesitate to call me. I look forward to following Feliz Trevizo along with you.    Sincerely,    Cara Chavis NP    Enclosure    If you would like to receive this communication electronically, please contact [email or web address] or [phone] to request [employer] Link access.    [employer] Link is a tool which provides read-only access to select patient information with whom you have a relationship. Its easy to use and provides real time access to review your patients record including encounter summaries, notes, results, and demographic information.    If you feel you have received this communication in error or would no longer like to receive these types of communications, please e-mail [email or web address]

## 2022-01-27 ENCOUNTER — DOCUMENTATION ONLY (OUTPATIENT)
Dept: TRANSPLANT | Facility: CLINIC | Age: 37
End: 2022-01-27
Payer: MEDICARE

## 2022-01-27 LAB
EXT HEMATOCRIT: 39.7 (ref 42–52)
EXT HEMOGLOBIN: 12.1 (ref 14–16.5)
EXT PLATELETS: 256 (ref 150–400)
EXT WBC: 8.7 (ref 4.8–10.8)

## 2022-01-28 ENCOUNTER — TELEPHONE (OUTPATIENT)
Dept: TRANSPLANT | Facility: CLINIC | Age: 37
End: 2022-01-28
Payer: MEDICARE

## 2022-01-28 LAB
CYTOMEGALOVIRUS DNA: NOT DETECTED
CYTOMEGALOVIRUS LOG (IU/ML): NOT DETECTED LOGIU/ML
CYTOMEGALOVIRUS PCR, QUANT: NOT DETECTED IU/ML

## 2022-01-28 NOTE — TELEPHONE ENCOUNTER
----- Message from Jay Velasco MD sent at 1/28/2022  4:28 PM CST -----  Please Repeat serum CMV pcr in 1 week

## 2022-01-29 DIAGNOSIS — D84.9 IMMUNOSUPPRESSED STATUS: ICD-10-CM

## 2022-02-04 DIAGNOSIS — D84.9 IMMUNOSUPPRESSED STATUS: ICD-10-CM

## 2022-02-06 DIAGNOSIS — D84.9 IMMUNOSUPPRESSED STATUS: ICD-10-CM

## 2022-02-07 ENCOUNTER — DOCUMENTATION ONLY (OUTPATIENT)
Dept: TRANSPLANT | Facility: CLINIC | Age: 37
End: 2022-02-07
Payer: MEDICARE

## 2022-02-07 DIAGNOSIS — Z94.0 KIDNEY REPLACED BY TRANSPLANT: Primary | ICD-10-CM

## 2022-02-07 LAB
CMV DNA PCR QUANT: <200
EXT ALBUMIN: 4.5 G/DL (ref 3.5–5)
EXT ANC: 1000 (ref 1400–6500)
EXT BK VIRUS DNA QUANT, PCR, URINE: NEGATIVE
EXT BUN: 14 MG/DL (ref 7–18)
EXT CALCIUM: 9.9 MG/DL (ref 8.4–10.2)
EXT CHLORIDE: 105 MMOL/L (ref 101–111)
EXT CO2: 30 MMOL/L (ref 21–31)
EXT CREATININE: 1.79 MG/DL (ref 0.6–1.3)
EXT EOSINOPHIL%: 5.6 % (ref 0–4.9)
EXT GFR MDRD AF AMER: 51 ML/MIN
EXT GLUCOSE UA: NEGATIVE MG/DL
EXT GLUCOSE: 99 MG/DL (ref 74–106)
EXT GRAN%: 51.8 (ref 42.2–52)
EXT HEMATOCRIT: 38.5 % (ref 42–52)
EXT HEMOGLOBIN: 11.7 G/DL (ref 14–16.5)
EXT LYMPH%: 16.6 % (ref 20.5–51.1)
EXT MAGNESIUM: 2.1 MG/DL (ref 1.7–2.8)
EXT MONOCYTES%: 24.5 % (ref 1.7–9.3)
EXT NITRITES UA: NEGATIVE
EXT PHOSPHORUS: 2.2
EXT PLATELETS: 233 BIL/L (ref 150–400)
EXT POTASSIUM: 3.9 MMOL/L (ref 3.6–5)
EXT PROT/CREAT RATIO UR: 0.08
EXT PROTEIN UA: NEGATIVE MG/DL
EXT RBC UA: ABNORMAL /HPF
EXT SODIUM: 140 MMOL/L (ref 137–145)
EXT TACROLIMUS LVL: 9.4 (ref 8–10)
EXT WBC UA: ABNORMAL /HPF
EXT WBC: 1.9 BIL/L (ref 4.8–10.8)

## 2022-02-07 NOTE — PROGRESS NOTES
He should be OFF valcyte   Please verify the report of BANDS ?58%. Ask for any sign of infection fever cough UTI symptoms  Lower Myfortic to 180 bid  Let's get an allosure now and monthly x 3 months  See if patient has coverage to switch bactrim to atovaquone to complete PJP prophylaxis until 4/17/2022  Hydration  Repeat cbc with diff Thursday 2/10

## 2022-02-07 NOTE — PROGRESS NOTES
He should be OFF valcyte     Lower Myfortic to 180 bid  Let's get an allosure now and monthly x 3 months  See if patient has coverage to switch bactrim to atovaquone to complete PJP prophylaxis until 4/17/2022  Hydration  Repeat cbc with diff Thursday 2/10

## 2022-02-08 ENCOUNTER — TELEPHONE (OUTPATIENT)
Dept: TRANSPLANT | Facility: CLINIC | Age: 37
End: 2022-02-08
Payer: MEDICARE

## 2022-02-08 RX ORDER — ATOVAQUONE 750 MG/5ML
1500 SUSPENSION ORAL DAILY
Qty: 300 ML | Refills: 1 | Status: SHIPPED | OUTPATIENT
Start: 2022-02-08 | End: 2022-05-02

## 2022-02-08 RX ORDER — MYCOPHENOLIC ACID 180 MG/1
180 TABLET, DELAYED RELEASE ORAL 2 TIMES DAILY
Qty: 60 TABLET | Refills: 11 | Status: SHIPPED | OUTPATIENT
Start: 2022-02-08 | End: 2022-05-02

## 2022-02-08 NOTE — TELEPHONE ENCOUNTER
Spoke to pt, instructed him to stop taking bactrim when he starts the new medicine, Atovaquone/Mepron, pt verbalized understanding and agreed.  Confirmed repeat labs tomorrow at [facility].      ----- Message from Lexii Medrano PharmD sent at 2/8/2022 11:44 AM CST -----  Regarding: RE: insurance coverage to switch to atovaquone?  Atovaquone PA approved and copay is $4.  Apparently he already had the Rx switched to his local pharmacy and is planning to  tomorrow.     Please make sure he knows to stop bactrim when he starts the atovaquone.     ----- Message -----  From: Lexii Medrano PharmD  Sent: 2/8/2022   8:54 AM CST  To: Cindy Harris RN, Lexii Medrano, PharmD  Subject: RE: insurance coverage to switch to atovaquo#    Her atovaquone requires a PA - so the [facility] pharmacists are working on that.     Once approved, we can find out the copay.      ----- Message -----  From: Cindy Harris RN  Sent: 2/7/2022   3:55 PM CST  To: Donnie PabonD, #  Subject: insurance coverage to switch to atovaquone?      Good evening-   Dr Velasoc wants to change him to atovaquone, can someone pleas check to see if he has    coverage to switch bactrim to atovaquone to complete PJP prophylaxis until 4/17/2022  Thank you  Cindy

## 2022-02-09 ENCOUNTER — TELEPHONE (OUTPATIENT)
Dept: TRANSPLANT | Facility: CLINIC | Age: 37
End: 2022-02-09
Payer: MEDICARE

## 2022-02-09 NOTE — TELEPHONE ENCOUNTER
----- Message from Aura Solano sent at 2/9/2022  3:25 PM CST -----  Regarding: FW: Call back  Contact: [phone]    ----- Message -----  From: Cata West RN  Sent: 2/9/2022   1:08 PM CST  To: [facility] Post-Kidney Transplant Clinical  Subject: FW: Call back                                      ----- Message -----  From: Staci Moreira  Sent: 2/9/2022  12:38 PM CST  To: Partha Marroquin Staff  Subject: Call back                                        Type:  Patient Call Back    Who Called:pt  What this is regarding?:returning call to discuss medication  Would the patient rather a call back or a response via [facility]? Call back  Best Call Back Number:[phone]  Additional Information:     Advised to call back directly if there are further questions, or if these symptoms fail to improve as anticipated or worsen.

## 2022-02-09 NOTE — TELEPHONE ENCOUNTER
----- Message from Alan Carroll sent at 2/9/2022 12:24 PM CST -----  Pt returning call to Cindy.    [phone]

## 2022-02-10 NOTE — TELEPHONE ENCOUNTER
----- Message from Lashaun Deutsch sent at 2/10/2022 11:43 AM CST -----  Regarding: Return call  Patient returning call to coordinator.    Call: [phone]

## 2022-02-11 ENCOUNTER — TELEPHONE (OUTPATIENT)
Dept: TRANSPLANT | Facility: CLINIC | Age: 37
End: 2022-02-11
Payer: MEDICARE

## 2022-02-11 NOTE — TELEPHONE ENCOUNTER
Patient repeated back and voice a understanding of orders.  Patient will have CBC and CMV drawn on Monday 2/14 since he did have his requested CBC drawn on 2/10/2022.   ----- Message from Jay Velasco MD sent at 2/11/2022  2:56 PM CST -----  Please repeat serum CMV pcr next week

## 2022-02-15 ENCOUNTER — TELEPHONE (OUTPATIENT)
Dept: TRANSPLANT | Facility: CLINIC | Age: 37
End: 2022-02-15
Payer: MEDICARE

## 2022-02-15 NOTE — TELEPHONE ENCOUNTER
----- Message from Liam Khan RN sent at 2/15/2022  5:14 PM CST -----  Contact: PT @ [phone]    ----- Message -----  From: Cata West RN  Sent: 2/15/2022   2:23 PM CST  To: [facility] Post-Kidney Transplant Clinical      ----- Message -----  From: Jed Eubanks  Sent: 2/15/2022  12:12 PM CST  To: Partha aMrroquin Staff    Patient is returning a phone call.    Who left a message for the patient: Cindy     Does patient know what this is regarding:  No       Would you like a call back, or a response through your [facility] portal?:   Call     Comments:  Patient stated he missed a phone call but not sure what it's regarding. Please call to advise.

## 2022-02-16 ENCOUNTER — DOCUMENTATION ONLY (OUTPATIENT)
Dept: TRANSPLANT | Facility: CLINIC | Age: 37
End: 2022-02-16
Payer: MEDICARE

## 2022-02-16 LAB
EXT ALBUMIN: ABNORMAL
EXT ALKALINE PHOSPHATASE: ABNORMAL
EXT ALLOSURE: ABNORMAL
EXT ALT: ABNORMAL
EXT AMYLASE: ABNORMAL
EXT ANC: 1288
EXT AST: ABNORMAL
EXT BACTERIA UA: ABNORMAL
EXT BANDS%: ABNORMAL
EXT BILIRUBIN DIRECT: ABNORMAL
EXT BILIRUBIN TOTAL: ABNORMAL
EXT BK VIRUS DNA QN PCR: ABNORMAL
EXT BK VIRUS DNA QUANT, PCR, URINE: ABNORMAL
EXT BUN: ABNORMAL
EXT C PEPTIDE: ABNORMAL
EXT CALCIUM: ABNORMAL
EXT CHLORIDE: ABNORMAL
EXT CHOLESTEROL (LIPID PANEL): ABNORMAL
EXT CHOLESTEROL: ABNORMAL
EXT CMV DNA QUANT. BY PCR: ABNORMAL
EXT CO2: ABNORMAL
EXT CREATININE: ABNORMAL
EXT CYCLOSPORINE LVL: ABNORMAL
EXT EBV DNA BY PCR: ABNORMAL
EXT EBV DNA-COPIES/ML: ABNORMAL
EXT EOSINOPHIL%: 8 (ref 0–?)
EXT FERRITIN: ABNORMAL
EXT GFR MDRD AF AMER: ABNORMAL
EXT GFR MDRD NON AF AMER: ABNORMAL
EXT GLUCOSE UA: ABNORMAL
EXT GLUCOSE: ABNORMAL
EXT HBV DNA QUANT PCR: ABNORMAL
EXT HCV QUANT: ABNORMAL
EXT HDL: ABNORMAL
EXT HEMATOCRIT: 58.8 (ref 42–52)
EXT HEMOGLOBIN A1C: ABNORMAL
EXT HEMOGLOBIN: 11.9 (ref 14–15.5)
EXT HEP B S AG: ABNORMAL
EXT HIV RNA QUANT PCR: ABNORMAL
EXT HIV: ABNORMAL
EXT IMMUNKNOW (STIMULATED): ABNORMAL
EXT INR: ABNORMAL
EXT IRON SATURATION: ABNORMAL
EXT LDH, TOTAL: ABNORMAL
EXT LDL CHOLESTEROL: ABNORMAL
EXT LIPASE: ABNORMAL
EXT LYMPH%: 12 (ref 20–33)
EXT MAGNESIUM: ABNORMAL
EXT MONOCYTES%: 34 (ref 2–5)
EXT NITRITES UA: ABNORMAL
EXT PHOSPHORUS: ABNORMAL
EXT PLATELETS: 270 (ref 150–400)
EXT POTASSIUM: ABNORMAL
EXT PROT/CREAT RATIO UR: ABNORMAL
EXT PROTEIN TOTAL: ABNORMAL
EXT PROTEIN UA: ABNORMAL
EXT PT: ABNORMAL
EXT PTH, INTACT: ABNORMAL
EXT RBC UA: ABNORMAL
EXT SARS COV-2 (COVID-19): ABNORMAL
EXT SEGS%: 46 (ref 60–70)
EXT SERUM IRON: ABNORMAL
EXT SIROLIMUS LVL: ABNORMAL
EXT SODIUM: ABNORMAL
EXT STOOL CDIFF: ABNORMAL
EXT STOOL CMV: ABNORMAL
EXT STOOL CULTURE: ABNORMAL
EXT STOOL OCP: ABNORMAL
EXT TACROLIMUS LVL: ABNORMAL
EXT TIBC: ABNORMAL
EXT TRIGLYCERIDES: ABNORMAL
EXT UNSATURATED IRON BINDING CAP.: ABNORMAL
EXT URIC ACID: ABNORMAL
EXT URINE CULTURE: ABNORMAL
EXT VIT D 25 HYDROXY: ABNORMAL
EXT WBC UA: ABNORMAL
EXT WBC: 2.8 (ref 4.8–10.8)

## 2022-02-22 ENCOUNTER — TELEPHONE (OUTPATIENT)
Dept: TRANSPLANT | Facility: CLINIC | Age: 37
End: 2022-02-22
Payer: MEDICARE

## 2022-02-22 ENCOUNTER — DOCUMENTATION ONLY (OUTPATIENT)
Dept: TRANSPLANT | Facility: CLINIC | Age: 37
End: 2022-02-22
Payer: MEDICARE

## 2022-02-22 LAB — EXT CMV DNA QUANT. BY PCR: ABNORMAL

## 2022-02-23 ENCOUNTER — TELEPHONE (OUTPATIENT)
Dept: TRANSPLANT | Facility: CLINIC | Age: 37
End: 2022-02-23
Payer: MEDICARE

## 2022-02-24 ENCOUNTER — DOCUMENTATION ONLY (OUTPATIENT)
Dept: TRANSPLANT | Facility: CLINIC | Age: 37
End: 2022-02-24
Payer: MEDICARE

## 2022-03-09 ENCOUNTER — TELEPHONE (OUTPATIENT)
Dept: TRANSPLANT | Facility: CLINIC | Age: 37
End: 2022-03-09
Payer: MEDICARE

## 2022-04-13 ENCOUNTER — TELEPHONE (OUTPATIENT)
Dept: TRANSPLANT | Facility: CLINIC | Age: 37
End: 2022-04-13
Payer: MEDICARE

## 2022-04-13 LAB
EXT BACTERIA UA: NORMAL
EXT BK VIRUS DNA QN PCR: NEGATIVE
EXT BUN: 14
EXT CALCIUM: 11
EXT CHLORIDE: 104
EXT CO2: 26
EXT CREATININE: 1.7 MG/DL
EXT EOSINOPHIL%: 4
EXT GFR MDRD AF AMER: 55
EXT GLUCOSE UA: NEGATIVE
EXT GLUCOSE: 99
EXT HEMATOCRIT: 37.3
EXT HEMOGLOBIN: 11.4
EXT HEP B S AG: NORMAL
EXT LYMPH%: 15
EXT MAGNESIUM: 2.1
EXT MONOCYTES%: 25
EXT NITRITES UA: NEGATIVE
EXT PHOSPHORUS: 2.9
EXT PLATELETS: 245
EXT POTASSIUM: 3.7
EXT PROTEIN UA: NEGATIVE
EXT RBC UA: NORMAL
EXT SEGS%: 56
EXT SODIUM: 138 MMOL/L
EXT TACROLIMUS LVL: 1.7
EXT WBC UA: NORMAL
EXT WBC: 2.9

## 2022-04-14 DIAGNOSIS — Z94.0 KIDNEY REPLACED BY TRANSPLANT: Primary | ICD-10-CM

## 2022-04-14 RX ORDER — TACROLIMUS 1 MG/1
4 CAPSULE ORAL EVERY 12 HOURS
Qty: 240 CAPSULE | Refills: 11 | Status: ON HOLD | OUTPATIENT
Start: 2022-04-14 | End: 2023-04-02 | Stop reason: SDUPTHER

## 2022-04-14 NOTE — TELEPHONE ENCOUNTER
Spoke to pt, reviewed labs and below instructions, pt verbalized understanding and agreed. Will increase prograf dose to 4/4 and will repeat labs on Monday 4/18/22.  Lab order faxed to [facility] lab and update prograf prescription sent to [facility].    ----- Message from Leena Castillo MD sent at 4/14/2022  3:13 PM CDT -----  Okay, if tac level remains pending from 4/8 till before you leave today , please go up on tac to 4 mg BID and get the result on early Monday for further adjustment   ----- Message -----  From: Cindy Harris RN  Sent: 4/14/2022   3:12 PM CDT  To: Leena Castillo MD    Dose has not been adjusted yet, just got those results.  Hoping to get results from 4/8/22 to you asap, I just called  lab again, she's checking and will call me right back    ----- Message -----  From: Leena Castillo MD  Sent: 4/14/2022   3:02 PM CDT  To: Cindy Harris RN    Gotcha, did we adjust the dose for level of 1.7 ?   ----- Message -----  From: Cindy Harris RN  Sent: 4/14/2022   3:01 PM CDT  To: Leena Castillo MD    He went on 4/8/22, been trying to get the results all week, still waiting  ----- Message -----  From: Leena Castillo MD  Sent: 4/14/2022   2:53 PM CDT  To: Cindy Harris RN    That is from a month ago, right? Any lab after this result?  ----- Message -----  From: Cindy Harris RN  Sent: 4/14/2022   2:47 PM CDT  To: Leena Castillo MD    Unfortunately yes, that was not a typo  ----- Message -----  From: Leena Castillo MD  Sent: 4/14/2022   2:27 PM CDT  To: [facility] Post-Kidney Transplant Clinical    Tac level is 1.7?

## 2022-04-14 NOTE — TELEPHONE ENCOUNTER
----- Message from Aura Solano sent at 4/14/2022  2:05 PM CDT -----  Regarding: FW: return call    ----- Message -----  From: Lashaun Deutsch  Sent: 4/13/2022   3:22 PM CDT  To: [facility] Post-Kidney Transplant Clinical  Subject: return call                                      Patient return call. Requesting call back after 4 or 5:00PM.    Call: [phone] (Mobile

## 2022-04-18 ENCOUNTER — TELEPHONE (OUTPATIENT)
Dept: TRANSPLANT | Facility: CLINIC | Age: 37
End: 2022-04-18
Payer: MEDICARE

## 2022-04-18 NOTE — TELEPHONE ENCOUNTER
----- Message from Cata West RN sent at 4/18/2022  2:08 PM CDT -----  Regarding: FW: return call    ----- Message -----  From: Lashaun Deutsch  Sent: 4/18/2022   1:05 PM CDT  To: Flako Holguin V Staff  Subject: return call                                      Patient returning call. Patient states the call may have been from Chelsie Arriola NP. Requesting a call back.    Call: [phone] (Mobile

## 2022-04-20 ENCOUNTER — TELEPHONE (OUTPATIENT)
Dept: TRANSPLANT | Facility: CLINIC | Age: 37
End: 2022-04-20
Payer: MEDICARE

## 2022-04-20 LAB
EXT ALBUMIN: 4.5
EXT ALKALINE PHOSPHATASE: 87
EXT ALT: 14
EXT ANC: 5.1
EXT AST: 14
EXT BACTERIA UA: NORMAL
EXT BILIRUBIN DIRECT: 0.1 MG/DL
EXT BILIRUBIN TOTAL: 0.5
EXT BUN: 18
EXT CALCIUM: 9.9
EXT CHLORIDE: 106
EXT CO2: 27
EXT CREATININE: 1.74 MG/DL
EXT EOSINOPHIL%: 2.2
EXT GFR MDRD AF AMER: 53
EXT GLUCOSE UA: NEGATIVE
EXT GLUCOSE: 86
EXT HEMATOCRIT: 38.3
EXT HEMOGLOBIN: 11.7
EXT LYMPH%: 7.8
EXT MAGNESIUM: 1.8
EXT MONOCYTES%: 5.9
EXT NITRITES UA: NEGATIV E
EXT PHOSPHORUS: 1.7
EXT PLATELETS: 227
EXT POTASSIUM: 4
EXT PROT/CREAT RATIO UR: 0.1
EXT PROTEIN TOTAL: 7
EXT PROTEIN UA: NEGATIVE
EXT RBC UA: NORMAL
EXT SODIUM: 140 MMOL/L
EXT TACROLIMUS LVL: 5.2
EXT URINE CULTURE: NORMAL
EXT WBC UA: NORMAL
EXT WBC: 6.1

## 2022-04-20 NOTE — TELEPHONE ENCOUNTER
I did not call pt but I returned call, unable to leave voicemail message.    ----- Message from Eugenio Hercules sent at 4/20/2022 12:24 PM CDT -----  Regarding: speak to nurse  Contact: Jefferson  Patient is calling to speak to nurse , pt states his brother informed him of a missed call to his number. Calling to see if it may have been his nurse. Patient is currently on his lunch break so may have to call back if he so happens to miss the call from nurse.    Contact: [phone]

## 2022-04-20 NOTE — TELEPHONE ENCOUNTER
----- Message from Sixto Potter sent at 4/20/2022  2:45 PM CDT -----  Pt returning call and will be available after 4:00 pm    Call : [phone]

## 2022-04-21 DIAGNOSIS — Z94.9 HYPERTENSION ASSOCIATED WITH TRANSPLANTATION: Primary | ICD-10-CM

## 2022-04-21 DIAGNOSIS — I15.8 HYPERTENSION ASSOCIATED WITH TRANSPLANTATION: Primary | ICD-10-CM

## 2022-04-21 DIAGNOSIS — Z94.0 KIDNEY REPLACED BY TRANSPLANT: ICD-10-CM

## 2022-04-21 NOTE — TELEPHONE ENCOUNTER
----- Message from Cata West RN sent at 4/21/2022  2:57 PM CDT -----  Regarding: FW: Rx    ----- Message -----  From: Sixto Potter  Sent: 4/21/2022   2:45 PM CDT  To: Rod MALDONADO Staff  Subject: Rx                                               Pt calling to get a Rx refill for minoxidiL (LONITEN) 10 MG Tab    [facility] - [address] [address]  Phone: [phone] Fax: [phone]

## 2022-04-22 RX ORDER — MINOXIDIL 2.5 MG/1
2.5 TABLET ORAL DAILY
Qty: 30 TABLET | Refills: 11 | Status: ON HOLD | OUTPATIENT
Start: 2022-04-22 | End: 2023-03-31 | Stop reason: SDUPTHER

## 2022-04-22 NOTE — TELEPHONE ENCOUNTER
Spoke to pt, confirmed that prescription has been sent to [facility] as requested, Minoxidil 2.5mg daily.----- Message from Eugenio Hercules sent at 4/21/2022  3:04 PM CDT -----  Regarding: Medication  Contact: Jefferson  Patient is calling to inquire about his kidney txp doctor writing a RX for his current blood pressure medication he's taking.     Contact: [phone]

## 2022-04-29 NOTE — PROGRESS NOTES
Kidney Post-Transplant Assessment    Referring Physician: Ralf Crouch  Current Nephrologist: Ralf Crouch    ORGAN: LEFT KIDNEY  Donor Type: donation after brain death  PHS Increased Risk: no  Cold Ischemia: 1,175 mins  Induction Medications: thymoglobulin    Subjective:     CC:  Reassessment of renal allograft function and management of chronic immunosuppression.    HPI:  Mr. Trevizo is a 37 y.o. 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 His most recent creatinine is1.68 . He takes mycophenolic acid, prednisone and tacrolimus for maintenance immunosuppression. His post transplant course has been complicated by neutropenia.  IMMUNOSUPPRESSION: Tacrolimus/Mycophenolate/Prednisone  PCP PROPHYLAXIS: Bactrim until 4/17/22  CMV PROPHYLAXIS: Valcyte until 1/17/22  FUNGAL PROPHYLAXIS: Posaconazole until 11/17/21      Interval HX:  Reports doing well. Has no complaints.   Denies any s/s illness. Denies pain over the allograft, swelling, fever, chills.      fx assessment Overall feels well. No health concerns today.   Denies chest pain, SOB, leg pain, abdominal pain or LUTs.    Lab /diagnostic results reviewed with patient today.   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   HENT: Positive for hearing loss. Negative for congestion, facial swelling, postnasal drip, rhinorrhea, sinus pressure, sore throat and trouble swallowing.    Eyes:  "Negative for pain, redness and visual disturbance.   Respiratory: Negative for cough, chest tightness, shortness of breath and wheezing.    Cardiovascular: Negative.  Negative for chest pain, palpitations and leg swelling.   Gastrointestinal: Negative for abdominal pain, constipation, diarrhea, nausea and vomiting.   Genitourinary: Negative for dysuria, flank pain and urgency.   Musculoskeletal: Negative for gait problem, neck pain and neck stiffness.   Skin: Negative for rash.   Allergic/Immunologic: Positive for immunocompromised state. Negative for environmental allergies and food allergies.   Neurological: Negative for dizziness, weakness, light-headedness and headaches.   Psychiatric/Behavioral: Negative for agitation and confusion. The patient is not nervous/anxious.        Objective:   Blood pressure 123/76, pulse 75, temperature 97.7 °F (36.5 °C), temperature source Temporal, resp. rate 16, height 5' 10" (1.778 m), weight 72.3 kg (159 lb 6.3 oz), SpO2 99 %.body mass index is 22.87 kg/m².    Physical Exam  Vitals reviewed.   Constitutional:       Appearance: Normal appearance. He is well-developed.   HENT:      Head: Normocephalic.   Eyes:      Pupils: Pupils are equal, round, and reactive to light.   Cardiovascular:      Rate and Rhythm: Normal rate and regular rhythm.      Heart sounds: Normal heart sounds.   Pulmonary:      Effort: Pulmonary effort is normal.      Breath sounds: Normal breath sounds.   Abdominal:      General: Bowel sounds are normal.      Palpations: Abdomen is soft.   Musculoskeletal:         General: Normal range of motion.      Cervical back: Normal range of motion and neck supple.   Skin:     General: Skin is warm and dry.   Neurological:      Mental Status: He is alert and oriented to person, place, and time.      Motor: No abnormal muscle tone.   Psychiatric:         Behavior: Behavior normal.         Labs:  Lab Results   Component Value Date    WBC 3.17 (L) 01/26/2022    HGB 12.3 " (L) 01/26/2022    HCT 41.3 01/26/2022     12/14/2021    K 4.4 12/14/2021     12/14/2021    CO2 24 12/14/2021    BUN 7 12/14/2021    CREATININE 1.7 (H) 12/14/2021    EGFRNONAA 50.0 (A) 12/14/2021    CALCIUM 9.8 12/14/2021    PHOS 2.5 (L) 11/22/2021    MG 1.8 12/14/2021    ALBUMIN 4.2 12/14/2021    AST 10 12/14/2021    ALT 8 (L) 12/14/2021    UTPCR Unable to calculate 12/14/2021    .7 (H) 10/18/2021    TACROLIMUS 4.9 (L) 12/14/2021       Lab Results   Component Value Date    EXTANC 5.1 04/18/2022    EXTWBC 6.1 04/18/2022    EXTSEGS 56 03/05/2022    EXTPLATELETS 227 04/18/2022    EXTHEMOGLOBI 11.7 04/18/2022    EXTHEMATOCRI 38.3 04/18/2022    EXTCREATININ 1.74 04/18/2022    EXTSODIUM 140 04/18/2022    EXTPOTASSIUM 4.0 04/18/2022    EXTBUN 18 04/18/2022    EXTCO2 27 04/18/2022    EXTCALCIUM 9.9 04/18/2022    EXTPHOSPHORU 1.7 04/18/2022    EXTGLUCOSE 86 04/18/2022    EXTALBUMIN 4.5 04/18/2022    EXTAST 14 04/18/2022    EXTALT 14 04/18/2022    EXTBILITOTAL 0.5 04/18/2022       Lab Results   Component Value Date    EXTTACROLVL 5.2 04/18/2022    EXTPROTCRE 0.10 04/18/2022    EXTPTHINTACT 246.8 (A) 01/13/2022    EXTPROTEINUA negative 04/18/2022    EXTWBCUA none seen 04/18/2022    EXTRBCUA none seen 04/18/2022       Labs were reviewed with the patient    Assessment:     1. S/P kidney transplant 10/19/21    2. Stage 3a chronic kidney disease    3. Long-term use of immunosuppressant medication    4. Renovascular hypertension    5. Hx of seizure disorder        Plan:     Completed atovaquone--d/c'd  Neutropenia resolved increase MYF 360mg BID  Just received allosure kit at home. With next labs allosure to be drawn.       Follow-up:   1. CKD stage: 3a  2. Immunosuppression:   Prograf trough 5.2,   therapeutic target 8-10. Continue  Prograf  4/4, MyF 180 Mg BID  , and Prednisone.,. Will continue to monitor for drug toxicities    3. Allograft Function: Stable. Continue good po hydration.      4/18/2022  5mo 4wk    EXT BUN 18   EXT Creatinine mg/dL 1.74   EXT GFR MDRD AF AMER 53   EXT Glucose 86       4. Hypertension management: advise low salt diet and home BP monitoring    ASA, Nifedipine, minoxidil, coreg      5. Metabolic Bone Disease/Secondary Hyperparathyroidism:stable  Will monitor PTH, CA and Vit D/guidelines,   KPN, Mg ox     4/18/2022  5mo 4wk   EXT Calcium 9.9   EXT Phosphorus 1.7   EXT Magnesium 1.8          6. Electrolytes:  Will monitor /guidelines     4/18/2022  5mo 4wk   EXT Sodium mmol/L 140   EXT Potassium 4.0   EXT Chloride 106   EXT CO2 27       7. Anemia: stable. No need for intervention    Will monitor /guidelines  1/24/2022 4/18/2022  5mo 4wk   EXT WBC 6.1   EXT ANC 5.1   EXT Hemoglobin 11.7   EXT Hematocrit 38.3   EXT Platelets 227       8.  Cytopenias: no significant cytopenias will monitor as per our guidelines. Medicine list reviewed including potential causes of drug-induced cytopenias  History of Neupogen use    9.Proteinuria: continue p/c ratio as per guidelines     4/18/2022  5mo 4wk   EXT PROT/CREAT Ratio UR 0.10     10. BK virus infection screening:  will continue to monitor/ guidelines      3/5/2022  4mo 2wk   EXT BK Virus DNA QN PCR negative         11. Weight education: provided during the clinic visit   Body mass index is 22.87 kg/m².       12.Patient safety education regarding immunosuppression including prophylaxis posttransplant for CMV, PCP : Education provided about vaccination and prevention of infections       Follow-up:   Clinic: return to transplant clinic weekly for the first month after transplant; every 2 weeks during months 2-3; then at 6-, 9-, 12-, 18-, 24-, and 36- months post-transplant to reassess for complications from immunosuppression toxicity and monitor for rejection.  Annually thereafter.    Labs: since patient remains at high risk for rejection and drug-related complications that warrant close monitoring, labs will be ordered as follows: continue twice weekly  CBC, renal panel, and drug level for first month; then same labs once weekly through 3rd month post-transplant.  Urine for UA and protein/creatinine ratio monthly.  Serum BK - PCR at 1-, 3-, 6-, 9-, 12-, 18-, 24-, 36-, 48-, and 60 months post-transplant.  Hepatic panel at 1-, 2-, 3-, 6-, 9-, 12-, 18-, 24-, and 36- months post-transplant.    Chelsie Arriola NP       Education:   Material provided to the patient.  Patient reminded to call with any health changes since these can be early signs of significant complications.  Also, I advised the patient to be sure any new medications or changes of old medications are discussed with either a pharmacist or physician knowledgeable with transplant to avoid rejection/drug toxicity related to significant drug interactions.    Patient advised that it is recommended that all transplanted patients, and their close contacts and household members receive Covid vaccination.

## 2022-05-02 ENCOUNTER — OFFICE VISIT (OUTPATIENT)
Dept: TRANSPLANT | Facility: CLINIC | Age: 37
End: 2022-05-02
Payer: MEDICARE

## 2022-05-02 VITALS
RESPIRATION RATE: 16 BRPM | SYSTOLIC BLOOD PRESSURE: 123 MMHG | DIASTOLIC BLOOD PRESSURE: 76 MMHG | OXYGEN SATURATION: 99 % | HEIGHT: 70 IN | WEIGHT: 159.38 LBS | BODY MASS INDEX: 22.82 KG/M2 | TEMPERATURE: 98 F | HEART RATE: 75 BPM

## 2022-05-02 DIAGNOSIS — N18.31 STAGE 3A CHRONIC KIDNEY DISEASE: ICD-10-CM

## 2022-05-02 DIAGNOSIS — Z94.0 S/P KIDNEY TRANSPLANT: Primary | ICD-10-CM

## 2022-05-02 DIAGNOSIS — I15.0 RENOVASCULAR HYPERTENSION: ICD-10-CM

## 2022-05-02 DIAGNOSIS — Z79.60 LONG-TERM USE OF IMMUNOSUPPRESSANT MEDICATION: ICD-10-CM

## 2022-05-02 DIAGNOSIS — Z94.0 KIDNEY REPLACED BY TRANSPLANT: ICD-10-CM

## 2022-05-02 DIAGNOSIS — Z86.69 HX OF SEIZURE DISORDER: Chronic | ICD-10-CM

## 2022-05-02 PROCEDURE — 99999 PR PBB SHADOW E&M-EST. PATIENT-LVL III: ICD-10-PCS | Mod: PBBFAC,,, | Performed by: NURSE PRACTITIONER

## 2022-05-02 PROCEDURE — 99213 OFFICE O/P EST LOW 20 MIN: CPT | Mod: PBBFAC | Performed by: NURSE PRACTITIONER

## 2022-05-02 PROCEDURE — 99215 OFFICE O/P EST HI 40 MIN: CPT | Mod: S$PBB,,, | Performed by: NURSE PRACTITIONER

## 2022-05-02 PROCEDURE — 99215 PR OFFICE/OUTPT VISIT, EST, LEVL V, 40-54 MIN: ICD-10-PCS | Mod: S$PBB,,, | Performed by: NURSE PRACTITIONER

## 2022-05-02 PROCEDURE — 99999 PR PBB SHADOW E&M-EST. PATIENT-LVL III: CPT | Mod: PBBFAC,,, | Performed by: NURSE PRACTITIONER

## 2022-05-02 RX ORDER — MYCOPHENOLIC ACID 180 MG/1
360 TABLET, DELAYED RELEASE ORAL 2 TIMES DAILY
Qty: 120 TABLET | Refills: 11 | Status: ON HOLD | OUTPATIENT
Start: 2022-05-02 | End: 2023-03-31 | Stop reason: HOSPADM

## 2022-05-02 NOTE — LETTER
May 2, 2022        Ralf Crouch  [address]  [facility] [place] [address]  Phone: [phone]  Fax: [phone]     Jay Velasco  [address]  [facility] [address]  Phone: [phone]  Fax: [phone]             [facility]  [address]  [facility] [address]  Phone: [phone]   Patient: Feliz Trevizo   MR Number: [id number]   YOB: 1985   Date of Visit: 5/2/2022       Dear Dr. Ralf Crouch, Jay Velasco    Thank you for referring Feliz Trevizo to me for evaluation. Attached you will find relevant portions of my assessment and plan of care.    If you have questions, please do not hesitate to call me. I look forward to following Feliz Trevizo along with you.    Sincerely,    Chelsie Arriola NP    Enclosure    If you would like to receive this communication electronically, please contact [email or web address] or [phone] to request [employer] access.    [facility] [employer] is a tool which provides read-only access to select patient information with whom you have a relationship. Its easy to use and provides real time access to review your patients record including encounter summaries, notes, results, and demographic information.    If you feel you have received this communication in error or would no longer like to receive these types of communications, please e-mail [email or web address]

## 2022-05-11 ENCOUNTER — PATIENT MESSAGE (OUTPATIENT)
Dept: RESEARCH | Facility: CLINIC | Age: 37
End: 2022-05-11
Payer: MEDICARE

## 2022-05-13 ENCOUNTER — TELEPHONE (OUTPATIENT)
Dept: TRANSPLANT | Facility: CLINIC | Age: 37
End: 2022-05-13
Payer: MEDICARE

## 2022-05-16 ENCOUNTER — TELEPHONE (OUTPATIENT)
Dept: TRANSPLANT | Facility: CLINIC | Age: 37
End: 2022-05-16
Payer: MEDICARE

## 2022-05-16 NOTE — TELEPHONE ENCOUNTER
Left message for pt requesting return call to schedule monthly labs.    ----- Message from Eugenio Hercules sent at 5/16/2022 11:48 AM CDT -----  Regarding: returning call  Contact: [place]  Patient states he is returning missed call from kidney txp team, checking to see if it may have been his nurse.    Contact: [phone]

## 2022-06-03 ENCOUNTER — TELEPHONE (OUTPATIENT)
Dept: TRANSPLANT | Facility: CLINIC | Age: 37
End: 2022-06-03
Payer: MEDICARE

## 2022-06-03 LAB
EXT ALBUMIN: 4.5
EXT ALKALINE PHOSPHATASE: 79
EXT ALT: 8
EXT ANC: 3.4
EXT AST: 10
EXT BACTERIA UA: ABNORMAL
EXT BILIRUBIN DIRECT: 0.1 MG/DL
EXT BILIRUBIN TOTAL: 0.5
EXT BK VIRUS DNA QN PCR: 115
EXT BUN: 18
EXT CHLORIDE: 104
EXT CO2: 29
EXT CREATININE: 2.03 MG/DL
EXT EOSINOPHIL%: 2.4
EXT GFR MDRD AF AMER: 44
EXT GLUCOSE UA: NEGATIVE
EXT GLUCOSE: 175
EXT HEMATOCRIT: 38.9
EXT HEMOGLOBIN: 11.8
EXT LYMPH%: 0.9
EXT MAGNESIUM: 1.4
EXT MONOCYTES%: 6.6
EXT NITRITES UA: NEGATIVE
EXT PHOSPHORUS: 2.2
EXT PLATELETS: 245
EXT POTASSIUM: 3.5
EXT PROTEIN TOTAL: 7.3
EXT PROTEIN TOTAL: 7.3
EXT PROTEIN UA: NEGATIVE
EXT RBC UA: ABNORMAL
EXT SODIUM: 138 MMOL/L
EXT TACROLIMUS LVL: 9.6
EXT URINE CULTURE: ABNORMAL
EXT WBC UA: ABNORMAL
EXT WBC: 4.7

## 2022-06-06 ENCOUNTER — TELEPHONE (OUTPATIENT)
Dept: TRANSPLANT | Facility: CLINIC | Age: 37
End: 2022-06-06
Payer: MEDICARE

## 2022-06-06 LAB
EXT ALLOSURE: 0.22
EXT ALLOSURE: <0.12
EXT PROTEIN UA: 0.3

## 2022-06-06 NOTE — TELEPHONE ENCOUNTER
Allosure results for the past 3 months have been resulted but did not automatically roll into epic as per usual, entered feb, April, may results, trending upward.    Received prograf level and BK result, reviewed and corrected all other results.   Called pt, no answer, left voicemail message requesting return call to review lab results     ----- Message from Jay Velasco MD sent at 6/6/2022  9:56 AM CDT -----  Please verify this report,  clearly there are several mistakes with phosphorus glucose reports. Verify creatinine value is actually correct  If the creatinine is correct speak with patient about acute illness hydration oral intake. Please Call the lab and get the tacro level, this blood work was done 6 days ago. Let me know the tacrolimus level to decide if we need to consider a kidney biopsy, he is not too distant from his baseline but at the higher end of baseline

## 2022-06-06 NOTE — PROGRESS NOTES
Please verify this report,  clearly there are several mistakes with phosphorus glucose reports. Verify creatinine value is actually correct  If the creatinine is correct speak with patient about acute illness hydration oral intake. Please Call the lab and get the tacro level, this blood work was done 6 days ago. Let me know the tacrolimus level to decide if we need to consider a kidney biopsy, he is not too distant from his baseline but at the higher end of baseline

## 2022-06-07 ENCOUNTER — TELEPHONE (OUTPATIENT)
Dept: TRANSPLANT | Facility: CLINIC | Age: 37
End: 2022-06-07
Payer: MEDICARE

## 2022-06-07 NOTE — TELEPHONE ENCOUNTER
"Pt returned call, he states that he has been drinking water "when he is thirsty, probably not 2L daily but a few bottles each day"  Instructed him to increase water intake, should be drinking at least daily to keep the kidney flushed and the creatinine level down, Dr Velasco is asking to increase water intake to 3L if possible, pt states that he will drink more water, will try to get 3L daily but states that 3L is A LOT.  He will repeat labs Friday at [facility] outpt lab, orders faxed.      6/7/22- left voicemail message with below instructions and request to return call to confirm.    ----- Message from Jay Velasco MD sent at 6/7/2022  8:25 AM CDT -----  Encourage hydration 3 lt/day  Serum BK per protocol  Repeat labs Friday great if e we can get the creatinine same day       "

## 2022-06-07 NOTE — PROGRESS NOTES
Encourage hydration 3 lt/day  Serum BK per protocol  Repeat labs Friday great if e we can get the creatinine same day

## 2022-06-13 ENCOUNTER — TELEPHONE (OUTPATIENT)
Dept: TRANSPLANT | Facility: CLINIC | Age: 37
End: 2022-06-13
Payer: MEDICARE

## 2022-06-13 NOTE — TELEPHONE ENCOUNTER
Spoke to pt, he stated that he was on his way to get labs drawn (at 11:45)  I reminded him that we need to get 12hr prograf level, asked him what time he took his prograf last night, he stated that he took it 8am just like always.  Explained the 12hr trough level instructions Again- he confims that he will take his prograf at 8pm and get to lab for 8am tomorrow, and then will take prograf.  Pt voices no complaints at present time.    ----- Message from Lashaun Deutsch sent at 6/13/2022 12:40 PM CDT -----  Regarding: blood work  Patient calling to speak to staff regarding blood work. Requesting a call back.    Call: [phone] (Mobile

## 2022-06-15 ENCOUNTER — TELEPHONE (OUTPATIENT)
Dept: TRANSPLANT | Facility: CLINIC | Age: 37
End: 2022-06-15
Payer: MEDICARE

## 2022-06-15 LAB
EXT ALBUMIN: 4.2
EXT BK VIRUS DNA QN PCR: 1591
EXT BUN: 26
EXT CALCIUM: 9.1
EXT CHLORIDE: 105
EXT CO2: 26
EXT CREATININE: 2.06 MG/DL
EXT EOSINOPHIL%: 2.5
EXT GFR MDRD AF AMER: 44
EXT GFR MDRD NON AF AMER: 44
EXT GLUCOSE: 122
EXT HEMATOCRIT: 36.5
EXT HEMOGLOBIN: 11.2
EXT LYMPH%: 21.6
EXT MAGNESIUM: 1.4
EXT MONOCYTES%: 8.3
EXT PHOSPHORUS: 2.9
EXT PLATELETS: 244
EXT POTASSIUM: 3.5
EXT PROT/CREAT RATIO UR: 0.1
EXT SEGS%: 67
EXT SODIUM: 138 MMOL/L
EXT TACROLIMUS LVL: 8.6
EXT WBC: 4.7

## 2022-06-17 ENCOUNTER — TELEPHONE (OUTPATIENT)
Dept: TRANSPLANT | Facility: CLINIC | Age: 37
End: 2022-06-17
Payer: MEDICARE

## 2022-06-17 NOTE — TELEPHONE ENCOUNTER
Repeat DSA and allosure now and monthly x 3 months   Hydrate   Plan for a kidney biopsy in 2 weeks  Repeat labs next week before we perform biopsy  Repeat serum BK per protocol, he was positive 4 weeks ago, he is due for a repeat serum BK pcr

## 2022-06-20 DIAGNOSIS — Z94.0 KIDNEY REPLACED BY TRANSPLANT: Primary | ICD-10-CM

## 2022-06-20 DIAGNOSIS — R79.89 ELEVATED SERUM CREATININE: ICD-10-CM

## 2022-06-22 ENCOUNTER — TELEPHONE (OUTPATIENT)
Dept: TRANSPLANT | Facility: CLINIC | Age: 37
End: 2022-06-22
Payer: MEDICARE

## 2022-06-22 NOTE — TELEPHONE ENCOUNTER
----- Message from Jay Velasco MD sent at 6/22/2022  3:00 PM CDT -----  Please repeat serum BK pcr next week (which will be 2 weeks after the last BK) great if we can have a result soon so I can decide if further reduction of IS is indicated.   Please repeat allosure now and monthly x 3 months.

## 2022-06-22 NOTE — PROGRESS NOTES
Please repeat serum BK pcr next week (which will be 2 weeks after the last BK) great if we can have a result soon so I can decide if further reduction of IS is indicated.   Please repeat allosure now and monthly x 3 months.

## 2022-06-28 ENCOUNTER — TELEPHONE (OUTPATIENT)
Dept: TRANSPLANT | Facility: CLINIC | Age: 37
End: 2022-06-28
Payer: MEDICARE

## 2022-06-28 DIAGNOSIS — R79.89 ELEVATED SERUM CREATININE: ICD-10-CM

## 2022-06-28 DIAGNOSIS — Z94.0 KIDNEY REPLACED BY TRANSPLANT: Primary | ICD-10-CM

## 2022-06-28 NOTE — TELEPHONE ENCOUNTER
----- Message from Eugenio Hercules sent at 6/28/2022  1:35 PM CDT -----  Regarding: speak to nurse  Contact: Marly Castillo [facility] is calling to speak to nurse in regard to lab orders. Patient is currently in the lab , they are confused on what to draw on patient being that orders were faxed to them but patient also brought in a box of tubes. Patient is also unsure.     Contact: [phone]

## 2022-06-29 LAB
EXT ALLOSURE: <0.12
EXT ANC: 2.4
EXT BK VIRUS DNA QN PCR: NEGATIVE
EXT BUN: 14
EXT CALCIUM: 9.8
EXT CHLORIDE: 107
EXT CO2: 27
EXT CREATININE: 2.04 MG/DL
EXT EOSINOPHIL%: 2.4
EXT GFR MDRD AF AMER: 44
EXT GLUCOSE: 50
EXT HEMATOCRIT: 38
EXT HEMOGLOBIN: 11.6
EXT LYMPH%: 18.4
EXT MAGNESIUM: 1.4
EXT MONOCYTES%: 11.3
EXT PHOSPHORUS: 2.5
EXT PLATELETS: 224
EXT POTASSIUM: 3.7
EXT SEGS%: 67.4
EXT SODIUM: 141 MMOL/L
EXT TACROLIMUS LVL: 6.3
EXT WBC: 3.6

## 2022-06-29 PROCEDURE — 86977 RBC SERUM PRETX INCUBJ/INHIB: CPT | Mod: PO | Performed by: INTERNAL MEDICINE

## 2022-06-29 PROCEDURE — 86833 HLA CLASS II HIGH DEFIN QUAL: CPT | Mod: PO | Performed by: INTERNAL MEDICINE

## 2022-06-29 PROCEDURE — 86832 HLA CLASS I HIGH DEFIN QUAL: CPT | Mod: PO | Performed by: INTERNAL MEDICINE

## 2022-06-30 ENCOUNTER — LAB VISIT (OUTPATIENT)
Dept: LAB | Facility: HOSPITAL | Age: 37
End: 2022-06-30
Payer: MEDICARE

## 2022-06-30 DIAGNOSIS — Z94.0 KIDNEY REPLACED BY TRANSPLANT: Primary | ICD-10-CM

## 2022-06-30 DIAGNOSIS — R79.89 ELEVATED SERUM CREATININE: ICD-10-CM

## 2022-06-30 DIAGNOSIS — Z94.0 KIDNEY REPLACED BY TRANSPLANT: ICD-10-CM

## 2022-07-01 ENCOUNTER — TELEPHONE (OUTPATIENT)
Dept: TRANSPLANT | Facility: CLINIC | Age: 37
End: 2022-07-01
Payer: MEDICARE

## 2022-07-01 DIAGNOSIS — Z94.0 KIDNEY REPLACED BY TRANSPLANT: Primary | ICD-10-CM

## 2022-07-01 DIAGNOSIS — R79.89 ELEVATED SERUM CREATININE: ICD-10-CM

## 2022-07-01 NOTE — PROGRESS NOTES
Please follow up on the last allosure. His creatinine is a bit above baseline and we were considering a kidney biopsy. Review this with the staff nephrologist next week. Ill be off until July 21

## 2022-07-01 NOTE — TELEPHONE ENCOUNTER
----- Message from Jay Velasco MD sent at 7/1/2022 12:37 PM CDT -----  Please follow up on the last allosure. His creatinine is a bit above baseline and we were considering a kidney biopsy. Review this with the staff nephrologist next week. Ill be off until July 21

## 2022-07-04 LAB
CLASS I ANTIBODY COMMENTS - LUMINEX: NORMAL
CLASS II ANTIBODY COMMENTS - LUMINEX: NORMAL
DSA1 TESTING DATE: NORMAL
DSA12 TESTING DATE: NORMAL
DSA2 TESTING DATE: NORMAL
SERUM COLLECTION DT - LUMINEX CLASS I: NORMAL
SERUM COLLECTION DT - LUMINEX CLASS II: NORMAL

## 2022-07-07 DIAGNOSIS — Z94.0 KIDNEY REPLACED BY TRANSPLANT: Primary | ICD-10-CM

## 2022-07-07 DIAGNOSIS — R79.89 ELEVATED SERUM CREATININE: ICD-10-CM

## 2022-07-12 ENCOUNTER — HOSPITAL ENCOUNTER (EMERGENCY)
Facility: HOSPITAL | Age: 37
Discharge: HOME | End: 2022-07-12
Attending: EMERGENCY MEDICINE
Payer: MEDICARE

## 2022-07-12 VITALS
TEMPERATURE: 98 F | HEIGHT: 70 IN | SYSTOLIC BLOOD PRESSURE: 125 MMHG | HEART RATE: 82 BPM | OXYGEN SATURATION: 99 % | WEIGHT: 159 LBS | DIASTOLIC BLOOD PRESSURE: 74 MMHG | RESPIRATION RATE: 18 BRPM | BODY MASS INDEX: 22.76 KG/M2

## 2022-07-12 DIAGNOSIS — S39.91XA BLUNT ABDOMINAL TRAUMA, INITIAL ENCOUNTER: Primary | ICD-10-CM

## 2022-07-12 PROCEDURE — 99284 EMERGENCY DEPT VISIT MOD MDM: CPT | Mod: ,,, | Performed by: EMERGENCY MEDICINE

## 2022-07-12 PROCEDURE — 99281 EMR DPT VST MAYX REQ PHY/QHP: CPT

## 2022-07-12 PROCEDURE — 99284 PR EMERGENCY DEPT VISIT,LEVEL IV: ICD-10-PCS | Mod: ,,, | Performed by: EMERGENCY MEDICINE
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
## 2022-07-14 ENCOUNTER — NURSE TRIAGE (OUTPATIENT)
Dept: ADMINISTRATIVE | Facility: CLINIC | Age: 37
End: 2022-07-14
Payer: MEDICARE

## 2022-07-14 ENCOUNTER — TELEPHONE (OUTPATIENT)
Dept: TRANSPLANT | Facility: CLINIC | Age: 37
End: 2022-07-14

## 2022-07-14 NOTE — TELEPHONE ENCOUNTER
Kidney Transplant 10/2021. States that he cannot rememeber when he last took  procardia medication, and has ran out of meds. States current /89. Denies CP, SOB, and dizziness. States unable to   procardia medication at this time due to pharamacy being closed. Pt advised to call provider within 24 hours. States that he is going to ED now.    t ptReason for Disposition   Ran out of BP medications    Additional Information   Negative: Difficult to awaken or acting confused (e.g., disoriented, slurred speech)   Negative: SEVERE difficulty breathing (e.g., struggling for each breath, speaks in single words)   Negative: [1] Weakness of the face, arm or leg on one side of the body AND [2] new-onset   Negative: [1] Numbness (i.e., loss of sensation) of the face, arm or leg on one side of the body AND [2] new-onset   Negative: [1] Chest pain lasts > 5 minutes AND [2] history of heart disease  (i.e., heart attack, bypass surgery, angina, angioplasty, CHF)   Negative: [1] Chest pain AND [2] took nitrogylcerin AND [3] pain was not relieved   Negative: Sounds like a life-threatening emergency to the triager   Negative: [1] Systolic BP  >= 160 OR Diastolic >= 100 AND [2] cardiac or neurologic symptoms (e.g., chest pain, difficulty breathing, unsteady gait, blurred vision)   Negative: [1] Pregnant 20 or more weeks (or postpartum < 6 weeks) AND [2] new hand or face swelling   Negative: [1] Pregnant 20 or more weeks AND [2] Systolic BP  >= 140 OR Diastolic >= 90   Negative: [1] Systolic BP  >= 200 OR Diastolic >= 120  AND [2] having NO cardiac or neurologic symptoms   Negative: [1] Postpartum < 6 weeks AND [2] Systolic BP  >= 140 OR Diastolic >= 90   Negative: [1] Systolic BP  >= 180 OR Diastolic >= 110 AND [2] missed most recent dose of blood pressure medication   Negative: Systolic BP  >= 180 OR Diastolic >= 110    Protocols used: BLOOD PRESSURE - HIGH-A-
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
## 2022-07-15 NOTE — TELEPHONE ENCOUNTER
Feliz calling from [place], states he is a post kidney transplant patient just realized he hasn't been taking medication nifedipine and unsure how long he has not been taking this medication and wants to know if he is going to be ok. Feliz states he has no symptoms at this time and does not feel bad. See BP is 139/94. Pulse is 78. Advised per triage protocol to call PCP within next 24 hours. Informed a high priority message will be sent to transplant team during office hours. Instructed to contact on call nurse with new/worsening symptoms and or further questions. V/u.     Reason for Disposition   Ran out of BP medications    Additional Information   Negative: Difficult to awaken or acting confused  (e.g., disoriented, slurred speech)   Negative: Severe difficulty breathing (e.g., struggling for each breath, speaks in single words)   Negative: [1] Weakness of the face, arm or leg on one side of the body AND [2] new onset   Negative: [1] Numbness (i.e., loss of sensation) of the face, arm or leg on one side of the body AND [2] new onset   Negative: [1] Chest pain lasts > 5 minutes AND [2] history of heart disease  (i.e., heart attack, bypass surgery, angina, angioplasty, CHF)   Negative: [1] Chest pain AND [2] took nitrogylcerin AND [3] pain was not relieved   Negative: Sounds like a life-threatening emergency to the triager   Negative: Symptom is main concern  (e.g., headache, chest pain)   Negative: Low blood pressure is main concern   Negative: [1] BP  >= 160 / 100 AND [2] cardiac or neurologic symptoms    (e.g., chest pain, difficulty breathing, unsteady gait, blurred vision)   Negative: [1] Pregnant AND [2] new hand or face swelling   Negative: [1] Pregnant > 20 weeks AND [2] BP  >= 140/90   Negative: [1] BP  >= 200/120  AND [2] having NO cardiac or neurologic symptoms   Negative: [1] BP  >= 180/110 AND [2] missed most recent dose of blood pressure medication   Negative: BP  >= 180/110    Answer  "Assessment - Initial Assessment Questions  1. BLOOD PRESSURE: "What is the blood pressure?" "Did you take at least two measurements 5 minutes apart?"      /94 at 2000. /90  2. ONSET: "When did you take your blood pressure?"     2000 & 2008 tonight  3. HOW: "How did you obtain the blood pressure?" (e.g., visiting nurse, automatic home BP monitor)      Automatic home monitor   4. HISTORY: "Do you have a history of high blood pressure?"      Yes  5. MEDICATIONS: "Are you taking any medications for blood pressure?" "Have you missed any doses recently?"      Nifedipine, Minoxidil & Coreg. Has missed a dose but unsure when the dose was missed.  6. OTHER SYMPTOMS: "Do you have any symptoms?" (e.g., headache, chest pain, blurred vision, difficulty breathing, weakness)      Denies other symptoms at this time.  7. PREGNANCY: "Is there any chance you are pregnant?" "When was your last menstrual period?"      N/a    Protocols used: ST HIGH BLOOD PRESSURE-A-      "

## 2022-07-29 ENCOUNTER — TELEPHONE (OUTPATIENT)
Dept: INTERVENTIONAL RADIOLOGY/VASCULAR | Facility: HOSPITAL | Age: 37
End: 2022-07-29
Payer: MEDICARE

## 2022-08-01 ENCOUNTER — TELEPHONE (OUTPATIENT)
Dept: TRANSPLANT | Facility: CLINIC | Age: 37
End: 2022-08-01
Payer: MEDICARE

## 2022-08-01 ENCOUNTER — HOSPITAL ENCOUNTER (OUTPATIENT)
Dept: RADIOLOGY | Facility: HOSPITAL | Age: 37
Discharge: HOME | End: 2022-08-01
Attending: INTERNAL MEDICINE
Payer: MEDICARE

## 2022-08-01 DIAGNOSIS — Z94.0 KIDNEY REPLACED BY TRANSPLANT: Primary | ICD-10-CM

## 2022-08-01 DIAGNOSIS — Z94.0 KIDNEY REPLACED BY TRANSPLANT: ICD-10-CM

## 2022-08-01 DIAGNOSIS — R79.89 ELEVATED SERUM CREATININE: ICD-10-CM

## 2022-08-01 PROCEDURE — 76776 US TRANSPLANT KIDNEY WITH DOPPLER: ICD-10-PCS | Mod: 26,,, | Performed by: RADIOLOGY

## 2022-08-01 PROCEDURE — 76776 US EXAM K TRANSPL W/DOPPLER: CPT | Mod: TC

## 2022-08-01 PROCEDURE — 76776 US EXAM K TRANSPL W/DOPPLER: CPT | Mod: 26,,, | Performed by: RADIOLOGY

## 2022-08-16 ENCOUNTER — TELEPHONE (OUTPATIENT)
Dept: TRANSPLANT | Facility: CLINIC | Age: 37
End: 2022-08-16
Payer: MEDICARE
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
## 2022-08-24 ENCOUNTER — TELEPHONE (OUTPATIENT)
Dept: TRANSPLANT | Facility: CLINIC | Age: 37
End: 2022-08-24

## 2022-08-24 NOTE — TELEPHONE ENCOUNTER
----- Message -----   From: Bright Maxwell   Sent: 8/22/2022   3:38 PM CDT   To: [facility] Post-Kidney Transplant Clinical   Subject: rx refill                                         Pt call in regards to rx refill for mycophenolate (MYFORTIC) 180 MG TbEC pt states he needs it mail to his address requesting call back     [facility]     Call

## 2022-09-01 ENCOUNTER — TELEPHONE (OUTPATIENT)
Dept: TRANSPLANT | Facility: CLINIC | Age: 37
End: 2022-09-01
Payer: MEDICARE

## 2022-09-01 NOTE — TELEPHONE ENCOUNTER
Request [id number]  Form: [employer] Electronic [place] Form (2017 NCPDP)  Drug: Mycophenolate Sodium 180MG dr tablets  Prescriber Instructions  Patient  Name - Prefix:  Name - First: JOSÉ  Name - Middle:  Name - Last: ANGELICA  Name - Suffix:  YOB: 1985  Gender - : Male  Member ID - : [id number]  Address - Street: [address]   Address - [address]:  Address - City: [place]  Address - State: [place]  Address - Zip: [address]  Phone - : [phone]  Drug  Medication Name - : Mycophenolate Sodium 180MG dr tablets  Quantity - (Enter numeric value with up to 2 decimal places and a preceding zero before the decimal when  applicable. For example, .5 must be entered in as 0.5.): 240  Quantity - Confirm dosage form: Capsule  Days Supply - (up to 3 digits): 30  Provider  NPI - : [id number]  Name - First: Jay  Name - Last: Rod  Address - Street: [address] [place] [address]  Address - Address Line 2 (optional):  Address - City: [place]  Address - State: [place]  Address - Zip: [address]  Phone - : [phone]  Fax - : [phone]  Type of Review  Are you requesting an URGENT review?: Yes  Prescriber Next Steps  Document Upload  Do Not Send To Plan  For Records Only  Upload #1 - :  Additional Documentation - Do you have additional documentation to include?:

## 2022-09-02 ENCOUNTER — TELEPHONE (OUTPATIENT)
Dept: TRANSPLANT | Facility: CLINIC | Age: 37
End: 2022-09-02
Payer: MEDICARE

## 2022-09-07 ENCOUNTER — DOCUMENTATION ONLY (OUTPATIENT)
Dept: TRANSPLANT | Facility: CLINIC | Age: 37
End: 2022-09-07
Payer: MEDICARE

## 2022-09-07 LAB
EXT ALLOSURE: 0.13
EXT BACTERIA UA: NEGATIVE
EXT BUN: 18 (ref 7–18)
EXT CALCIUM: 9.7 (ref 8.4–10.2)
EXT CHLORIDE: 105 (ref 101–111)
EXT CO2: 30 (ref 21–31)
EXT CREATININE: 1.87 MG/DL (ref 0.6–1.3)
EXT EOSINOPHIL%: 1.9 (ref 0–5)
EXT GFR MDRD AF AMER: 49
EXT GLUCOSE UA: NEGATIVE
EXT GLUCOSE: 90 (ref 74–106)
EXT HEMATOCRIT: 35.6 (ref 42–52)
EXT HEMOGLOBIN: 11 (ref 14–16.5)
EXT LYMPH%: 20.9 (ref 20.5–51.1)
EXT MAGNESIUM: 1.4 (ref 1.7–2.8)
EXT MONOCYTES%: 10.2 (ref 1.7–9.3)
EXT NITRITES UA: NEGATIVE
EXT PHOSPHORUS: 2.9 (ref 2.5–4.6)
EXT PLATELETS: 247 (ref 150–400)
EXT POTASSIUM: 3.7 (ref 3.6–5)
EXT PROT/CREAT RATIO UR: 0.07 MG/DL (ref 0–0.2)
EXT PROTEIN UA: NEGATIVE
EXT RBC UA: NEGATIVE
EXT SEGS%: 66.7 (ref 45.2–75.2)
EXT SODIUM: 140 MMOL/L (ref 137–145)
EXT TACROLIMUS LVL: 7.2 (ref 7–9)
EXT WBC UA: NEGATIVE
EXT WBC: 4.4 (ref 4.8–10.8)
Lab: ABNORMAL
Lab: ABNORMAL

## 2022-09-08 ENCOUNTER — TELEPHONE (OUTPATIENT)
Dept: TRANSPLANT | Facility: CLINIC | Age: 37
End: 2022-09-08
Payer: MEDICARE

## 2022-09-08 ENCOUNTER — PATIENT MESSAGE (OUTPATIENT)
Dept: TRANSPLANT | Facility: CLINIC | Age: 37
End: 2022-09-08
Payer: MEDICARE

## 2022-09-09 ENCOUNTER — TELEPHONE (OUTPATIENT)
Dept: TRANSPLANT | Facility: CLINIC | Age: 37
End: 2022-09-09
Payer: MEDICARE

## 2022-09-20 ENCOUNTER — TELEPHONE (OUTPATIENT)
Dept: TRANSPLANT | Facility: CLINIC | Age: 37
End: 2022-09-20
Payer: MEDICARE

## 2022-09-29 ENCOUNTER — PATIENT MESSAGE (OUTPATIENT)
Dept: PHARMACY | Facility: CLINIC | Age: 37
End: 2022-09-29
Payer: MEDICARE

## 2022-10-21 NOTE — PROGRESS NOTES
Kidney Post-Transplant Assessment    Referring Physician: Ralf Crouch  Current Nephrologist: Ralf Crouch    ORGAN: LEFT KIDNEY  Donor Type: donation after brain death  PHS Increased Risk: no  Cold Ischemia: 1,175 mins  Induction Medications: thymoglobulin    Subjective:     CC:  Reassessment of renal allograft function and management of chronic immunosuppression.    HPI:  Mr. Trevizo is a 37 y.o. 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 His most recent creatinine is1.68 . He takes mycophenolic acid, prednisone and tacrolimus for maintenance immunosuppression. His post transplant course has been complicated by neutropenia .        Interval HX:  Reports doing well. Has no complaints.  No health concerns today. No ER visit for hospitalizations. He is taking his medication on time. Reports urinating well. No swelling. Denies pain over the allograft, swelling, fever, chills.  He is deaf but reads lips.     Lab /diagnostic results reviewed with patient today.   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   HENT:  Positive for hearing loss. Negative for congestion, facial swelling, postnasal drip, rhinorrhea, sinus pressure, sore throat and trouble swallowing.    Eyes:  Negative for pain, redness and visual disturbance.   Respiratory:  Negative for cough, chest tightness, shortness of breath and wheezing.    Cardiovascular: Negative.   "Negative for chest pain, palpitations and leg swelling.   Gastrointestinal:  Negative for abdominal pain, constipation, diarrhea, nausea and vomiting.   Genitourinary:  Negative for dysuria, flank pain and urgency.   Musculoskeletal:  Negative for gait problem, neck pain and neck stiffness.   Skin:  Negative for rash.   Allergic/Immunologic: Positive for immunocompromised state. Negative for environmental allergies and food allergies.   Neurological:  Negative for dizziness, weakness, light-headedness and headaches.   Psychiatric/Behavioral:  Negative for agitation and confusion. The patient is not nervous/anxious.      Objective:   Blood pressure 123/73, pulse 75, temperature 97.3 °F (36.3 °C), temperature source Temporal, resp. rate 16, height 5' 10" (1.778 m), weight 74.4 kg (164 lb 0.4 oz), SpO2 99 %.body mass index is 23.53 kg/m².    Physical Exam  Vitals reviewed.   Constitutional:       Appearance: Normal appearance. He is well-developed.   HENT:      Head: Normocephalic.   Eyes:      Pupils: Pupils are equal, round, and reactive to light.   Cardiovascular:      Rate and Rhythm: Normal rate and regular rhythm.      Heart sounds: Normal heart sounds.   Pulmonary:      Effort: Pulmonary effort is normal.      Breath sounds: Normal breath sounds.   Abdominal:      General: Bowel sounds are normal.      Palpations: Abdomen is soft.   Musculoskeletal:         General: Normal range of motion.      Cervical back: Normal range of motion and neck supple.   Skin:     General: Skin is warm and dry.   Neurological:      Mental Status: He is alert and oriented to person, place, and time.      Motor: No abnormal muscle tone.   Psychiatric:         Behavior: Behavior normal.       Labs:  Lab Results   Component Value Date    WBC 3.17 (L) 01/26/2022    HGB 12.3 (L) 01/26/2022    HCT 41.3 01/26/2022     07/12/2022    K 4.1 07/12/2022     07/12/2022    CO2 25 07/12/2022    BUN 24 (H) 07/12/2022    CREATININE 2.2 " (H) 07/12/2022    EGFRNONAA 36.4 (A) 07/12/2022    CALCIUM 10.2 07/12/2022    PHOS 2.5 (L) 11/22/2021    MG 1.8 12/14/2021    ALBUMIN 4.1 07/12/2022    AST 13 07/12/2022    ALT 9 (L) 07/12/2022    UTPCR Unable to calculate 12/14/2021    .7 (H) 10/18/2021    TACROLIMUS 4.9 (L) 12/14/2021       Lab Results   Component Value Date    EXTANC 2.4 06/29/2022    EXTWBC 4.4 (A) 09/07/2022    EXTSEGS 66.7 09/07/2022    EXTPLATELETS 247 09/07/2022    EXTHEMOGLOBI 11 (A) 09/07/2022    EXTHEMATOCRI 35.6 (A) 09/07/2022    EXTCREATININ 1.87 (A) 09/07/2022    EXTSODIUM 140 09/07/2022    EXTPOTASSIUM 3.7 09/07/2022    EXTBUN 18 09/07/2022    EXTCO2 30 09/07/2022    EXTCALCIUM 9.7 09/07/2022    EXTPHOSPHORU 2.9 09/07/2022    EXTGLUCOSE 90 09/07/2022    EXTALBUMIN 4.2 06/14/2022    EXTAST 10 05/31/2022    EXTALT 8 05/31/2022    EXTBILITOTAL 0.5 05/31/2022       Lab Results   Component Value Date    EXTTACROLVL 7.2 (A) 09/07/2022    EXTPROTCRE 0.07 09/07/2022    EXTPTHINTACT 246.8 (A) 01/13/2022    EXTPROTEINUA Negative 09/07/2022    EXTWBCUA Negative 09/07/2022    EXTRBCUA Negative 09/07/2022       Labs were reviewed with the patient    Assessment:     1. S/P kidney transplant 10/19/21    2. Stage 3a chronic kidney disease    3. Long-term use of immunosuppressant medication    4. Renovascular hypertension        Plan:          Follow-up:   1. CKD stage: 3a  2. Immunosuppression:   Prograf trough 7.2,   therapeutic target 5-7. Continue  Prograf  4/4, MyF 360 Mg BID, and Prednisone.,. Will continue to monitor for drug toxicities    3. Allograft Function: Stable. Continue good po hydration.       9/7/2022  10mo 2wk   EXT Creatinine 0.6 - 1.30 mg/dL 1.87 (A)   EXT GFR MDRD AF AMER 49   EXT Glucose 74 - 106 90       4. Hypertension management: advise low salt diet and home BP monitoring    ASA, Nifedipine, minoxidil, coreg      5. Metabolic Bone Disease/Secondary Hyperparathyroidism:stable  Will monitor PTH, CA and Vit D/guidelines,    KPN, Mg ox      9/7/2022  10mo 2wk   EXT Calcium 8.4 - 10.2 9.7   EXT Phosphorus 2.5 - 4.6 2.9   EXT Magnesium 1.7 - 2.8 1.4 (A)       6. Electrolytes:  Will monitor /guidelines      9/7/2022  10mo 2wk   EXT Sodium 137 - 145 mmol/L 140   EXT Potassium 3.6 - 5 3.7   EXT Chloride 101 - 111 105   EXT CO2 21 - 31 30       7. Anemia: stable. No need for intervention    Will monitor /guidelines    9/7/2022  10mo 2wk   EXT WBC 4.8 - 10.8 4.4 (A)   EXT Hemoglobin 14 - 16.5 11 (A)   EXT Hematocrit 42 - 52 35.6 (A)   EXT Platelets 150 - 400 247     8.  Cytopenias: no significant cytopenias will monitor as per our guidelines. Medicine list reviewed including potential causes of drug-induced cytopenias  History of Neupogen use    9.Proteinuria: continue p/c ratio as per guidelines      9/7/2022  10mo 2wk   EXT PROT/CREAT Ratio UR 0.0 - 0.2 mg/dl 0.07       10. BK virus infection screening:  will continue to monitor/ guidelines    7/12/2022  8mo 3wk   BK Virus DNA, Blood Not detected Not detected   BK Virus DNA PCR, Quant, Blood <125 Copies/mL <125       11. Weight education: provided during the clinic visit   Body mass index is 23.53 kg/m².       12.Patient safety education regarding immunosuppression including prophylaxis posttransplant for CMV, PCP : Education provided about vaccination and prevention of infections       Follow-up:   Clinic: return to transplant clinic weekly for the first month after transplant; every 2 weeks during months 2-3; then at 6-, 9-, 12-, 18-, 24-, and 36- months post-transplant to reassess for complications from immunosuppression toxicity and monitor for rejection.  Annually thereafter.    Labs: since patient remains at high risk for rejection and drug-related complications that warrant close monitoring, labs will be ordered as follows: continue twice weekly CBC, renal panel, and drug level for first month; then same labs once weekly through 3rd month post-transplant.  Urine for UA and  protein/creatinine ratio monthly.  Serum BK - PCR at 1-, 3-, 6-, 9-, 12-, 18-, 24-, 36-, 48-, and 60 months post-transplant.  Hepatic panel at 1-, 2-, 3-, 6-, 9-, 12-, 18-, 24-, and 36- months post-transplant.    Chelsie Arriola NP       Education:   Material provided to the patient.  Patient reminded to call with any health changes since these can be early signs of significant complications.  Also, I advised the patient to be sure any new medications or changes of old medications are discussed with either a pharmacist or physician knowledgeable with transplant to avoid rejection/drug toxicity related to significant drug interactions.    Patient advised that it is recommended that all transplanted patients, and their close contacts and household members receive Covid vaccination.

## 2022-10-24 ENCOUNTER — LAB VISIT (OUTPATIENT)
Dept: LAB | Facility: HOSPITAL | Age: 37
End: 2022-10-24
Payer: MEDICARE

## 2022-10-24 ENCOUNTER — OFFICE VISIT (OUTPATIENT)
Dept: TRANSPLANT | Facility: CLINIC | Age: 37
End: 2022-10-24
Payer: MEDICARE

## 2022-10-24 VITALS
TEMPERATURE: 97 F | SYSTOLIC BLOOD PRESSURE: 123 MMHG | HEART RATE: 75 BPM | OXYGEN SATURATION: 99 % | DIASTOLIC BLOOD PRESSURE: 73 MMHG | WEIGHT: 164 LBS | BODY MASS INDEX: 23.48 KG/M2 | HEIGHT: 70 IN | RESPIRATION RATE: 16 BRPM

## 2022-10-24 DIAGNOSIS — R79.89 ELEVATED SERUM CREATININE: ICD-10-CM

## 2022-10-24 DIAGNOSIS — N18.31 STAGE 3A CHRONIC KIDNEY DISEASE: ICD-10-CM

## 2022-10-24 DIAGNOSIS — Z94.0 KIDNEY REPLACED BY TRANSPLANT: ICD-10-CM

## 2022-10-24 DIAGNOSIS — I15.0 RENOVASCULAR HYPERTENSION: ICD-10-CM

## 2022-10-24 DIAGNOSIS — Z94.0 S/P KIDNEY TRANSPLANT: Primary | ICD-10-CM

## 2022-10-24 DIAGNOSIS — Z79.60 LONG-TERM USE OF IMMUNOSUPPRESSANT MEDICATION: ICD-10-CM

## 2022-10-24 LAB
ALBUMIN SERPL BCP-MCNC: 4 G/DL (ref 3.5–5.2)
ALBUMIN SERPL BCP-MCNC: 4 G/DL (ref 3.5–5.2)
ALP SERPL-CCNC: 66 U/L (ref 55–135)
ALT SERPL W/O P-5'-P-CCNC: 9 U/L (ref 10–44)
ANION GAP SERPL CALC-SCNC: 9 MMOL/L (ref 8–16)
AST SERPL-CCNC: 11 U/L (ref 10–40)
BASOPHILS # BLD AUTO: 0.02 K/UL (ref 0–0.2)
BASOPHILS NFR BLD: 0.4 % (ref 0–1.9)
BILIRUB DIRECT SERPL-MCNC: 0.2 MG/DL (ref 0.1–0.3)
BILIRUB SERPL-MCNC: 0.5 MG/DL (ref 0.1–1)
BUN SERPL-MCNC: 21 MG/DL (ref 6–20)
CALCIUM SERPL-MCNC: 9.6 MG/DL (ref 8.7–10.5)
CHLORIDE SERPL-SCNC: 108 MMOL/L (ref 95–110)
CO2 SERPL-SCNC: 25 MMOL/L (ref 23–29)
CREAT SERPL-MCNC: 1.9 MG/DL (ref 0.5–1.4)
DIFFERENTIAL METHOD: ABNORMAL
EOSINOPHIL # BLD AUTO: 0.1 K/UL (ref 0–0.5)
EOSINOPHIL NFR BLD: 1.3 % (ref 0–8)
ERYTHROCYTE [DISTWIDTH] IN BLOOD BY AUTOMATED COUNT: 13 % (ref 11.5–14.5)
EST. GFR  (NO RACE VARIABLE): 45.5 ML/MIN/1.73 M^2
GLUCOSE SERPL-MCNC: 101 MG/DL (ref 70–110)
HCT VFR BLD AUTO: 37.2 % (ref 40–54)
HGB BLD-MCNC: 10.8 G/DL (ref 14–18)
IMM GRANULOCYTES # BLD AUTO: 0.02 K/UL (ref 0–0.04)
IMM GRANULOCYTES NFR BLD AUTO: 0.4 % (ref 0–0.5)
LYMPHOCYTES # BLD AUTO: 0.6 K/UL (ref 1–4.8)
LYMPHOCYTES NFR BLD: 13.2 % (ref 18–48)
MAGNESIUM SERPL-MCNC: 1.5 MG/DL (ref 1.6–2.6)
MCH RBC QN AUTO: 24.4 PG (ref 27–31)
MCHC RBC AUTO-ENTMCNC: 29 G/DL (ref 32–36)
MCV RBC AUTO: 84 FL (ref 82–98)
MONOCYTES # BLD AUTO: 0.5 K/UL (ref 0.3–1)
MONOCYTES NFR BLD: 9.6 % (ref 4–15)
NEUTROPHILS # BLD AUTO: 3.6 K/UL (ref 1.8–7.7)
NEUTROPHILS NFR BLD: 75.1 % (ref 38–73)
NRBC BLD-RTO: 0 /100 WBC
PHOSPHATE SERPL-MCNC: 2.7 MG/DL (ref 2.7–4.5)
PLATELET # BLD AUTO: 229 K/UL (ref 150–450)
PMV BLD AUTO: 11.5 FL (ref 9.2–12.9)
POTASSIUM SERPL-SCNC: 4.1 MMOL/L (ref 3.5–5.1)
PROT SERPL-MCNC: 6.8 G/DL (ref 6–8.4)
RBC # BLD AUTO: 4.42 M/UL (ref 4.6–6.2)
SODIUM SERPL-SCNC: 142 MMOL/L (ref 136–145)
TACROLIMUS BLD-MCNC: 8.1 NG/ML (ref 5–15)
WBC # BLD AUTO: 4.79 K/UL (ref 3.9–12.7)

## 2022-10-24 PROCEDURE — 85025 COMPLETE CBC W/AUTO DIFF WBC: CPT | Performed by: INTERNAL MEDICINE

## 2022-10-24 PROCEDURE — 99999 PR PBB SHADOW E&M-EST. PATIENT-LVL III: CPT | Mod: PBBFAC,,, | Performed by: NURSE PRACTITIONER

## 2022-10-24 PROCEDURE — 36415 COLL VENOUS BLD VENIPUNCTURE: CPT | Performed by: INTERNAL MEDICINE

## 2022-10-24 PROCEDURE — 99215 OFFICE O/P EST HI 40 MIN: CPT | Mod: S$PBB,,, | Performed by: NURSE PRACTITIONER

## 2022-10-24 PROCEDURE — 99213 OFFICE O/P EST LOW 20 MIN: CPT | Mod: PBBFAC | Performed by: NURSE PRACTITIONER

## 2022-10-24 PROCEDURE — 80197 ASSAY OF TACROLIMUS: CPT | Performed by: INTERNAL MEDICINE

## 2022-10-24 PROCEDURE — 99215 PR OFFICE/OUTPT VISIT, EST, LEVL V, 40-54 MIN: ICD-10-PCS | Mod: S$PBB,,, | Performed by: NURSE PRACTITIONER

## 2022-10-24 PROCEDURE — 83735 ASSAY OF MAGNESIUM: CPT | Performed by: INTERNAL MEDICINE

## 2022-10-24 PROCEDURE — 86833 HLA CLASS II HIGH DEFIN QUAL: CPT | Mod: PO | Performed by: INTERNAL MEDICINE

## 2022-10-24 PROCEDURE — 86832 HLA CLASS I HIGH DEFIN QUAL: CPT | Mod: PO | Performed by: INTERNAL MEDICINE

## 2022-10-24 PROCEDURE — 80069 RENAL FUNCTION PANEL: CPT | Performed by: INTERNAL MEDICINE

## 2022-10-24 PROCEDURE — 99999 PR PBB SHADOW E&M-EST. PATIENT-LVL III: ICD-10-PCS | Mod: PBBFAC,,, | Performed by: NURSE PRACTITIONER

## 2022-10-24 PROCEDURE — 84075 ASSAY ALKALINE PHOSPHATASE: CPT | Performed by: INTERNAL MEDICINE

## 2022-10-24 PROCEDURE — 86352 CELL FUNCTION ASSAY W/STIM: CPT | Performed by: INTERNAL MEDICINE

## 2022-10-24 PROCEDURE — 86977 RBC SERUM PRETX INCUBJ/INHIB: CPT | Mod: 59,PO | Performed by: INTERNAL MEDICINE

## 2022-10-24 PROCEDURE — 87799 DETECT AGENT NOS DNA QUANT: CPT | Performed by: INTERNAL MEDICINE

## 2022-10-24 NOTE — LETTER
October 24, 2022        Ralf Crouch  [address]  [facility] [place] [address]  Phone: [phone]  Fax: [phone]     Jay Velasco  [address]  [facility] [address]  Phone: [phone]  Fax: [phone]             [facility]  [address]  [facility] [address]  Phone: [phone]   Patient: Feliz Trevizo   MR Number: [id number]   YOB: 1985   Date of Visit: 10/24/2022       Dear Dr. Ralf Crouch, Jay Velasco    Thank you for referring Feliz Trevizo to me for evaluation. Attached you will find relevant portions of my assessment and plan of care.    If you have questions, please do not hesitate to call me. I look forward to following Feliz Trevizo along with you.    Sincerely,    Chelsie Arriola NP    Enclosure    If you would like to receive this communication electronically, please contact [email or web address] or [phone] to request [facility] access.    [facility] is a tool which provides read-only access to select patient information with whom you have a relationship. Its easy to use and provides real time access to review your patients record including encounter summaries, notes, results, and demographic information.    If you feel you have received this communication in error or would no longer like to receive these types of communications, please e-mail [email or web address]

## 2022-10-26 LAB
ALLOSURE COMMENT: NORMAL
ALLOSURE SCORE KIDNEY: 0.14 %
BKV DNA SERPL NAA+PROBE-ACNC: <125 COPIES/ML
BKV DNA SERPL NAA+PROBE-LOG#: <2.1 LOG (10) COPIES/ML
BKV DNA SERPL QL NAA+PROBE: NOT DETECTED
CLASS I ANTIBODY COMMENTS - LUMINEX: NORMAL
CLASS II ANTIBODY COMMENTS - LUMINEX: NORMAL
DSA1 TESTING DATE: NORMAL
DSA12 TESTING DATE: NORMAL
DSA2 TESTING DATE: NORMAL
IMMUNKNOW (STIMULATED): 454 NG/ML (ref 226–524)
INFORMATION: NORMAL
SERUM COLLECTION DT - LUMINEX CLASS I: NORMAL
SERUM COLLECTION DT - LUMINEX CLASS II: NORMAL

## 2022-10-26 NOTE — PROGRESS NOTES
His allosure and Class II DSA have not showed any change in the last few months. Last Allosure pending

## 2022-11-02 ENCOUNTER — TELEPHONE (OUTPATIENT)
Dept: TRANSPLANT | Facility: CLINIC | Age: 37
End: 2022-11-02
Payer: MEDICARE

## 2022-11-09 ENCOUNTER — TELEPHONE (OUTPATIENT)
Dept: TRANSPLANT | Facility: CLINIC | Age: 37
End: 2022-11-09
Payer: MEDICARE

## 2022-12-05 ENCOUNTER — TELEPHONE (OUTPATIENT)
Dept: TRANSPLANT | Facility: CLINIC | Age: 37
End: 2022-12-05

## 2022-12-05 DIAGNOSIS — Z94.0 KIDNEY REPLACED BY TRANSPLANT: ICD-10-CM

## 2022-12-05 RX ORDER — TACROLIMUS 1 MG/1
4 CAPSULE ORAL EVERY 12 HOURS
Qty: 40 CAPSULE | Refills: 0 | Status: CANCELLED | OUTPATIENT
Start: 2022-12-05 | End: 2022-12-10

## 2022-12-05 RX ORDER — MYCOPHENOLIC ACID 180 MG/1
360 TABLET, DELAYED RELEASE ORAL 2 TIMES DAILY
Qty: 20 TABLET | Refills: 0 | Status: CANCELLED | OUTPATIENT
Start: 2022-12-05 | End: 2022-12-10

## 2022-12-05 NOTE — TELEPHONE ENCOUNTER
Pt called to report that he is out of prograf and myfortic, he is expecting to receive shipment Thursday or Friday, 5day supply Rx's for prograf & myfiortic sent to [facility] per pt request

## 2022-12-20 ENCOUNTER — TELEPHONE (OUTPATIENT)
Dept: TRANSPLANT | Facility: CLINIC | Age: 37
End: 2022-12-20
Payer: MEDICARE

## 2023-02-19 ENCOUNTER — TELEPHONE (OUTPATIENT)
Dept: TRANSPLANT | Facility: CLINIC | Age: 38
End: 2023-02-19
Payer: MEDICARE

## 2023-03-23 NOTE — PROVIDER TRANSFER
Outside Transfer Acceptance Note / Regional Referral Center    Referring facility: [facility]   Referring provider: YASSINE VASQUEZ  Accepting facility: [facility]  Accepting provider: DUSTY GOODE  Admitting provider: DUSTY GOODE  Reason for transfer: Higher Level of Care   Transfer diagnosis: Kidney Failure  Transfer specialty requested: Transplant Kidney  Transfer specialty notified: yes  Transfer level: NUMBER 1-5: 2  Bed type requested: TELEMETRY   Isolation status: No active isolations   Admission class or status: IP- Inpatient      Narrative     The patient is a 39 y/o male with PMH of kidney transplant in 2021 and HTN who presented for blood work at the clinic today. He was advised to come to the ER as labs worsened. Creatinine 9.32, BUN 43. Patient is on prograf and has been out of his medications for a week. Case was discussed with Dr. Madden of [facility] and agreed for patient to transfer for concerns for rejection. Vitals stable.       Objective     Vitals:    Recent Labs:  as above  Recent imaging:    Airway:     Vent settings:     IV access:    Infusions:   Allergies: Review of patient's allergies indicates:  No Known Allergies   NPO: No    Anticoagulation:   Anticoagulants       None             Instructions      Gideon y-  Admit to Hospital Medicine  Upon patient arrival to floor, please send SecureChat to [facility] or call extension [phone] (if no answer, do NOT leave a callback number after the beep, rather please send a SecureChat to [facility]), for Hospital Medicine admit team assignment and for additional admit orders for the patient.  Do not page the attending physician associated with the patient on arrival (this physician may not be on duty at the time of arrival).  Rather, always send a SecureChat to [facility] or call [phone] to reach the triage physician for orders and team assignment.

## 2023-03-24 ENCOUNTER — HOSPITAL ENCOUNTER (INPATIENT)
Facility: HOSPITAL | Age: 38
LOS: 10 days | Discharge: HOME | DRG: 698 | End: 2023-04-03
Attending: HOSPITALIST | Admitting: HOSPITALIST
Payer: MEDICARE

## 2023-03-24 DIAGNOSIS — Z29.89 PROPHYLACTIC IMMUNOTHERAPY: ICD-10-CM

## 2023-03-24 DIAGNOSIS — I15.0 RENOVASCULAR HYPERTENSION: Chronic | ICD-10-CM

## 2023-03-24 DIAGNOSIS — J02.9 SORE THROAT: ICD-10-CM

## 2023-03-24 DIAGNOSIS — N19 KIDNEY FAILURE: ICD-10-CM

## 2023-03-24 DIAGNOSIS — E87.5 HYPERKALEMIA: ICD-10-CM

## 2023-03-24 DIAGNOSIS — I15.8 HYPERTENSION ASSOCIATED WITH TRANSPLANTATION: ICD-10-CM

## 2023-03-24 DIAGNOSIS — Z94.0 S/P KIDNEY TRANSPLANT: Chronic | ICD-10-CM

## 2023-03-24 DIAGNOSIS — Z94.9 HYPERTENSION ASSOCIATED WITH TRANSPLANTATION: ICD-10-CM

## 2023-03-24 DIAGNOSIS — Z79.60 LONG-TERM USE OF IMMUNOSUPPRESSANT MEDICATION: Chronic | ICD-10-CM

## 2023-03-24 DIAGNOSIS — Z94.0 KIDNEY REPLACED BY TRANSPLANT: ICD-10-CM

## 2023-03-24 DIAGNOSIS — T86.11 ACUTE REJECTION OF KIDNEY TRANSPLANT: Primary | ICD-10-CM

## 2023-03-24 DIAGNOSIS — E83.42 HYPOMAGNESEMIA: ICD-10-CM

## 2023-03-24 DIAGNOSIS — E87.20 METABOLIC ACIDOSIS: ICD-10-CM

## 2023-03-24 DIAGNOSIS — N18.9 ACUTE KIDNEY INJURY SUPERIMPOSED ON CHRONIC KIDNEY DISEASE: ICD-10-CM

## 2023-03-24 DIAGNOSIS — D63.8 ANEMIA OF CHRONIC DISEASE: Chronic | ICD-10-CM

## 2023-03-24 DIAGNOSIS — I10 BENIGN ESSENTIAL HTN: ICD-10-CM

## 2023-03-24 DIAGNOSIS — Z91.89 AT RISK FOR OPPORTUNISTIC INFECTIONS: ICD-10-CM

## 2023-03-24 DIAGNOSIS — R07.9 CHEST PAIN: ICD-10-CM

## 2023-03-24 DIAGNOSIS — U07.1 COVID-19: ICD-10-CM

## 2023-03-24 DIAGNOSIS — N17.9 ACUTE KIDNEY INJURY SUPERIMPOSED ON CHRONIC KIDNEY DISEASE: ICD-10-CM

## 2023-03-24 PROCEDURE — 20600001 HC STEP DOWN PRIVATE ROOM

## 2023-03-25 PROBLEM — I15.0 RENOVASCULAR HYPERTENSION: Chronic | Status: ACTIVE | Noted: 2017-08-02

## 2023-03-25 PROBLEM — T86.11 ACUTE REJECTION OF KIDNEY TRANSPLANT: Status: ACTIVE | Noted: 2023-03-25

## 2023-03-25 PROBLEM — N25.81 SECONDARY HYPERPARATHYROIDISM: Status: ACTIVE | Noted: 2017-11-17

## 2023-03-25 PROBLEM — Z79.60 LONG-TERM USE OF IMMUNOSUPPRESSANT MEDICATION: Chronic | Status: ACTIVE | Noted: 2021-10-19

## 2023-03-25 PROBLEM — N18.31 STAGE 3A CHRONIC KIDNEY DISEASE: Chronic | Status: ACTIVE | Noted: 2021-10-29

## 2023-03-25 PROBLEM — J06.9 URI, ACUTE: Status: ACTIVE | Noted: 2020-10-09

## 2023-03-25 PROBLEM — Z94.0 S/P KIDNEY TRANSPLANT: Chronic | Status: ACTIVE | Noted: 2021-10-19

## 2023-03-25 PROBLEM — D63.8 ANEMIA OF CHRONIC DISEASE: Chronic | Status: ACTIVE | Noted: 2021-10-19

## 2023-03-25 PROBLEM — S60.112A SUBUNGUAL HEMATOMA OF LEFT THUMB: Status: ACTIVE | Noted: 2023-03-25

## 2023-03-25 LAB
ALBUMIN SERPL BCP-MCNC: 3 G/DL (ref 3.5–5.2)
ALP SERPL-CCNC: 66 U/L (ref 55–135)
ALT SERPL W/O P-5'-P-CCNC: 27 U/L (ref 10–44)
ANION GAP SERPL CALC-SCNC: 8 MMOL/L (ref 8–16)
ANION GAP SERPL CALC-SCNC: 9 MMOL/L (ref 8–16)
AST SERPL-CCNC: 20 U/L (ref 10–40)
BASOPHILS # BLD AUTO: 0.02 K/UL (ref 0–0.2)
BASOPHILS NFR BLD: 0.5 % (ref 0–1.9)
BILIRUB SERPL-MCNC: 0.2 MG/DL (ref 0.1–1)
BUN SERPL-MCNC: 39 MG/DL (ref 6–20)
BUN SERPL-MCNC: 40 MG/DL (ref 6–20)
CALCIUM SERPL-MCNC: 8.6 MG/DL (ref 8.7–10.5)
CALCIUM SERPL-MCNC: 8.7 MG/DL (ref 8.7–10.5)
CHLORIDE SERPL-SCNC: 118 MMOL/L (ref 95–110)
CHLORIDE SERPL-SCNC: 118 MMOL/L (ref 95–110)
CO2 SERPL-SCNC: 13 MMOL/L (ref 23–29)
CO2 SERPL-SCNC: 14 MMOL/L (ref 23–29)
CREAT SERPL-MCNC: 6.5 MG/DL (ref 0.5–1.4)
CREAT SERPL-MCNC: 6.8 MG/DL (ref 0.5–1.4)
DIFFERENTIAL METHOD: ABNORMAL
EOSINOPHIL # BLD AUTO: 0.1 K/UL (ref 0–0.5)
EOSINOPHIL NFR BLD: 3.1 % (ref 0–8)
ERYTHROCYTE [DISTWIDTH] IN BLOOD BY AUTOMATED COUNT: 14 % (ref 11.5–14.5)
EST. GFR  (NO RACE VARIABLE): 10.3 ML/MIN/1.73 M^2
EST. GFR  (NO RACE VARIABLE): 9.8 ML/MIN/1.73 M^2
FERRITIN SERPL-MCNC: 510 NG/ML (ref 20–300)
GLUCOSE SERPL-MCNC: 91 MG/DL (ref 70–110)
GLUCOSE SERPL-MCNC: 92 MG/DL (ref 70–110)
HCT VFR BLD AUTO: 28 % (ref 40–54)
HGB BLD-MCNC: 8.3 G/DL (ref 14–18)
IMM GRANULOCYTES # BLD AUTO: 0.04 K/UL (ref 0–0.04)
IMM GRANULOCYTES NFR BLD AUTO: 1 % (ref 0–0.5)
IRON SERPL-MCNC: 68 UG/DL (ref 45–160)
LYMPHOCYTES # BLD AUTO: 0.5 K/UL (ref 1–4.8)
LYMPHOCYTES NFR BLD: 11.8 % (ref 18–48)
MAGNESIUM SERPL-MCNC: 1.6 MG/DL (ref 1.6–2.6)
MCH RBC QN AUTO: 24.3 PG (ref 27–31)
MCHC RBC AUTO-ENTMCNC: 29.6 G/DL (ref 32–36)
MCV RBC AUTO: 82 FL (ref 82–98)
MONOCYTES # BLD AUTO: 0.8 K/UL (ref 0.3–1)
MONOCYTES NFR BLD: 18.1 % (ref 4–15)
NEUTROPHILS # BLD AUTO: 2.7 K/UL (ref 1.8–7.7)
NEUTROPHILS NFR BLD: 65.5 % (ref 38–73)
NRBC BLD-RTO: 0 /100 WBC
PHOSPHATE SERPL-MCNC: 3.9 MG/DL (ref 2.7–4.5)
PLATELET # BLD AUTO: 256 K/UL (ref 150–450)
PMV BLD AUTO: 10.4 FL (ref 9.2–12.9)
POTASSIUM SERPL-SCNC: 4.7 MMOL/L (ref 3.5–5.1)
POTASSIUM SERPL-SCNC: 4.9 MMOL/L (ref 3.5–5.1)
PROT SERPL-MCNC: 6 G/DL (ref 6–8.4)
RBC # BLD AUTO: 3.42 M/UL (ref 4.6–6.2)
RETICS/RBC NFR AUTO: 0.9 % (ref 0.4–2)
SATURATED IRON: 32 % (ref 20–50)
SODIUM SERPL-SCNC: 140 MMOL/L (ref 136–145)
SODIUM SERPL-SCNC: 140 MMOL/L (ref 136–145)
TACROLIMUS BLD-MCNC: 5.6 NG/ML (ref 5–15)
TOTAL IRON BINDING CAPACITY: 212 UG/DL (ref 250–450)
TRANSFERRIN SERPL-MCNC: 143 MG/DL (ref 200–375)
WBC # BLD AUTO: 4.15 K/UL (ref 3.9–12.7)

## 2023-03-25 PROCEDURE — 25000003 PHARM REV CODE 250: Performed by: INTERNAL MEDICINE

## 2023-03-25 PROCEDURE — 93005 ELECTROCARDIOGRAM TRACING: CPT

## 2023-03-25 PROCEDURE — 36415 COLL VENOUS BLD VENIPUNCTURE: CPT

## 2023-03-25 PROCEDURE — 99223 PR INITIAL HOSPITAL CARE,LEVL III: ICD-10-PCS | Mod: ,,, | Performed by: INTERNAL MEDICINE

## 2023-03-25 PROCEDURE — 80053 COMPREHEN METABOLIC PANEL: CPT

## 2023-03-25 PROCEDURE — 25000003 PHARM REV CODE 250

## 2023-03-25 PROCEDURE — 84100 ASSAY OF PHOSPHORUS: CPT

## 2023-03-25 PROCEDURE — 93010 EKG 12-LEAD: ICD-10-PCS | Mod: ,,, | Performed by: INTERNAL MEDICINE

## 2023-03-25 PROCEDURE — 86832 HLA CLASS I HIGH DEFIN QUAL: CPT | Performed by: INTERNAL MEDICINE

## 2023-03-25 PROCEDURE — 84466 ASSAY OF TRANSFERRIN: CPT

## 2023-03-25 PROCEDURE — 85025 COMPLETE CBC W/AUTO DIFF WBC: CPT

## 2023-03-25 PROCEDURE — 93010 ELECTROCARDIOGRAM REPORT: CPT | Mod: ,,, | Performed by: INTERNAL MEDICINE

## 2023-03-25 PROCEDURE — 80197 ASSAY OF TACROLIMUS: CPT

## 2023-03-25 PROCEDURE — 80048 BASIC METABOLIC PNL TOTAL CA: CPT | Mod: XB

## 2023-03-25 PROCEDURE — 63600175 PHARM REV CODE 636 W HCPCS: Performed by: INTERNAL MEDICINE

## 2023-03-25 PROCEDURE — 99223 PR INITIAL HOSPITAL CARE,LEVL III: ICD-10-PCS | Mod: CR,,, | Performed by: HOSPITALIST

## 2023-03-25 PROCEDURE — 82728 ASSAY OF FERRITIN: CPT

## 2023-03-25 PROCEDURE — 99223 1ST HOSP IP/OBS HIGH 75: CPT | Mod: ,,, | Performed by: INTERNAL MEDICINE

## 2023-03-25 PROCEDURE — U0002 COVID-19 LAB TEST NON-CDC: HCPCS

## 2023-03-25 PROCEDURE — 85045 AUTOMATED RETICULOCYTE COUNT: CPT

## 2023-03-25 PROCEDURE — 86977 RBC SERUM PRETX INCUBJ/INHIB: CPT | Performed by: INTERNAL MEDICINE

## 2023-03-25 PROCEDURE — 99223 1ST HOSP IP/OBS HIGH 75: CPT | Mod: CR,,, | Performed by: HOSPITALIST

## 2023-03-25 PROCEDURE — 87799 DETECT AGENT NOS DNA QUANT: CPT | Performed by: INTERNAL MEDICINE

## 2023-03-25 PROCEDURE — 86833 HLA CLASS II HIGH DEFIN QUAL: CPT | Performed by: INTERNAL MEDICINE

## 2023-03-25 PROCEDURE — 63600175 PHARM REV CODE 636 W HCPCS

## 2023-03-25 PROCEDURE — 83735 ASSAY OF MAGNESIUM: CPT

## 2023-03-25 PROCEDURE — 20600001 HC STEP DOWN PRIVATE ROOM

## 2023-03-25 PROCEDURE — 36415 COLL VENOUS BLD VENIPUNCTURE: CPT | Performed by: INTERNAL MEDICINE

## 2023-03-25 RX ORDER — DEXTROSE 40 %
15 GEL (GRAM) ORAL
Status: DISCONTINUED | OUTPATIENT
Start: 2023-03-25 | End: 2023-04-03 | Stop reason: HOSPADM

## 2023-03-25 RX ORDER — GLUCAGON 1 MG
1 KIT INJECTION
Status: DISCONTINUED | OUTPATIENT
Start: 2023-03-25 | End: 2023-04-03 | Stop reason: HOSPADM

## 2023-03-25 RX ORDER — PREDNISONE 5 MG/1
5 TABLET ORAL DAILY
Status: DISCONTINUED | OUTPATIENT
Start: 2023-03-25 | End: 2023-03-26

## 2023-03-25 RX ORDER — BENZONATATE 100 MG/1
100 CAPSULE ORAL 3 TIMES DAILY PRN
Status: DISCONTINUED | OUTPATIENT
Start: 2023-03-25 | End: 2023-04-03 | Stop reason: HOSPADM

## 2023-03-25 RX ORDER — SODIUM CHLORIDE 0.9 % (FLUSH) 0.9 %
10 SYRINGE (ML) INJECTION EVERY 12 HOURS PRN
Status: DISCONTINUED | OUTPATIENT
Start: 2023-03-25 | End: 2023-04-03 | Stop reason: HOSPADM

## 2023-03-25 RX ORDER — HEPARIN SODIUM 5000 [USP'U]/ML
5000 INJECTION, SOLUTION INTRAVENOUS; SUBCUTANEOUS EVERY 8 HOURS
Status: DISCONTINUED | OUTPATIENT
Start: 2023-03-25 | End: 2023-04-03 | Stop reason: HOSPADM

## 2023-03-25 RX ORDER — TACROLIMUS 1 MG/1
4 CAPSULE ORAL 2 TIMES DAILY
Status: DISCONTINUED | OUTPATIENT
Start: 2023-03-25 | End: 2023-04-01

## 2023-03-25 RX ORDER — NALOXONE HCL 0.4 MG/ML
0.02 VIAL (ML) INJECTION
Status: DISCONTINUED | OUTPATIENT
Start: 2023-03-25 | End: 2023-04-03 | Stop reason: HOSPADM

## 2023-03-25 RX ORDER — ACETAMINOPHEN 325 MG/1
650 TABLET ORAL EVERY 4 HOURS PRN
Status: DISCONTINUED | OUTPATIENT
Start: 2023-03-25 | End: 2023-04-03 | Stop reason: HOSPADM

## 2023-03-25 RX ORDER — NIFEDIPINE 30 MG/1
30 TABLET, EXTENDED RELEASE ORAL 2 TIMES DAILY
Status: DISCONTINUED | OUTPATIENT
Start: 2023-03-25 | End: 2023-03-27

## 2023-03-25 RX ORDER — DEXTROSE 40 %
30 GEL (GRAM) ORAL
Status: DISCONTINUED | OUTPATIENT
Start: 2023-03-25 | End: 2023-04-03 | Stop reason: HOSPADM

## 2023-03-25 RX ORDER — ONDANSETRON 4 MG/1
4 TABLET, ORALLY DISINTEGRATING ORAL EVERY 8 HOURS PRN
Status: DISCONTINUED | OUTPATIENT
Start: 2023-03-25 | End: 2023-04-03 | Stop reason: HOSPADM

## 2023-03-25 RX ORDER — SODIUM BICARBONATE 650 MG/1
650 TABLET ORAL 3 TIMES DAILY
Status: DISCONTINUED | OUTPATIENT
Start: 2023-03-25 | End: 2023-03-27

## 2023-03-25 RX ORDER — CARVEDILOL 25 MG/1
25 TABLET ORAL 2 TIMES DAILY WITH MEALS
Status: DISCONTINUED | OUTPATIENT
Start: 2023-03-25 | End: 2023-04-03 | Stop reason: HOSPADM

## 2023-03-25 RX ORDER — AMOXICILLIN 250 MG
1 CAPSULE ORAL DAILY PRN
Status: DISCONTINUED | OUTPATIENT
Start: 2023-03-25 | End: 2023-04-03 | Stop reason: HOSPADM

## 2023-03-25 RX ORDER — MINOXIDIL 2.5 MG/1
2.5 TABLET ORAL DAILY
Status: DISCONTINUED | OUTPATIENT
Start: 2023-03-25 | End: 2023-04-03 | Stop reason: HOSPADM

## 2023-03-25 RX ORDER — MYCOPHENOLIC ACID 180 MG/1
360 TABLET, DELAYED RELEASE ORAL 2 TIMES DAILY
Status: DISCONTINUED | OUTPATIENT
Start: 2023-03-25 | End: 2023-03-26

## 2023-03-25 RX ORDER — TALC
6 POWDER (GRAM) TOPICAL NIGHTLY PRN
Status: DISCONTINUED | OUTPATIENT
Start: 2023-03-25 | End: 2023-04-03 | Stop reason: HOSPADM

## 2023-03-25 RX ADMIN — SODIUM BICARBONATE 650 MG: 650 TABLET ORAL at 08:03

## 2023-03-25 RX ADMIN — CARVEDILOL 25 MG: 25 TABLET, FILM COATED ORAL at 05:03

## 2023-03-25 RX ADMIN — TACROLIMUS 4 MG: 1 CAPSULE ORAL at 05:03

## 2023-03-25 RX ADMIN — MINOXIDIL 2.5 MG: 2.5 TABLET ORAL at 08:03

## 2023-03-25 RX ADMIN — SODIUM BICARBONATE 650 MG: 650 TABLET ORAL at 02:03

## 2023-03-25 RX ADMIN — NIFEDIPINE 30 MG: 30 TABLET, FILM COATED, EXTENDED RELEASE ORAL at 01:03

## 2023-03-25 RX ADMIN — ACETAMINOPHEN 650 MG: 325 TABLET ORAL at 11:03

## 2023-03-25 RX ADMIN — CARVEDILOL 25 MG: 25 TABLET, FILM COATED ORAL at 08:03

## 2023-03-25 RX ADMIN — SODIUM BICARBONATE 650 MG: 650 TABLET ORAL at 07:03

## 2023-03-25 RX ADMIN — ACETAMINOPHEN 650 MG: 325 TABLET ORAL at 01:03

## 2023-03-25 RX ADMIN — NIFEDIPINE 30 MG: 30 TABLET, FILM COATED, EXTENDED RELEASE ORAL at 08:03

## 2023-03-25 RX ADMIN — HEPARIN SODIUM 5000 UNITS: 5000 INJECTION INTRAVENOUS; SUBCUTANEOUS at 05:03

## 2023-03-25 RX ADMIN — PREDNISONE 5 MG: 5 TABLET ORAL at 08:03

## 2023-03-25 RX ADMIN — NIFEDIPINE 30 MG: 30 TABLET, FILM COATED, EXTENDED RELEASE ORAL at 07:03

## 2023-03-25 RX ADMIN — SODIUM BICARBONATE: 84 INJECTION, SOLUTION INTRAVENOUS at 12:03

## 2023-03-25 RX ADMIN — MYCOPHENOLIC ACID 360 MG: 180 TABLET, DELAYED RELEASE ORAL at 07:03

## 2023-03-25 RX ADMIN — BENZONATATE 100 MG: 100 CAPSULE ORAL at 07:03

## 2023-03-25 RX ADMIN — HEPARIN SODIUM 5000 UNITS: 5000 INJECTION INTRAVENOUS; SUBCUTANEOUS at 07:03

## 2023-03-25 RX ADMIN — Medication 6 MG: at 01:03

## 2023-03-25 RX ADMIN — SODIUM BICARBONATE: 84 INJECTION, SOLUTION INTRAVENOUS at 09:03

## 2023-03-25 RX ADMIN — HEPARIN SODIUM 5000 UNITS: 5000 INJECTION INTRAVENOUS; SUBCUTANEOUS at 02:03

## 2023-03-25 RX ADMIN — MYCOPHENOLIC ACID 360 MG: 180 TABLET, DELAYED RELEASE ORAL at 08:03

## 2023-03-25 NOTE — ASSESSMENT & PLAN NOTE
Takes minoxidil, coreg, and nifedipine. Home Bps are well controlled around 100-130s systolic mostly.     - restarted home meds.

## 2023-03-25 NOTE — MEDICAL/APP STUDENT
History & Physical  [facility] [place] [facility] MED 3    SUBJECTIVE:   Chief Complaint/Reason for Admission: Abnormal labs    History of Present Illness:  Patient is a 38 y.o. male w/ s/p renal transplant 10/21 (ESRD 2/2 FSGS), HTN, TIAs who presents to [employer] with abnormal lab work of Cr 9.38 (baseline 1.8) and BUN 47 and was transferred here. Reports he missed 2 weeks of myfortic and prograf because of a delay in picking them up from [facility]. Restarted all meds 3/17. He had an episode of facial and lower leg swelling on 3/21 that has resolved. He has good urine output. Denies fevers, chills, chest pain, abdominal pain, dysuria, decreased urinary frequency.     Dr. Ramírez of [facility] accepted transfer due to concerns for acute rejection. Upon admission, HDS with BP 160s/90s. Home systolic BP measurements 100-130s. Labs on admit were significant for Cr 6.8, BUN 39, K 4.9, HCO3 13.         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     Glomerulonephritis     Hypertension     Seizures     Sensorineural hearing loss     Stage 3a chronic kidney disease 10/29/2021    TIA (transient ischemic attack)        Past Surgical History:   Procedure Laterality Date    ABDOMINOPLASTY  10/19/2021    Procedure: ABDOMINOPLASTY;  Surgeon: Ajay Persaud MD;  Location: [address];  Service: Transplant;;    AV FISTULA PLACEMENT Left     CYSTOSCOPY      stent removal    KIDNEY TRANSPLANT N/A 10/19/2021    Procedure: TRANSPLANT, KIDNEY;  Surgeon: Ajay Persaud MD;  Location: [address];  Service: Transplant;  Laterality: N/A;  Out of Ice 0107  Reperfusion 0133      Family History   Problem Relation Age of Onset    Cancer Mother     Hypertension Mother     Heart disease Mother     Kidney disease Father     Hypertension Sister     Seizures Brother     Sickle cell trait Brother     Heart defect Brother     No Known Problems  Sister     Heart disease Maternal Grandmother     Hypertension Maternal Grandmother     Birth defects Maternal Grandmother     Stomach cancer Maternal Grandmother     Heart disease Maternal Grandfather     Hypertension Maternal Grandfather     Birth defects Maternal Grandfather     Lung cancer Maternal Grandfather        Social History     Tobacco Use    Smoking status: Never    Smokeless tobacco: Never   Substance Use Topics    Alcohol use: No    Drug use: No      Review of patient's allergies indicates:  No Known Allergies    No current facility-administered medications on file prior to encounter.     Current Outpatient Medications on File Prior to Encounter   Medication Sig Dispense Refill    aspirin (ECOTRIN) 81 MG EC tablet Take 81 mg by mouth daily as needed.      carvediloL (COREG) 25 MG tablet Take 1 tablet (25 mg total) by mouth 2 (two) times daily with meals. 60 tablet 11    k phos di & mono-sod phos mono (K-PHOS-NEUTRAL) 250 mg Tab Take 3 tablets by mouth 2 (two) times a day. (Patient not taking: Reported on 3/25/2023) 180 tablet 11    minoxidiL (LONITEN) 2.5 MG tablet Take 1 tablet (2.5 mg total) by mouth once daily. 30 tablet 11    mycophenolate (MYFORTIC) 180 MG TbEC Take 2 tablets (360 mg total) by mouth 2 (two) times daily. 120 tablet 11    NIFEdipine (PROCARDIA-XL) 30 MG (OSM) 24 hr tablet Take 1 tablet (30 mg total) by mouth 2 (two) times a day. 60 tablet 11    predniSONE (DELTASONE) 5 MG tablet TAKE ONE TABLET BY MOUTH DAILY 30 tablet 10    tacrolimus (PROGRAF) 1 MG Cap Take 4 capsules (4 mg total) by mouth every 12 (twelve) hours. 240 capsule 11        Review of Systems:  Constitutional: no fever or chills  Eyes: no visual changes  ENT: no nasal congestion or sore throat  Respiratory: positive for cough, negative for dyspnea on exertion, hemoptysis, pleurisy/chest pain, and sputum  Cardiovascular: no chest pain or palpitations  Gastrointestinal: no nausea or vomiting, no abdominal pain or change  in bowel habits  Genitourinary: no hematuria or dysuria  Musculoskeletal: no arthralgias or myalgias  Neurological: no seizures or tremors     OBJECTIVE:     Vital Signs (Most Recent)   Temp: 98 °F (36.7 °C) (03/25/23 0753)  Pulse: 71 (03/25/23 0753)  Resp: 14 (03/25/23 0753)  BP: 136/84 (03/25/23 0753)  SpO2: 97 % (03/25/23 0753)  Body mass index is 24.99 kg/m².      Physical Exam:  General: well developed, well nourished, no distress  HENT: Head:normocephalic, atraumatic. Ears:external ear canals normal. Nose: Nares normal. Septum midline. Mucosa normal. No drainage or sinus tenderness., no discharge. Throat: lips, mucosa, and tongue normal; teeth and gums normal and no throat erythema.  Neck: no adenopathy, supple, symmetrical, trachea midline, no JVD, and thyroid not enlarged, symmetric, no tenderness/mass/nodules  Lungs:  clear to auscultation bilaterally and normal respiratory effort  Cardiovascular: Heart: regular rate and rhythm, S1, S2 normal, no murmur, click, rub or gallop. Chest Wall: no tenderness. Extremities: no cyanosis or edema, or clubbing. Pulses: 2+ and symmetric.  Abdomen/Rectal: Abdomen: soft, non-tender non-distented; bowel sounds normal; no masses,  no organomegaly and RLQ scar from kidney transplant. Rectal: not examined  Skin: Skin color, texture, turgor normal. No rashes or lesions or AV fistulas L forearm  Musculoskeletal:full range of motion of joints: all extremities  Neurologic: Normal strength and tone. No focal numbness or weakness    Laboratory:  CBC/Anemia Labs: Coags:    Recent Labs   Lab 03/25/23  0700   WBC 4.15   HGB 8.3*   HCT 28.0*      MCV 82   RDW 14.0   IRON 68   FERRITIN 510*   RETIC 0.9    No results for input(s): PT, INR, APTT in the last 168 hours.     Chemistries: ABG:   Recent Labs   Lab 03/25/23  0027 03/25/23  0700    140   K 4.9 4.7   * 118*   CO2 13* 14*   BUN 39* 40*   CREATININE 6.8* 6.5*   CALCIUM 8.7 8.6*   PROT  --  6.0   BILITOT  --  0.2  "  ALKPHOS  --  66   ALT  --  27   AST  --  20   MG  --  1.6   PHOS  --  3.9    No results for input(s): PH, PCO2, PO2, HCO3, POCSATURATED, BE in the last 168 hours.         Diagnostic Results:  Labs: Reviewed  ECG: Reviewed  US: Reviewed  Renal US - "Unchanged borderline elevated intrarenal resistive indices with normal waveforms.  Findings remain nonspecific but can be seen in the setting of drug toxicity, rejection, or ATN"      ASSESSMENT/PLAN:     Active Hospital Problems    Diagnosis  POA    *Acute rejection of kidney transplant [T86.11]  Yes    Subungual hematoma of left thumb [S60.112A]  Yes    Stage 3a chronic kidney disease [N18.31]  Yes     Chronic    S/P kidney transplant 10/19/21 [Z94.0]  Not Applicable     Chronic    Long-term use of immunosuppressant medication [Z79.60]  Not Applicable     Chronic    Anemia of chronic disease [D63.8]  Yes     Chronic    Renovascular hypertension [I15.0]  Yes     Chronic    Hx-TIA (transient ischemic attack) [Z86.73]  Not Applicable     Chronic    Hx of seizure disorder [Z86.69]  Not Applicable     Chronic      Resolved Hospital Problems   No resolved problems to display.     Acute rejection  KTM team consulted and following closely. Concern for rejection due to period of missed immunosuppression and Cr 9.38 at outside labs. Renal US concerning for rejection. Cr improving to 6.5. Immunosuppressive regimen: prednisone 5mg, mycophenolate 360mg BID, tacro 4mg BID.     Plan:  - KTM team consulted   - HLA donor specific antibodies (DSAs)   - plan for kidney biopsy possibly Monday 3/27   - cont home immunosuppressive regimen   - D5 with sodium bicarb  - fu tacro levels    Hypertension  Home sbps 100-130s, 160s on admission. Home meds: coreg, minoxidil, nifedipine.  Plan:  -start home meds    DVT ppx- low risk  CODE status- Full    Dispo-    Li Aguirre, MS4   [employer]       "

## 2023-03-25 NOTE — NURSING
IM3 on call resident notified of pt's c/o of dry cough causing sore throat. She will review chart and place appropriate order.

## 2023-03-25 NOTE — PHARMACY MED REC
"Admission Medication History     The home medication history was taken by Comfort Juarez.    You may go to "Admission" then "Reconcile Home Medications" tabs to review and/or act upon these items.     The home medication list has been updated by the Pharmacy department.   Please read ALL comments highlighted in yellow.   Please address this information as you see fit.    Feel free to contact us if you have any questions or require assistance.          Medications listed below were obtained from: Patient/family, [employer], and Patient's pharmacy  PTA Medications   Medication Sig    aspirin (ECOTRIN) 81 MG EC tablet Take 81 mg by mouth daily as needed.    carvediloL (COREG) 25 MG tablet Take 1 tablet (25 mg total) by mouth 2 (two) times daily with meals.    k phos di & mono-sod phos mono (K-PHOS-NEUTRAL) 250 mg Tab Take 3 tablets by mouth 2 (two) times a day. (Patient not taking: Reported on 3/25/2023)    minoxidiL (LONITEN) 2.5 MG tablet Take 1 tablet (2.5 mg total) by mouth once daily.    mycophenolate (MYFORTIC) 180 MG TbEC Take 2 tablets (360 mg total) by mouth 2 (two) times daily.    NIFEdipine (PROCARDIA-XL) 30 MG (OSM) 24 hr tablet Take 1 tablet (30 mg total) by mouth 2 (two) times a day.    predniSONE (DELTASONE) 5 MG tablet TAKE ONE TABLET BY MOUTH DAILY    tacrolimus (PROGRAF) 1 MG Cap Take 4 capsules (4 mg total) by mouth every 12 (twelve) hours.       Potential issues to be addressed PRIOR TO DISCHARGE  Patient reported not taking the following medications: (k-phos-neutral 250 mg ). These medications remain on the home medication list. Please address accordingly.     Comfort Juarez  EXT [id number]                  .        "
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
## 2023-03-25 NOTE — HPI
39 y/o male with PMH of kidney transplant in [id number] (previously had ESRD 2/2 FSGS) and HTN who is transferred for abnormal bloodwork.His creatinine increased to 9.38, BUN 43. Repeat Cr 8.47, BUN 47. K 5, bicarb 17. Patient is on prograf, myfortic, and prednisone and has been out of myfortiq and tacrolimus for 2 weeks. He has been adherent with his BP meds and prednsone. He fills all of his medications at [facility] other than tacrolimus and myfortic, which are filled at [facility]. The medications are usually mailed out to him, but when he called the line was busy. So he came to pick them up in person. He was unable to drive here earlier due to work obligations, thus missing doses for about 2 weeks. Reports good urine output, no urinary symptoms. Home blood pressures are 100-130s systolic with occasional values in 150s per his home BP cuff reads.     Case was discussed with Dr. Madden of [facility] and agreed for patient to transfer for concerns for rejection. Vitals stable upon transfer other than hypertension up to 165/94. Admitted due to concern for acute renal transplant rejection and KTM input. Initial labs significant for K 4.9, Cr 6.8, BUN 39, bicarb 13.

## 2023-03-25 NOTE — PLAN OF CARE
Pt aao x4. Call bell within reach. He is up independent. He is legally deaf but can read lips. KTM following. Restarting prograf. Sodium bicarb gtt at 100ml/hr x 24 hours. Kidney ultrasound done this am. Possible kidney biopsy Monday. He voiced understanding of plan of care.

## 2023-03-25 NOTE — ASSESSMENT & PLAN NOTE
Patient reports jamming thumb, no pain. Hematoma has been present for a month. Lower concern for subungual melanoma given preceding trauma.

## 2023-03-25 NOTE — SUBJECTIVE & OBJECTIVE
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     Glomerulonephritis     Hypertension     Seizures     Sensorineural hearing loss     Stage 3a chronic kidney disease 10/29/2021    TIA (transient ischemic attack)        Past Surgical History:   Procedure Laterality Date    ABDOMINOPLASTY  10/19/2021    Procedure: ABDOMINOPLASTY;  Surgeon: Ajay Persaud MD;  Location: [facility] OR [address] [place] [address];  Service: Transplant;;    AV FISTULA PLACEMENT Left     CYSTOSCOPY      stent removal    KIDNEY TRANSPLANT N/A 10/19/2021    Procedure: TRANSPLANT, KIDNEY;  Surgeon: Ajay Persaud MD;  Location: [facility] OR [address] [place] [address];  Service: Transplant;  Laterality: N/A;  Out of Ice 0107  Reperfusion 0133       Review of patient's allergies indicates:  No Known Allergies    No current facility-administered medications on file prior to encounter.     Current Outpatient Medications on File Prior to Encounter   Medication Sig    aspirin (ECOTRIN) 81 MG EC tablet Take 81 mg by mouth daily as needed.    carvediloL (COREG) 25 MG tablet Take 1 tablet (25 mg total) by mouth 2 (two) times daily with meals.    k phos di & mono-sod phos mono (K-PHOS-NEUTRAL) 250 mg Tab Take 3 tablets by mouth 2 (two) times a day.    minoxidiL (LONITEN) 2.5 MG tablet Take 1 tablet (2.5 mg total) by mouth once daily.    mycophenolate (MYFORTIC) 180 MG TbEC Take 2 tablets (360 mg total) by mouth 2 (two) times daily.    NIFEdipine (PROCARDIA-XL) 30 MG (OSM) 24 hr tablet Take 1 tablet (30 mg total) by mouth 2 (two) times a day. (Patient taking differently: Take 30 mg by mouth 2 (two) times a day.)    predniSONE (DELTASONE) 5 MG tablet TAKE ONE TABLET BY MOUTH DAILY    tacrolimus (PROGRAF) 1 MG Cap Take 4 capsules (4 mg total) by mouth every 12 (twelve) hours.     Family History       Problem Relation (Age of Onset)    Birth defects Maternal Grandmother, Maternal  Grandfather    Cancer Mother    Heart defect Brother    Heart disease Mother, Maternal Grandmother, Maternal Grandfather    Hypertension Mother, Sister, Maternal Grandmother, Maternal Grandfather    Kidney disease Father    Lung cancer Maternal Grandfather    No Known Problems Sister    Seizures Brother    Sickle cell trait Brother    Stomach cancer Maternal Grandmother          Tobacco Use    Smoking status: Never    Smokeless tobacco: Never   Substance and Sexual Activity    Alcohol use: No    Drug use: No    Sexual activity: Not on file     Review of Systems   Constitutional:  Positive for appetite change. Negative for chills and fever.   HENT:  Negative for congestion and rhinorrhea.    Eyes:  Negative for photophobia and visual disturbance.   Respiratory:  Negative for cough and shortness of breath.    Cardiovascular:  Negative for chest pain, palpitations and leg swelling.   Gastrointestinal:  Positive for nausea. Negative for abdominal distention, abdominal pain, blood in stool, constipation, diarrhea and vomiting.   Genitourinary:  Negative for dysuria, flank pain, hematuria and urgency.   Musculoskeletal:  Negative for arthralgias and back pain.   Allergic/Immunologic: Positive for immunocompromised state.   Neurological:  Positive for headaches. Negative for dizziness and weakness.   Psychiatric/Behavioral:  Negative for agitation and behavioral problems.    Objective:     Vital Signs (Most Recent):  Temp: 99.1 °F (37.3 °C) (03/24/23 2230)  Pulse: 83 (03/24/23 2230)  Resp: 17 (03/24/23 2230)  BP: (!) 165/94 (03/24/23 2230)  SpO2: 98 % (03/24/23 2230)   Vital Signs (24h Range):  Temp:  [99.1 °F (37.3 °C)] 99.1 °F (37.3 °C)  Pulse:  [83] 83  Resp:  [17] 17  SpO2:  [98 %] 98 %  BP: (165)/(94) 165/94     Weight: 79 kg (174 lb 2.6 oz)  Body mass index is 24.99 kg/m².    Physical Exam  Constitutional:       Appearance: Normal appearance.   HENT:      Head: Normocephalic and atraumatic.      Nose: Nose normal.       Mouth/Throat:      Mouth: Mucous membranes are moist.   Eyes:      General: No scleral icterus.     Extraocular Movements: Extraocular movements intact.      Conjunctiva/sclera: Conjunctivae normal.   Cardiovascular:      Rate and Rhythm: Normal rate and regular rhythm.      Pulses: Normal pulses.      Heart sounds: Murmur heard.   Pulmonary:      Effort: Pulmonary effort is normal. No respiratory distress.      Breath sounds: Normal breath sounds. No wheezing or rales.   Abdominal:      General: Bowel sounds are normal. There is no distension.      Palpations: Abdomen is soft. There is no mass.      Tenderness: There is no abdominal tenderness. There is no guarding.   Musculoskeletal:         General: No swelling, deformity or signs of injury. Normal range of motion.      Cervical back: Normal range of motion. No rigidity.   Skin:     General: Skin is warm and dry.      Coloration: Skin is not jaundiced.      Findings: No bruising.      Comments: Subungual hematoma on left thumb   Neurological:      General: No focal deficit present.      Mental Status: He is alert and oriented to person, place, and time.      Motor: No weakness.   Psychiatric:         Mood and Affect: Mood normal.         Behavior: Behavior normal.         Thought Content: Thought content normal.           Significant Labs: All pertinent labs within the past 24 hours have been reviewed.  BMP:   Recent Labs   Lab 03/25/23  0027   GLU 92      K 4.9   *   CO2 13*   BUN 39*   CREATININE 6.8*   CALCIUM 8.7       Significant Imaging: I have reviewed all pertinent imaging results/findings within the past 24 hours.

## 2023-03-25 NOTE — ASSESSMENT & PLAN NOTE
Hb normally 9-10. Hb on 3/22 was 8.6. MCV was 79    - daily CBC  - transfuse for Hb<7  - f/u iron labs and retic

## 2023-03-25 NOTE — ASSESSMENT & PLAN NOTE
Patient has been out of tacrolimus and myfortic for 2 weeks. Cr with steep increase in labs from 3/22. On outside labs Cr 8.47, BUN 47, up from baseline Cr 1.9. Concern for acute rejection. Still having good urine output. Bicarb decreased to 13 on admission, potassium high-normal. Patient reports mild loss of appetite and nausea.     - KTM consulted, appreciate recs.   - follow up tacro level  - restarted prednisone and mycophenolate

## 2023-03-25 NOTE — H&P
[facility] - Transplant [facility] Medicine  History & Physical    Patient Name: Feliz Trevizo  MRN: [id number]  Patient Class: IP- Inpatient  Admission Date: 3/24/2023  Attending Physician: Bertin Tate MD   Primary Care Provider: Primary Doctor No         Patient information was obtained from patient, past medical records and ER records.     Subjective:     Principal Problem:Acute rejection of kidney transplant    Chief Complaint: No chief complaint on file.       HPI: 37 y/o male with PMH of kidney transplant in [id number] (previously had ESRD 2/2 FSGS) and HTN who is transferred for abnormal bloodwork.His creatinine increased to 9.38, BUN 43. Repeat Cr 8.47, BUN 47. K 5, bicarb 17. Patient is on prograf, myfortic, and prednisone and has been out of myfortiq and tacrolimus for 2 weeks. He has been adherent with his BP meds and prednsone. He fills all of his medications at [facility] other than tacor and myfortic, which are filled at [facility]. The medications are usually mailed out to him, but when he called the line was busy. So he came to pick them up in person. He was unable to drive here earlier due to work obligations, thus missing doses for about 2 weeks. Reports good urine output, no urinary symptoms. Home blood pressures are 100-130s systolic with occasional values in 150s per his home BP cuff reads.     Case was discussed with Dr. Madden of [facility] and agreed for patient to transfer for concerns for rejection. Vitals stable upon transfer other than hypertension up to 165/94. Admitted due to concern for acute renal transplant rejection and KTM input. Initial labs significant for K 4.9, Cr 6.8, BUN 39, bicarb 13.             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     Glomerulonephritis     Hypertension     Seizures     Sensorineural hearing loss      Stage 3a chronic kidney disease 10/29/2021    TIA (transient ischemic attack)        Past Surgical History:   Procedure Laterality Date    ABDOMINOPLASTY  10/19/2021    Procedure: ABDOMINOPLASTY;  Surgeon: Ajay Persaud MD;  Location: [facility] OR [address] [place] [address];  Service: Transplant;;    AV FISTULA PLACEMENT Left     CYSTOSCOPY      stent removal    KIDNEY TRANSPLANT N/A 10/19/2021    Procedure: TRANSPLANT, KIDNEY;  Surgeon: Ajay Persaud MD;  Location: [facility] OR [address] [place] [address];  Service: Transplant;  Laterality: N/A;  Out of Ice 0107  Reperfusion 0133       Review of patient's allergies indicates:  No Known Allergies    No current facility-administered medications on file prior to encounter.     Current Outpatient Medications on File Prior to Encounter   Medication Sig    aspirin (ECOTRIN) 81 MG EC tablet Take 81 mg by mouth daily as needed.    carvediloL (COREG) 25 MG tablet Take 1 tablet (25 mg total) by mouth 2 (two) times daily with meals.    k phos di & mono-sod phos mono (K-PHOS-NEUTRAL) 250 mg Tab Take 3 tablets by mouth 2 (two) times a day.    minoxidiL (LONITEN) 2.5 MG tablet Take 1 tablet (2.5 mg total) by mouth once daily.    mycophenolate (MYFORTIC) 180 MG TbEC Take 2 tablets (360 mg total) by mouth 2 (two) times daily.    NIFEdipine (PROCARDIA-XL) 30 MG (OSM) 24 hr tablet Take 1 tablet (30 mg total) by mouth 2 (two) times a day. (Patient taking differently: Take 30 mg by mouth 2 (two) times a day.)    predniSONE (DELTASONE) 5 MG tablet TAKE ONE TABLET BY MOUTH DAILY    tacrolimus (PROGRAF) 1 MG Cap Take 4 capsules (4 mg total) by mouth every 12 (twelve) hours.     Family History       Problem Relation (Age of Onset)    Birth defects Maternal Grandmother, Maternal Grandfather    Cancer Mother    Heart defect Brother    Heart disease Mother, Maternal Grandmother, Maternal Grandfather    Hypertension Mother, Sister, Maternal Grandmother, Maternal Grandfather    Kidney disease Father    Lung cancer  Maternal Grandfather    No Known Problems Sister    Seizures Brother    Sickle cell trait Brother    Stomach cancer Maternal Grandmother          Tobacco Use    Smoking status: Never    Smokeless tobacco: Never   Substance and Sexual Activity    Alcohol use: No    Drug use: No    Sexual activity: Not on file     Review of Systems   Constitutional:  Positive for appetite change. Negative for chills and fever.   HENT:  Negative for congestion and rhinorrhea.    Eyes:  Negative for photophobia and visual disturbance.   Respiratory:  Negative for cough and shortness of breath.    Cardiovascular:  Negative for chest pain, palpitations and leg swelling.   Gastrointestinal:  Positive for nausea. Negative for abdominal distention, abdominal pain, blood in stool, constipation, diarrhea and vomiting.   Genitourinary:  Negative for dysuria, flank pain, hematuria and urgency.   Musculoskeletal:  Negative for arthralgias and back pain.   Allergic/Immunologic: Positive for immunocompromised state.   Neurological:  Positive for headaches. Negative for dizziness and weakness.   Psychiatric/Behavioral:  Negative for agitation and behavioral problems.    Objective:     Vital Signs (Most Recent):  Temp: 99.1 °F (37.3 °C) (03/24/23 2230)  Pulse: 83 (03/24/23 2230)  Resp: 17 (03/24/23 2230)  BP: (!) 165/94 (03/24/23 2230)  SpO2: 98 % (03/24/23 2230)   Vital Signs (24h Range):  Temp:  [99.1 °F (37.3 °C)] 99.1 °F (37.3 °C)  Pulse:  [83] 83  Resp:  [17] 17  SpO2:  [98 %] 98 %  BP: (165)/(94) 165/94     Weight: 79 kg (174 lb 2.6 oz)  Body mass index is 24.99 kg/m².    Physical Exam  Constitutional:       Appearance: Normal appearance.   HENT:      Head: Normocephalic and atraumatic.      Nose: Nose normal.      Mouth/Throat:      Mouth: Mucous membranes are moist.   Eyes:      General: No scleral icterus.     Extraocular Movements: Extraocular movements intact.      Conjunctiva/sclera: Conjunctivae normal.   Cardiovascular:      Rate  and Rhythm: Normal rate and regular rhythm.      Pulses: Normal pulses.      Heart sounds: Murmur heard.   Pulmonary:      Effort: Pulmonary effort is normal. No respiratory distress.      Breath sounds: Normal breath sounds. No wheezing or rales.   Abdominal:      General: Bowel sounds are normal. There is no distension.      Palpations: Abdomen is soft. There is no mass.      Tenderness: There is no abdominal tenderness. There is no guarding.   Musculoskeletal:         General: No swelling, deformity or signs of injury. Normal range of motion.      Cervical back: Normal range of motion. No rigidity.   Skin:     General: Skin is warm and dry.      Coloration: Skin is not jaundiced.      Findings: No bruising.      Comments: Subungual hematoma on left thumb   Neurological:      General: No focal deficit present.      Mental Status: He is alert and oriented to person, place, and time.      Motor: No weakness.   Psychiatric:         Mood and Affect: Mood normal.         Behavior: Behavior normal.         Thought Content: Thought content normal.           Significant Labs: All pertinent labs within the past 24 hours have been reviewed.  BMP:   Recent Labs   Lab 03/25/23  0027   GLU 92      K 4.9   *   CO2 13*   BUN 39*   CREATININE 6.8*   CALCIUM 8.7       Significant Imaging: I have reviewed all pertinent imaging results/findings within the past 24 hours.    Assessment/Plan:     * Acute rejection of kidney transplant  Patient has been out of tacrolimus and myfortic for 2 weeks. Cr with steep increase in labs from 3/22. On outside labs Cr 8.47, BUN 47, up from baseline Cr 1.9. Concern for acute rejection. Still having good urine output. Bicarb decreased to 13 on admission, potassium high-normal. Patient reports mild loss of appetite and nausea.     - KTM consulted, appreciate recs.   - follow up tacro level  - restarted prednisone and mycophenolate      Subungual hematoma of left thumb  Patient reports  jamming thumb, no pain. Hematoma has been present for a month. Lower concern for subungual melanoma given preceding trauma.       Stage 3a chronic kidney disease  Baseline Cr 1.9      Anemia of chronic disease  Hb normally 9-10. Hb on 3/22 was 8.6. MCV was 79    - daily CBC  - transfuse for Hb<7  - f/u iron labs and retic      Long-term use of immunosuppressant medication  Takes prednisone, tacrolimus, mycophenolate.       S/P kidney transplant 10/19/21  S/p renal transplant      Hx of seizure disorder  Remote history in childhood, no home AEDs      Renovascular hypertension  Takes minoxidil, coreg, and nifedipine. Home Bps are well controlled around 100-130s systolic mostly.     - restarted home meds.     VTE Risk Mitigation (From admission, onward)         Ordered     heparin (porcine) injection 5,000 Units  Every 8 hours         03/25/23 0047     IP VTE LOW RISK PATIENT  Once         03/25/23 0013     Place sequential compression device  Until discontinued         03/25/23 0013                           Rene Zepeda MD  Department of Hospital Medicine  [facility] - Transplant Stepdown

## 2023-03-25 NOTE — PROGRESS NOTES
Fall bundle in place. Pt. Remained free from falls/trauma/injuries. No complaints of CP/ SOB/discomfort. VSS. NSR. A&Ox4. Pt. Reports pain this shift, given PRN Tylenol for headache. Pt. Also received melatonin for sleep aid. Pts' Bps slightly elevated upon admit, but trended down after receiving Nifedipine. EKG obtained. Cr. Trending down. The POC explained, questions were encouraged & answered. No further concerns at this time.     lip

## 2023-03-25 NOTE — NURSING TRANSFER
Nursing Transfer Note      03/24/2023     Reason patient is being transferred: kidney failure    Transfer From: [facility] To: [facility] _U    Transfer via stretcher    Transfer with patient belongings & home meds.    Transported by EMTs.     Medicines sent: yes.    Any special needs or follow-up needed: hearing impairment     Chart send with patient: Yes    Notified: Uncle.    Patient reassessed at: 03/24/23, 2237.     Upon arrival to floor: patient oriented to room, call bell in reach, and bed in lowest position. VSS upon admit.

## 2023-03-26 PROBLEM — U07.1 COVID: Status: ACTIVE | Noted: 2023-03-26

## 2023-03-26 LAB
ALBUMIN SERPL BCP-MCNC: 3 G/DL (ref 3.5–5.2)
ALP SERPL-CCNC: 60 U/L (ref 55–135)
ALT SERPL W/O P-5'-P-CCNC: 28 U/L (ref 10–44)
ANION GAP SERPL CALC-SCNC: 6 MMOL/L (ref 8–16)
AST SERPL-CCNC: 20 U/L (ref 10–40)
BASOPHILS # BLD AUTO: 0.02 K/UL (ref 0–0.2)
BASOPHILS NFR BLD: 0.4 % (ref 0–1.9)
BILIRUB SERPL-MCNC: 0.3 MG/DL (ref 0.1–1)
BUN SERPL-MCNC: 42 MG/DL (ref 6–20)
CALCIUM SERPL-MCNC: 8.5 MG/DL (ref 8.7–10.5)
CHLORIDE SERPL-SCNC: 113 MMOL/L (ref 95–110)
CO2 SERPL-SCNC: 21 MMOL/L (ref 23–29)
CREAT SERPL-MCNC: 6.1 MG/DL (ref 0.5–1.4)
DIFFERENTIAL METHOD: ABNORMAL
EOSINOPHIL # BLD AUTO: 0.1 K/UL (ref 0–0.5)
EOSINOPHIL NFR BLD: 1.2 % (ref 0–8)
ERYTHROCYTE [DISTWIDTH] IN BLOOD BY AUTOMATED COUNT: 13.8 % (ref 11.5–14.5)
EST. GFR  (NO RACE VARIABLE): 11.1 ML/MIN/1.73 M^2
GLUCOSE SERPL-MCNC: 115 MG/DL (ref 70–110)
HCT VFR BLD AUTO: 27.7 % (ref 40–54)
HGB BLD-MCNC: 8.1 G/DL (ref 14–18)
IMM GRANULOCYTES # BLD AUTO: 0.03 K/UL (ref 0–0.04)
IMM GRANULOCYTES NFR BLD AUTO: 0.5 % (ref 0–0.5)
LYMPHOCYTES # BLD AUTO: 0.5 K/UL (ref 1–4.8)
LYMPHOCYTES NFR BLD: 8.9 % (ref 18–48)
MAGNESIUM SERPL-MCNC: 1.5 MG/DL (ref 1.6–2.6)
MCH RBC QN AUTO: 24 PG (ref 27–31)
MCHC RBC AUTO-ENTMCNC: 29.2 G/DL (ref 32–36)
MCV RBC AUTO: 82 FL (ref 82–98)
MONOCYTES # BLD AUTO: 0.8 K/UL (ref 0.3–1)
MONOCYTES NFR BLD: 14.2 % (ref 4–15)
NEUTROPHILS # BLD AUTO: 4.2 K/UL (ref 1.8–7.7)
NEUTROPHILS NFR BLD: 74.8 % (ref 38–73)
NRBC BLD-RTO: 0 /100 WBC
PHOSPHATE SERPL-MCNC: 2.3 MG/DL (ref 2.7–4.5)
PLATELET # BLD AUTO: 282 K/UL (ref 150–450)
PMV BLD AUTO: 11 FL (ref 9.2–12.9)
POTASSIUM SERPL-SCNC: 4.3 MMOL/L (ref 3.5–5.1)
PROT SERPL-MCNC: 6.1 G/DL (ref 6–8.4)
RBC # BLD AUTO: 3.38 M/UL (ref 4.6–6.2)
SARS-COV-2 RDRP RESP QL NAA+PROBE: POSITIVE
SODIUM SERPL-SCNC: 140 MMOL/L (ref 136–145)
WBC # BLD AUTO: 5.62 K/UL (ref 3.9–12.7)

## 2023-03-26 PROCEDURE — 80053 COMPREHEN METABOLIC PANEL: CPT

## 2023-03-26 PROCEDURE — 99233 SBSQ HOSP IP/OBS HIGH 50: CPT | Mod: CR,,, | Performed by: HOSPITALIST

## 2023-03-26 PROCEDURE — 36415 COLL VENOUS BLD VENIPUNCTURE: CPT

## 2023-03-26 PROCEDURE — 63600175 PHARM REV CODE 636 W HCPCS

## 2023-03-26 PROCEDURE — 25000003 PHARM REV CODE 250

## 2023-03-26 PROCEDURE — 99233 PR SUBSEQUENT HOSPITAL CARE,LEVL III: ICD-10-PCS | Mod: CR,,, | Performed by: HOSPITALIST

## 2023-03-26 PROCEDURE — 99900035 HC TECH TIME PER 15 MIN (STAT)

## 2023-03-26 PROCEDURE — 27000207 HC ISOLATION

## 2023-03-26 PROCEDURE — 63600175 PHARM REV CODE 636 W HCPCS: Mod: JZ,TB

## 2023-03-26 PROCEDURE — 87040 BLOOD CULTURE FOR BACTERIA: CPT | Mod: 59 | Performed by: HOSPITALIST

## 2023-03-26 PROCEDURE — 84100 ASSAY OF PHOSPHORUS: CPT

## 2023-03-26 PROCEDURE — 85025 COMPLETE CBC W/AUTO DIFF WBC: CPT

## 2023-03-26 PROCEDURE — 94640 AIRWAY INHALATION TREATMENT: CPT

## 2023-03-26 PROCEDURE — 25000242 PHARM REV CODE 250 ALT 637 W/ HCPCS: Performed by: STUDENT IN AN ORGANIZED HEALTH CARE EDUCATION/TRAINING PROGRAM

## 2023-03-26 PROCEDURE — 83735 ASSAY OF MAGNESIUM: CPT

## 2023-03-26 PROCEDURE — 94761 N-INVAS EAR/PLS OXIMETRY MLT: CPT

## 2023-03-26 PROCEDURE — 20600001 HC STEP DOWN PRIVATE ROOM

## 2023-03-26 PROCEDURE — 63600175 PHARM REV CODE 636 W HCPCS: Performed by: STUDENT IN AN ORGANIZED HEALTH CARE EDUCATION/TRAINING PROGRAM

## 2023-03-26 PROCEDURE — 25000003 PHARM REV CODE 250: Performed by: STUDENT IN AN ORGANIZED HEALTH CARE EDUCATION/TRAINING PROGRAM

## 2023-03-26 PROCEDURE — 63600175 PHARM REV CODE 636 W HCPCS: Performed by: INTERNAL MEDICINE

## 2023-03-26 RX ORDER — ALBUTEROL SULFATE 90 UG/1
2 AEROSOL, METERED RESPIRATORY (INHALATION) EVERY 6 HOURS
Status: DISCONTINUED | OUTPATIENT
Start: 2023-03-26 | End: 2023-03-31

## 2023-03-26 RX ORDER — MAGNESIUM SULFATE HEPTAHYDRATE 40 MG/ML
2 INJECTION, SOLUTION INTRAVENOUS ONCE
Status: COMPLETED | OUTPATIENT
Start: 2023-03-26 | End: 2023-03-26

## 2023-03-26 RX ORDER — ASCORBIC ACID 500 MG
500 TABLET ORAL 2 TIMES DAILY
Status: DISCONTINUED | OUTPATIENT
Start: 2023-03-26 | End: 2023-04-03 | Stop reason: HOSPADM

## 2023-03-26 RX ADMIN — CARVEDILOL 25 MG: 25 TABLET, FILM COATED ORAL at 08:03

## 2023-03-26 RX ADMIN — MYCOPHENOLIC ACID 360 MG: 180 TABLET, DELAYED RELEASE ORAL at 09:03

## 2023-03-26 RX ADMIN — DEXAMETHASONE 6 MG: 4 TABLET ORAL at 12:03

## 2023-03-26 RX ADMIN — ALBUTEROL SULFATE 2 PUFF: 108 INHALANT RESPIRATORY (INHALATION) at 09:03

## 2023-03-26 RX ADMIN — MINOXIDIL 2.5 MG: 2.5 TABLET ORAL at 09:03

## 2023-03-26 RX ADMIN — SODIUM BICARBONATE 650 MG: 650 TABLET ORAL at 08:03

## 2023-03-26 RX ADMIN — Medication 500 MG: at 08:03

## 2023-03-26 RX ADMIN — Medication 500 MG: at 12:03

## 2023-03-26 RX ADMIN — TACROLIMUS 4 MG: 1 CAPSULE ORAL at 05:03

## 2023-03-26 RX ADMIN — ALBUTEROL SULFATE 2 PUFF: 108 INHALANT RESPIRATORY (INHALATION) at 12:03

## 2023-03-26 RX ADMIN — MAGNESIUM SULFATE 2 G: 2 INJECTION INTRAVENOUS at 09:03

## 2023-03-26 RX ADMIN — HEPARIN SODIUM 5000 UNITS: 5000 INJECTION INTRAVENOUS; SUBCUTANEOUS at 10:03

## 2023-03-26 RX ADMIN — HEPARIN SODIUM 5000 UNITS: 5000 INJECTION INTRAVENOUS; SUBCUTANEOUS at 06:03

## 2023-03-26 RX ADMIN — PREDNISONE 5 MG: 5 TABLET ORAL at 09:03

## 2023-03-26 RX ADMIN — NIFEDIPINE 30 MG: 30 TABLET, FILM COATED, EXTENDED RELEASE ORAL at 09:03

## 2023-03-26 RX ADMIN — TACROLIMUS 4 MG: 1 CAPSULE ORAL at 08:03

## 2023-03-26 RX ADMIN — CARVEDILOL 25 MG: 25 TABLET, FILM COATED ORAL at 05:03

## 2023-03-26 RX ADMIN — HEPARIN SODIUM 5000 UNITS: 5000 INJECTION INTRAVENOUS; SUBCUTANEOUS at 02:03

## 2023-03-26 RX ADMIN — NIFEDIPINE 30 MG: 30 TABLET, FILM COATED, EXTENDED RELEASE ORAL at 08:03

## 2023-03-26 RX ADMIN — SODIUM BICARBONATE 650 MG: 650 TABLET ORAL at 09:03

## 2023-03-26 RX ADMIN — REMDESIVIR 200 MG: 100 INJECTION, POWDER, LYOPHILIZED, FOR SOLUTION INTRAVENOUS at 06:03

## 2023-03-26 RX ADMIN — THERA TABS 1 TABLET: TAB at 12:03

## 2023-03-26 RX ADMIN — SODIUM BICARBONATE 650 MG: 650 TABLET ORAL at 03:03

## 2023-03-26 NOTE — ASSESSMENT & PLAN NOTE
Patient is identified as Severe COVID-19 based on hypoxemia with O2 saturations <94% on room air or on ambulation   Initiate standard COVID protocols; COVID-19 testing ,Infection Control notification  and isolation- respiratory, contact and droplet per protocol    Diagnostics: (leukopenia, hyponatremia, hyperferritinemia, elevated troponin, elevated d-dimer, age, and comorbidities are significant predictors of poor clinical outcome)  CBC, CMP, Ferritin, CRP and Portable CXR    Management: Initiate targeted therapy with Remdesivir, 200mg IV x1, followed by 100mg IV daily x5 days total and Dexamethasone PO/IV 6mg daily x10 days, Maintain oxygen saturations 92-96% via Nasal Cannula  LPM and monitor with continuous/intermittent pulse oximetry.  and Inhaled bronchodilators as needed for shortness of breath.    New infiltrates on CXR. SpO2 93-4% on RA and associated symptoms of HA, sore throat, chills and fever.

## 2023-03-26 NOTE — H&P
Radiology History & Physical      SUBJECTIVE:     History of Present Illness:  Feliz Trevizo is a 38 y.o. male with history of kidney transplant admitted with concern for transplant rejection. IR consulted for percutaneous transplant kidney biopsy.     Admission H&P reviewed.    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     Glomerulonephritis     Hypertension     Seizures     Sensorineural hearing loss     Stage 3a chronic kidney disease 10/29/2021    TIA (transient ischemic attack)      Past Surgical History:   Procedure Laterality Date    ABDOMINOPLASTY  10/19/2021    Procedure: ABDOMINOPLASTY;  Surgeon: Ajay Persaud MD;  Location: [facility] OR [address] [place] [address];  Service: Transplant;;    AV FISTULA PLACEMENT Left     CYSTOSCOPY      stent removal    KIDNEY TRANSPLANT N/A 10/19/2021    Procedure: TRANSPLANT, KIDNEY;  Surgeon: Ajay Persaud MD;  Location: [facility] OR [address] [place] [address];  Service: Transplant;  Laterality: N/A;  Out of Ice 0107  Reperfusion 0133       Home Meds:   Prior to Admission medications    Medication Sig Start Date End Date Taking? Authorizing Provider   aspirin (ECOTRIN) 81 MG EC tablet Take 81 mg by mouth daily as needed.    Historical Provider   carvediloL (COREG) 25 MG tablet Take 1 tablet (25 mg total) by mouth 2 (two) times daily with meals. 10/19/21   Ajay Persaud MD   k phos di & mono-sod phos mono (K-PHOS-NEUTRAL) 250 mg Tab Take 3 tablets by mouth 2 (two) times a day.  Patient not taking: Reported on 3/25/2023 11/16/21 11/16/22  Dayanara Acevedo MD   minoxidiL (LONITEN) 2.5 MG tablet Take 1 tablet (2.5 mg total) by mouth once daily. 4/22/22 4/22/23  Jay Velasco MD   mycophenolate (MYFORTIC) 180 MG TbEC Take 2 tablets (360 mg total) by mouth 2 (two) times daily. 5/2/22 5/2/23  Chelsie Arriola NP   NIFEdipine (PROCARDIA-XL) 30 MG (OSM) 24 hr tablet Take 1 tablet  (30 mg total) by mouth 2 (two) times a day. 10/22/21 10/24/22  Jay Velasco MD   predniSONE (DELTASONE) 5 MG tablet TAKE ONE TABLET BY MOUTH DAILY 2/14/23   Jay Velasco MD   tacrolimus (PROGRAF) 1 MG Cap Take 4 capsules (4 mg total) by mouth every 12 (twelve) hours. 4/14/22 4/16/23  Leena Castillo MD     Anticoagulants/Antiplatelets:  heparin DVT ppx    Allergies: Review of patient's allergies indicates:  No Known Allergies  Sedation History:  no adverse reactions    Review of Systems:   Hematological: no known coagulopathies  Respiratory: no shortness of breath  Cardiovascular: no chest pain  Gastrointestinal: no abdominal pain  Genito-Urinary: no dysuria  Musculoskeletal: negative  Neurological: no TIA or stroke symptoms         OBJECTIVE:     Vital Signs (Most Recent)  Temp: 98.8 °F (37.1 °C) (03/26/23 1620)  Pulse: 78 (03/26/23 1620)  Resp: 18 (03/26/23 1620)  BP: (!) 154/90 (03/26/23 1620)  SpO2: 95 % (03/26/23 1620)    Physical Exam:  ASA: II  Mallampati: II    General: no acute distress  Mental Status: alert and oriented to person, place and time  HEENT: normocephalic, atraumatic  Chest: unlabored breathing  Heart: regular heart rate  Abdomen: nondistended  Extremity: moves all extremities    Laboratory  Lab Results   Component Value Date    INR 1.1 07/12/2022       Lab Results   Component Value Date    WBC 5.62 03/26/2023    HGB 8.1 (L) 03/26/2023    HCT 27.7 (L) 03/26/2023    MCV 82 03/26/2023     03/26/2023      Lab Results   Component Value Date     (H) 03/26/2023     03/26/2023    K 4.3 03/26/2023     (H) 03/26/2023    CO2 21 (L) 03/26/2023    BUN 42 (H) 03/26/2023    CREATININE 6.1 (H) 03/26/2023    CALCIUM 8.5 (L) 03/26/2023    MG 1.5 (L) 03/26/2023    ALT 28 03/26/2023    AST 20 03/26/2023    ALBUMIN 3.0 (L) 03/26/2023    BILITOT 0.3 03/26/2023    BILIDIR 0.2 10/24/2022       ASSESSMENT/PLAN:     Sedation Plan: up to moderate.  Patient will undergo percutaneous  transplant kidney biopsy. Tentatively planning for tomorrow (3/27). Please keep patient NPO at midnight. Please hold AM dose heparin DVT ppx.    Ronnie Benson MD PGY-2  Department of Radiology  [facility]

## 2023-03-26 NOTE — ASSESSMENT & PLAN NOTE
Patient has been out of tacrolimus and myfortic for 2 weeks. Cr with steep increase in labs from 3/22. On outside labs Cr 8.47, BUN 47, up from baseline Cr 1.9. Concern for acute rejection. Still having good urine output. Bicarb decreased to 13 on admission, potassium high-normal. Patient reports mild loss of appetite and nausea now resolved.   - US kidney transplant unremarkable.  - UA at OSH noted to only have trace blood  - pending labs    Plan  - IR consulted for biopsy  - KTM consulted, appreciate recs.   - continue tacro and changed pred to dex given COVID  - holding MMF given covid

## 2023-03-26 NOTE — SUBJECTIVE & OBJECTIVE
Interval History: Improving HA and sore throat. COVID positive on rem and dex. CXR with new infiltrates. IR consult placed for bx. Cr improved to 6.1.     Review of Systems   Constitutional:  Positive for appetite change. Negative for chills and fever.   HENT:  Negative for congestion and rhinorrhea.    Eyes:  Negative for photophobia and visual disturbance.   Respiratory:  Negative for cough and shortness of breath.    Cardiovascular:  Negative for chest pain, palpitations and leg swelling.   Gastrointestinal:  Positive for nausea. Negative for abdominal distention, abdominal pain, blood in stool, constipation, diarrhea and vomiting.   Genitourinary:  Negative for dysuria, flank pain, hematuria and urgency.   Musculoskeletal:  Negative for arthralgias and back pain.   Allergic/Immunologic: Positive for immunocompromised state.   Neurological:  Positive for headaches. Negative for dizziness and weakness.   Psychiatric/Behavioral:  Negative for agitation and behavioral problems.    Objective:     Vital Signs (Most Recent):  Temp: 98.8 °F (37.1 °C) (03/26/23 1143)  Pulse: 77 (03/26/23 1150)  Resp: 18 (03/26/23 1150)  BP: (!) 151/90 (03/26/23 1150)  SpO2: (!) 94 % (03/26/23 1150)   Vital Signs (24h Range):  Temp:  [98 °F (36.7 °C)-100.5 °F (38.1 °C)] 98.8 °F (37.1 °C)  Pulse:  [77-85] 77  Resp:  [18-19] 18  SpO2:  [93 %-98 %] 94 %  BP: (144-161)/(82-97) 151/90     Weight: 79 kg (174 lb 2.6 oz)  Body mass index is 24.99 kg/m².    Intake/Output Summary (Last 24 hours) at 3/26/2023 1226  Last data filed at 3/26/2023 0921  Gross per 24 hour   Intake 2404.7 ml   Output 1125 ml   Net 1279.7 ml      Physical Exam  Vitals and nursing note reviewed.   Constitutional:       Appearance: Normal appearance.   HENT:      Head: Normocephalic and atraumatic.      Nose: Nose normal.      Mouth/Throat:      Mouth: Mucous membranes are moist.   Eyes:      General: No scleral icterus.     Extraocular Movements: Extraocular movements  Pt had a Natrelle saline filled breast implant placed on 11/16/2015- right breast  330cc, ref number .  Inflated to 330cc  SN: [id number]   intact.      Conjunctiva/sclera: Conjunctivae normal.   Cardiovascular:      Rate and Rhythm: Normal rate and regular rhythm.      Pulses: Normal pulses.      Heart sounds: Murmur heard.   Pulmonary:      Effort: Pulmonary effort is normal. No respiratory distress.      Breath sounds: Normal breath sounds. No wheezing or rales.   Abdominal:      General: Bowel sounds are normal. There is no distension.      Palpations: Abdomen is soft. There is no mass.      Tenderness: There is no abdominal tenderness. There is no guarding.   Musculoskeletal:         General: No swelling, deformity or signs of injury. Normal range of motion.      Cervical back: Normal range of motion. No rigidity.   Skin:     General: Skin is warm and dry.      Coloration: Skin is not jaundiced.      Findings: No bruising.      Comments: Subungual hematoma on left thumb   Neurological:      General: No focal deficit present.      Mental Status: He is alert and oriented to person, place, and time.      Motor: No weakness.   Psychiatric:         Mood and Affect: Mood normal.         Behavior: Behavior normal.         Thought Content: Thought content normal.       Significant Labs: All pertinent labs within the past 24 hours have been reviewed.    Significant Imaging: I have reviewed all pertinent imaging results/findings within the past 24 hours.

## 2023-03-26 NOTE — ASSESSMENT & PLAN NOTE
Takes prednisone, tacrolimus, mycophenolate.     Holding mycophenolate given COVID  Continue tacro   Change pred to dex given COVID

## 2023-03-26 NOTE — CONSULTS
IR consulted for transplant kidney biopsy. Case reviewed with IR staff. Tentatively plan for tomorrow (3/27). Please place patient NPO tonight and hold AM dose heparin.    Full H&P to follow.    Ronnie Benson MD PGY-2  Department of Radiology  [facility]

## 2023-03-26 NOTE — ASSESSMENT & PLAN NOTE
Takes minoxidil, coreg, and nifedipine. Home Bps are well controlled around 100-130s systolic mostly.     - see HTN  - restarted home meds.

## 2023-03-26 NOTE — PROGRESS NOTES
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 Repeat Cr 8.47, BUN 47. K 5, bicarb 17. Patient is on prograf, myfortic, and prednisone and has been out of myfortiq and tacrolimus for 2 weeks. He has been adherent with his BP meds and prednsone. He fills all of his medications at [facility] other than tacrolimus and myfortic, which are filled at [facility]. The medications are usually mailed out to him, but when he called the line was busy. So he came to pick them up in person. He was unable to drive here earlier due to work obligations, thus missing doses for about 2 weeks. Reports good urine output, no urinary symptoms. Home blood pressures are 100-130s systolic with occasional values in 150s per his home BP cuff reads.     Case was discussed with Dr. Madden of [facility] and agreed for patient to transfer for concerns for rejection. Vitals stable upon transfer other than hypertension up to 165/94. Admitted due to concern for acute renal transplant rejection and KTM input. Initial labs significant for K 4.9, Cr 6.8, BUN 39, bicarb 13.             Overview/Hospital Course:  Patient admitted to  for management of SEBASTIAN with concern for acute rejection as patient was out of medications prior to admission. Restarted on tacro and prednisone (transitioned to dex). Patient also found to be covid positive and started on remdesivir and dexemethasone. CXR with infiltrates bilaterally with mild borderline hypoxia. Plan for  kidney transplant biopsy on 3/27/23.       Interval History: Improving HA and sore throat. COVID positive on rem and dex. CXR with new infiltrates. IR consult placed for bx. Cr improved to 6.1.     Review of Systems   Constitutional:  Positive for appetite change. Negative for chills and fever.   HENT:  Negative for congestion and rhinorrhea.    Eyes:  Negative for photophobia and visual disturbance.   Respiratory:  Negative for cough and shortness of breath.    Cardiovascular:  Negative for chest pain, palpitations and leg swelling.   Gastrointestinal:  Positive for nausea. Negative for abdominal distention, abdominal pain, blood in stool, constipation, diarrhea and vomiting.   Genitourinary:  Negative for dysuria, flank pain, hematuria and urgency.   Musculoskeletal:  Negative for arthralgias and back pain.   Allergic/Immunologic: Positive for immunocompromised state.   Neurological:  Positive for headaches. Negative for dizziness and weakness.   Psychiatric/Behavioral:  Negative for agitation and behavioral problems.    Objective:     Vital Signs (Most Recent):  Temp: 98.8 °F (37.1 °C) (03/26/23 1143)  Pulse: 77 (03/26/23 1150)  Resp: 18 (03/26/23 1150)  BP: (!) 151/90 (03/26/23 1150)  SpO2: (!) 94 % (03/26/23 1150)   Vital Signs (24h Range):  Temp:  [98 °F (36.7 °C)-100.5 °F (38.1 °C)] 98.8 °F (37.1 °C)  Pulse:  [77-85] 77  Resp:  [18-19] 18  SpO2:  [93 %-98 %] 94 %  BP: (144-161)/(82-97) 151/90     Weight: 79 kg (174 lb 2.6 oz)  Body mass index is 24.99 kg/m².    Intake/Output Summary (Last 24 hours) at 3/26/2023 1226  Last data filed at 3/26/2023 0921  Gross per 24 hour   Intake 2404.7 ml   Output 1125 ml   Net 1279.7 ml      Physical Exam  Vitals and nursing note reviewed.   Constitutional:       Appearance: Normal appearance.   HENT:      Head: Normocephalic and atraumatic.      Nose: Nose normal.      Mouth/Throat:      Mouth: Mucous membranes are moist.   Eyes:      General: No scleral icterus.      Extraocular Movements: Extraocular movements intact.      Conjunctiva/sclera: Conjunctivae normal.   Cardiovascular:      Rate and Rhythm: Normal rate and regular rhythm.      Pulses: Normal pulses.      Heart sounds: Murmur heard.   Pulmonary:      Effort: Pulmonary effort is normal. No respiratory distress.      Breath sounds: Normal breath sounds. No wheezing or rales.   Abdominal:      General: Bowel sounds are normal. There is no distension.      Palpations: Abdomen is soft. There is no mass.      Tenderness: There is no abdominal tenderness. There is no guarding.   Musculoskeletal:         General: No swelling, deformity or signs of injury. Normal range of motion.      Cervical back: Normal range of motion. No rigidity.   Skin:     General: Skin is warm and dry.      Coloration: Skin is not jaundiced.      Findings: No bruising.      Comments: Subungual hematoma on left thumb   Neurological:      General: No focal deficit present.      Mental Status: He is alert and oriented to person, place, and time.      Motor: No weakness.   Psychiatric:         Mood and Affect: Mood normal.         Behavior: Behavior normal.         Thought Content: Thought content normal.       Significant Labs: All pertinent labs within the past 24 hours have been reviewed.    Significant Imaging: I have reviewed all pertinent imaging results/findings within the past 24 hours.      Assessment/Plan:      * Acute rejection of kidney transplant  Patient has been out of tacrolimus and myfortic for 2 weeks. Cr with steep increase in labs from 3/22. On outside labs Cr 8.47, BUN 47, up from baseline Cr 1.9. Concern for acute rejection. Still having good urine output. Bicarb decreased to 13 on admission, potassium high-normal. Patient reports mild loss of appetite and nausea now resolved.   - US kidney transplant unremarkable.  - UA at OSH noted to only have trace blood  - pending labs    Plan  - IR consulted for biopsy  - KTM consulted,  appreciate recs.   - continue tacro and changed pred to dex given COVID  - holding MMF given covid      COVID  Patient is identified as Severe COVID-19 based on hypoxemia with O2 saturations <94% on room air or on ambulation   Initiate standard COVID protocols; COVID-19 testing ,Infection Control notification  and isolation- respiratory, contact and droplet per protocol    Diagnostics: (leukopenia, hyponatremia, hyperferritinemia, elevated troponin, elevated d-dimer, age, and comorbidities are significant predictors of poor clinical outcome)  CBC, CMP, Ferritin, CRP and Portable CXR    Management: Initiate targeted therapy with Remdesivir, 200mg IV x1, followed by 100mg IV daily x5 days total and Dexamethasone PO/IV 6mg daily x10 days, Maintain oxygen saturations 92-96% via Nasal Cannula  LPM and monitor with continuous/intermittent pulse oximetry.  and Inhaled bronchodilators as needed for shortness of breath.    New infiltrates on CXR. SpO2 93-4% on RA and associated symptoms of HA, sore throat, chills and fever.     Subungual hematoma of left thumb  Patient reports jamming thumb, no pain. Hematoma has been present for a month. Lower concern for subungual melanoma given preceding trauma.       Benign essential HTN  Continue nifed, coreg and minoxidil. Hypertensive due to pain and COVID    Will likely increase coreg if still hypertensive      Stage 3a chronic kidney disease  Baseline Cr 1.9      Anemia of chronic disease  Hb normally 9-10. Hb on 3/22 was 8.6. MCV was 79    - daily CBC  - transfuse for Hb<7  - f/u iron labs and retic      Long-term use of immunosuppressant medication  Takes prednisone, tacrolimus, mycophenolate.     Holding mycophenolate given COVID  Continue tacro   Change pred to dex given COVID    S/P kidney transplant 10/19/21  S/p renal transplant      Hx-TIA (transient ischemic attack)        Hx of seizure disorder  Remote history in childhood, no home AEDs      Renovascular  hypertension  Takes minoxidil, coreg, and nifedipine. Home Bps are well controlled around 100-130s systolic mostly.     - see HTN  - restarted home meds.       VTE Risk Mitigation (From admission, onward)         Ordered     heparin (porcine) injection 5,000 Units  Every 8 hours         03/25/23 0047     IP VTE LOW RISK PATIENT  Once         03/25/23 0013     Place sequential compression device  Until discontinued         03/25/23 0013                Discharge Planning   MARSHALL: 3/28/2023     Code Status: Full Code   Is the patient medically ready for discharge?:     Reason for patient still in hospital (select all that apply): Patient new problem, Patient trending condition, Laboratory test and Consult recommendations                     Marily Nieves DO  Department of Hospital Medicine   [facility] - Telemetry Stepdown

## 2023-03-26 NOTE — PLAN OF CARE
Patient AAOX4, RA, vitals are WNL. Pt beginning to have a productive cough. MD aware. Pt has had no complaints of pain throughout the day. Pt states no further needs or concerns att. Will continue to monitor att.

## 2023-03-26 NOTE — HOSPITAL COURSE
Patient admitted to  for management of SEBASTIAN with concern for acute rejection as patient was out of medications prior to admission. Restarted on tacro and prednisone (transitioned to dex). Patient also found to be covid positive and started on remdesivir and dexemethasone. CXR with infiltrates bilaterally with mild borderline hypoxia. Plan for kidney transplant biopsy on 3/27/23. Pt tolerated procedure well. 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 KTM to take over as primary team on 3/31/23.

## 2023-03-26 NOTE — NURSING
New onset fevers. Tmax: 100.5. Complaints of sore throat and dry cough. Tylenol administered. IM3 notified. COVID test sent to lab and resulted positive. Patient transferred with belongings to [facility].

## 2023-03-26 NOTE — ASSESSMENT & PLAN NOTE
Continue nifed, coreg and minoxidil. Hypertensive due to pain and COVID    Will likely increase coreg if still hypertensive

## 2023-03-26 NOTE — NURSING
0305 Patient arrived to [facility] via wheelchair. AAOx4 able to make needs known to staff, [place] (decreased hearing) able to hear when staff speaks loud. Clear and fluent speech, calm and cooperative with care. Voids per urinal and able to ambulate with toilet, gait steady and even. Patient placed on bedside telemetry and continues pulse ox monitoring per orders. Temp 99.7. denies pain or discomfort at this time, no apparent distress noted. Bed in lowest position, call bell with reach, bed alarm on, pt. Educated on calling before exiting bed.   0600 Remdesivir initiated.

## 2023-03-26 NOTE — MEDICAL/APP STUDENT
[facility] - Telemetry [facility] Medicine  Progress Note    Patient Name: Feliz Trevizo  MRN: [id number]  Patient Class: IP- Inpatient   Admission Date: 3/24/2023  Length of Stay: 2 days  Attending Physician: Bertin Tate MD  Primary Care Provider: Primary Doctor No        Subjective:     Principal Problem:Acute rejection of kidney transplant    HPI: Patient is a 38 y.o. male w/ s/p renal transplant 10/21 (ESRD 2/2 FSGS), HTN, TIAs who presents to [place] with abnormal lab work of Cr 9.38 (baseline 1.8) and BUN 47 and was transferred here. Reports he missed 2 weeks of myfortic and prograf because of a delay in picking them up from [facility]. Restarted all meds 3/17. He had an episode of facial and lower leg swelling on 3/21 that has resolved. He has good urine output. Denies fevers, chills, chest pain, abdominal pain, dysuria, decreased urinary frequency.      Dr. Ramírez of [facility] accepted transfer due to concerns for acute rejection. Upon admission, HDS with BP 160s/90s. Home systolic BP measurements 100-130s. Labs on admit were significant for Cr 6.8, BUN 39, K 4.9, HCO3 13.     Overview/Hospital Course: Transferred from OSH, accepted by [facility] with admission to hospital medicine. Cr downtrending from 9.38 to 6.5.     Interval History: Tested positive for COVID overnight, cough and sore throat, started on remdesivir. This morning, cough improved, reports new headache and sinus congestion, no SOB and is on RA. KTM suggested renal biopsy    Review of Systems   Constitutional:  Negative for activity change, appetite change, chills, diaphoresis and fever.   HENT:  Positive for congestion, sinus pressure and sore throat. Negative for rhinorrhea and trouble swallowing.    Eyes:  Negative for visual disturbance.   Respiratory:  Positive for cough. Negative for choking, chest tightness, shortness of breath and wheezing.    Cardiovascular:  Negative for chest pain, palpitations and leg swelling.   Gastrointestinal:   Negative for abdominal distention, abdominal pain, constipation, diarrhea, nausea and vomiting.   Genitourinary:  Negative for difficulty urinating, dysuria, frequency, hematuria and urgency.   Hematological:  Negative for adenopathy.   Objective:     Vital Signs (Most Recent):  Temp: 99.6 °F (37.6 °C) (03/26/23 0730)  Pulse: 83 (03/26/23 0730)  Resp: 19 (03/26/23 0730)  BP: (!) 156/97 (03/26/23 0730)  SpO2: (!) 93 % (03/26/23 0730)   Vital Signs (24h Range):  Temp:  [98 °F (36.7 °C)-100.5 °F (38.1 °C)] 99.6 °F (37.6 °C)  Pulse:  [79-85] 83  Resp:  [18-19] 19  SpO2:  [93 %-98 %] 93 %  BP: (144-161)/(82-97) 156/97     Weight: 79 kg (174 lb 2.6 oz)  Body mass index is 24.99 kg/m².    Intake/Output Summary (Last 24 hours) at 3/26/2023 0849  Last data filed at 3/26/2023 0243  Gross per 24 hour   Intake 2764.7 ml   Output 1225 ml   Net 1539.7 ml      Physical Exam  Constitutional:       General: He is not in acute distress.     Appearance: He is not ill-appearing or diaphoretic.   HENT:      Head: Normocephalic.      Nose: Congestion present.      Mouth/Throat:      Mouth: Mucous membranes are moist.      Pharynx: No posterior oropharyngeal erythema.   Eyes:      Extraocular Movements: Extraocular movements intact.   Cardiovascular:      Rate and Rhythm: Normal rate and regular rhythm.      Pulses: Normal pulses.      Heart sounds: Normal heart sounds.   Pulmonary:      Effort: Pulmonary effort is normal. No respiratory distress.      Breath sounds: Normal breath sounds.   Chest:      Chest wall: No tenderness.   Abdominal:      General: Bowel sounds are normal. There is no distension.      Palpations: Abdomen is soft.      Tenderness: There is no abdominal tenderness. There is no right CVA tenderness or left CVA tenderness.   Musculoskeletal:         General: No swelling or deformity.      Cervical back: Normal range of motion. No rigidity.      Right lower leg: No edema.      Left lower leg: No edema.    Lymphadenopathy:      Cervical: No cervical adenopathy.   Skin:     General: Skin is warm.      Findings: No rash.      Comments: Previous AVF L forearem   Neurological:      General: No focal deficit present.      Mental Status: He is alert and oriented to person, place, and time.      Motor: No weakness.   Psychiatric:         Mood and Affect: Mood normal.         Behavior: Behavior normal.     Significant Labs:   Recent Results (from the past 24 hour(s))   COVID-19 Rapid Screening    Collection Time: 03/25/23 11:42 PM   Result Value Ref Range    SARS-CoV-2 RNA, Amplification, Qual Positive (A) Negative   Comprehensive Metabolic Panel (CMP)    Collection Time: 03/26/23  3:29 AM   Result Value Ref Range    Sodium 140 136 - 145 mmol/L    Potassium 4.3 3.5 - 5.1 mmol/L    Chloride 113 (H) 95 - 110 mmol/L    CO2 21 (L) 23 - 29 mmol/L    Glucose 115 (H) 70 - 110 mg/dL    BUN 42 (H) 6 - 20 mg/dL    Creatinine 6.1 (H) 0.5 - 1.4 mg/dL    Calcium 8.5 (L) 8.7 - 10.5 mg/dL    Total Protein 6.1 6.0 - 8.4 g/dL    Albumin 3.0 (L) 3.5 - 5.2 g/dL    Total Bilirubin 0.3 0.1 - 1.0 mg/dL    Alkaline Phosphatase 60 55 - 135 U/L    AST 20 10 - 40 U/L    ALT 28 10 - 44 U/L    Anion Gap 6 (L) 8 - 16 mmol/L    eGFR 11.1 (A) >60 mL/min/1.73 m^2   Magnesium    Collection Time: 03/26/23  3:29 AM   Result Value Ref Range    Magnesium 1.5 (L) 1.6 - 2.6 mg/dL   Phosphorus    Collection Time: 03/26/23  3:29 AM   Result Value Ref Range    Phosphorus 2.3 (L) 2.7 - 4.5 mg/dL   CBC with Automated Differential    Collection Time: 03/26/23  3:29 AM   Result Value Ref Range    WBC 5.62 3.90 - 12.70 K/uL    RBC 3.38 (L) 4.60 - 6.20 M/uL    Hemoglobin 8.1 (L) 14.0 - 18.0 g/dL    Hematocrit 27.7 (L) 40.0 - 54.0 %    MCV 82 82 - 98 fL    MCH 24.0 (L) 27.0 - 31.0 pg    MCHC 29.2 (L) 32.0 - 36.0 g/dL    RDW 13.8 11.5 - 14.5 %    Platelets 282 150 - 450 K/uL    MPV 11.0 9.2 - 12.9 fL    Immature Granulocytes 0.5 0.0 - 0.5 %    Gran # (ANC) 4.2 1.8 - 7.7  "K/uL    Immature Grans (Abs) 0.03 0.00 - 0.04 K/uL    Lymph # 0.5 (L) 1.0 - 4.8 K/uL    Mono # 0.8 0.3 - 1.0 K/uL    Eos # 0.1 0.0 - 0.5 K/uL    Baso # 0.02 0.00 - 0.20 K/uL    nRBC 0 0 /100 WBC    Gran % 74.8 (H) 38.0 - 73.0 %    Lymph % 8.9 (L) 18.0 - 48.0 %    Mono % 14.2 4.0 - 15.0 %    Eosinophil % 1.2 0.0 - 8.0 %    Basophil % 0.4 0.0 - 1.9 %    Differential Method Automated        Significant Imaging:  3/26 CX: "Interval development ill-defined lung opacities greater on the right lower lobe while nonspecific early airspace filling process not excluded in light of history."      Assessment/Plan:      Active Diagnoses:    Diagnosis Date Noted POA    PRINCIPAL PROBLEM:  Acute rejection of kidney transplant [T86.11] 03/25/2023 Yes    Subungual hematoma of left thumb [S60.112A] 03/25/2023 Yes    Stage 3a chronic kidney disease [N18.31] 10/29/2021 Yes     Chronic    S/P kidney transplant 10/19/21 [Z94.0] 10/19/2021 Not Applicable     Chronic    Long-term use of immunosuppressant medication [Z79.60] 10/19/2021 Not Applicable     Chronic    Anemia of chronic disease [D63.8] 10/19/2021 Yes     Chronic    Renovascular hypertension [I15.0] 08/02/2017 Yes     Chronic    Hx-TIA (transient ischemic attack) [Z86.73] 08/02/2017 Not Applicable     Chronic    Hx of seizure disorder [Z86.69] 08/02/2017 Not Applicable     Chronic    Benign essential HTN [I10] 11/11/2014 Yes      Problems Resolved During this Admission:     SEBASTIAN (on bg renal transplantation 10/21)  KTM team consulted and following closely. Concern for acute rejection due to period of missed immunosuppression and Cr 9.38 at outside labs. Renal US concerning for rejection. Cr improving to 6.5. Immunosuppressive regimen: prednisone 5mg, mycophenolate 360mg BID, tacro 4mg BID.      IR planning for renal biopsy Monday 3/27.       Plan:  - KTM team consulted              - HLA donor specific antibodies (DSAs)              - plan for kidney biopsy possibly " Monday 3/27              - cont home immunosuppressive regimen              - D5 with sodium bicarb  - fu tacro levels     Hypertension  Home sbps 100-130s, 160s on admission. Home meds: coreg, minoxidil, nifedipine.  Plan:  -start home meds    COVID 19   Cough and sore throat. Tested positive 3/25. Started on remdesivir.   Plan:  - Remdesivir for 5 days   - last dose on 3/30    DVT ppx- low risk  CODE status- Full     Dispo-    VTE Risk Mitigation (From admission, onward)           Ordered     heparin (porcine) injection 5,000 Units  Every 8 hours         03/25/23 0047     IP VTE LOW RISK PATIENT  Once         03/25/23 0013     Place sequential compression device  Until discontinued         03/25/23 0013                   Disposition  - ***.    Li Aguirre, MS-4  [employer] [employer]

## 2023-03-27 PROBLEM — E87.20 METABOLIC ACIDOSIS: Status: ACTIVE | Noted: 2023-03-27

## 2023-03-27 PROBLEM — N17.9 ACUTE KIDNEY INJURY SUPERIMPOSED ON CHRONIC KIDNEY DISEASE: Status: ACTIVE | Noted: 2023-03-27

## 2023-03-27 PROBLEM — I11.9 HYPERTENSIVE CARDIOVASCULAR DISEASE: Status: ACTIVE | Noted: 2023-03-27

## 2023-03-27 PROBLEM — N18.9 ACUTE KIDNEY INJURY SUPERIMPOSED ON CHRONIC KIDNEY DISEASE: Status: ACTIVE | Noted: 2023-03-27

## 2023-03-27 LAB
ALBUMIN SERPL BCP-MCNC: 3.1 G/DL (ref 3.5–5.2)
ALBUMIN/CREAT UR: 42.3 UG/MG (ref 0–30)
ALP SERPL-CCNC: 61 U/L (ref 55–135)
ALT SERPL W/O P-5'-P-CCNC: 25 U/L (ref 10–44)
ANION GAP SERPL CALC-SCNC: 8 MMOL/L (ref 8–16)
AST SERPL-CCNC: 17 U/L (ref 10–40)
BASOPHILS # BLD AUTO: 0 K/UL (ref 0–0.2)
BASOPHILS NFR BLD: 0 % (ref 0–1.9)
BILIRUB SERPL-MCNC: 0.2 MG/DL (ref 0.1–1)
BILIRUB UR QL STRIP: NEGATIVE
BUN SERPL-MCNC: 43 MG/DL (ref 6–20)
CALCIUM SERPL-MCNC: 9.1 MG/DL (ref 8.7–10.5)
CHLORIDE SERPL-SCNC: 113 MMOL/L (ref 95–110)
CLARITY UR REFRACT.AUTO: CLEAR
CLASS I ANTIBODY COMMENTS - LUMINEX: NORMAL
CLASS II ANTIBODY COMMENTS - LUMINEX: NORMAL
CO2 SERPL-SCNC: 19 MMOL/L (ref 23–29)
COLOR UR AUTO: COLORLESS
CREAT SERPL-MCNC: 5.9 MG/DL (ref 0.5–1.4)
CREAT UR-MCNC: 52 MG/DL (ref 23–375)
CREAT UR-MCNC: 52 MG/DL (ref 23–375)
DIFFERENTIAL METHOD: ABNORMAL
DSA1 TESTING DATE: NORMAL
DSA12 TESTING DATE: NORMAL
DSA2 TESTING DATE: NORMAL
EOSINOPHIL # BLD AUTO: 0 K/UL (ref 0–0.5)
EOSINOPHIL NFR BLD: 0 % (ref 0–8)
ERYTHROCYTE [DISTWIDTH] IN BLOOD BY AUTOMATED COUNT: 13.8 % (ref 11.5–14.5)
ERYTHROCYTE [SEDIMENTATION RATE] IN BLOOD BY PHOTOMETRIC METHOD: 65 MM/HR (ref 0–23)
EST. GFR  (NO RACE VARIABLE): 11.6 ML/MIN/1.73 M^2
GLUCOSE SERPL-MCNC: 96 MG/DL (ref 70–110)
GLUCOSE UR QL STRIP: NEGATIVE
HCT VFR BLD AUTO: 29.6 % (ref 40–54)
HGB BLD-MCNC: 8.7 G/DL (ref 14–18)
HGB UR QL STRIP: NEGATIVE
IMM GRANULOCYTES # BLD AUTO: 0.14 K/UL (ref 0–0.04)
IMM GRANULOCYTES NFR BLD AUTO: 1.8 % (ref 0–0.5)
INR PPP: 1.2 (ref 0.8–1.2)
KETONES UR QL STRIP: NEGATIVE
LEUKOCYTE ESTERASE UR QL STRIP: NEGATIVE
LYMPHOCYTES # BLD AUTO: 0.5 K/UL (ref 1–4.8)
LYMPHOCYTES NFR BLD: 6.3 % (ref 18–48)
MAGNESIUM SERPL-MCNC: 1.8 MG/DL (ref 1.6–2.6)
MCH RBC QN AUTO: 23.6 PG (ref 27–31)
MCHC RBC AUTO-ENTMCNC: 29.4 G/DL (ref 32–36)
MCV RBC AUTO: 80 FL (ref 82–98)
MICROALBUMIN UR DL<=1MG/L-MCNC: 22 UG/ML
MONOCYTES # BLD AUTO: 0.6 K/UL (ref 0.3–1)
MONOCYTES NFR BLD: 7.4 % (ref 4–15)
NEUTROPHILS # BLD AUTO: 6.4 K/UL (ref 1.8–7.7)
NEUTROPHILS NFR BLD: 84.5 % (ref 38–73)
NITRITE UR QL STRIP: NEGATIVE
NRBC BLD-RTO: 0 /100 WBC
PH UR STRIP: 7 [PH] (ref 5–8)
PHOSPHATE SERPL-MCNC: 3 MG/DL (ref 2.7–4.5)
PLATELET # BLD AUTO: 311 K/UL (ref 150–450)
PMV BLD AUTO: 10.2 FL (ref 9.2–12.9)
POTASSIUM SERPL-SCNC: 4.8 MMOL/L (ref 3.5–5.1)
PROT SERPL-MCNC: 6.4 G/DL (ref 6–8.4)
PROT UR QL STRIP: NEGATIVE
PROT UR-MCNC: <7 MG/DL (ref 0–15)
PROT/CREAT UR: NORMAL MG/G{CREAT} (ref 0–0.2)
PROTHROMBIN TIME: 12.1 SEC (ref 9–12.5)
RBC # BLD AUTO: 3.68 M/UL (ref 4.6–6.2)
SERUM COLLECTION DT - LUMINEX CLASS I: NORMAL
SERUM COLLECTION DT - LUMINEX CLASS II: NORMAL
SODIUM SERPL-SCNC: 140 MMOL/L (ref 136–145)
SP GR UR STRIP: 1.01 (ref 1–1.03)
URN SPEC COLLECT METH UR: ABNORMAL
WBC # BLD AUTO: 7.58 K/UL (ref 3.9–12.7)

## 2023-03-27 PROCEDURE — 27000207 HC ISOLATION

## 2023-03-27 PROCEDURE — 99233 PR SUBSEQUENT HOSPITAL CARE,LEVL III: ICD-10-PCS | Mod: CR,,, | Performed by: HOSPITALIST

## 2023-03-27 PROCEDURE — 25000003 PHARM REV CODE 250: Performed by: STUDENT IN AN ORGANIZED HEALTH CARE EDUCATION/TRAINING PROGRAM

## 2023-03-27 PROCEDURE — 94640 AIRWAY INHALATION TREATMENT: CPT

## 2023-03-27 PROCEDURE — 25000003 PHARM REV CODE 250

## 2023-03-27 PROCEDURE — 83735 ASSAY OF MAGNESIUM: CPT

## 2023-03-27 PROCEDURE — 63600175 PHARM REV CODE 636 W HCPCS

## 2023-03-27 PROCEDURE — 99233 SBSQ HOSP IP/OBS HIGH 50: CPT | Mod: CR,,, | Performed by: HOSPITALIST

## 2023-03-27 PROCEDURE — 84156 ASSAY OF PROTEIN URINE: CPT | Performed by: STUDENT IN AN ORGANIZED HEALTH CARE EDUCATION/TRAINING PROGRAM

## 2023-03-27 PROCEDURE — 85652 RBC SED RATE AUTOMATED: CPT | Performed by: STUDENT IN AN ORGANIZED HEALTH CARE EDUCATION/TRAINING PROGRAM

## 2023-03-27 PROCEDURE — 99233 PR SUBSEQUENT HOSPITAL CARE,LEVL III: ICD-10-PCS | Mod: ,,, | Performed by: INTERNAL MEDICINE

## 2023-03-27 PROCEDURE — 36415 COLL VENOUS BLD VENIPUNCTURE: CPT

## 2023-03-27 PROCEDURE — 85610 PROTHROMBIN TIME: CPT

## 2023-03-27 PROCEDURE — 84100 ASSAY OF PHOSPHORUS: CPT

## 2023-03-27 PROCEDURE — 85025 COMPLETE CBC W/AUTO DIFF WBC: CPT

## 2023-03-27 PROCEDURE — 63600175 PHARM REV CODE 636 W HCPCS: Performed by: INTERNAL MEDICINE

## 2023-03-27 PROCEDURE — 20600001 HC STEP DOWN PRIVATE ROOM

## 2023-03-27 PROCEDURE — 82043 UR ALBUMIN QUANTITATIVE: CPT | Performed by: STUDENT IN AN ORGANIZED HEALTH CARE EDUCATION/TRAINING PROGRAM

## 2023-03-27 PROCEDURE — 80053 COMPREHEN METABOLIC PANEL: CPT

## 2023-03-27 PROCEDURE — 99900035 HC TECH TIME PER 15 MIN (STAT)

## 2023-03-27 PROCEDURE — 63600175 PHARM REV CODE 636 W HCPCS: Performed by: STUDENT IN AN ORGANIZED HEALTH CARE EDUCATION/TRAINING PROGRAM

## 2023-03-27 PROCEDURE — 63600175 PHARM REV CODE 636 W HCPCS: Mod: JZ,TB

## 2023-03-27 PROCEDURE — 63600175 PHARM REV CODE 636 W HCPCS: Performed by: RADIOLOGY

## 2023-03-27 PROCEDURE — 94761 N-INVAS EAR/PLS OXIMETRY MLT: CPT

## 2023-03-27 PROCEDURE — 81003 URINALYSIS AUTO W/O SCOPE: CPT | Performed by: STUDENT IN AN ORGANIZED HEALTH CARE EDUCATION/TRAINING PROGRAM

## 2023-03-27 PROCEDURE — 99233 SBSQ HOSP IP/OBS HIGH 50: CPT | Mod: ,,, | Performed by: INTERNAL MEDICINE

## 2023-03-27 RX ORDER — NIFEDIPINE 30 MG/1
60 TABLET, EXTENDED RELEASE ORAL 2 TIMES DAILY
Status: DISCONTINUED | OUTPATIENT
Start: 2023-03-27 | End: 2023-03-27

## 2023-03-27 RX ORDER — NIFEDIPINE 30 MG/1
30 TABLET, EXTENDED RELEASE ORAL 2 TIMES DAILY
Status: DISCONTINUED | OUTPATIENT
Start: 2023-03-27 | End: 2023-03-28

## 2023-03-27 RX ORDER — SODIUM BICARBONATE 650 MG/1
1950 TABLET ORAL 3 TIMES DAILY
Status: DISCONTINUED | OUTPATIENT
Start: 2023-03-27 | End: 2023-04-03 | Stop reason: HOSPADM

## 2023-03-27 RX ORDER — FENTANYL CITRATE 50 UG/ML
INJECTION, SOLUTION INTRAMUSCULAR; INTRAVENOUS
Status: COMPLETED | OUTPATIENT
Start: 2023-03-27 | End: 2023-03-27

## 2023-03-27 RX ORDER — HYDRALAZINE HYDROCHLORIDE 20 MG/ML
INJECTION INTRAMUSCULAR; INTRAVENOUS
Status: COMPLETED | OUTPATIENT
Start: 2023-03-27 | End: 2023-03-27

## 2023-03-27 RX ORDER — SODIUM BICARBONATE 650 MG/1
1300 TABLET ORAL 3 TIMES DAILY
Status: DISCONTINUED | OUTPATIENT
Start: 2023-03-27 | End: 2023-03-27

## 2023-03-27 RX ORDER — MIDAZOLAM HYDROCHLORIDE 1 MG/ML
INJECTION INTRAMUSCULAR; INTRAVENOUS
Status: COMPLETED | OUTPATIENT
Start: 2023-03-27 | End: 2023-03-27

## 2023-03-27 RX ADMIN — NIFEDIPINE 30 MG: 30 TABLET, FILM COATED, EXTENDED RELEASE ORAL at 09:03

## 2023-03-27 RX ADMIN — MIDAZOLAM HYDROCHLORIDE 1 MG: 1 INJECTION, SOLUTION INTRAMUSCULAR; INTRAVENOUS at 06:03

## 2023-03-27 RX ADMIN — ALBUTEROL SULFATE 2 PUFF: 108 INHALANT RESPIRATORY (INHALATION) at 08:03

## 2023-03-27 RX ADMIN — HEPARIN SODIUM 5000 UNITS: 5000 INJECTION INTRAVENOUS; SUBCUTANEOUS at 06:03

## 2023-03-27 RX ADMIN — HEPARIN SODIUM 5000 UNITS: 5000 INJECTION INTRAVENOUS; SUBCUTANEOUS at 09:03

## 2023-03-27 RX ADMIN — REMDESIVIR 100 MG: 100 INJECTION, POWDER, LYOPHILIZED, FOR SOLUTION INTRAVENOUS at 09:03

## 2023-03-27 RX ADMIN — ALBUTEROL SULFATE 2 PUFF: 108 INHALANT RESPIRATORY (INHALATION) at 01:03

## 2023-03-27 RX ADMIN — Medication 500 MG: at 09:03

## 2023-03-27 RX ADMIN — ACETAMINOPHEN 650 MG: 325 TABLET ORAL at 01:03

## 2023-03-27 RX ADMIN — SODIUM BICARBONATE 650 MG: 650 TABLET ORAL at 09:03

## 2023-03-27 RX ADMIN — CARVEDILOL 25 MG: 25 TABLET, FILM COATED ORAL at 07:03

## 2023-03-27 RX ADMIN — TACROLIMUS 4 MG: 1 CAPSULE ORAL at 08:03

## 2023-03-27 RX ADMIN — CARVEDILOL 25 MG: 25 TABLET, FILM COATED ORAL at 04:03

## 2023-03-27 RX ADMIN — TACROLIMUS 4 MG: 1 CAPSULE ORAL at 04:03

## 2023-03-27 RX ADMIN — FENTANYL CITRATE 25 MCG: 50 INJECTION, SOLUTION INTRAMUSCULAR; INTRAVENOUS at 06:03

## 2023-03-27 RX ADMIN — SODIUM BICARBONATE 1950 MG: 650 TABLET ORAL at 09:03

## 2023-03-27 RX ADMIN — THERA TABS 1 TABLET: TAB at 09:03

## 2023-03-27 RX ADMIN — DEXAMETHASONE 6 MG: 4 TABLET ORAL at 09:03

## 2023-03-27 RX ADMIN — MINOXIDIL 2.5 MG: 2.5 TABLET ORAL at 09:03

## 2023-03-27 RX ADMIN — SODIUM BICARBONATE 1950 MG: 650 TABLET ORAL at 02:03

## 2023-03-27 RX ADMIN — HYDRALAZINE HYDROCHLORIDE 10 MG: 20 INJECTION INTRAMUSCULAR; INTRAVENOUS at 06:03

## 2023-03-27 NOTE — ASSESSMENT & PLAN NOTE
Pharmacy reviewed pharm records regarding when he picked up meds. Picked up 30 day supply of meds in Nov 2022, then Jan 2023 and then on March 17, 2023.  On prograf 4/4, EC- BID and pred 5 at home   On decadron for COVID  Cont prograf 4/4   Hold MMF as pt has active infection   Daily prograf level  monitor for toxicity

## 2023-03-27 NOTE — ASSESSMENT & PLAN NOTE
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 -ve DSA at transplant  See SEBASTIAN and acute rejection

## 2023-03-27 NOTE — ASSESSMENT & PLAN NOTE
NON oliguric stage III SEBASTIAN on CKD3, improving slightly   SEBASTIAN likely 2/2 suspected underlying rejection and would recommend kidney bx ( IR consulted, pt on list for today)  Baseline Cr 1.7 to 1.9, baseline eGFR ~45  Cr 9.3 at PCP, 8.47 at outside ED, 6.8 on admission    Cr trend 6.8>6.5>6.1>5.9  US kidney transplant unremarkable  UA at OSH noted to only have trace blood  UPCR and ACR ordered, pending  DSA, CMV and BK pending, of note had weakly postive DSA 7/2022, but not at time of transplant   Encourage adequate Po nutrition and hydration  Monitor accurate I/O  Monitor renal function daily  Avoid nephrotoxins, NSAIDS, fleet enemas, and gadolinium  renally dose medications

## 2023-03-27 NOTE — ASSESSMENT & PLAN NOTE
BERNARDA, EF 65%, cLVH, and mild LA enlargement    On coreg 25BID, minoxidil 2.5, procardia 30 BID at home  Cont as feasible per primary (procardai currently 60 BID)

## 2023-03-27 NOTE — PLAN OF CARE
[facility] - Telemetry Stepdown  Initial Discharge Assessment       Primary Care Provider: SUSAN Vela    Admission Diagnosis: Kidney failure [N19]    Admission Date: 3/24/2023  Expected Discharge Date: 3/28/2023         Payor: MEDICARE / Plan: MEDICARE PART A & B / Product Type: Government /     Extended Emergency Contact Information  Primary Emergency Contact: Yany Trevizo   [place]  Mobile Phone: [phone]  Relation: Sister    Discharge Plan A: Home with family  Discharge Plan B: Home with family      [facility]  [address]  [facility] [address]  Phone: [phone] Fax: [phone]    [facility] [address] [facility] [address]  [address] [facility] [address]  Phone: [phone] Fax: [phone]      Initial Assessment (most recent)       Adult Discharge Assessment - 03/27/23 1502          Discharge Assessment    Assessment Type Discharge Planning Assessment     Source of Information health record     Reason For Admission Kidney failure     People in Home sibling(s)     Facility [facility] From: [facility]     Prior to hospitilization cognitive status: Alert/Oriented     Current cognitive status: Unable to Assess     Equipment Currently Used at Home none     Readmission within 30 days? No     Patient currently being followed by outpatient case management? No     Do you currently have service(s) that help you manage your care at home? No     Do you take prescription medications? Yes     Do you have prescription coverage? Yes     Do you have any problems affording any of your prescribed medications? No     How do you get to doctors appointments? car, drives self     Are you on dialysis? No     Do you take coumadin? No     Discharge Plan A Home with family     Discharge Plan B Home with family     DME Needed Upon Discharge  none                   Petra Ashraf, IDANIA  Ext [phone]

## 2023-03-27 NOTE — SUBJECTIVE & OBJECTIVE
Interval History: NAEON; patient scheduled for biopsy today. Pt with dry cough with mild production. No other complaints.    Review of Systems   Constitutional:  Positive for appetite change. Negative for chills and fever.   HENT:  Negative for congestion and rhinorrhea.    Eyes:  Negative for photophobia and visual disturbance.   Respiratory:  Negative for cough and shortness of breath.    Cardiovascular:  Negative for chest pain, palpitations and leg swelling.   Gastrointestinal:  Positive for nausea. Negative for abdominal distention, abdominal pain, blood in stool, constipation, diarrhea and vomiting.   Genitourinary:  Negative for dysuria, flank pain, hematuria and urgency.   Musculoskeletal:  Negative for arthralgias and back pain.   Allergic/Immunologic: Positive for immunocompromised state.   Neurological:  Positive for headaches. Negative for dizziness and weakness.   Psychiatric/Behavioral:  Negative for agitation and behavioral problems.    Objective:     Vital Signs (Most Recent):  Temp: 98.2 °F (36.8 °C) (03/27/23 0730)  Pulse: 85 (03/27/23 0854)  Resp: (!) 23 (03/27/23 0854)  BP: (!) 145/89 (03/27/23 0730)  SpO2: 95 % (03/27/23 0854)   Vital Signs (24h Range):  Temp:  [98.2 °F (36.8 °C)-99.6 °F (37.6 °C)] 98.2 °F (36.8 °C)  Pulse:  [75-88] 85  Resp:  [18-26] 23  SpO2:  [94 %-97 %] 95 %  BP: (145-164)/(89-94) 145/89     Weight: 79 kg (174 lb 2.6 oz)  Body mass index is 24.99 kg/m².    Intake/Output Summary (Last 24 hours) at 3/27/2023 1120  Last data filed at 3/27/2023 1004  Gross per 24 hour   Intake 640 ml   Output 800 ml   Net -160 ml      Physical Exam  Vitals and nursing note reviewed.   Constitutional:       Appearance: Normal appearance.   HENT:      Head: Normocephalic and atraumatic.      Nose: Nose normal.      Mouth/Throat:      Mouth: Mucous membranes are moist.   Eyes:      General: No scleral icterus.     Extraocular Movements: Extraocular movements intact.      Conjunctiva/sclera:  Conjunctivae normal.   Cardiovascular:      Rate and Rhythm: Normal rate and regular rhythm.      Pulses: Normal pulses.      Heart sounds: Murmur heard.   Pulmonary:      Effort: Pulmonary effort is normal. No respiratory distress.      Breath sounds: Normal breath sounds. No wheezing or rales.   Abdominal:      General: Bowel sounds are normal. There is no distension.      Palpations: Abdomen is soft. There is no mass.      Tenderness: There is no abdominal tenderness. There is no guarding.   Musculoskeletal:         General: No swelling, deformity or signs of injury. Normal range of motion.      Cervical back: Normal range of motion. No rigidity.   Skin:     General: Skin is warm and dry.      Coloration: Skin is not jaundiced.      Findings: No bruising.      Comments: Subungual hematoma on left thumb   Neurological:      General: No focal deficit present.      Mental Status: He is alert and oriented to person, place, and time.      Motor: No weakness.   Psychiatric:         Mood and Affect: Mood normal.         Behavior: Behavior normal.         Thought Content: Thought content normal.       Significant Labs: All pertinent labs within the past 24 hours have been reviewed.    Significant Imaging: I have reviewed all pertinent imaging results/findings within the past 24 hours.

## 2023-03-27 NOTE — PLAN OF CARE
Patient AAOX4, RA, vitals WNL. Pt has had no complaints of pain throughout the day. Pt state no further needs or concerns att. Will continue to monitor

## 2023-03-27 NOTE — ASSESSMENT & PLAN NOTE
Takes minoxidil, coreg, and nifedipine. Home Bps are well controlled around 100-130s systolic mostly.     - restarted home meds.   - increase nifedipine to 60 BID

## 2023-03-27 NOTE — PROGRESS NOTES
[facility] - Telemetry Stepdown  Kidney Transplant  Progress Note      Reason for Follow-up: Reassessment of Kidney Transplant - 10/19/2021  (#1) recipient and management of immunosuppression.    ORGAN: LEFT KIDNEY    Donor Type: Donation after Brain Death        Subjective:   History of Present Illness:  No notes on file    Mr. Trevizo is a 38 y.o. year old male who is status post Kidney Transplant - 10/19/2021  (#1).    His maintenance immunosuppression consists of:   Immunosuppressants (From admission, onward)      Start     Stop Route Frequency Ordered    03/25/23 1545  tacrolimus capsule 4 mg         -- Oral 2 times daily 03/25/23 1534            Hospital Course:  No notes on file    Interval History:  Pt seen and examined at bedside. No AOE. O2 sat table above 94% on RA. UOP adequate ~1L in past 24hrs. sCr steadily trending down. Pt still complains of productive cough, however improved from admission.     Past Medical, Surgical, Family, and Social History:   Unchanged from H&P.    Scheduled Meds:   albuterol  2 puff Inhalation Q6H    ascorbic acid (vitamin C)  500 mg Oral BID    carvediloL  25 mg Oral BID WM    dexAMETHasone  6 mg Oral Daily    heparin (porcine)  5,000 Units Subcutaneous Q8H    minoxidiL  2.5 mg Oral Daily    multivitamin  1 tablet Oral Daily    NIFEdipine  60 mg Oral BID    remdesivir infusion  100 mg Intravenous Daily    sodium bicarbonate  1,950 mg Oral TID    tacrolimus  4 mg Oral BID     Continuous Infusions:  PRN Meds:acetaminophen, benzonatate, dextrose 10%, dextrose 10%, dextrose, dextrose, glucagon (human recombinant), melatonin, naloxone, ondansetron, senna-docusate 8.6-50 mg, sodium chloride 0.9%    Intake/Output - Last 3 Shifts         03/25 0700  03/26 0659 03/26 0700  03/27 0659 03/27 0700  03/28 0659    P.O. 1320 880     I.V. (mL/kg) 1444.7 (18.3)      Total Intake(mL/kg) 2764.7 (35) 880 (11.1)     Urine (mL/kg/hr) 1225 (0.6) 500 (0.3) 500 (1)    Stool 0 0     Total  "Output 1225 500 500    Net +1539.7 +380 -500           Stool Occurrence 0 x 0 x              Review of Systems   Constitutional: Negative.    HENT: Negative.     Respiratory:  Positive for cough and shortness of breath.    Gastrointestinal: Negative.    Endocrine: Negative.    Genitourinary: Negative.    Musculoskeletal: Negative.    Allergic/Immunologic: Negative.    Neurological: Negative.    Hematological: Negative.    Psychiatric/Behavioral: Negative.      Objective:     Vital Signs (Most Recent):  Temp: 97.9 °F (36.6 °C) (03/27/23 1132)  Pulse: 81 (03/27/23 1132)  Resp: (!) 25 (03/27/23 1132)  BP: (!) 155/97 (03/27/23 1132)  SpO2: 95 % (03/27/23 1132)   Vital Signs (24h Range):  Temp:  [97.9 °F (36.6 °C)-99.6 °F (37.6 °C)] 97.9 °F (36.6 °C)  Pulse:  [75-88] 81  Resp:  [18-26] 25  SpO2:  [94 %-97 %] 95 %  BP: (145-164)/(89-97) 155/97     Weight: 79 kg (174 lb 2.6 oz)  Height: 5' 10" (177.8 cm)  Body mass index is 24.99 kg/m².    Physical Exam  Constitutional:       Appearance: Normal appearance.   HENT:      Head: Normocephalic and atraumatic.      Nose: Nose normal.      Mouth/Throat:      Mouth: Mucous membranes are moist.   Eyes:      Pupils: Pupils are equal, round, and reactive to light.   Cardiovascular:      Rate and Rhythm: Normal rate and regular rhythm.   Pulmonary:      Effort: Pulmonary effort is normal.      Breath sounds: Rales present.   Abdominal:      General: Abdomen is flat.      Palpations: Abdomen is soft.   Skin:     General: Skin is warm.      Capillary Refill: Capillary refill takes less than 2 seconds.   Neurological:      General: No focal deficit present.      Mental Status: He is alert and oriented to person, place, and time.       Laboratory:  CBC:   Recent Labs   Lab 03/25/23  0700 03/26/23  0329 03/27/23  0926   WBC 4.15 5.62 7.58   RBC 3.42* 3.38* 3.68*   HGB 8.3* 8.1* 8.7*   HCT 28.0* 27.7* 29.6*    282 311   MCV 82 82 80*   MCH 24.3* 24.0* 23.6*   MCHC 29.6* 29.2* 29.4* "     BMP:   Recent Labs   Lab 03/25/23  0700 03/26/23  0329 03/27/23  0926   GLU 91 115* 96    140 140   K 4.7 4.3 4.8   * 113* 113*   CO2 14* 21* 19*   BUN 40* 42* 43*   CREATININE 6.5* 6.1* 5.9*   CALCIUM 8.6* 8.5* 9.1     CMP:   Recent Labs   Lab 03/25/23  0700 03/26/23  0329 03/27/23  0926   GLU 91 115* 96   CALCIUM 8.6* 8.5* 9.1   ALBUMIN 3.0* 3.0* 3.1*   PROT 6.0 6.1 6.4    140 140   K 4.7 4.3 4.8   CO2 14* 21* 19*   * 113* 113*   BUN 40* 42* 43*   CREATININE 6.5* 6.1* 5.9*   ALKPHOS 66 60 61   ALT 27 28 25   AST 20 20 17       Diagnostic Results:  None    Assessment/Plan:     37 yo AAM, w/ PMHx of 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 Diagnosed w/ COVID inpt.     * Acute rejection of kidney transplant  NON oliguric stage III SEBASTIAN on CKD3, improving slightly   * Acute kidney injury superimposed on chronic kidney disease  SEBASTIAN likely 2/2 suspected underlying rejection and would recommend kidney bx ( IR consulted, pt on list for today)  Baseline Cr 1.7 to 1.9, baseline eGFR ~45  Cr 9.3 at PCP, 8.47 at outside ED, 6.8 on admission    Cr trend 6.8>6.5>6.1>5.9  US kidney transplant unremarkable  UA at OSH noted to only have trace blood  UPCR and ACR ordered, pending ( to r/o recurrence of FSGS)  DSA, CMV and BK pending, of note had weakly postive DSA 7/2022, but not at time of transplant   Encourage adequate Po nutrition and hydration  Monitor accurate I/O  Monitor renal function daily  Avoid nephrotoxins, NSAIDS, fleet enemas, and gadolinium  renally dose medications               Metabolic acidosis  2/2 SEBASTIAN  On bicarb gtt on admssion  Will increase NaHCO3 to 1950 TID    Hypertensive cardiovascular disease  BERNARDA, EF 65%, cLVH, and mild LA enlargement    On coreg 25BID, minoxidil 2.5, procardia 30 BID at home  Cont as feasible per primary (procardai currently 60 BID)     Long-term use of immunosuppressant  medication  Pharmacy reviewed pharm records regarding when he picked up meds. Picked up 30 day supply of meds in Nov 2022, then Jan 2023 and then on March 17, 2023.  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 -ve DSA at transplant  See SEBASTIAN and acute rejection     COVID  On RA, improving   Remdisivir, decadron, and keep sat >94% per primary     Anemia of chronic disease  Serum Fe 68, %32, Ferretin 10, retic WNL  Hg 8.7  CTM, ROBER    Edward Leon MD  Kidney Transplant  [facility] - Telemetry Stepdown

## 2023-03-27 NOTE — NURSING
Patient AAOX4, able to make needs known to staff. [place], hearing aids not available. AROM to all extremities, ambulates to restroom and urinal noted at bedside. NPO past midnight to prepare to kidney biopsy. Tylenol PRN admin for headache. No apparent distress noted. Will continue current plan of care.

## 2023-03-27 NOTE — H&P
Radiology History & Physical      SUBJECTIVE:     Chief Complaint: SEBASTIAN    History of Present Illness:  Feliz Trevizo is a 38 y.o. male who presents for renal transplant biopsy  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     Glomerulonephritis     Hypertension     Seizures     Sensorineural hearing loss     Stage 3a chronic kidney disease 10/29/2021    TIA (transient ischemic attack)      Past Surgical History:   Procedure Laterality Date    ABDOMINOPLASTY  10/19/2021    Procedure: ABDOMINOPLASTY;  Surgeon: Ajay Persaud MD;  Location: [facility] OR [address] [place] [address];  Service: Transplant;;    AV FISTULA PLACEMENT Left     CYSTOSCOPY      stent removal    KIDNEY TRANSPLANT N/A 10/19/2021    Procedure: TRANSPLANT, KIDNEY;  Surgeon: Ajay Persaud MD;  Location: [facility] OR [address] [place] [address];  Service: Transplant;  Laterality: N/A;  Out of Ice 0107  Reperfusion 0133       Home Meds:   Prior to Admission medications    Medication Sig Start Date End Date Taking? Authorizing Provider   aspirin (ECOTRIN) 81 MG EC tablet Take 81 mg by mouth daily as needed.    Historical Provider   carvediloL (COREG) 25 MG tablet Take 1 tablet (25 mg total) by mouth 2 (two) times daily with meals. 10/19/21   Ajay Persaud MD   minoxidiL (LONITEN) 2.5 MG tablet Take 1 tablet (2.5 mg total) by mouth once daily. 4/22/22 4/22/23  Jay Velasco MD   mycophenolate (MYFORTIC) 180 MG TbEC Take 2 tablets (360 mg total) by mouth 2 (two) times daily. 5/2/22 5/2/23  Chelsie Arriola NP   NIFEdipine (PROCARDIA-XL) 30 MG (OSM) 24 hr tablet Take 1 tablet (30 mg total) by mouth 2 (two) times a day. 10/22/21 10/24/22  Jay Velasco MD   predniSONE (DELTASONE) 5 MG tablet TAKE ONE TABLET BY MOUTH DAILY 2/14/23   Jay Velasco MD   tacrolimus (PROGRAF) 1 MG Cap Take 4 capsules (4 mg total) by mouth every 12 (twelve) hours. 4/14/22 4/16/23  Leena  SARAH Castillo MD   k phos di & mono-sod phos mono (K-PHOS-NEUTRAL) 250 mg Tab Take 3 tablets by mouth 2 (two) times a day.  Patient not taking: Reported on 3/25/2023 11/16/21 3/27/23  Dayanara Acevedo MD     Anticoagulants/Antiplatelets: aspirin    Allergies: Review of patient's allergies indicates:  No Known Allergies  Sedation History:  no adverse reactions    Review of Systems:   Hematological: no known coagulopathies  Respiratory: no shortness of breath  Cardiovascular: no chest pain  Gastrointestinal: no abdominal pain  Genito-Urinary: no dysuria  Musculoskeletal: negative  Neurological: no TIA or stroke symptoms         OBJECTIVE:     Vital Signs (Most Recent)  Temp: 98.1 °F (36.7 °C) (03/27/23 1519)  Pulse: 73 (03/27/23 1519)  Resp: 19 (03/27/23 1519)  BP: (!) 150/95 (03/27/23 1519)  SpO2: 97 % (03/27/23 1519)    Physical Exam:  ASA: 3  Mallampati: 2    General: no acute distress  Mental Status: alert and oriented to person, place and time  HEENT: normocephalic, atraumatic  Chest: unlabored breathing  Heart: regular heart rate  Abdomen: nondistended  Extremity: moves all extremities    Laboratory  Lab Results   Component Value Date    INR 1.2 03/27/2023       Lab Results   Component Value Date    WBC 7.58 03/27/2023    HGB 8.7 (L) 03/27/2023    HCT 29.6 (L) 03/27/2023    MCV 80 (L) 03/27/2023     03/27/2023      Lab Results   Component Value Date    GLU 96 03/27/2023     03/27/2023    K 4.8 03/27/2023     (H) 03/27/2023    CO2 19 (L) 03/27/2023    BUN 43 (H) 03/27/2023    CREATININE 5.9 (H) 03/27/2023    CALCIUM 9.1 03/27/2023    MG 1.8 03/27/2023    ALT 25 03/27/2023    AST 17 03/27/2023    ALBUMIN 3.1 (L) 03/27/2023    BILITOT 0.2 03/27/2023    BILIDIR 0.2 10/24/2022       ASSESSMENT/PLAN:     Sedation Plan: Moderate  Patient will undergo Renal transplant biopsy.    Patrice Montiel M.D.  Interventional Radiology  Department of Radiology  Pager: [phone]

## 2023-03-27 NOTE — SUBJECTIVE & OBJECTIVE
Subjective:   History of Present Illness:  No notes on file    Mr. Trevizo is a 38 y.o. year old male who is status post Kidney Transplant - 10/19/2021  (#1).    His maintenance immunosuppression consists of:   Immunosuppressants (From admission, onward)      Start     Stop Route Frequency Ordered    03/25/23 1545  tacrolimus capsule 4 mg         -- Oral 2 times daily 03/25/23 1534            Hospital Course:  No notes on file    Interval History:  Pt seen and examined at bedside. No AOE. O2 sat table above 94% on RA. UOP adequate ~1L in past 24hrs. sCr steadily trending down. Pt still complains of productive cough, however improved from admission.     Past Medical, Surgical, Family, and Social History:   Unchanged from H&P.    Scheduled Meds:   albuterol  2 puff Inhalation Q6H    ascorbic acid (vitamin C)  500 mg Oral BID    carvediloL  25 mg Oral BID WM    dexAMETHasone  6 mg Oral Daily    heparin (porcine)  5,000 Units Subcutaneous Q8H    minoxidiL  2.5 mg Oral Daily    multivitamin  1 tablet Oral Daily    NIFEdipine  60 mg Oral BID    remdesivir infusion  100 mg Intravenous Daily    sodium bicarbonate  1,950 mg Oral TID    tacrolimus  4 mg Oral BID     Continuous Infusions:  PRN Meds:acetaminophen, benzonatate, dextrose 10%, dextrose 10%, dextrose, dextrose, glucagon (human recombinant), melatonin, naloxone, ondansetron, senna-docusate 8.6-50 mg, sodium chloride 0.9%    Intake/Output - Last 3 Shifts         03/25 0700  03/26 0659 03/26 0700  03/27 0659 03/27 0700  03/28 0659    P.O. 1320 880     I.V. (mL/kg) 1444.7 (18.3)      Total Intake(mL/kg) 2764.7 (35) 880 (11.1)     Urine (mL/kg/hr) 1225 (0.6) 500 (0.3) 500 (1)    Stool 0 0     Total Output 1225 500 500    Net +1539.7 +380 -500           Stool Occurrence 0 x 0 x              Review of Systems   Constitutional: Negative.    HENT: Negative.     Respiratory:  Positive for cough and shortness of breath.    Gastrointestinal: Negative.    Endocrine: Negative.   "  Genitourinary: Negative.    Musculoskeletal: Negative.    Allergic/Immunologic: Negative.    Neurological: Negative.    Hematological: Negative.    Psychiatric/Behavioral: Negative.      Objective:     Vital Signs (Most Recent):  Temp: 97.9 °F (36.6 °C) (03/27/23 1132)  Pulse: 81 (03/27/23 1132)  Resp: (!) 25 (03/27/23 1132)  BP: (!) 155/97 (03/27/23 1132)  SpO2: 95 % (03/27/23 1132)   Vital Signs (24h Range):  Temp:  [97.9 °F (36.6 °C)-99.6 °F (37.6 °C)] 97.9 °F (36.6 °C)  Pulse:  [75-88] 81  Resp:  [18-26] 25  SpO2:  [94 %-97 %] 95 %  BP: (145-164)/(89-97) 155/97     Weight: 79 kg (174 lb 2.6 oz)  Height: 5' 10" (177.8 cm)  Body mass index is 24.99 kg/m².    Physical Exam  Constitutional:       Appearance: Normal appearance.   HENT:      Head: Normocephalic and atraumatic.      Nose: Nose normal.      Mouth/Throat:      Mouth: Mucous membranes are moist.   Eyes:      Pupils: Pupils are equal, round, and reactive to light.   Cardiovascular:      Rate and Rhythm: Normal rate and regular rhythm.   Pulmonary:      Effort: Pulmonary effort is normal.      Breath sounds: Rales present.   Abdominal:      General: Abdomen is flat.      Palpations: Abdomen is soft.   Skin:     General: Skin is warm.      Capillary Refill: Capillary refill takes less than 2 seconds.   Neurological:      General: No focal deficit present.      Mental Status: He is alert and oriented to person, place, and time.       Laboratory:  CBC:   Recent Labs   Lab 03/25/23  0700 03/26/23  0329 03/27/23  0926   WBC 4.15 5.62 7.58   RBC 3.42* 3.38* 3.68*   HGB 8.3* 8.1* 8.7*   HCT 28.0* 27.7* 29.6*    282 311   MCV 82 82 80*   MCH 24.3* 24.0* 23.6*   MCHC 29.6* 29.2* 29.4*     BMP:   Recent Labs   Lab 03/25/23  0700 03/26/23  0329 03/27/23  0926   GLU 91 115* 96    140 140   K 4.7 4.3 4.8   * 113* 113*   CO2 14* 21* 19*   BUN 40* 42* 43*   CREATININE 6.5* 6.1* 5.9*   CALCIUM 8.6* 8.5* 9.1     CMP:   Recent Labs   Lab 03/25/23  0700 " 03/26/23  0329 03/27/23  0926   GLU 91 115* 96   CALCIUM 8.6* 8.5* 9.1   ALBUMIN 3.0* 3.0* 3.1*   PROT 6.0 6.1 6.4    140 140   K 4.7 4.3 4.8   CO2 14* 21* 19*   * 113* 113*   BUN 40* 42* 43*   CREATININE 6.5* 6.1* 5.9*   ALKPHOS 66 60 61   ALT 27 28 25   AST 20 20 17       Diagnostic Results:  None

## 2023-03-27 NOTE — ASSESSMENT & PLAN NOTE
Patient has been out of tacrolimus and myfortic for 2 weeks. Cr with steep increase in labs from 3/22. On outside labs Cr 8.47, BUN 47, up from baseline Cr 1.9. Concern for acute rejection. Still having good urine output. Bicarb decreased to 13 on admission, potassium high-normal. Patient reports mild loss of appetite and nausea now resolved.   - US kidney transplant unremarkable.  - UA at OSH noted to only have trace blood  - pending labs    Plan  - IR for biopsy  - KTM consulted, appreciate recs.   - continue tacro and changed pred to dex given COVID  - holding MMF given covid

## 2023-03-27 NOTE — PROGRESS NOTES
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 Repeat Cr 8.47, BUN 47. K 5, bicarb 17. Patient is on prograf, myfortic, and prednisone and has been out of myfortiq and tacrolimus for 2 weeks. He has been adherent with his BP meds and prednsone. He fills all of his medications at [place] other than tacrolimus and myfortic, which are filled at [facility]. The medications are usually mailed out to him, but when he called the line was busy. So he came to pick them up in person. He was unable to drive here earlier due to work obligations, thus missing doses for about 2 weeks. Reports good urine output, no urinary symptoms. Home blood pressures are 100-130s systolic with occasional values in 150s per his home BP cuff reads.     Case was discussed with Dr. Madden of [facility] and agreed for patient to transfer for concerns for rejection. Vitals stable upon transfer other than hypertension up to 165/94. Admitted due to concern for acute renal transplant rejection and KTM input. Initial labs significant for K 4.9, Cr 6.8, BUN 39, bicarb 13.             Overview/Hospital Course:  Patient admitted to  for management of SEBASTIAN with concern for acute rejection as patient was out of medications prior to admission. Restarted on tacro and prednisone (transitioned to dex). Patient also found to be covid positive and started on remdesivir and dexemethasone. CXR with infiltrates bilaterally with mild borderline hypoxia. Plan for  kidney transplant biopsy on 3/27/23.       Interval History: NAEON; patient scheduled for biopsy today. Pt with dry cough with mild production. No other complaints.    Review of Systems   Constitutional:  Positive for appetite change. Negative for chills and fever.   HENT:  Negative for congestion and rhinorrhea.    Eyes:  Negative for photophobia and visual disturbance.   Respiratory:  Negative for cough and shortness of breath.    Cardiovascular:  Negative for chest pain, palpitations and leg swelling.   Gastrointestinal:  Positive for nausea. Negative for abdominal distention, abdominal pain, blood in stool, constipation, diarrhea and vomiting.   Genitourinary:  Negative for dysuria, flank pain, hematuria and urgency.   Musculoskeletal:  Negative for arthralgias and back pain.   Allergic/Immunologic: Positive for immunocompromised state.   Neurological:  Positive for headaches. Negative for dizziness and weakness.   Psychiatric/Behavioral:  Negative for agitation and behavioral problems.    Objective:     Vital Signs (Most Recent):  Temp: 98.2 °F (36.8 °C) (03/27/23 0730)  Pulse: 85 (03/27/23 0854)  Resp: (!) 23 (03/27/23 0854)  BP: (!) 145/89 (03/27/23 0730)  SpO2: 95 % (03/27/23 0854)   Vital Signs (24h Range):  Temp:  [98.2 °F (36.8 °C)-99.6 °F (37.6 °C)] 98.2 °F (36.8 °C)  Pulse:  [75-88] 85  Resp:  [18-26] 23  SpO2:  [94 %-97 %] 95 %  BP: (145-164)/(89-94) 145/89     Weight: 79 kg (174 lb 2.6 oz)  Body mass index is 24.99 kg/m².    Intake/Output Summary (Last 24 hours) at 3/27/2023 1120  Last data filed at 3/27/2023 1004  Gross per 24 hour   Intake 640 ml   Output 800 ml   Net -160 ml      Physical Exam  Vitals and nursing note reviewed.   Constitutional:       Appearance: Normal appearance.   HENT:      Head: Normocephalic and atraumatic.      Nose: Nose normal.      Mouth/Throat:      Mouth: Mucous membranes are moist.   Eyes:      General: No scleral icterus.     Extraocular Movements: Extraocular  movements intact.      Conjunctiva/sclera: Conjunctivae normal.   Cardiovascular:      Rate and Rhythm: Normal rate and regular rhythm.      Pulses: Normal pulses.      Heart sounds: Murmur heard.   Pulmonary:      Effort: Pulmonary effort is normal. No respiratory distress.      Breath sounds: Normal breath sounds. No wheezing or rales.   Abdominal:      General: Bowel sounds are normal. There is no distension.      Palpations: Abdomen is soft. There is no mass.      Tenderness: There is no abdominal tenderness. There is no guarding.   Musculoskeletal:         General: No swelling, deformity or signs of injury. Normal range of motion.      Cervical back: Normal range of motion. No rigidity.   Skin:     General: Skin is warm and dry.      Coloration: Skin is not jaundiced.      Findings: No bruising.      Comments: Subungual hematoma on left thumb   Neurological:      General: No focal deficit present.      Mental Status: He is alert and oriented to person, place, and time.      Motor: No weakness.   Psychiatric:         Mood and Affect: Mood normal.         Behavior: Behavior normal.         Thought Content: Thought content normal.       Significant Labs: All pertinent labs within the past 24 hours have been reviewed.    Significant Imaging: I have reviewed all pertinent imaging results/findings within the past 24 hours.      Assessment/Plan:      * Acute rejection of kidney transplant  Patient has been out of tacrolimus and myfortic for 2 weeks. Cr with steep increase in labs from 3/22. On outside labs Cr 8.47, BUN 47, up from baseline Cr 1.9. Concern for acute rejection. Still having good urine output. Bicarb decreased to 13 on admission, potassium high-normal. Patient reports mild loss of appetite and nausea now resolved.   - US kidney transplant unremarkable.  - UA at OSH noted to only have trace blood  - pending labs    Plan  - IR for biopsy  - KTM consulted, appreciate recs.   - continue tacro and changed  pred to dex given COVID  - holding MMF given covid      COVID  Patient is identified as Severe COVID-19 based on hypoxemia with O2 saturations <94% on room air or on ambulation   Initiate standard COVID protocols; COVID-19 testing ,Infection Control notification  and isolation- respiratory, contact and droplet per protocol    Diagnostics: (leukopenia, hyponatremia, hyperferritinemia, elevated troponin, elevated d-dimer, age, and comorbidities are significant predictors of poor clinical outcome)  CBC, CMP, Ferritin, CRP and Portable CXR    Management: Initiate targeted therapy with Remdesivir, 200mg IV x1, followed by 100mg IV daily x5 days total and Dexamethasone PO/IV 6mg daily x10 days, Maintain oxygen saturations 92-96% via Nasal Cannula  LPM and monitor with continuous/intermittent pulse oximetry.  and Inhaled bronchodilators as needed for shortness of breath.    New infiltrates on CXR. SpO2 93-4% on RA and associated symptoms of HA, sore throat, chills and fever.     Subungual hematoma of left thumb  Patient reports jamming thumb, no pain. Hematoma has been present for a month. Lower concern for subungual melanoma given preceding trauma.       Benign essential HTN  Continue nifed, coreg and minoxidil. Hypertensive due to pain and COVID    Will likely increase coreg if still hypertensive      Stage 3a chronic kidney disease  Baseline Cr 1.9      Anemia of chronic disease  Hb normally 9-10. Hb on 3/22 was 8.6. MCV was 79    - daily CBC  - transfuse for Hb<7  - f/u iron labs and retic      Long-term use of immunosuppressant medication  Takes prednisone, tacrolimus, mycophenolate.     Holding mycophenolate given COVID  Continue tacro   Change pred to dex given COVID    S/P kidney transplant 10/19/21  S/p renal transplant      Hx-TIA (transient ischemic attack)  See primary problem      Hx of seizure disorder  Remote history in childhood, no home AEDs      Renovascular hypertension  Takes minoxidil, coreg, and  nifedipine. Home Bps are well controlled around 100-130s systolic mostly.     - restarted home meds.   - increase nifedipine to 60 BID      VTE Risk Mitigation (From admission, onward)         Ordered     heparin (porcine) injection 5,000 Units  Every 8 hours         03/25/23 0047     IP VTE LOW RISK PATIENT  Once         03/25/23 0013     Place sequential compression device  Until discontinued         03/25/23 0013                Discharge Planning   MARSHALL: 3/28/2023     Code Status: Full Code   Is the patient medically ready for discharge?:     Reason for patient still in hospital (select all that apply): Patient trending condition and Imaging           Andrea Encinas MD  Department of Hospital Medicine   [facility] - Telemetry Stepdown

## 2023-03-28 ENCOUNTER — TELEPHONE (OUTPATIENT)
Dept: TRANSPLANT | Facility: CLINIC | Age: 38
End: 2023-03-28
Payer: MEDICARE

## 2023-03-28 LAB
ALBUMIN SERPL BCP-MCNC: 3.2 G/DL (ref 3.5–5.2)
ALP SERPL-CCNC: 66 U/L (ref 55–135)
ALT SERPL W/O P-5'-P-CCNC: 24 U/L (ref 10–44)
ANION GAP SERPL CALC-SCNC: 11 MMOL/L (ref 8–16)
AST SERPL-CCNC: 18 U/L (ref 10–40)
BASOPHILS # BLD AUTO: 0.01 K/UL (ref 0–0.2)
BASOPHILS NFR BLD: 0.1 % (ref 0–1.9)
BILIRUB SERPL-MCNC: 0.2 MG/DL (ref 0.1–1)
BUN SERPL-MCNC: 50 MG/DL (ref 6–20)
CALCIUM SERPL-MCNC: 9.2 MG/DL (ref 8.7–10.5)
CHLORIDE SERPL-SCNC: 111 MMOL/L (ref 95–110)
CMV DNA SPEC QL NAA+PROBE: NOT DETECTED
CO2 SERPL-SCNC: 19 MMOL/L (ref 23–29)
CREAT SERPL-MCNC: 5.2 MG/DL (ref 0.5–1.4)
CYTOMEGALOVIRUS LOG (IU/ML): NOT DETECTED LOGIU/ML
CYTOMEGALOVIRUS PCR, QUANT: NOT DETECTED IU/ML
DIFFERENTIAL METHOD: ABNORMAL
EOSINOPHIL # BLD AUTO: 0 K/UL (ref 0–0.5)
EOSINOPHIL NFR BLD: 0 % (ref 0–8)
ERYTHROCYTE [DISTWIDTH] IN BLOOD BY AUTOMATED COUNT: 13.8 % (ref 11.5–14.5)
EST. GFR  (NO RACE VARIABLE): 13.5 ML/MIN/1.73 M^2
GLUCOSE SERPL-MCNC: 86 MG/DL (ref 70–110)
HCT VFR BLD AUTO: 31.1 % (ref 40–54)
HGB BLD-MCNC: 9.3 G/DL (ref 14–18)
IMM GRANULOCYTES # BLD AUTO: 0.09 K/UL (ref 0–0.04)
IMM GRANULOCYTES NFR BLD AUTO: 0.9 % (ref 0–0.5)
LYMPHOCYTES # BLD AUTO: 0.6 K/UL (ref 1–4.8)
LYMPHOCYTES NFR BLD: 5.7 % (ref 18–48)
MAGNESIUM SERPL-MCNC: 1.9 MG/DL (ref 1.6–2.6)
MCH RBC QN AUTO: 24.1 PG (ref 27–31)
MCHC RBC AUTO-ENTMCNC: 29.9 G/DL (ref 32–36)
MCV RBC AUTO: 81 FL (ref 82–98)
MONOCYTES # BLD AUTO: 0.6 K/UL (ref 0.3–1)
MONOCYTES NFR BLD: 5.8 % (ref 4–15)
NEUTROPHILS # BLD AUTO: 8.8 K/UL (ref 1.8–7.7)
NEUTROPHILS NFR BLD: 87.5 % (ref 38–73)
NRBC BLD-RTO: 0 /100 WBC
PHOSPHATE SERPL-MCNC: 3 MG/DL (ref 2.7–4.5)
PLATELET # BLD AUTO: 377 K/UL (ref 150–450)
PMV BLD AUTO: 10.5 FL (ref 9.2–12.9)
POTASSIUM SERPL-SCNC: 5.3 MMOL/L (ref 3.5–5.1)
PROT SERPL-MCNC: 6.5 G/DL (ref 6–8.4)
RBC # BLD AUTO: 3.86 M/UL (ref 4.6–6.2)
SODIUM SERPL-SCNC: 141 MMOL/L (ref 136–145)
TACROLIMUS BLD-MCNC: 8 NG/ML (ref 5–15)
WBC # BLD AUTO: 10.01 K/UL (ref 3.9–12.7)

## 2023-03-28 PROCEDURE — 80197 ASSAY OF TACROLIMUS: CPT | Performed by: STUDENT IN AN ORGANIZED HEALTH CARE EDUCATION/TRAINING PROGRAM

## 2023-03-28 PROCEDURE — 80053 COMPREHEN METABOLIC PANEL: CPT

## 2023-03-28 PROCEDURE — 99233 PR SUBSEQUENT HOSPITAL CARE,LEVL III: ICD-10-PCS | Mod: CR,,, | Performed by: HOSPITALIST

## 2023-03-28 PROCEDURE — 63600175 PHARM REV CODE 636 W HCPCS

## 2023-03-28 PROCEDURE — 83735 ASSAY OF MAGNESIUM: CPT

## 2023-03-28 PROCEDURE — 63600175 PHARM REV CODE 636 W HCPCS: Mod: JZ,TB

## 2023-03-28 PROCEDURE — 99233 SBSQ HOSP IP/OBS HIGH 50: CPT | Mod: CR,,, | Performed by: HOSPITALIST

## 2023-03-28 PROCEDURE — 25000003 PHARM REV CODE 250

## 2023-03-28 PROCEDURE — 27000207 HC ISOLATION

## 2023-03-28 PROCEDURE — 99233 SBSQ HOSP IP/OBS HIGH 50: CPT | Mod: GC,,, | Performed by: INTERNAL MEDICINE

## 2023-03-28 PROCEDURE — 25000003 PHARM REV CODE 250: Performed by: INTERNAL MEDICINE

## 2023-03-28 PROCEDURE — 25000003 PHARM REV CODE 250: Performed by: STUDENT IN AN ORGANIZED HEALTH CARE EDUCATION/TRAINING PROGRAM

## 2023-03-28 PROCEDURE — 20600001 HC STEP DOWN PRIVATE ROOM

## 2023-03-28 PROCEDURE — 27100098 HC SPACER

## 2023-03-28 PROCEDURE — 63600175 PHARM REV CODE 636 W HCPCS: Performed by: INTERNAL MEDICINE

## 2023-03-28 PROCEDURE — 36415 COLL VENOUS BLD VENIPUNCTURE: CPT

## 2023-03-28 PROCEDURE — 63600175 PHARM REV CODE 636 W HCPCS: Performed by: STUDENT IN AN ORGANIZED HEALTH CARE EDUCATION/TRAINING PROGRAM

## 2023-03-28 PROCEDURE — 85025 COMPLETE CBC W/AUTO DIFF WBC: CPT

## 2023-03-28 PROCEDURE — 84100 ASSAY OF PHOSPHORUS: CPT

## 2023-03-28 PROCEDURE — 94640 AIRWAY INHALATION TREATMENT: CPT

## 2023-03-28 PROCEDURE — 94761 N-INVAS EAR/PLS OXIMETRY MLT: CPT

## 2023-03-28 PROCEDURE — 99233 PR SUBSEQUENT HOSPITAL CARE,LEVL III: ICD-10-PCS | Mod: GC,,, | Performed by: INTERNAL MEDICINE

## 2023-03-28 RX ORDER — NIFEDIPINE 30 MG/1
60 TABLET, EXTENDED RELEASE ORAL 2 TIMES DAILY
Status: DISCONTINUED | OUTPATIENT
Start: 2023-03-28 | End: 2023-04-03 | Stop reason: HOSPADM

## 2023-03-28 RX ADMIN — ALBUTEROL SULFATE 2 PUFF: 108 INHALANT RESPIRATORY (INHALATION) at 07:03

## 2023-03-28 RX ADMIN — REMDESIVIR 100 MG: 100 INJECTION, POWDER, LYOPHILIZED, FOR SOLUTION INTRAVENOUS at 09:03

## 2023-03-28 RX ADMIN — ALBUTEROL SULFATE 2 PUFF: 108 INHALANT RESPIRATORY (INHALATION) at 03:03

## 2023-03-28 RX ADMIN — ALBUTEROL SULFATE 2 PUFF: 108 INHALANT RESPIRATORY (INHALATION) at 09:03

## 2023-03-28 RX ADMIN — CARVEDILOL 25 MG: 25 TABLET, FILM COATED ORAL at 05:03

## 2023-03-28 RX ADMIN — TACROLIMUS 4 MG: 1 CAPSULE ORAL at 05:03

## 2023-03-28 RX ADMIN — SODIUM BICARBONATE 1950 MG: 650 TABLET ORAL at 09:03

## 2023-03-28 RX ADMIN — TACROLIMUS 4 MG: 1 CAPSULE ORAL at 08:03

## 2023-03-28 RX ADMIN — THERA TABS 1 TABLET: TAB at 09:03

## 2023-03-28 RX ADMIN — HEPARIN SODIUM 5000 UNITS: 5000 INJECTION INTRAVENOUS; SUBCUTANEOUS at 05:03

## 2023-03-28 RX ADMIN — Medication 500 MG: at 09:03

## 2023-03-28 RX ADMIN — SODIUM BICARBONATE 1950 MG: 650 TABLET ORAL at 02:03

## 2023-03-28 RX ADMIN — DEXAMETHASONE 6 MG: 4 TABLET ORAL at 09:03

## 2023-03-28 RX ADMIN — ALBUTEROL SULFATE 2 PUFF: 108 INHALANT RESPIRATORY (INHALATION) at 01:03

## 2023-03-28 RX ADMIN — ACETAMINOPHEN 650 MG: 325 TABLET ORAL at 09:03

## 2023-03-28 RX ADMIN — MINOXIDIL 2.5 MG: 2.5 TABLET ORAL at 09:03

## 2023-03-28 RX ADMIN — NIFEDIPINE 60 MG: 30 TABLET, FILM COATED, EXTENDED RELEASE ORAL at 09:03

## 2023-03-28 RX ADMIN — HEPARIN SODIUM 5000 UNITS: 5000 INJECTION INTRAVENOUS; SUBCUTANEOUS at 09:03

## 2023-03-28 RX ADMIN — HEPARIN SODIUM 5000 UNITS: 5000 INJECTION INTRAVENOUS; SUBCUTANEOUS at 02:03

## 2023-03-28 RX ADMIN — SODIUM ZIRCONIUM CYCLOSILICATE 10 G: 5 POWDER, FOR SUSPENSION ORAL at 02:03

## 2023-03-28 RX ADMIN — NIFEDIPINE 30 MG: 30 TABLET, FILM COATED, EXTENDED RELEASE ORAL at 09:03

## 2023-03-28 RX ADMIN — CARVEDILOL 25 MG: 25 TABLET, FILM COATED ORAL at 08:03

## 2023-03-28 NOTE — PROGRESS NOTES
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 Repeat Cr 8.47, BUN 47. K 5, bicarb 17. Patient is on prograf, myfortic, and prednisone and has been out of myfortiq and tacrolimus for 2 weeks. He has been adherent with his BP meds and prednsone. He fills all of his medications at [facility] other than tacrolimus and myfortic, which are filled at [facility]. The medications are usually mailed out to him, but when he called the line was busy. So he came to pick them up in person. He was unable to drive here earlier due to work obligations, thus missing doses for about 2 weeks. Reports good urine output, no urinary symptoms. Home blood pressures are 100-130s systolic with occasional values in 150s per his home BP cuff reads.     Case was discussed with Dr. Madden of [facility] and agreed for patient to transfer for concerns for rejection. Vitals stable upon transfer other than hypertension up to 165/94. Admitted due to concern for acute renal transplant rejection and KTM input. Initial labs significant for K 4.9, Cr 6.8, BUN 39, bicarb 13.             Overview/Hospital Course:  Patient admitted to  for management of SEBASTIAN with concern for acute rejection as patient was out of medications prior to admission. Restarted on tacro and prednisone (transitioned to dex). Patient also found to be covid positive and started on remdesivir and dexemethasone. CXR with infiltrates bilaterally with mild borderline hypoxia. Plan for  kidney transplant biopsy on 3/27/23. Pt tolerated procedure well.       Interval History: NAEON; pt tolerated procedure well. Pending biopsy results. Pt still having cough that is mildly productive. Still making urine with adequate output.     Review of Systems   Constitutional:  Positive for appetite change. Negative for chills and fever.   HENT:  Negative for congestion and rhinorrhea.    Eyes:  Negative for photophobia and visual disturbance.   Respiratory:  Negative for cough and shortness of breath.    Cardiovascular:  Negative for chest pain, palpitations and leg swelling.   Gastrointestinal:  Positive for nausea. Negative for abdominal distention, abdominal pain, blood in stool, constipation, diarrhea and vomiting.   Genitourinary:  Negative for dysuria, flank pain, hematuria and urgency.   Musculoskeletal:  Negative for arthralgias and back pain.   Allergic/Immunologic: Positive for immunocompromised state.   Neurological:  Positive for headaches. Negative for dizziness and weakness.   Psychiatric/Behavioral:  Negative for agitation and behavioral problems.    Objective:     Vital Signs (Most Recent):  Temp: 98.8 °F (37.1 °C) (03/28/23 0753)  Pulse: 72 (03/28/23 1102)  Resp: 17 (03/28/23 0753)  BP: (!) 148/86 (03/28/23 0753)  SpO2: 95 % (03/28/23 0753)   Vital Signs (24h Range):  Temp:  [98 °F (36.7 °C)-98.8 °F (37.1 °C)] 98.8 °F (37.1 °C)  Pulse:  [71-88] 72  Resp:  [16-30] 17  SpO2:  [94 %-99 %] 95 %  BP: (139-199)/(76-97) 148/86     Weight: 79 kg (174 lb 2.6 oz)  Body mass index is 24.99 kg/m².    Intake/Output Summary (Last 24 hours) at 3/28/2023 1212  Last data filed at 3/28/2023 0939  Gross per 24 hour   Intake 0 ml   Output 2950 ml   Net -2950 ml      Physical Exam  Vitals and nursing note reviewed.   Constitutional:       Appearance: Normal appearance.   HENT:      Head: Normocephalic and atraumatic.      Nose: Nose normal.      Mouth/Throat:      Mouth: Mucous membranes are moist.   Eyes:       General: No scleral icterus.     Extraocular Movements: Extraocular movements intact.      Conjunctiva/sclera: Conjunctivae normal.   Cardiovascular:      Rate and Rhythm: Normal rate and regular rhythm.      Pulses: Normal pulses.      Heart sounds: Murmur heard.   Pulmonary:      Effort: Pulmonary effort is normal. No respiratory distress.      Breath sounds: Normal breath sounds. No wheezing or rales.      Comments: Transmitted upper airway sounds  Abdominal:      General: Bowel sounds are normal. There is no distension.      Palpations: Abdomen is soft. There is no mass.      Tenderness: There is no abdominal tenderness. There is no guarding.   Musculoskeletal:         General: No swelling, deformity or signs of injury. Normal range of motion.      Cervical back: Normal range of motion. No rigidity.   Skin:     General: Skin is warm and dry.      Coloration: Skin is not jaundiced.      Findings: No bruising.      Comments: Subungual hematoma on left thumb   Neurological:      General: No focal deficit present.      Mental Status: He is alert and oriented to person, place, and time.      Motor: No weakness.   Psychiatric:         Mood and Affect: Mood normal.         Behavior: Behavior normal.         Thought Content: Thought content normal.       Significant Labs: All pertinent labs within the past 24 hours have been reviewed.    Significant Imaging: I have reviewed all pertinent imaging results/findings within the past 24 hours.      Assessment/Plan:      * Acute rejection of kidney transplant  Patient has been out of tacrolimus and myfortic for 2 weeks. Cr with steep increase in labs from 3/22. On outside labs Cr 8.47, BUN 47, up from baseline Cr 1.9. Concern for acute rejection. Still having good urine output. Bicarb decreased to 13 on admission, potassium high-normal. Patient reports mild loss of appetite and nausea now resolved.   - US kidney transplant unremarkable.  - UA at OSH noted to only have trace  blood  - pending labs    Plan  - IR for biopsy; f/u results  - KTM consulted, appreciate recs.   - continue tacro and changed pred to dex given COVID  - holding MMF given covid      Metabolic acidosis  Bicarb 1950 BID    Acute kidney injury superimposed on chronic kidney disease  F/u nephrology recommendations  IVF  Strict Is/Os  Renally dose medications        Hypertensive cardiovascular disease  See primary problem      COVID  Patient is identified as Severe COVID-19 based on hypoxemia with O2 saturations <94% on room air or on ambulation   Initiate standard COVID protocols; COVID-19 testing ,Infection Control notification  and isolation- respiratory, contact and droplet per protocol    Diagnostics: (leukopenia, hyponatremia, hyperferritinemia, elevated troponin, elevated d-dimer, age, and comorbidities are significant predictors of poor clinical outcome)  CBC, CMP, Ferritin, CRP and Portable CXR    Management: Initiate targeted therapy with Remdesivir, 200mg IV x1, followed by 100mg IV daily x5 days total and Dexamethasone PO/IV 6mg daily x10 days, Maintain oxygen saturations 92-96% via Nasal Cannula  LPM and monitor with continuous/intermittent pulse oximetry.  and Inhaled bronchodilators as needed for shortness of breath.    New infiltrates on CXR. SpO2 93-4% on RA and associated symptoms of HA, sore throat, chills and fever.     Subungual hematoma of left thumb  Patient reports jamming thumb, no pain. Hematoma has been present for a month. Lower concern for subungual melanoma given preceding trauma.       Benign essential HTN  Continue nifed, coreg and minoxidil. Hypertensive due to pain and COVID    Increased nifedipine to 60 bid       Stage 3a chronic kidney disease  Baseline Cr 1.9      Anemia of chronic disease  Hb normally 9-10. Hb on 3/22 was 8.6. MCV was 79    - daily CBC  - transfuse for Hb<7  - f/u iron labs and retic      Long-term use of immunosuppressant medication  Takes prednisone, tacrolimus,  mycophenolate.     Holding mycophenolate given COVID  Continue tacro   Change pred to dex given COVID    S/P kidney transplant 10/19/21  S/p renal transplant      Hx-TIA (transient ischemic attack)  See primary problem      Hx of seizure disorder  Remote history in childhood, no home AEDs      Renovascular hypertension  Takes minoxidil, coreg, and nifedipine. Home Bps are well controlled around 100-130s systolic mostly.     - restarted home meds.   - increase nifedipine to 60 BID    Focal segmental glomerulosclerosis  Follow KTM recs      VTE Risk Mitigation (From admission, onward)         Ordered     heparin (porcine) injection 5,000 Units  Every 8 hours         03/25/23 0047     IP VTE LOW RISK PATIENT  Once         03/25/23 0013     Place sequential compression device  Until discontinued         03/25/23 0013                Discharge Planning   MARSHALL: 3/28/2023     Code Status: Full Code   Is the patient medically ready for discharge?:     Reason for patient still in hospital (select all that apply): Patient trending condition and Treatment  Discharge Plan A: Home with family        Andrea Encinas MD  Department of Hospital Medicine   [facility] - Telemetry Stepdown

## 2023-03-28 NOTE — PLAN OF CARE
Patient AAOX4, RA, vitals are WNL. Patient states no further needs or concerns att. Will continue to monitor att.

## 2023-03-28 NOTE — ASSESSMENT & PLAN NOTE
NON oliguric stage III SEBASTIAN on CKD3, improving slightly   SEBASTIAN likely 2/2 suspected underlying rejection and would recommend kidney bx (done 3/27)  Baseline Cr 1.7 to 1.9, baseline eGFR ~45  Cr 9.3 at PCP, 8.47 at outside ED, 6.8 on admission    Cr trend 6.8>6.5>6.1>5.9>5.2  US kidney transplant unremarkable  UA at OSH noted to only have trace blood  UPCR undectable and ACR 42 (Not FSGS reactivation)  DSA undetectable, note had weakly postive DSA 7/2022, but not at time of transplant   CMV and BK pending  Encourage adequate Po nutrition and hydration  F/U kidney bx result   Monitor accurate I/O  Monitor renal function daily  Avoid nephrotoxins, NSAIDS, fleet enemas, and gadolinium  renally dose medications

## 2023-03-28 NOTE — ASSESSMENT & PLAN NOTE
Patient has been out of tacrolimus and myfortic for 2 weeks. Cr with steep increase in labs from 3/22. On outside labs Cr 8.47, BUN 47, up from baseline Cr 1.9. Concern for acute rejection. Still having good urine output. Bicarb decreased to 13 on admission, potassium high-normal. Patient reports mild loss of appetite and nausea now resolved.   - US kidney transplant unremarkable.  - UA at OSH noted to only have trace blood  - pending labs    Plan  - IR for biopsy; f/u results  - KTM consulted, appreciate recs.   - continue tacro and changed pred to dex given COVID  - holding MMF given covid

## 2023-03-28 NOTE — PLAN OF CARE
Pt AAOx4, no c/o of pain, s/p IR renal biopsy, RLQ abd puncture site CDI with gauze, no redness/ tenderness noted. NSR on tele monitor. VSS, no acute distress noted. Covid precaution maintained. Cont to monitor    Problem: Adult Inpatient Plan of Care  Goal: Plan of Care Review  Outcome: Ongoing, Progressing  Goal: Absence of Hospital-Acquired Illness or Injury  Outcome: Ongoing, Progressing  Goal: Optimal Comfort and Wellbeing  Outcome: Ongoing, Progressing  Goal: Readiness for Transition of Care  Outcome: Ongoing, Progressing     Problem: Communication Impairment  Goal: Effective Communication Skills  Outcome: Ongoing, Progressing     Problem: Fluid and Electrolyte Imbalance (Acute Kidney Injury/Impairment)  Goal: Fluid and Electrolyte Balance  Outcome: Ongoing, Progressing     Problem: Renal Function Impairment (Acute Kidney Injury/Impairment)  Goal: Effective Renal Function  Outcome: Ongoing, Progressing     Problem: Donor Organ Function (Kidney Transplant)  Goal: Effective Renal Function  Outcome: Ongoing, Progressing     Problem: Fluid and Electrolyte Imbalance (Kidney Transplant)  Goal: Fluid and Electrolyte Balance  Outcome: Ongoing, Progressing

## 2023-03-28 NOTE — ASSESSMENT & PLAN NOTE
BERNARDA, EF 65%, cLVH, and mild LA enlargement    On coreg 25BID, minoxidil 2.5, procardia 30 BID at home  Cont as feasible per primary

## 2023-03-28 NOTE — SUBJECTIVE & OBJECTIVE
Subjective:   History of Present Illness:  No notes on file    Mr. Trevizo is a 38 y.o. year old male who is status post Kidney Transplant - 10/19/2021  (#1).    His maintenance immunosuppression consists of:   Immunosuppressants (From admission, onward)      Start     Stop Route Frequency Ordered    03/25/23 1545  tacrolimus capsule 4 mg         -- Oral 2 times daily 03/25/23 1534            Hospital Course:  No notes on file    Interval History:  Pt seen and examined at bedside. No AOE. S/p allograft bx 3/27, specimen sent to [place] [facility]. Noted cough and Sob improving. sCr improving. UOP adequate     Past Medical, Surgical, Family, and Social History:   Unchanged from H&P.    Scheduled Meds:   albuterol  2 puff Inhalation Q6H    ascorbic acid (vitamin C)  500 mg Oral BID    carvediloL  25 mg Oral BID WM    dexAMETHasone  6 mg Oral Daily    heparin (porcine)  5,000 Units Subcutaneous Q8H    minoxidiL  2.5 mg Oral Daily    multivitamin  1 tablet Oral Daily    NIFEdipine  30 mg Oral BID    remdesivir infusion  100 mg Intravenous Daily    sodium bicarbonate  1,950 mg Oral TID    tacrolimus  4 mg Oral BID     Continuous Infusions:  PRN Meds:acetaminophen, benzonatate, dextrose 10%, dextrose 10%, dextrose, dextrose, glucagon (human recombinant), melatonin, naloxone, ondansetron, senna-docusate 8.6-50 mg, sodium chloride 0.9%    Intake/Output - Last 3 Shifts         03/26 0700  03/27 0659 03/27 0700  03/28 0659 03/28 0700  03/29 0659    P.O. 880 0     I.V. (mL/kg)       Total Intake(mL/kg) 880 (11.1) 0 (0)     Urine (mL/kg/hr) 500 (0.3) 3150 (1.7) 600 (1.7)    Stool 0 0     Total Output 500 3150 600    Net +380 -3150 -600           Stool Occurrence 0 x 1 x              Review of Systems   Constitutional:  Positive for fatigue.   HENT: Negative.     Respiratory: Negative.     Cardiovascular: Negative.    Gastrointestinal: Negative.    Endocrine: Negative.    Skin: Negative.    Allergic/Immunologic: Negative.   "  Neurological: Negative.    Hematological: Negative.    Psychiatric/Behavioral: Negative.      Objective:     Vital Signs (Most Recent):  Temp: 98.8 °F (37.1 °C) (03/28/23 0753)  Pulse: 72 (03/28/23 1102)  Resp: 17 (03/28/23 0753)  BP: (!) 148/86 (03/28/23 0753)  SpO2: 95 % (03/28/23 0753)   Vital Signs (24h Range):  Temp:  [98 °F (36.7 °C)-98.8 °F (37.1 °C)] 98.8 °F (37.1 °C)  Pulse:  [71-88] 72  Resp:  [16-30] 17  SpO2:  [94 %-99 %] 95 %  BP: (139-199)/(76-97) 148/86     Weight: 79 kg (174 lb 2.6 oz)  Height: 5' 10" (177.8 cm)  Body mass index is 24.99 kg/m².    Physical Exam  HENT:      Head: Normocephalic.      Nose: Nose normal.      Mouth/Throat:      Mouth: Mucous membranes are moist.   Eyes:      Extraocular Movements: Extraocular movements intact.      Pupils: Pupils are equal, round, and reactive to light.   Cardiovascular:      Rate and Rhythm: Normal rate and regular rhythm.   Pulmonary:      Effort: Pulmonary effort is normal.      Breath sounds: Normal breath sounds.   Abdominal:      General: Abdomen is flat.   Musculoskeletal:         General: Normal range of motion.   Skin:     General: Skin is warm.      Capillary Refill: Capillary refill takes less than 2 seconds.   Neurological:      General: No focal deficit present.      Mental Status: He is alert and oriented to person, place, and time.   Psychiatric:         Mood and Affect: Mood normal.         Behavior: Behavior normal.       Laboratory:  CBC:   Recent Labs   Lab 03/26/23 0329 03/27/23  0926 03/28/23  0450   WBC 5.62 7.58 10.01   RBC 3.38* 3.68* 3.86*   HGB 8.1* 8.7* 9.3*   HCT 27.7* 29.6* 31.1*    311 377   MCV 82 80* 81*   MCH 24.0* 23.6* 24.1*   MCHC 29.2* 29.4* 29.9*     BMP:   Recent Labs   Lab 03/26/23  0329 03/27/23  0926 03/28/23  0450   * 96 86    140 141   K 4.3 4.8 5.3*   * 113* 111*   CO2 21* 19* 19*   BUN 42* 43* 50*   CREATININE 6.1* 5.9* 5.2*   CALCIUM 8.5* 9.1 9.2     CMP:   Recent Labs   Lab " 03/26/23  0329 03/27/23  0926 03/28/23  0450   * 96 86   CALCIUM 8.5* 9.1 9.2   ALBUMIN 3.0* 3.1* 3.2*   PROT 6.1 6.4 6.5    140 141   K 4.3 4.8 5.3*   CO2 21* 19* 19*   * 113* 111*   BUN 42* 43* 50*   CREATININE 6.1* 5.9* 5.2*   ALKPHOS 60 61 66   ALT 28 25 24   AST 20 17 18       Diagnostic Results:  None

## 2023-03-28 NOTE — ASSESSMENT & PLAN NOTE
Continue nifed, coreg and minoxidil. Hypertensive due to pain and COVID    Increased nifedipine to 60 bid

## 2023-03-28 NOTE — ASSESSMENT & PLAN NOTE
2/2 SEBASTIAN  On bicarb gtt on admssion  Cont NaHCO3 1950 TID.   Expected improvement w/ improved kidney function as well

## 2023-03-28 NOTE — SUBJECTIVE & OBJECTIVE
Interval History: NAEON; pt tolerated procedure well. Pending biopsy results. Pt still having cough that is mildly productive. Still making urine with adequate output.     Review of Systems   Constitutional:  Positive for appetite change. Negative for chills and fever.   HENT:  Negative for congestion and rhinorrhea.    Eyes:  Negative for photophobia and visual disturbance.   Respiratory:  Negative for cough and shortness of breath.    Cardiovascular:  Negative for chest pain, palpitations and leg swelling.   Gastrointestinal:  Positive for nausea. Negative for abdominal distention, abdominal pain, blood in stool, constipation, diarrhea and vomiting.   Genitourinary:  Negative for dysuria, flank pain, hematuria and urgency.   Musculoskeletal:  Negative for arthralgias and back pain.   Allergic/Immunologic: Positive for immunocompromised state.   Neurological:  Positive for headaches. Negative for dizziness and weakness.   Psychiatric/Behavioral:  Negative for agitation and behavioral problems.    Objective:     Vital Signs (Most Recent):  Temp: 98.8 °F (37.1 °C) (03/28/23 0753)  Pulse: 72 (03/28/23 1102)  Resp: 17 (03/28/23 0753)  BP: (!) 148/86 (03/28/23 0753)  SpO2: 95 % (03/28/23 0753)   Vital Signs (24h Range):  Temp:  [98 °F (36.7 °C)-98.8 °F (37.1 °C)] 98.8 °F (37.1 °C)  Pulse:  [71-88] 72  Resp:  [16-30] 17  SpO2:  [94 %-99 %] 95 %  BP: (139-199)/(76-97) 148/86     Weight: 79 kg (174 lb 2.6 oz)  Body mass index is 24.99 kg/m².    Intake/Output Summary (Last 24 hours) at 3/28/2023 1212  Last data filed at 3/28/2023 0939  Gross per 24 hour   Intake 0 ml   Output 2950 ml   Net -2950 ml      Physical Exam  Vitals and nursing note reviewed.   Constitutional:       Appearance: Normal appearance.   HENT:      Head: Normocephalic and atraumatic.      Nose: Nose normal.      Mouth/Throat:      Mouth: Mucous membranes are moist.   Eyes:      General: No scleral icterus.     Extraocular Movements: Extraocular movements  intact.      Conjunctiva/sclera: Conjunctivae normal.   Cardiovascular:      Rate and Rhythm: Normal rate and regular rhythm.      Pulses: Normal pulses.      Heart sounds: Murmur heard.   Pulmonary:      Effort: Pulmonary effort is normal. No respiratory distress.      Breath sounds: Normal breath sounds. No wheezing or rales.      Comments: Transmitted upper airway sounds  Abdominal:      General: Bowel sounds are normal. There is no distension.      Palpations: Abdomen is soft. There is no mass.      Tenderness: There is no abdominal tenderness. There is no guarding.   Musculoskeletal:         General: No swelling, deformity or signs of injury. Normal range of motion.      Cervical back: Normal range of motion. No rigidity.   Skin:     General: Skin is warm and dry.      Coloration: Skin is not jaundiced.      Findings: No bruising.      Comments: Subungual hematoma on left thumb   Neurological:      General: No focal deficit present.      Mental Status: He is alert and oriented to person, place, and time.      Motor: No weakness.   Psychiatric:         Mood and Affect: Mood normal.         Behavior: Behavior normal.         Thought Content: Thought content normal.       Significant Labs: All pertinent labs within the past 24 hours have been reviewed.    Significant Imaging: I have reviewed all pertinent imaging results/findings within the past 24 hours.

## 2023-03-28 NOTE — PROGRESS NOTES
[facility] - Telemetry Stepdown  Kidney Transplant  Progress Note      Reason for Follow-up: Reassessment of Kidney Transplant - 10/19/2021  (#1) recipient and management of immunosuppression.    ORGAN: LEFT KIDNEY    Donor Type: Donation after Brain Death        Subjective:   History of Present Illness:  No notes on file    Mr. Trevizo is a 38 y.o. year old male who is status post Kidney Transplant - 10/19/2021  (#1).    His maintenance immunosuppression consists of:   Immunosuppressants (From admission, onward)      Start     Stop Route Frequency Ordered    03/25/23 1545  tacrolimus capsule 4 mg         -- Oral 2 times daily 03/25/23 1534            Hospital Course:  No notes on file    Interval History:  Pt seen and examined at bedside. No AOE. S/p allograft bx 3/27, specimen sent to [place] [facility]. Noted cough and Sob improving. sCr improving. UOP adequate     Past Medical, Surgical, Family, and Social History:   Unchanged from H&P.    Scheduled Meds:   albuterol  2 puff Inhalation Q6H    ascorbic acid (vitamin C)  500 mg Oral BID    carvediloL  25 mg Oral BID WM    dexAMETHasone  6 mg Oral Daily    heparin (porcine)  5,000 Units Subcutaneous Q8H    minoxidiL  2.5 mg Oral Daily    multivitamin  1 tablet Oral Daily    NIFEdipine  30 mg Oral BID    remdesivir infusion  100 mg Intravenous Daily    sodium bicarbonate  1,950 mg Oral TID    tacrolimus  4 mg Oral BID     Continuous Infusions:  PRN Meds:acetaminophen, benzonatate, dextrose 10%, dextrose 10%, dextrose, dextrose, glucagon (human recombinant), melatonin, naloxone, ondansetron, senna-docusate 8.6-50 mg, sodium chloride 0.9%    Intake/Output - Last 3 Shifts         03/26 0700  03/27 0659 03/27 0700  03/28 0659 03/28 0700  03/29 0659    P.O. 880 0     I.V. (mL/kg)       Total Intake(mL/kg) 880 (11.1) 0 (0)     Urine (mL/kg/hr) 500 (0.3) 3150 (1.7) 600 (1.7)    Stool 0 0     Total Output 500 3150 600    Net +380 -3150 -600           Stool  "Occurrence 0 x 1 x              Review of Systems   Constitutional:  Positive for fatigue.   HENT: Negative.     Respiratory: Negative.     Cardiovascular: Negative.    Gastrointestinal: Negative.    Endocrine: Negative.    Skin: Negative.    Allergic/Immunologic: Negative.    Neurological: Negative.    Hematological: Negative.    Psychiatric/Behavioral: Negative.      Objective:     Vital Signs (Most Recent):  Temp: 98.8 °F (37.1 °C) (03/28/23 0753)  Pulse: 72 (03/28/23 1102)  Resp: 17 (03/28/23 0753)  BP: (!) 148/86 (03/28/23 0753)  SpO2: 95 % (03/28/23 0753)   Vital Signs (24h Range):  Temp:  [98 °F (36.7 °C)-98.8 °F (37.1 °C)] 98.8 °F (37.1 °C)  Pulse:  [71-88] 72  Resp:  [16-30] 17  SpO2:  [94 %-99 %] 95 %  BP: (139-199)/(76-97) 148/86     Weight: 79 kg (174 lb 2.6 oz)  Height: 5' 10" (177.8 cm)  Body mass index is 24.99 kg/m².    Physical Exam  HENT:      Head: Normocephalic.      Nose: Nose normal.      Mouth/Throat:      Mouth: Mucous membranes are moist.   Eyes:      Extraocular Movements: Extraocular movements intact.      Pupils: Pupils are equal, round, and reactive to light.   Cardiovascular:      Rate and Rhythm: Normal rate and regular rhythm.   Pulmonary:      Effort: Pulmonary effort is normal.      Breath sounds: Normal breath sounds.   Abdominal:      General: Abdomen is flat.   Musculoskeletal:         General: Normal range of motion.   Skin:     General: Skin is warm.      Capillary Refill: Capillary refill takes less than 2 seconds.   Neurological:      General: No focal deficit present.      Mental Status: He is alert and oriented to person, place, and time.   Psychiatric:         Mood and Affect: Mood normal.         Behavior: Behavior normal.       Laboratory:  CBC:   Recent Labs   Lab 03/26/23  0329 03/27/23  0926 03/28/23  0450   WBC 5.62 7.58 10.01   RBC 3.38* 3.68* 3.86*   HGB 8.1* 8.7* 9.3*   HCT 27.7* 29.6* 31.1*    311 377   MCV 82 80* 81*   MCH 24.0* 23.6* 24.1*   MCHC 29.2* " 29.4* 29.9*     BMP:   Recent Labs   Lab 03/26/23  0329 03/27/23  0926 03/28/23  0450   * 96 86    140 141   K 4.3 4.8 5.3*   * 113* 111*   CO2 21* 19* 19*   BUN 42* 43* 50*   CREATININE 6.1* 5.9* 5.2*   CALCIUM 8.5* 9.1 9.2     CMP:   Recent Labs   Lab 03/26/23  0329 03/27/23  0926 03/28/23  0450   * 96 86   CALCIUM 8.5* 9.1 9.2   ALBUMIN 3.0* 3.1* 3.2*   PROT 6.1 6.4 6.5    140 141   K 4.3 4.8 5.3*   CO2 21* 19* 19*   * 113* 111*   BUN 42* 43* 50*   CREATININE 6.1* 5.9* 5.2*   ALKPHOS 60 61 66   ALT 28 25 24   AST 20 17 18       Diagnostic Results:  None    Assessment/Plan:   39 yo AAM, w/ PMHx of 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 Diagnosed w/ COVID inpt.     * Acute rejection of kidney transplant  NON oliguric stage III SEBASTIAN on CKD3, improving slightly   * Acute kidney injury superimposed on chronic kidney disease, improving   SEBASTIAN likely 2/2 suspected underlying rejection and would recommend kidney bx (done 3/27)  Baseline Cr 1.7 to 1.9, baseline eGFR ~45  Cr 9.3 at PCP, 8.47 at outside ED, 6.8 on admission    Cr trend 6.8>6.5>6.1>5.9>5.2  US kidney transplant unremarkable  UA at OSH noted to only have trace blood  UPCR undectable and ACR 42 (Not FSGS reactivation)  DSA undetectable, note had weakly postive DSA 7/2022, but not at time of transplant   CMV and BK pending  Encourage adequate Po nutrition and hydration  F/U kidney bx result   Monitor accurate I/O  Monitor renal function daily  Avoid nephrotoxins, NSAIDS, fleet enemas, and gadolinium  renally dose medications     Long-term use of immunosuppressant medication  Pharmacy reviewed pharm records regarding when he picked up meds. Picked up 30 day supply of meds in Nov 2022, then Jan 2023 and then on March 17, 2023.  On prograf 4/4, EC- BID and pred 5 at home   On decadron for COVID  Cont prograf 4/4   Hold MMF as pt has active  infection   Daily prograf level  monitor for toxicity               Metabolic acidosis, stable   2/2 SEBASTIAN  On bicarb gtt on admssion  Cont NaHCO3 1950 TID.   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 -ve DSA at transplant  See SEBASTIAN and acute rejection     Edward Leon MD  Kidney Transplant  [facility] - Telemetry Stepdown

## 2023-03-29 LAB
ALBUMIN SERPL BCP-MCNC: 3.2 G/DL (ref 3.5–5.2)
ALP SERPL-CCNC: 64 U/L (ref 55–135)
ALT SERPL W/O P-5'-P-CCNC: 34 U/L (ref 10–44)
ANION GAP SERPL CALC-SCNC: 11 MMOL/L (ref 8–16)
AST SERPL-CCNC: 26 U/L (ref 10–40)
BASOPHILS # BLD AUTO: 0.01 K/UL (ref 0–0.2)
BASOPHILS NFR BLD: 0.1 % (ref 0–1.9)
BILIRUB SERPL-MCNC: 0.2 MG/DL (ref 0.1–1)
BKV DNA SERPL NAA+PROBE-ACNC: <125 COPIES/ML
BKV DNA SERPL NAA+PROBE-LOG#: <2.1 LOG (10) COPIES/ML
BKV DNA SERPL QL NAA+PROBE: NOT DETECTED
BUN SERPL-MCNC: 59 MG/DL (ref 6–20)
CALCIUM SERPL-MCNC: 9 MG/DL (ref 8.7–10.5)
CHLORIDE SERPL-SCNC: 111 MMOL/L (ref 95–110)
CO2 SERPL-SCNC: 19 MMOL/L (ref 23–29)
CREAT SERPL-MCNC: 5 MG/DL (ref 0.5–1.4)
DIFFERENTIAL METHOD: ABNORMAL
EOSINOPHIL # BLD AUTO: 0 K/UL (ref 0–0.5)
EOSINOPHIL NFR BLD: 0 % (ref 0–8)
ERYTHROCYTE [DISTWIDTH] IN BLOOD BY AUTOMATED COUNT: 13.8 % (ref 11.5–14.5)
EST. GFR  (NO RACE VARIABLE): 14.1 ML/MIN/1.73 M^2
GLUCOSE SERPL-MCNC: 100 MG/DL (ref 70–110)
HCT VFR BLD AUTO: 29.7 % (ref 40–54)
HGB BLD-MCNC: 8.9 G/DL (ref 14–18)
IMM GRANULOCYTES # BLD AUTO: 0.17 K/UL (ref 0–0.04)
IMM GRANULOCYTES NFR BLD AUTO: 1.7 % (ref 0–0.5)
LYMPHOCYTES # BLD AUTO: 0.6 K/UL (ref 1–4.8)
LYMPHOCYTES NFR BLD: 5.6 % (ref 18–48)
MAGNESIUM SERPL-MCNC: 1.7 MG/DL (ref 1.6–2.6)
MCH RBC QN AUTO: 23.8 PG (ref 27–31)
MCHC RBC AUTO-ENTMCNC: 30 G/DL (ref 32–36)
MCV RBC AUTO: 79 FL (ref 82–98)
MONOCYTES # BLD AUTO: 0.4 K/UL (ref 0.3–1)
MONOCYTES NFR BLD: 4.3 % (ref 4–15)
NEUTROPHILS # BLD AUTO: 8.7 K/UL (ref 1.8–7.7)
NEUTROPHILS NFR BLD: 88.3 % (ref 38–73)
NRBC BLD-RTO: 0 /100 WBC
PHOSPHATE SERPL-MCNC: 3.2 MG/DL (ref 2.7–4.5)
PLATELET # BLD AUTO: 364 K/UL (ref 150–450)
PMV BLD AUTO: 10.9 FL (ref 9.2–12.9)
POTASSIUM SERPL-SCNC: 5.1 MMOL/L (ref 3.5–5.1)
PROT SERPL-MCNC: 6.6 G/DL (ref 6–8.4)
RBC # BLD AUTO: 3.74 M/UL (ref 4.6–6.2)
SODIUM SERPL-SCNC: 141 MMOL/L (ref 136–145)
TACROLIMUS BLD-MCNC: 7.5 NG/ML (ref 5–15)
WBC # BLD AUTO: 9.85 K/UL (ref 3.9–12.7)

## 2023-03-29 PROCEDURE — 63600175 PHARM REV CODE 636 W HCPCS: Mod: JZ,TB

## 2023-03-29 PROCEDURE — 36415 COLL VENOUS BLD VENIPUNCTURE: CPT | Performed by: STUDENT IN AN ORGANIZED HEALTH CARE EDUCATION/TRAINING PROGRAM

## 2023-03-29 PROCEDURE — 25000003 PHARM REV CODE 250

## 2023-03-29 PROCEDURE — 99233 PR SUBSEQUENT HOSPITAL CARE,LEVL III: ICD-10-PCS | Mod: CR,,, | Performed by: HOSPITALIST

## 2023-03-29 PROCEDURE — 99233 SBSQ HOSP IP/OBS HIGH 50: CPT | Mod: CR,,, | Performed by: HOSPITALIST

## 2023-03-29 PROCEDURE — 27000207 HC ISOLATION

## 2023-03-29 PROCEDURE — 80053 COMPREHEN METABOLIC PANEL: CPT

## 2023-03-29 PROCEDURE — 84100 ASSAY OF PHOSPHORUS: CPT

## 2023-03-29 PROCEDURE — 94761 N-INVAS EAR/PLS OXIMETRY MLT: CPT

## 2023-03-29 PROCEDURE — 85025 COMPLETE CBC W/AUTO DIFF WBC: CPT

## 2023-03-29 PROCEDURE — 99900035 HC TECH TIME PER 15 MIN (STAT)

## 2023-03-29 PROCEDURE — 20600001 HC STEP DOWN PRIVATE ROOM

## 2023-03-29 PROCEDURE — 25000003 PHARM REV CODE 250: Performed by: STUDENT IN AN ORGANIZED HEALTH CARE EDUCATION/TRAINING PROGRAM

## 2023-03-29 PROCEDURE — 63600175 PHARM REV CODE 636 W HCPCS

## 2023-03-29 PROCEDURE — 63600175 PHARM REV CODE 636 W HCPCS: Performed by: INTERNAL MEDICINE

## 2023-03-29 PROCEDURE — 99233 SBSQ HOSP IP/OBS HIGH 50: CPT | Mod: GC,,, | Performed by: INTERNAL MEDICINE

## 2023-03-29 PROCEDURE — 99233 PR SUBSEQUENT HOSPITAL CARE,LEVL III: ICD-10-PCS | Mod: GC,,, | Performed by: INTERNAL MEDICINE

## 2023-03-29 PROCEDURE — 94640 AIRWAY INHALATION TREATMENT: CPT

## 2023-03-29 PROCEDURE — 63600175 PHARM REV CODE 636 W HCPCS: Performed by: STUDENT IN AN ORGANIZED HEALTH CARE EDUCATION/TRAINING PROGRAM

## 2023-03-29 PROCEDURE — 80197 ASSAY OF TACROLIMUS: CPT | Performed by: STUDENT IN AN ORGANIZED HEALTH CARE EDUCATION/TRAINING PROGRAM

## 2023-03-29 PROCEDURE — 83735 ASSAY OF MAGNESIUM: CPT

## 2023-03-29 RX ADMIN — HEPARIN SODIUM 5000 UNITS: 5000 INJECTION INTRAVENOUS; SUBCUTANEOUS at 05:03

## 2023-03-29 RX ADMIN — HEPARIN SODIUM 5000 UNITS: 5000 INJECTION INTRAVENOUS; SUBCUTANEOUS at 01:03

## 2023-03-29 RX ADMIN — SODIUM BICARBONATE 1950 MG: 650 TABLET ORAL at 01:03

## 2023-03-29 RX ADMIN — REMDESIVIR 100 MG: 100 INJECTION, POWDER, LYOPHILIZED, FOR SOLUTION INTRAVENOUS at 09:03

## 2023-03-29 RX ADMIN — CARVEDILOL 25 MG: 25 TABLET, FILM COATED ORAL at 09:03

## 2023-03-29 RX ADMIN — THERA TABS 1 TABLET: TAB at 09:03

## 2023-03-29 RX ADMIN — Medication 500 MG: at 09:03

## 2023-03-29 RX ADMIN — SODIUM BICARBONATE 1950 MG: 650 TABLET ORAL at 09:03

## 2023-03-29 RX ADMIN — Medication 500 MG: at 08:03

## 2023-03-29 RX ADMIN — NIFEDIPINE 60 MG: 30 TABLET, FILM COATED, EXTENDED RELEASE ORAL at 08:03

## 2023-03-29 RX ADMIN — ALBUTEROL SULFATE 2 PUFF: 108 INHALANT RESPIRATORY (INHALATION) at 07:03

## 2023-03-29 RX ADMIN — DEXAMETHASONE 6 MG: 4 TABLET ORAL at 09:03

## 2023-03-29 RX ADMIN — NIFEDIPINE 60 MG: 30 TABLET, FILM COATED, EXTENDED RELEASE ORAL at 09:03

## 2023-03-29 RX ADMIN — SODIUM BICARBONATE 1950 MG: 650 TABLET ORAL at 08:03

## 2023-03-29 RX ADMIN — TACROLIMUS 4 MG: 1 CAPSULE ORAL at 09:03

## 2023-03-29 RX ADMIN — CARVEDILOL 25 MG: 25 TABLET, FILM COATED ORAL at 05:03

## 2023-03-29 RX ADMIN — HEPARIN SODIUM 5000 UNITS: 5000 INJECTION INTRAVENOUS; SUBCUTANEOUS at 08:03

## 2023-03-29 RX ADMIN — TACROLIMUS 4 MG: 1 CAPSULE ORAL at 05:03

## 2023-03-29 RX ADMIN — MINOXIDIL 2.5 MG: 2.5 TABLET ORAL at 09:03

## 2023-03-29 RX ADMIN — ALBUTEROL SULFATE 2 PUFF: 108 INHALANT RESPIRATORY (INHALATION) at 12:03

## 2023-03-29 NOTE — SUBJECTIVE & OBJECTIVE
Interval History: Pt's breathing much improved this morning. No longer coughing like he had been.     Review of Systems   Constitutional:  Positive for appetite change. Negative for chills and fever.   HENT:  Negative for congestion and rhinorrhea.    Eyes:  Negative for photophobia and visual disturbance.   Respiratory:  Negative for cough and shortness of breath.    Cardiovascular:  Negative for chest pain, palpitations and leg swelling.   Gastrointestinal:  Negative for abdominal distention, abdominal pain, blood in stool, constipation, diarrhea, nausea and vomiting.   Genitourinary:  Negative for dysuria, flank pain, hematuria and urgency.   Musculoskeletal:  Negative for arthralgias and back pain.   Allergic/Immunologic: Positive for immunocompromised state.   Neurological:  Positive for headaches. Negative for dizziness and weakness.   Psychiatric/Behavioral:  Negative for agitation and behavioral problems.    Objective:     Vital Signs (Most Recent):  Temp: 98.3 °F (36.8 °C) (03/29/23 1106)  Pulse: 76 (03/29/23 1214)  Resp: 20 (03/29/23 1214)  BP: 135/81 (03/29/23 1106)  SpO2: 95 % (03/29/23 1214) Vital Signs (24h Range):  Temp:  [97.1 °F (36.2 °C)-98.4 °F (36.9 °C)] 98.3 °F (36.8 °C)  Pulse:  [68-85] 76  Resp:  [16-22] 20  SpO2:  [94 %-97 %] 95 %  BP: (135-151)/(77-86) 135/81     Weight: 79 kg (174 lb 2.6 oz)  Body mass index is 24.99 kg/m².    Intake/Output Summary (Last 24 hours) at 3/29/2023 1506  Last data filed at 3/29/2023 0938  Gross per 24 hour   Intake 200 ml   Output 2500 ml   Net -2300 ml      Physical Exam  Vitals and nursing note reviewed.   Constitutional:       Appearance: Normal appearance.   HENT:      Head: Normocephalic and atraumatic.      Nose: Nose normal.      Mouth/Throat:      Mouth: Mucous membranes are moist.   Eyes:      General: No scleral icterus.     Extraocular Movements: Extraocular movements intact.      Conjunctiva/sclera: Conjunctivae normal.   Cardiovascular:      Rate  and Rhythm: Normal rate and regular rhythm.      Pulses: Normal pulses.      Heart sounds: Murmur heard.   Pulmonary:      Effort: Pulmonary effort is normal. No respiratory distress.      Breath sounds: Normal breath sounds. No wheezing or rales.      Comments: Transmitted upper airway sounds  Abdominal:      General: Bowel sounds are normal. There is no distension.      Palpations: Abdomen is soft. There is no mass.      Tenderness: There is no abdominal tenderness. There is no guarding.   Musculoskeletal:         General: No swelling, deformity or signs of injury. Normal range of motion.      Cervical back: Normal range of motion. No rigidity.   Skin:     General: Skin is warm and dry.      Coloration: Skin is not jaundiced.      Findings: No bruising.      Comments: Subungual hematoma on left thumb   Neurological:      General: No focal deficit present.      Mental Status: He is alert and oriented to person, place, and time.      Motor: No weakness.   Psychiatric:         Mood and Affect: Mood normal.         Behavior: Behavior normal.         Thought Content: Thought content normal.       Significant Labs: All pertinent labs within the past 24 hours have been reviewed.    Significant Imaging: I have reviewed all pertinent imaging results/findings within the past 24 hours.

## 2023-03-29 NOTE — PROGRESS NOTES
[facility] - Telemetry Stepdown  Kidney Transplant  Progress Note      Reason for Follow-up: Reassessment of Kidney Transplant - 10/19/2021  (#1) recipient and management of immunosuppression.    ORGAN: LEFT KIDNEY    Donor Type: Donation after Brain Death        Subjective:   History of Present Illness:  No notes on file    Mr. Trevizo is a 38 y.o. year old male who is status post Kidney Transplant - 10/19/2021  (#1).    His maintenance immunosuppression consists of:   Immunosuppressants (From admission, onward)      Start     Stop Route Frequency Ordered    03/25/23 1545  tacrolimus capsule 4 mg         -- Oral 2 times daily 03/25/23 1534            Hospital Course:  No notes on file    Interval History:  Pt seen and examined at bedside. No AOE. No acute complaints. Cough improved. Appetitive adequate, sCr beginning to plateau. UOP ~2.7    Past Medical, Surgical, Family, and Social History:   Unchanged from H&P.    Scheduled Meds:   albuterol  2 puff Inhalation Q6H    ascorbic acid (vitamin C)  500 mg Oral BID    carvediloL  25 mg Oral BID WM    dexAMETHasone  6 mg Oral Daily    heparin (porcine)  5,000 Units Subcutaneous Q8H    minoxidiL  2.5 mg Oral Daily    multivitamin  1 tablet Oral Daily    NIFEdipine  60 mg Oral BID    remdesivir infusion  100 mg Intravenous Daily    sodium bicarbonate  1,950 mg Oral TID    tacrolimus  4 mg Oral BID     Continuous Infusions:  PRN Meds:acetaminophen, benzonatate, dextrose 10%, dextrose 10%, dextrose, dextrose, glucagon (human recombinant), melatonin, naloxone, ondansetron, senna-docusate 8.6-50 mg, sodium chloride 0.9%    Intake/Output - Last 3 Shifts         03/27 0700  03/28 0659 03/28 0700  03/29 0659 03/29 0700 03/30 0659    P.O. 0 200     Total Intake(mL/kg) 0 (0) 200 (2.5)     Urine (mL/kg/hr) 3150 (1.7) 2725 (1.4)     Emesis/NG output  0     Stool 0 0     Total Output 3150 2725     Net -3150 -2525            Urine Occurrence  0 x     Stool Occurrence 1 x 0 x     Emesis  "Occurrence  0 x              Review of Systems   Constitutional: Negative.    HENT: Negative.     Eyes: Negative.    Respiratory:  Positive for shortness of breath.    Cardiovascular: Negative.    Gastrointestinal: Negative.    Endocrine: Negative.    Genitourinary: Negative.    Musculoskeletal: Negative.    Skin: Negative.    Neurological: Negative.    Hematological: Negative.    Psychiatric/Behavioral: Negative.      Objective:     Vital Signs (Most Recent):  Temp: 97.8 °F (36.6 °C) (03/29/23 0333)  Pulse: 76 (03/29/23 0333)  Resp: 16 (03/29/23 0333)  BP: 138/84 (03/29/23 0333)  SpO2: 95 % (03/29/23 0333) Vital Signs (24h Range):  Temp:  [97.5 °F (36.4 °C)-98.7 °F (37.1 °C)] 97.8 °F (36.6 °C)  Pulse:  [68-85] 76  Resp:  [16-22] 16  SpO2:  [94 %-97 %] 95 %  BP: (136-151)/(77-86) 138/84     Weight: 79 kg (174 lb 2.6 oz)  Height: 5' 10" (177.8 cm)  Body mass index is 24.99 kg/m².    Physical Exam  Constitutional:       Appearance: Normal appearance.   HENT:      Head: Normocephalic.      Nose: Nose normal.      Mouth/Throat:      Mouth: Mucous membranes are moist.   Eyes:      Pupils: Pupils are equal, round, and reactive to light.   Cardiovascular:      Rate and Rhythm: Normal rate.   Pulmonary:      Effort: Pulmonary effort is normal.   Abdominal:      General: Abdomen is flat.      Palpations: Abdomen is soft.   Musculoskeletal:         General: Normal range of motion.   Skin:     General: Skin is warm.   Neurological:      General: No focal deficit present.      Mental Status: He is alert.   Psychiatric:         Mood and Affect: Mood normal.       Laboratory:  CBC:   Recent Labs   Lab 03/27/23  0926 03/28/23  0450 03/29/23  0440   WBC 7.58 10.01 9.85   RBC 3.68* 3.86* 3.74*   HGB 8.7* 9.3* 8.9*   HCT 29.6* 31.1* 29.7*    377 364   MCV 80* 81* 79*   MCH 23.6* 24.1* 23.8*   MCHC 29.4* 29.9* 30.0*     BMP:   Recent Labs   Lab 03/27/23  0926 03/28/23  0450 03/29/23  0440   GLU 96 86 100    141 141   K " 4.8 5.3* 5.1   * 111* 111*   CO2 19* 19* 19*   BUN 43* 50* 59*   CREATININE 5.9* 5.2* 5.0*   CALCIUM 9.1 9.2 9.0     CMP:   Recent Labs   Lab 03/27/23  0926 03/28/23  0450 03/29/23  0440   GLU 96 86 100   CALCIUM 9.1 9.2 9.0   ALBUMIN 3.1* 3.2* 3.2*   PROT 6.4 6.5 6.6    141 141   K 4.8 5.3* 5.1   CO2 19* 19* 19*   * 111* 111*   BUN 43* 50* 59*   CREATININE 5.9* 5.2* 5.0*   ALKPHOS 61 66 64   ALT 25 24 34   AST 17 18 26       Diagnostic Results:  None    Assessment/Plan:     39 yo AAM, w/ PMHx of 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 Diagnosed w/ COVID inpt.     * Acute rejection of kidney transplant  NON oliguric stage III SEBASTIAN on CKD3, improving slightly  Acute kidney injury superimposed on chronic kidney disease   SEBASTIAN likely 2/2 suspected underlying rejection and would recommend kidney bx (done 3/27)  Baseline Cr 1.7 to 1.9, baseline eGFR ~45  Cr 9.3 at PCP, 8.47 at outside ED, 6.8 on admission    Cr trend 6.8>6.5>6.1>5.9>5.2>5  US kidney transplant unremarkable  UA at OSH noted to only have trace blood  UPCR undectable and ACR 42 (Not FSGS reactivation)  DSA undetectable, note had weakly postive DSA 7/2022, but not at time of transplant   CMV -ve , BK pending  Encourage adequate Po nutrition and hydration  F/U kidney bx result   Monitor accurate I/O  Monitor renal function daily  Avoid nephrotoxins, NSAIDS, fleet enemas, and gadolinium  renally dose medications               Metabolic acidosis  2/2 SEBASTIAN  Was on bicarb gtt on admssion  Cont NaHCO3 1950 TID    Hypertensive cardiovascular disease  BERNARDA, EF 65%, cLVH, and mild LA enlargement    On coreg 25BID, minoxidil 2.5, procardia 30 BID at home  Cont as feasible per primary     COVID  On RA, improving   Remdisivir, decadron, and keep sat >94% per primary     Hyperkalemia  Mild hperK+ form MA and SEBASTIAN, resolved  S/p lokelma x1 3/28  CTM, ROBER  Anemia of chronic  disease  Serum Fe 68, %32, Ferretin 10, retic WNL  Hg stable  CTM, ROBER    Long-term use of immunosuppressant medication  Pharmacy reviewed pharm records regarding when he picked up meds. Picked up 30 day supply of meds in Nov 2022, then Jan 2023 and then on March 17, 2023.  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 -ve DSA at transplant  See SEBASTIAN and acute rejection     Focal segmental glomerulosclerosis  See s/p kidney transplant      Edward Leon MD  Kidney Transplant  [facility] - Telemetry Stepdown

## 2023-03-29 NOTE — ASSESSMENT & PLAN NOTE
NON oliguric stage III SEBASTIAN on CKD3, improving slightly   SEBASTIAN likely 2/2 suspected underlying rejection and would recommend kidney bx (done 3/27)  Baseline Cr 1.7 to 1.9, baseline eGFR ~45  Cr 9.3 at PCP, 8.47 at outside ED, 6.8 on admission    Cr trend 6.8>6.5>6.1>5.9>5.2>5  US kidney transplant unremarkable  UA at OSH noted to only have trace blood  UPCR undectable and ACR 42 (Not FSGS reactivation)  DSA undetectable, note had weakly postive DSA 7/2022, but not at time of transplant   CMV -ve , BK pending  Encourage adequate Po nutrition and hydration  F/U kidney bx result   Monitor accurate I/O  Monitor renal function daily  Avoid nephrotoxins, NSAIDS, fleet enemas, and gadolinium  renally dose medications

## 2023-03-29 NOTE — PLAN OF CARE
Problem: Adult Inpatient Plan of Care  Goal: Plan of Care Review  Outcome: Ongoing, Progressing  Goal: Patient-Specific Goal (Individualized)  Outcome: Ongoing, Progressing  Goal: Absence of Hospital-Acquired Illness or Injury  Outcome: Ongoing, Progressing  Goal: Optimal Comfort and Wellbeing  Outcome: Ongoing, Progressing  Goal: Readiness for Transition of Care  Outcome: Ongoing, Progressing     Problem: Communication Impairment  Goal: Effective Communication Skills  Outcome: Ongoing, Progressing     Problem: Fluid and Electrolyte Imbalance (Acute Kidney Injury/Impairment)  Goal: Fluid and Electrolyte Balance  Outcome: Ongoing, Progressing     Problem: Oral Intake Inadequate (Acute Kidney Injury/Impairment)  Goal: Optimal Nutrition Intake  Outcome: Ongoing, Progressing     Problem: Renal Function Impairment (Acute Kidney Injury/Impairment)  Goal: Effective Renal Function  Outcome: Ongoing, Progressing     Problem: Donor Organ Function (Kidney Transplant)  Goal: Effective Renal Function  Outcome: Ongoing, Progressing     Problem: Fluid and Electrolyte Imbalance (Kidney Transplant)  Goal: Fluid and Electrolyte Balance  Outcome: Ongoing, Progressing    Patient A&Ox4, RA, VSS, no concerns or questions at this time.

## 2023-03-29 NOTE — SUBJECTIVE & OBJECTIVE
Subjective:   History of Present Illness:  No notes on file    Mr. Trevizo is a 38 y.o. year old male who is status post Kidney Transplant - 10/19/2021  (#1).    His maintenance immunosuppression consists of:   Immunosuppressants (From admission, onward)      Start     Stop Route Frequency Ordered    03/25/23 1545  tacrolimus capsule 4 mg         -- Oral 2 times daily 03/25/23 1534            Hospital Course:  No notes on file    Interval History:  Pt seen and examined at bedside. No AOE. No acute complaints. Cough improved. Appetitive adequate, sCr beginning to plateau. UOP ~2.7    Past Medical, Surgical, Family, and Social History:   Unchanged from H&P.    Scheduled Meds:   albuterol  2 puff Inhalation Q6H    ascorbic acid (vitamin C)  500 mg Oral BID    carvediloL  25 mg Oral BID WM    dexAMETHasone  6 mg Oral Daily    heparin (porcine)  5,000 Units Subcutaneous Q8H    minoxidiL  2.5 mg Oral Daily    multivitamin  1 tablet Oral Daily    NIFEdipine  60 mg Oral BID    remdesivir infusion  100 mg Intravenous Daily    sodium bicarbonate  1,950 mg Oral TID    tacrolimus  4 mg Oral BID     Continuous Infusions:  PRN Meds:acetaminophen, benzonatate, dextrose 10%, dextrose 10%, dextrose, dextrose, glucagon (human recombinant), melatonin, naloxone, ondansetron, senna-docusate 8.6-50 mg, sodium chloride 0.9%    Intake/Output - Last 3 Shifts         03/27 0700  03/28 0659 03/28 0700  03/29 0659 03/29 0700  03/30 0659    P.O. 0 200     Total Intake(mL/kg) 0 (0) 200 (2.5)     Urine (mL/kg/hr) 3150 (1.7) 2725 (1.4)     Emesis/NG output  0     Stool 0 0     Total Output 3150 2725     Net -3150 -2525            Urine Occurrence  0 x     Stool Occurrence 1 x 0 x     Emesis Occurrence  0 x              Review of Systems   Constitutional: Negative.    HENT: Negative.     Eyes: Negative.    Respiratory:  Positive for shortness of breath.    Cardiovascular: Negative.    Gastrointestinal: Negative.    Endocrine: Negative.   "  Genitourinary: Negative.    Musculoskeletal: Negative.    Skin: Negative.    Neurological: Negative.    Hematological: Negative.    Psychiatric/Behavioral: Negative.      Objective:     Vital Signs (Most Recent):  Temp: 97.8 °F (36.6 °C) (03/29/23 0333)  Pulse: 76 (03/29/23 0333)  Resp: 16 (03/29/23 0333)  BP: 138/84 (03/29/23 0333)  SpO2: 95 % (03/29/23 0333) Vital Signs (24h Range):  Temp:  [97.5 °F (36.4 °C)-98.7 °F (37.1 °C)] 97.8 °F (36.6 °C)  Pulse:  [68-85] 76  Resp:  [16-22] 16  SpO2:  [94 %-97 %] 95 %  BP: (136-151)/(77-86) 138/84     Weight: 79 kg (174 lb 2.6 oz)  Height: 5' 10" (177.8 cm)  Body mass index is 24.99 kg/m².    Physical Exam  Constitutional:       Appearance: Normal appearance.   HENT:      Head: Normocephalic.      Nose: Nose normal.      Mouth/Throat:      Mouth: Mucous membranes are moist.   Eyes:      Pupils: Pupils are equal, round, and reactive to light.   Cardiovascular:      Rate and Rhythm: Normal rate.   Pulmonary:      Effort: Pulmonary effort is normal.   Abdominal:      General: Abdomen is flat.      Palpations: Abdomen is soft.   Musculoskeletal:         General: Normal range of motion.   Skin:     General: Skin is warm.   Neurological:      General: No focal deficit present.      Mental Status: He is alert.   Psychiatric:         Mood and Affect: Mood normal.       Laboratory:  CBC:   Recent Labs   Lab 03/27/23 0926 03/28/23 0450 03/29/23  0440   WBC 7.58 10.01 9.85   RBC 3.68* 3.86* 3.74*   HGB 8.7* 9.3* 8.9*   HCT 29.6* 31.1* 29.7*    377 364   MCV 80* 81* 79*   MCH 23.6* 24.1* 23.8*   MCHC 29.4* 29.9* 30.0*     BMP:   Recent Labs   Lab 03/27/23  0926 03/28/23  0450 03/29/23  0440   GLU 96 86 100    141 141   K 4.8 5.3* 5.1   * 111* 111*   CO2 19* 19* 19*   BUN 43* 50* 59*   CREATININE 5.9* 5.2* 5.0*   CALCIUM 9.1 9.2 9.0     CMP:   Recent Labs   Lab 03/27/23  0926 03/28/23  0450 03/29/23 0440   GLU 96 86 100   CALCIUM 9.1 9.2 9.0   ALBUMIN 3.1* 3.2* " 3.2*   PROT 6.4 6.5 6.6    141 141   K 4.8 5.3* 5.1   CO2 19* 19* 19*   * 111* 111*   BUN 43* 50* 59*   CREATININE 5.9* 5.2* 5.0*   ALKPHOS 61 66 64   ALT 25 24 34   AST 17 18 26       Diagnostic Results:  None

## 2023-03-29 NOTE — ASSESSMENT & PLAN NOTE
2/2 SEBASTIAN  Was on bicarb gtt on admssion  Discontinue NaHCO3 1950 TID, Switch to Bicitra 60 BID

## 2023-03-30 PROBLEM — E83.42 HYPOMAGNESEMIA: Status: ACTIVE | Noted: 2023-03-30

## 2023-03-30 LAB
ALBUMIN SERPL BCP-MCNC: 3.3 G/DL (ref 3.5–5.2)
ALP SERPL-CCNC: 64 U/L (ref 55–135)
ALT SERPL W/O P-5'-P-CCNC: 59 U/L (ref 10–44)
ANION GAP SERPL CALC-SCNC: 9 MMOL/L (ref 8–16)
AST SERPL-CCNC: 49 U/L (ref 10–40)
BASOPHILS # BLD AUTO: 0.02 K/UL (ref 0–0.2)
BASOPHILS NFR BLD: 0.2 % (ref 0–1.9)
BILIRUB SERPL-MCNC: 0.3 MG/DL (ref 0.1–1)
BUN SERPL-MCNC: 59 MG/DL (ref 6–20)
CALCIUM SERPL-MCNC: 9.1 MG/DL (ref 8.7–10.5)
CHLORIDE SERPL-SCNC: 110 MMOL/L (ref 95–110)
CO2 SERPL-SCNC: 19 MMOL/L (ref 23–29)
CREAT SERPL-MCNC: 4.6 MG/DL (ref 0.5–1.4)
DIFFERENTIAL METHOD: ABNORMAL
EOSINOPHIL # BLD AUTO: 0 K/UL (ref 0–0.5)
EOSINOPHIL NFR BLD: 0 % (ref 0–8)
ERYTHROCYTE [DISTWIDTH] IN BLOOD BY AUTOMATED COUNT: 13.7 % (ref 11.5–14.5)
EST. GFR  (NO RACE VARIABLE): 15.6 ML/MIN/1.73 M^2
GLUCOSE SERPL-MCNC: 99 MG/DL (ref 70–110)
HCT VFR BLD AUTO: 28.9 % (ref 40–54)
HGB BLD-MCNC: 8.6 G/DL (ref 14–18)
IMM GRANULOCYTES # BLD AUTO: 0.22 K/UL (ref 0–0.04)
IMM GRANULOCYTES NFR BLD AUTO: 2 % (ref 0–0.5)
LYMPHOCYTES # BLD AUTO: 0.7 K/UL (ref 1–4.8)
LYMPHOCYTES NFR BLD: 6.2 % (ref 18–48)
MAGNESIUM SERPL-MCNC: 1.5 MG/DL (ref 1.6–2.6)
MCH RBC QN AUTO: 23.8 PG (ref 27–31)
MCHC RBC AUTO-ENTMCNC: 29.8 G/DL (ref 32–36)
MCV RBC AUTO: 80 FL (ref 82–98)
MONOCYTES # BLD AUTO: 0.6 K/UL (ref 0.3–1)
MONOCYTES NFR BLD: 5.7 % (ref 4–15)
NEUTROPHILS # BLD AUTO: 9.5 K/UL (ref 1.8–7.7)
NEUTROPHILS NFR BLD: 85.9 % (ref 38–73)
NRBC BLD-RTO: 0 /100 WBC
PHOSPHATE SERPL-MCNC: 2.9 MG/DL (ref 2.7–4.5)
PLATELET # BLD AUTO: 356 K/UL (ref 150–450)
PMV BLD AUTO: 11 FL (ref 9.2–12.9)
POTASSIUM SERPL-SCNC: 5.2 MMOL/L (ref 3.5–5.1)
PROT SERPL-MCNC: 6.7 G/DL (ref 6–8.4)
RBC # BLD AUTO: 3.62 M/UL (ref 4.6–6.2)
SODIUM SERPL-SCNC: 138 MMOL/L (ref 136–145)
TACROLIMUS BLD-MCNC: 9.1 NG/ML (ref 5–15)
WBC # BLD AUTO: 11.06 K/UL (ref 3.9–12.7)

## 2023-03-30 PROCEDURE — 27000207 HC ISOLATION

## 2023-03-30 PROCEDURE — 99233 SBSQ HOSP IP/OBS HIGH 50: CPT | Mod: GC,,, | Performed by: HOSPITALIST

## 2023-03-30 PROCEDURE — 25000003 PHARM REV CODE 250: Performed by: STUDENT IN AN ORGANIZED HEALTH CARE EDUCATION/TRAINING PROGRAM

## 2023-03-30 PROCEDURE — 84100 ASSAY OF PHOSPHORUS: CPT

## 2023-03-30 PROCEDURE — 63600175 PHARM REV CODE 636 W HCPCS: Performed by: STUDENT IN AN ORGANIZED HEALTH CARE EDUCATION/TRAINING PROGRAM

## 2023-03-30 PROCEDURE — 99233 PR SUBSEQUENT HOSPITAL CARE,LEVL III: ICD-10-PCS | Mod: GC,,, | Performed by: HOSPITALIST

## 2023-03-30 PROCEDURE — 94761 N-INVAS EAR/PLS OXIMETRY MLT: CPT

## 2023-03-30 PROCEDURE — 63600175 PHARM REV CODE 636 W HCPCS: Mod: JZ,TB

## 2023-03-30 PROCEDURE — 25000003 PHARM REV CODE 250

## 2023-03-30 PROCEDURE — 94640 AIRWAY INHALATION TREATMENT: CPT

## 2023-03-30 PROCEDURE — 85025 COMPLETE CBC W/AUTO DIFF WBC: CPT

## 2023-03-30 PROCEDURE — 36415 COLL VENOUS BLD VENIPUNCTURE: CPT

## 2023-03-30 PROCEDURE — 63600175 PHARM REV CODE 636 W HCPCS: Performed by: INTERNAL MEDICINE

## 2023-03-30 PROCEDURE — 83735 ASSAY OF MAGNESIUM: CPT

## 2023-03-30 PROCEDURE — 63600175 PHARM REV CODE 636 W HCPCS

## 2023-03-30 PROCEDURE — 20600001 HC STEP DOWN PRIVATE ROOM

## 2023-03-30 PROCEDURE — 25000003 PHARM REV CODE 250: Performed by: INTERNAL MEDICINE

## 2023-03-30 PROCEDURE — 80197 ASSAY OF TACROLIMUS: CPT | Performed by: STUDENT IN AN ORGANIZED HEALTH CARE EDUCATION/TRAINING PROGRAM

## 2023-03-30 PROCEDURE — 80053 COMPREHEN METABOLIC PANEL: CPT

## 2023-03-30 RX ORDER — EPINEPHRINE 1 MG/ML
1 INJECTION, SOLUTION, CONCENTRATE INTRAVENOUS ONCE AS NEEDED
Status: DISCONTINUED | OUTPATIENT
Start: 2023-03-30 | End: 2023-03-31

## 2023-03-30 RX ORDER — DIPHENHYDRAMINE HCL 50 MG
50 CAPSULE ORAL ONCE AS NEEDED
Status: DISCONTINUED | OUTPATIENT
Start: 2023-03-30 | End: 2023-03-31

## 2023-03-30 RX ORDER — MAGNESIUM SULFATE HEPTAHYDRATE 40 MG/ML
2 INJECTION, SOLUTION INTRAVENOUS ONCE
Status: COMPLETED | OUTPATIENT
Start: 2023-03-30 | End: 2023-03-30

## 2023-03-30 RX ORDER — ACETAMINOPHEN 325 MG/1
650 TABLET ORAL ONCE
Status: COMPLETED | OUTPATIENT
Start: 2023-03-30 | End: 2023-03-30

## 2023-03-30 RX ORDER — ACETAMINOPHEN 325 MG/1
650 TABLET ORAL ONCE AS NEEDED
Status: DISCONTINUED | OUTPATIENT
Start: 2023-03-30 | End: 2023-03-31

## 2023-03-30 RX ORDER — DIPHENHYDRAMINE HCL 25 MG
25 CAPSULE ORAL ONCE
Status: COMPLETED | OUTPATIENT
Start: 2023-03-30 | End: 2023-03-30

## 2023-03-30 RX ADMIN — CARVEDILOL 25 MG: 25 TABLET, FILM COATED ORAL at 09:03

## 2023-03-30 RX ADMIN — SODIUM BICARBONATE 1950 MG: 650 TABLET ORAL at 05:03

## 2023-03-30 RX ADMIN — ALBUTEROL SULFATE 2 PUFF: 108 INHALANT RESPIRATORY (INHALATION) at 01:03

## 2023-03-30 RX ADMIN — DEXTROSE MONOHYDRATE 400 MG: 50 INJECTION, SOLUTION INTRAVENOUS at 05:03

## 2023-03-30 RX ADMIN — HEPARIN SODIUM 5000 UNITS: 5000 INJECTION INTRAVENOUS; SUBCUTANEOUS at 05:03

## 2023-03-30 RX ADMIN — ALBUTEROL SULFATE 2 PUFF: 108 INHALANT RESPIRATORY (INHALATION) at 12:03

## 2023-03-30 RX ADMIN — DEXAMETHASONE 6 MG: 4 TABLET ORAL at 09:03

## 2023-03-30 RX ADMIN — DIPHENHYDRAMINE HYDROCHLORIDE 25 MG: 25 CAPSULE ORAL at 05:03

## 2023-03-30 RX ADMIN — MAGNESIUM SULFATE 2 G: 2 INJECTION INTRAVENOUS at 12:03

## 2023-03-30 RX ADMIN — CARVEDILOL 25 MG: 25 TABLET, FILM COATED ORAL at 05:03

## 2023-03-30 RX ADMIN — NIFEDIPINE 60 MG: 30 TABLET, FILM COATED, EXTENDED RELEASE ORAL at 09:03

## 2023-03-30 RX ADMIN — Medication 500 MG: at 09:03

## 2023-03-30 RX ADMIN — ANTI-THYMOCYTE GLOBULIN (RABBIT) 125 MG: 5 INJECTION, POWDER, LYOPHILIZED, FOR SOLUTION INTRAVENOUS at 06:03

## 2023-03-30 RX ADMIN — TACROLIMUS 4 MG: 1 CAPSULE ORAL at 09:03

## 2023-03-30 RX ADMIN — TACROLIMUS 4 MG: 1 CAPSULE ORAL at 06:03

## 2023-03-30 RX ADMIN — HEPARIN SODIUM 5000 UNITS: 5000 INJECTION INTRAVENOUS; SUBCUTANEOUS at 09:03

## 2023-03-30 RX ADMIN — ALBUTEROL SULFATE 2 PUFF: 108 INHALANT RESPIRATORY (INHALATION) at 07:03

## 2023-03-30 RX ADMIN — MINOXIDIL 2.5 MG: 2.5 TABLET ORAL at 09:03

## 2023-03-30 RX ADMIN — REMDESIVIR 100 MG: 100 INJECTION, POWDER, LYOPHILIZED, FOR SOLUTION INTRAVENOUS at 09:03

## 2023-03-30 RX ADMIN — SODIUM BICARBONATE 1950 MG: 650 TABLET ORAL at 09:03

## 2023-03-30 RX ADMIN — HEPARIN SODIUM 5000 UNITS: 5000 INJECTION INTRAVENOUS; SUBCUTANEOUS at 02:03

## 2023-03-30 RX ADMIN — ALBUTEROL SULFATE 2 PUFF: 108 INHALANT RESPIRATORY (INHALATION) at 08:03

## 2023-03-30 RX ADMIN — ACETAMINOPHEN 650 MG: 325 TABLET ORAL at 05:03

## 2023-03-30 RX ADMIN — THERA TABS 1 TABLET: TAB at 09:03

## 2023-03-30 NOTE — ASSESSMENT & PLAN NOTE
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

## 2023-03-30 NOTE — PLAN OF CARE
Problem: Adult Inpatient Plan of Care  Goal: Plan of Care Review  Outcome: Ongoing, Progressing  Goal: Patient-Specific Goal (Individualized)  Outcome: Ongoing, Progressing  Goal: Absence of Hospital-Acquired Illness or Injury  Outcome: Ongoing, Progressing  Goal: Optimal Comfort and Wellbeing  Outcome: Ongoing, Progressing  Goal: Readiness for Transition of Care  Outcome: Ongoing, Progressing     Problem: Communication Impairment  Goal: Effective Communication Skills  Outcome: Ongoing, Progressing     Problem: Fluid and Electrolyte Imbalance (Acute Kidney Injury/Impairment)  Goal: Fluid and Electrolyte Balance  Outcome: Ongoing, Progressing     Problem: Oral Intake Inadequate (Acute Kidney Injury/Impairment)  Goal: Optimal Nutrition Intake  Outcome: Ongoing, Progressing     Problem: Renal Function Impairment (Acute Kidney Injury/Impairment)  Goal: Effective Renal Function  Outcome: Ongoing, Progressing     Problem: Donor Organ Function (Kidney Transplant)  Goal: Effective Renal Function  Outcome: Ongoing, Progressing     Problem: Fluid and Electrolyte Imbalance (Kidney Transplant)  Goal: Fluid and Electrolyte Balance  Outcome: Ongoing, Progressing  Patient A&Ox4, RA, VSS, no concerns or questions at this time

## 2023-03-30 NOTE — PROGRESS NOTES
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 Repeat Cr 8.47, BUN 47. K 5, bicarb 17. Patient is on prograf, myfortic, and prednisone and has been out of myfortiq and tacrolimus for 2 weeks. He has been adherent with his BP meds and prednsone. He fills all of his medications at [facility] other than tacrolimus and myfortic, which are filled at [facility]. The medications are usually mailed out to him, but when he called the line was busy. So he came to pick them up in person. He was unable to drive here earlier due to work obligations, thus missing doses for about 2 weeks. Reports good urine output, no urinary symptoms. Home blood pressures are 100-130s systolic with occasional values in 150s per his home BP cuff reads.     Case was discussed with Dr. Madden of [facility] and agreed for patient to transfer for concerns for rejection. Vitals stable upon transfer other than hypertension up to 165/94. Admitted due to concern for acute renal transplant rejection and KTM input. Initial labs significant for K 4.9, Cr 6.8, BUN 39, bicarb 13.             Overview/Hospital Course:  Patient admitted to  for management of SEBASTIAN with concern for acute rejection as patient was out of medications prior to admission. Restarted on tacro and prednisone (transitioned to dex). Patient also found to be covid positive and started on remdesivir and dexemethasone. CXR with infiltrates bilaterally with mild borderline hypoxia. Plan for  kidney transplant biopsy on 3/27/23. Pt tolerated procedure well. 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 KTM to take over as primary team on 3/31/23.      Interval History: Pt not complaining of much cough this morning. Breathing was much better as well. No other concerns this morning.     Review of Systems   Constitutional:  Positive for appetite change. Negative for chills and fever.   HENT:  Negative for congestion and rhinorrhea.    Eyes:  Negative for photophobia and visual disturbance.   Respiratory:  Positive for cough. Negative for shortness of breath.    Cardiovascular:  Negative for chest pain, palpitations and leg swelling.   Gastrointestinal:  Negative for abdominal distention, abdominal pain, blood in stool, constipation, diarrhea, nausea and vomiting.   Genitourinary:  Negative for dysuria, flank pain, hematuria and urgency.   Musculoskeletal:  Negative for arthralgias and back pain.   Allergic/Immunologic: Positive for immunocompromised state.   Neurological:  Negative for dizziness, weakness and headaches.   Psychiatric/Behavioral:  Negative for agitation and behavioral problems.    Objective:     Vital Signs (Most Recent):  Temp: 97.9 °F (36.6 °C) (03/30/23 1230)  Pulse: 71 (03/30/23 1230)  Resp: 18 (03/30/23 1230)  BP: (!) 147/87 (03/30/23 1230)  SpO2: 97 % (03/30/23 1230)   Vital Signs (24h Range):  Temp:  [97.7 °F (36.5 °C)-98.8 °F (37.1 °C)] 97.9 °F (36.6 °C)  Pulse:  [70-84] 71  Resp:  [16-19] 18  SpO2:  [93 %-99 %] 97 %  BP: (127-147)/(70-87) 147/87     Weight: 79 kg (174 lb 2.6 oz)  Body mass index is 24.99 kg/m².    Intake/Output Summary (Last 24 hours) at 3/30/2023 1421  Last data filed at 3/29/2023 2100  Gross per 24 hour   Intake 300 ml   Output 500 ml   Net -200 ml      Physical Exam  Vitals and nursing note reviewed.   Constitutional:       Appearance: Normal appearance.   HENT:      Head: Normocephalic and  atraumatic.      Nose: Nose normal.      Mouth/Throat:      Mouth: Mucous membranes are moist.   Eyes:      General: No scleral icterus.     Extraocular Movements: Extraocular movements intact.      Conjunctiva/sclera: Conjunctivae normal.   Cardiovascular:      Rate and Rhythm: Normal rate and regular rhythm.      Pulses: Normal pulses.      Heart sounds: Murmur heard.   Pulmonary:      Effort: Pulmonary effort is normal. No respiratory distress.      Breath sounds: Normal breath sounds. No wheezing or rales.      Comments: Transmitted upper airway sounds  Abdominal:      General: Bowel sounds are normal. There is no distension.      Palpations: Abdomen is soft. There is no mass.      Tenderness: There is no abdominal tenderness. There is no guarding.   Musculoskeletal:         General: No swelling, deformity or signs of injury. Normal range of motion.      Cervical back: Normal range of motion. No rigidity.   Skin:     General: Skin is warm and dry.      Coloration: Skin is not jaundiced.      Findings: No bruising.      Comments: Subungual hematoma on left thumb   Neurological:      General: No focal deficit present.      Mental Status: He is alert and oriented to person, place, and time.      Motor: No weakness.   Psychiatric:         Mood and Affect: Mood normal.         Behavior: Behavior normal.         Thought Content: Thought content normal.       Significant Labs: All pertinent labs within the past 24 hours have been reviewed.    Significant Imaging: I have reviewed all pertinent imaging results/findings within the past 24 hours.      Assessment/Plan:      * Acute rejection of kidney transplant  Patient has been out of tacrolimus and myfortic for 2 weeks. Cr with steep increase in labs from 3/22. On outside labs Cr 8.47, BUN 47, up from baseline Cr 1.9. Concern for acute rejection. Still having good urine output. Bicarb decreased to 13 on admission, potassium high-normal. Patient reports mild loss of  appetite and nausea now resolved.   - US kidney transplant unremarkable.  - UA at OSH noted to only have trace blood  - pending labs    Plan  - IR for biopsy showing significant T cell rejection  - KTM consulted, appreciate recs.   - continue tacro and changed pred to dex given COVID  - holding MMF given covid  - KTM to assume care as primary team tomorrow morning      Metabolic acidosis  Bicarb 1950 BID    Acute kidney injury superimposed on chronic kidney disease  F/u nephrology recommendations  IVF  Strict Is/Os  Renally dose medications        Hypertensive cardiovascular disease  See primary problem      COVID  Patient is identified as Severe COVID-19 based on hypoxemia with O2 saturations <94% on room air or on ambulation   Initiate standard COVID protocols; COVID-19 testing ,Infection Control notification  and isolation- respiratory, contact and droplet per protocol    Diagnostics: (leukopenia, hyponatremia, hyperferritinemia, elevated troponin, elevated d-dimer, age, and comorbidities are significant predictors of poor clinical outcome)  CBC, CMP, Ferritin, CRP and Portable CXR    Management: Initiate targeted therapy with Remdesivir, 200mg IV x1, followed by 100mg IV daily x5 days total and Dexamethasone PO/IV 6mg daily x10 days, Maintain oxygen saturations 92-96% via Nasal Cannula  LPM and monitor with continuous/intermittent pulse oximetry.  and Inhaled bronchodilators as needed for shortness of breath.    New infiltrates on CXR. SpO2 93-4% on RA and associated symptoms of HA, sore throat, chills and fever.     Subungual hematoma of left thumb  Patient reports jamming thumb, no pain. Hematoma has been present for a month. Lower concern for subungual melanoma given preceding trauma.       Benign essential HTN  Continue nifed, coreg and minoxidil. Hypertensive due to pain and COVID    Increased nifedipine to 60 bid       Hyperkalemia  Will monitor    -Lokelma if continues to rise      Stage 3a chronic kidney  disease  Baseline Cr 1.9      Anemia of chronic disease  Hb normally 9-10. Hb on 3/22 was 8.6. MCV was 79    - daily CBC  - transfuse for Hb<7  - f/u iron labs and retic      Long-term use of immunosuppressant medication  Takes prednisone, tacrolimus, mycophenolate.     Holding mycophenolate given COVID  Continue tacro   Change pred to dex given COVID    S/P kidney transplant 10/19/21  S/p renal transplant      Hx-TIA (transient ischemic attack)  See primary problem      Hx of seizure disorder  Remote history in childhood, no home AEDs      Renovascular hypertension  Takes minoxidil, coreg, and nifedipine. Home Bps are well controlled around 100-130s systolic mostly.     - restarted home meds.   - nifedipine 60 BID    Focal segmental glomerulosclerosis  Follow KTM recs      VTE Risk Mitigation (From admission, onward)         Ordered     antithymocyte globulin (rabbit) 125 mg, hydrocortisone sodium succinate (SOLU-CORTEF) 20 mg in sodium chloride 0.9% 500 mL (FOR PERIPHERAL LINE ADMINISTRATION ONLY)  Once         03/30/23 1228     heparin (porcine) injection 5,000 Units  Every 8 hours         03/25/23 0047     IP VTE LOW RISK PATIENT  Once         03/25/23 0013     Place sequential compression device  Until discontinued         03/25/23 0013                Discharge Planning   MARSHALL: 3/31/2023     Code Status: Full Code   Is the patient medically ready for discharge?: No    Reason for patient still in hospital (select all that apply): Patient trending condition and Treatment  Discharge Plan A: Home with family        Andrea Encinas MD  Department of Hospital Medicine   [facility] - Telemetry Stepdown

## 2023-03-30 NOTE — ASSESSMENT & PLAN NOTE
Patient has been out of tacrolimus and myfortic for 2 weeks. Cr with steep increase in labs from 3/22. On outside labs Cr 8.47, BUN 47, up from baseline Cr 1.9. Concern for acute rejection. Still having good urine output. Bicarb decreased to 13 on admission, potassium high-normal. Patient reports mild loss of appetite and nausea now resolved.   - US kidney transplant unremarkable.  - UA at OSH noted to only have trace blood  - pending labs    Plan  - IR for biopsy showing significant T cell rejection  - KTM consulted, appreciate recs.   - continue tacro and changed pred to dex given COVID  - holding MMF given covid  - KTM to assume care as primary team tomorrow morning

## 2023-03-30 NOTE — PLAN OF CARE
Problem: Adult Inpatient Plan of Care  Goal: Plan of Care Review  Outcome: Ongoing, Progressing  Goal: Patient-Specific Goal (Individualized)  Outcome: Ongoing, Progressing  Goal: Absence of Hospital-Acquired Illness or Injury  Outcome: Ongoing, Progressing  Goal: Optimal Comfort and Wellbeing  Outcome: Ongoing, Progressing  Goal: Readiness for Transition of Care  Outcome: Ongoing, Progressing     Problem: Communication Impairment  Goal: Effective Communication Skills  Outcome: Ongoing, Progressing   Aox4. Antithymocyte infusing. Premedication given. Pt in no apparent distress. Safety interventions maintained. Pt free from falls. POC reviewed. Questions and concerns answered. Call bell within reach. Care ongoing.

## 2023-03-30 NOTE — SUBJECTIVE & OBJECTIVE
Subjective:   History of Present Illness:  No notes on file    Mr. Trevizo is a 38 y.o. year old male who is status post Kidney Transplant - 10/19/2021  (#1).    His maintenance immunosuppression consists of:   Immunosuppressants (From admission, onward)      Start     Stop Route Frequency Ordered    03/25/23 1545  tacrolimus capsule 4 mg         -- Oral 2 times daily 03/25/23 1534            Hospital Course:  No notes on file    Interval History:  Pt seen and examined at bedside. No AOE. Noted improvement in cough. Denied SOB. Has good appetite. sCr improving 4.6 today. UOP adequate ~1.2 L    Past Medical, Surgical, Family, and Social History:   Unchanged from H&P.    Scheduled Meds:   albuterol  2 puff Inhalation Q6H    ascorbic acid (vitamin C)  500 mg Oral BID    carvediloL  25 mg Oral BID WM    dexAMETHasone  6 mg Oral Daily    heparin (porcine)  5,000 Units Subcutaneous Q8H    minoxidiL  2.5 mg Oral Daily    multivitamin  1 tablet Oral Daily    NIFEdipine  60 mg Oral BID    remdesivir infusion  100 mg Intravenous Daily    sodium bicarbonate  1,950 mg Oral TID    tacrolimus  4 mg Oral BID     Continuous Infusions:  PRN Meds:acetaminophen, benzonatate, dextrose 10%, dextrose 10%, dextrose, dextrose, glucagon (human recombinant), melatonin, naloxone, ondansetron, senna-docusate 8.6-50 mg, sodium chloride 0.9%    Intake/Output - Last 3 Shifts         03/28 0700  03/29 0659 03/29 0700  03/30 0659 03/30 0700  03/31 0659    P.O. 200 300     Total Intake(mL/kg) 200 (2.5) 300 (3.8)     Urine (mL/kg/hr) 2725 (1.4) 1200 (0.6)     Emesis/NG output 0 0     Stool 0 0     Total Output 2725 1200     Net -2525 -900            Urine Occurrence 0 x 0 x     Stool Occurrence 0 x 0 x     Emesis Occurrence 0 x 0 x              Review of Systems   Constitutional: Negative.    HENT: Negative.     Eyes: Negative.    Respiratory:  Positive for cough.    Cardiovascular: Negative.    Gastrointestinal: Negative.    Genitourinary:  "Negative.    Musculoskeletal: Negative.    Allergic/Immunologic: Negative.    Neurological: Negative.    Psychiatric/Behavioral: Negative.      Objective:     Vital Signs (Most Recent):  Temp: 97.7 °F (36.5 °C) (03/30/23 0800)  Pulse: 70 (03/30/23 0735)  Resp: 18 (03/30/23 0735)  BP: (!) 142/83 (03/30/23 0800)  SpO2: 97 % (03/30/23 0735)   Vital Signs (24h Range):  Temp:  [97.1 °F (36.2 °C)-98.8 °F (37.1 °C)] 97.7 °F (36.5 °C)  Pulse:  [70-84] 70  Resp:  [16-21] 18  SpO2:  [93 %-99 %] 97 %  BP: (127-144)/(70-84) 142/83     Weight: 79 kg (174 lb 2.6 oz)  Height: 5' 10" (177.8 cm)  Body mass index is 24.99 kg/m².    Physical Exam  Constitutional:       Appearance: Normal appearance.   HENT:      Head: Normocephalic.      Mouth/Throat:      Mouth: Mucous membranes are moist.   Eyes:      Pupils: Pupils are equal, round, and reactive to light.   Cardiovascular:      Rate and Rhythm: Normal rate and regular rhythm.   Pulmonary:      Effort: Pulmonary effort is normal.   Abdominal:      General: Abdomen is flat.   Musculoskeletal:         General: Normal range of motion.      Cervical back: Normal range of motion.   Skin:     General: Skin is warm.      Capillary Refill: Capillary refill takes less than 2 seconds.   Neurological:      General: No focal deficit present.      Mental Status: He is alert and oriented to person, place, and time.       Laboratory:  CBC:   Recent Labs   Lab 03/28/23 0450 03/29/23 0440 03/30/23  0417   WBC 10.01 9.85 11.06   RBC 3.86* 3.74* 3.62*   HGB 9.3* 8.9* 8.6*   HCT 31.1* 29.7* 28.9*    364 356   MCV 81* 79* 80*   MCH 24.1* 23.8* 23.8*   MCHC 29.9* 30.0* 29.8*     BMP:   Recent Labs   Lab 03/28/23  0450 03/29/23  0440 03/30/23  0417   GLU 86 100 99    141 138   K 5.3* 5.1 5.2*   * 111* 110   CO2 19* 19* 19*   BUN 50* 59* 59*   CREATININE 5.2* 5.0* 4.6*   CALCIUM 9.2 9.0 9.1     CMP:   Recent Labs   Lab 03/28/23  0450 03/29/23  0440 03/30/23 0417   GLU 86 100 99 "   CALCIUM 9.2 9.0 9.1   ALBUMIN 3.2* 3.2* 3.3*   PROT 6.5 6.6 6.7    141 138   K 5.3* 5.1 5.2*   CO2 19* 19* 19*   * 111* 110   BUN 50* 59* 59*   CREATININE 5.2* 5.0* 4.6*   ALKPHOS 66 64 64   ALT 24 34 59*   AST 18 26 49*       Diagnostic Results:  None

## 2023-03-30 NOTE — SUBJECTIVE & OBJECTIVE
Interval History: Pt not complaining of much cough this morning. Breathing was much better as well. No other concerns this morning.     Review of Systems   Constitutional:  Positive for appetite change. Negative for chills and fever.   HENT:  Negative for congestion and rhinorrhea.    Eyes:  Negative for photophobia and visual disturbance.   Respiratory:  Positive for cough. Negative for shortness of breath.    Cardiovascular:  Negative for chest pain, palpitations and leg swelling.   Gastrointestinal:  Negative for abdominal distention, abdominal pain, blood in stool, constipation, diarrhea, nausea and vomiting.   Genitourinary:  Negative for dysuria, flank pain, hematuria and urgency.   Musculoskeletal:  Negative for arthralgias and back pain.   Allergic/Immunologic: Positive for immunocompromised state.   Neurological:  Negative for dizziness, weakness and headaches.   Psychiatric/Behavioral:  Negative for agitation and behavioral problems.    Objective:     Vital Signs (Most Recent):  Temp: 97.9 °F (36.6 °C) (03/30/23 1230)  Pulse: 71 (03/30/23 1230)  Resp: 18 (03/30/23 1230)  BP: (!) 147/87 (03/30/23 1230)  SpO2: 97 % (03/30/23 1230)   Vital Signs (24h Range):  Temp:  [97.7 °F (36.5 °C)-98.8 °F (37.1 °C)] 97.9 °F (36.6 °C)  Pulse:  [70-84] 71  Resp:  [16-19] 18  SpO2:  [93 %-99 %] 97 %  BP: (127-147)/(70-87) 147/87     Weight: 79 kg (174 lb 2.6 oz)  Body mass index is 24.99 kg/m².    Intake/Output Summary (Last 24 hours) at 3/30/2023 1421  Last data filed at 3/29/2023 2100  Gross per 24 hour   Intake 300 ml   Output 500 ml   Net -200 ml      Physical Exam  Vitals and nursing note reviewed.   Constitutional:       Appearance: Normal appearance.   HENT:      Head: Normocephalic and atraumatic.      Nose: Nose normal.      Mouth/Throat:      Mouth: Mucous membranes are moist.   Eyes:      General: No scleral icterus.     Extraocular Movements: Extraocular movements intact.      Conjunctiva/sclera: Conjunctivae  normal.   Cardiovascular:      Rate and Rhythm: Normal rate and regular rhythm.      Pulses: Normal pulses.      Heart sounds: Murmur heard.   Pulmonary:      Effort: Pulmonary effort is normal. No respiratory distress.      Breath sounds: Normal breath sounds. No wheezing or rales.      Comments: Transmitted upper airway sounds  Abdominal:      General: Bowel sounds are normal. There is no distension.      Palpations: Abdomen is soft. There is no mass.      Tenderness: There is no abdominal tenderness. There is no guarding.   Musculoskeletal:         General: No swelling, deformity or signs of injury. Normal range of motion.      Cervical back: Normal range of motion. No rigidity.   Skin:     General: Skin is warm and dry.      Coloration: Skin is not jaundiced.      Findings: No bruising.      Comments: Subungual hematoma on left thumb   Neurological:      General: No focal deficit present.      Mental Status: He is alert and oriented to person, place, and time.      Motor: No weakness.   Psychiatric:         Mood and Affect: Mood normal.         Behavior: Behavior normal.         Thought Content: Thought content normal.       Significant Labs: All pertinent labs within the past 24 hours have been reviewed.    Significant Imaging: I have reviewed all pertinent imaging results/findings within the past 24 hours.

## 2023-03-30 NOTE — ASSESSMENT & PLAN NOTE
Pharmacy reviewed pharm records regarding when he picked up meds. Picked up 30 day supply of meds in Nov 2022, then Jan 2023 and then on March 17, 2023.  On prograf 4/4, EC- BID and pred 5 at home   On decadron for COVID  Cont prograf 4/4, will discontinue as pt will get solumedrol pulse w/ thymo   Hold MMF as pt has active infection   Daily prograf level  monitor for toxicity

## 2023-03-30 NOTE — ASSESSMENT & PLAN NOTE
2/2 SEBASTIAN  Was on bicarb gtt on admssion  Continue NaHCO3 1950 TID,   If remains llow or doesn't improve may consider switching to Bicitra tomorrow

## 2023-03-30 NOTE — ASSESSMENT & PLAN NOTE
See s/p kidney transplant     Techique: The unwanted hair in the treatment area(s) were removed using sensitive wax. Phyto Corrective and Epidermal Repair was applied to help with redness. Post-Care Instructions: I reviewed with the patient in detail post-care instructions. Patient should avoid sun exposure and wear sun protection. Price (Use Numbers Only, No Special Characters Or $): 7.50 Detail Level: Zone

## 2023-03-30 NOTE — ASSESSMENT & PLAN NOTE
Takes minoxidil, coreg, and nifedipine. Home Bps are well controlled around 100-130s systolic mostly.     - restarted home meds.   - nifedipine 60 BID

## 2023-03-30 NOTE — PROGRESS NOTES
[facility] - Telemetry Stepdown  Kidney Transplant  Progress Note      Reason for Follow-up: Reassessment of Kidney Transplant - 10/19/2021  (#1) recipient and management of immunosuppression.    ORGAN: LEFT KIDNEY    Donor Type: Donation after Brain Death          Subjective:   History of Present Illness:  No notes on file    Mr. Trevizo is a 38 y.o. year old male who is status post Kidney Transplant - 10/19/2021  (#1).    His maintenance immunosuppression consists of:   Immunosuppressants (From admission, onward)      Start     Stop Route Frequency Ordered    03/25/23 1545  tacrolimus capsule 4 mg         -- Oral 2 times daily 03/25/23 1534            Hospital Course:  No notes on file    Interval History:  Pt seen and examined at bedside. No AOE. Noted improvement in cough. Denied SOB. Has good appetite. sCr improving 4.6 today. UOP adequate ~1.2 L    Past Medical, Surgical, Family, and Social History:   Unchanged from H&P.    Scheduled Meds:   albuterol  2 puff Inhalation Q6H    ascorbic acid (vitamin C)  500 mg Oral BID    carvediloL  25 mg Oral BID WM    dexAMETHasone  6 mg Oral Daily    heparin (porcine)  5,000 Units Subcutaneous Q8H    minoxidiL  2.5 mg Oral Daily    multivitamin  1 tablet Oral Daily    NIFEdipine  60 mg Oral BID    remdesivir infusion  100 mg Intravenous Daily    sodium bicarbonate  1,950 mg Oral TID    tacrolimus  4 mg Oral BID     Continuous Infusions:  PRN Meds:acetaminophen, benzonatate, dextrose 10%, dextrose 10%, dextrose, dextrose, glucagon (human recombinant), melatonin, naloxone, ondansetron, senna-docusate 8.6-50 mg, sodium chloride 0.9%    Intake/Output - Last 3 Shifts         03/28 0700 03/29 0659 03/29 0700 03/30 0659 03/30 0700 03/31 0659    P.O. 200 300     Total Intake(mL/kg) 200 (2.5) 300 (3.8)     Urine (mL/kg/hr) 2725 (1.4) 1200 (0.6)     Emesis/NG output 0 0     Stool 0 0     Total Output 2725 1200     Net -2525 -900            Urine Occurrence 0 x 0 x     Stool  "Occurrence 0 x 0 x     Emesis Occurrence 0 x 0 x              Review of Systems   Constitutional: Negative.    HENT: Negative.     Eyes: Negative.    Respiratory:  Positive for cough.    Cardiovascular: Negative.    Gastrointestinal: Negative.    Genitourinary: Negative.    Musculoskeletal: Negative.    Allergic/Immunologic: Negative.    Neurological: Negative.    Psychiatric/Behavioral: Negative.      Objective:     Vital Signs (Most Recent):  Temp: 97.7 °F (36.5 °C) (03/30/23 0800)  Pulse: 70 (03/30/23 0735)  Resp: 18 (03/30/23 0735)  BP: (!) 142/83 (03/30/23 0800)  SpO2: 97 % (03/30/23 0735)   Vital Signs (24h Range):  Temp:  [97.1 °F (36.2 °C)-98.8 °F (37.1 °C)] 97.7 °F (36.5 °C)  Pulse:  [70-84] 70  Resp:  [16-21] 18  SpO2:  [93 %-99 %] 97 %  BP: (127-144)/(70-84) 142/83     Weight: 79 kg (174 lb 2.6 oz)  Height: 5' 10" (177.8 cm)  Body mass index is 24.99 kg/m².    Physical Exam  Constitutional:       Appearance: Normal appearance.   HENT:      Head: Normocephalic.      Mouth/Throat:      Mouth: Mucous membranes are moist.   Eyes:      Pupils: Pupils are equal, round, and reactive to light.   Cardiovascular:      Rate and Rhythm: Normal rate and regular rhythm.   Pulmonary:      Effort: Pulmonary effort is normal.   Abdominal:      General: Abdomen is flat.   Musculoskeletal:         General: Normal range of motion.      Cervical back: Normal range of motion.   Skin:     General: Skin is warm.      Capillary Refill: Capillary refill takes less than 2 seconds.   Neurological:      General: No focal deficit present.      Mental Status: He is alert and oriented to person, place, and time.       Laboratory:  CBC:   Recent Labs   Lab 03/28/23  0450 03/29/23  0440 03/30/23  0417   WBC 10.01 9.85 11.06   RBC 3.86* 3.74* 3.62*   HGB 9.3* 8.9* 8.6*   HCT 31.1* 29.7* 28.9*    364 356   MCV 81* 79* 80*   MCH 24.1* 23.8* 23.8*   MCHC 29.9* 30.0* 29.8*     BMP:   Recent Labs   Lab 03/28/23  0450 03/29/23  6863 " 03/30/23  0417   GLU 86 100 99    141 138   K 5.3* 5.1 5.2*   * 111* 110   CO2 19* 19* 19*   BUN 50* 59* 59*   CREATININE 5.2* 5.0* 4.6*   CALCIUM 9.2 9.0 9.1     CMP:   Recent Labs   Lab 03/28/23  0450 03/29/23  0440 03/30/23  0417   GLU 86 100 99   CALCIUM 9.2 9.0 9.1   ALBUMIN 3.2* 3.2* 3.3*   PROT 6.5 6.6 6.7    141 138   K 5.3* 5.1 5.2*   CO2 19* 19* 19*   * 111* 110   BUN 50* 59* 59*   CREATININE 5.2* 5.0* 4.6*   ALKPHOS 66 64 64   ALT 24 34 59*   AST 18 26 49*       Diagnostic Results:  None    Assessment/Plan:     37 yo AAM, w/ PMHx of 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 Diagnosed w/ COVID inpt. 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  NON oliguric stage III SEBASTIAN on CKD3, improving slightly   SEBASTIAN likely 2/2 suspected underlying rejection and would recommend kidney bx (done 3/27)  Baseline Cr 1.7 to 1.9, baseline eGFR ~45  Cr 9.3 at PCP, 8.47 at outside ED, 6.8 on admission    Cr trend 6.8>6.5>6.1>5.9>5.2>5>4.6  US kidney transplant unremarkable  UA at OSH noted to only have trace blood  UPCR undectable and ACR 42 (Not FSGS reactivation)  DSA undetectable class 1, weak detectable class 2 as before  CMV -ve , BK -ve    Irvin kidney bx showed T cell rejection   Thymo x3 starting today w/ solumedrol pulse     Encourage adequate Po nutrition and hydration  F/U Full report of kidney bx result   Monitor accurate I/O  Monitor renal function daily  Avoid nephrotoxins, NSAIDS, fleet enemas, and gadolinium  renally dose medications     Long-term use of immunosuppressant medication  Pharmacy reviewed pharm records regarding when he picked up meds. Picked up 30 day supply of meds in Nov 2022, then Jan 2023 and then on March 17, 2023.  On  prograf 4/4, EC- BID and pred 5 at home   On decadron for COVID, will discontinue as pt will get solumedrol pulse w/ thymo   Cont prograf 4/4,   Hold MMF as pt has active infection   Daily prograf level  monitor for toxicity     Metabolic acidosis  2/2 SEBASTIAN  Was on bicarb gtt on admssion  Continue NaHCO3 1950 TID,   If remains low or doesn't improve may consider switching to Bicitra tomorrow               Hypomagnesemia  Replete as necessary    Hypertensive cardiovascular disease  BERNARDA, EF 65%, cLVH, and mild LA enlargement    On coreg 25BID, minoxidil 2.5, procardia 30 BID at home  Cont as feasible per primary     COVID  On RA, improving   Remdisivir, decadron, and keep sat >94% per primary     Hyperkalemia  Mild hperK+ form MA and SEBASTIAN,   S/p lokelma x1 3/28  CTM, ROBER  Anemia of chronic disease  Serum Fe 68, %32, Ferretin 10, retic WNL  Hg stable  CTM, ROBER    S/P kidney transplant 10/19/21  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 -ve DSA at transplant  See SEBASTIAN and acute rejection   Focal segmental glomerulosclerosis  See s/p kidney transplant    Edward Leon MD  Kidney Transplant  [facility] - Telemetry Stepdown

## 2023-03-31 LAB
ALBUMIN SERPL BCP-MCNC: 3.2 G/DL (ref 3.5–5.2)
ALP SERPL-CCNC: 63 U/L (ref 55–135)
ALT SERPL W/O P-5'-P-CCNC: 72 U/L (ref 10–44)
ANION GAP SERPL CALC-SCNC: 10 MMOL/L (ref 8–16)
AST SERPL-CCNC: 44 U/L (ref 10–40)
BACTERIA BLD CULT: NORMAL
BACTERIA BLD CULT: NORMAL
BASOPHILS # BLD AUTO: 0.02 K/UL (ref 0–0.2)
BASOPHILS NFR BLD: 0.1 % (ref 0–1.9)
BILIRUB SERPL-MCNC: 0.2 MG/DL (ref 0.1–1)
BUN SERPL-MCNC: 60 MG/DL (ref 6–20)
CALCIUM SERPL-MCNC: 9.6 MG/DL (ref 8.7–10.5)
CHLORIDE SERPL-SCNC: 110 MMOL/L (ref 95–110)
CO2 SERPL-SCNC: 17 MMOL/L (ref 23–29)
CREAT SERPL-MCNC: 4.2 MG/DL (ref 0.5–1.4)
DIFFERENTIAL METHOD: ABNORMAL
EOSINOPHIL # BLD AUTO: 0 K/UL (ref 0–0.5)
EOSINOPHIL NFR BLD: 0 % (ref 0–8)
ERYTHROCYTE [DISTWIDTH] IN BLOOD BY AUTOMATED COUNT: 13.4 % (ref 11.5–14.5)
EST. GFR  (NO RACE VARIABLE): 17.4 ML/MIN/1.73 M^2
GLUCOSE SERPL-MCNC: 124 MG/DL (ref 70–110)
HCT VFR BLD AUTO: 29.5 % (ref 40–54)
HGB BLD-MCNC: 9 G/DL (ref 14–18)
IMM GRANULOCYTES # BLD AUTO: 0.39 K/UL (ref 0–0.04)
IMM GRANULOCYTES NFR BLD AUTO: 2.4 % (ref 0–0.5)
LYMPHOCYTES # BLD AUTO: 0.1 K/UL (ref 1–4.8)
LYMPHOCYTES NFR BLD: 0.9 % (ref 18–48)
MAGNESIUM SERPL-MCNC: 1.8 MG/DL (ref 1.6–2.6)
MCH RBC QN AUTO: 24 PG (ref 27–31)
MCHC RBC AUTO-ENTMCNC: 30.5 G/DL (ref 32–36)
MCV RBC AUTO: 79 FL (ref 82–98)
MONOCYTES # BLD AUTO: 0.5 K/UL (ref 0.3–1)
MONOCYTES NFR BLD: 3.3 % (ref 4–15)
NEUTROPHILS # BLD AUTO: 15.1 K/UL (ref 1.8–7.7)
NEUTROPHILS NFR BLD: 93.3 % (ref 38–73)
NRBC BLD-RTO: 0 /100 WBC
PHOSPHATE SERPL-MCNC: 3.6 MG/DL (ref 2.7–4.5)
PLATELET # BLD AUTO: 339 K/UL (ref 150–450)
PMV BLD AUTO: 10.4 FL (ref 9.2–12.9)
POTASSIUM SERPL-SCNC: 5.2 MMOL/L (ref 3.5–5.1)
POTASSIUM SERPL-SCNC: 5.6 MMOL/L (ref 3.5–5.1)
PROT SERPL-MCNC: 6.7 G/DL (ref 6–8.4)
RBC # BLD AUTO: 3.75 M/UL (ref 4.6–6.2)
SODIUM SERPL-SCNC: 137 MMOL/L (ref 136–145)
TACROLIMUS BLD-MCNC: 7.7 NG/ML (ref 5–15)
WBC # BLD AUTO: 16.17 K/UL (ref 3.9–12.7)

## 2023-03-31 PROCEDURE — 63600175 PHARM REV CODE 636 W HCPCS: Performed by: INTERNAL MEDICINE

## 2023-03-31 PROCEDURE — 36415 COLL VENOUS BLD VENIPUNCTURE: CPT | Performed by: PHYSICIAN ASSISTANT

## 2023-03-31 PROCEDURE — 85025 COMPLETE CBC W/AUTO DIFF WBC: CPT

## 2023-03-31 PROCEDURE — 94761 N-INVAS EAR/PLS OXIMETRY MLT: CPT

## 2023-03-31 PROCEDURE — 25000003 PHARM REV CODE 250: Performed by: STUDENT IN AN ORGANIZED HEALTH CARE EDUCATION/TRAINING PROGRAM

## 2023-03-31 PROCEDURE — 25000003 PHARM REV CODE 250

## 2023-03-31 PROCEDURE — 84100 ASSAY OF PHOSPHORUS: CPT

## 2023-03-31 PROCEDURE — 99233 SBSQ HOSP IP/OBS HIGH 50: CPT | Mod: CR,,, | Performed by: PHYSICIAN ASSISTANT

## 2023-03-31 PROCEDURE — 93010 ELECTROCARDIOGRAM REPORT: CPT | Mod: ,,, | Performed by: INTERNAL MEDICINE

## 2023-03-31 PROCEDURE — 84132 ASSAY OF SERUM POTASSIUM: CPT | Performed by: PHYSICIAN ASSISTANT

## 2023-03-31 PROCEDURE — 94640 AIRWAY INHALATION TREATMENT: CPT

## 2023-03-31 PROCEDURE — 20600001 HC STEP DOWN PRIVATE ROOM

## 2023-03-31 PROCEDURE — 27000207 HC ISOLATION

## 2023-03-31 PROCEDURE — 80053 COMPREHEN METABOLIC PANEL: CPT

## 2023-03-31 PROCEDURE — 27000646 HC AEROBIKA DEVICE

## 2023-03-31 PROCEDURE — 99900035 HC TECH TIME PER 15 MIN (STAT)

## 2023-03-31 PROCEDURE — 63600175 PHARM REV CODE 636 W HCPCS

## 2023-03-31 PROCEDURE — 83735 ASSAY OF MAGNESIUM: CPT

## 2023-03-31 PROCEDURE — 93010 EKG 12-LEAD: ICD-10-PCS | Mod: ,,, | Performed by: INTERNAL MEDICINE

## 2023-03-31 PROCEDURE — 99233 PR SUBSEQUENT HOSPITAL CARE,LEVL III: ICD-10-PCS | Mod: CR,,, | Performed by: PHYSICIAN ASSISTANT

## 2023-03-31 PROCEDURE — 94664 DEMO&/EVAL PT USE INHALER: CPT

## 2023-03-31 PROCEDURE — 25000003 PHARM REV CODE 250: Performed by: PHYSICIAN ASSISTANT

## 2023-03-31 PROCEDURE — 94799 UNLISTED PULMONARY SVC/PX: CPT

## 2023-03-31 PROCEDURE — 63600175 PHARM REV CODE 636 W HCPCS: Performed by: PHYSICIAN ASSISTANT

## 2023-03-31 PROCEDURE — 80197 ASSAY OF TACROLIMUS: CPT | Performed by: STUDENT IN AN ORGANIZED HEALTH CARE EDUCATION/TRAINING PROGRAM

## 2023-03-31 PROCEDURE — 93005 ELECTROCARDIOGRAM TRACING: CPT

## 2023-03-31 RX ORDER — VALGANCICLOVIR 450 MG/1
450 TABLET, FILM COATED ORAL
Status: DISCONTINUED | OUTPATIENT
Start: 2023-03-31 | End: 2023-04-03 | Stop reason: HOSPADM

## 2023-03-31 RX ORDER — NIFEDIPINE 60 MG/1
60 TABLET, EXTENDED RELEASE ORAL 2 TIMES DAILY
Qty: 60 TABLET | Refills: 11 | Status: SHIPPED | OUTPATIENT
Start: 2023-03-31

## 2023-03-31 RX ORDER — PREDNISONE 20 MG/1
20 TABLET ORAL DAILY
Status: DISCONTINUED | OUTPATIENT
Start: 2023-04-03 | End: 2023-04-01

## 2023-03-31 RX ORDER — VALGANCICLOVIR 450 MG/1
450 TABLET, FILM COATED ORAL
Qty: 12 TABLET | Refills: 2 | Status: SHIPPED | OUTPATIENT
Start: 2023-03-30 | End: 2023-06-28

## 2023-03-31 RX ORDER — PREDNISONE 5 MG/1
TABLET ORAL
Qty: 70 TABLET | Refills: 11 | Status: SHIPPED | OUTPATIENT
Start: 2023-03-31 | End: 2023-08-25 | Stop reason: SDUPTHER

## 2023-03-31 RX ORDER — CARVEDILOL 25 MG/1
25 TABLET ORAL 2 TIMES DAILY WITH MEALS
Qty: 60 TABLET | Refills: 11 | Status: SHIPPED | OUTPATIENT
Start: 2023-03-31

## 2023-03-31 RX ORDER — ACETAMINOPHEN 325 MG/1
650 TABLET ORAL ONCE AS NEEDED
Status: DISCONTINUED | OUTPATIENT
Start: 2023-03-31 | End: 2023-04-03 | Stop reason: HOSPADM

## 2023-03-31 RX ORDER — METHYLPREDNISOLONE SOD SUCC 125 MG
200 VIAL (EA) INJECTION ONCE
Status: COMPLETED | OUTPATIENT
Start: 2023-03-31 | End: 2023-03-31

## 2023-03-31 RX ORDER — SULFAMETHOXAZOLE AND TRIMETHOPRIM 400; 80 MG/1; MG/1
1 TABLET ORAL
Status: DISCONTINUED | OUTPATIENT
Start: 2023-03-31 | End: 2023-04-03 | Stop reason: HOSPADM

## 2023-03-31 RX ORDER — MINOXIDIL 2.5 MG/1
2.5 TABLET ORAL DAILY
Qty: 30 TABLET | Refills: 11 | Status: SHIPPED | OUTPATIENT
Start: 2023-03-31 | End: 2023-08-25 | Stop reason: SDUPTHER

## 2023-03-31 RX ORDER — SODIUM BICARBONATE 650 MG/1
1950 TABLET ORAL 3 TIMES DAILY
Qty: 270 TABLET | Refills: 5 | Status: SHIPPED | OUTPATIENT
Start: 2023-03-31

## 2023-03-31 RX ORDER — DIPHENHYDRAMINE HCL 50 MG
50 CAPSULE ORAL ONCE AS NEEDED
Status: DISCONTINUED | OUTPATIENT
Start: 2023-03-31 | End: 2023-04-03 | Stop reason: HOSPADM

## 2023-03-31 RX ORDER — EPINEPHRINE 1 MG/ML
1 INJECTION, SOLUTION, CONCENTRATE INTRAVENOUS ONCE AS NEEDED
Status: DISCONTINUED | OUTPATIENT
Start: 2023-03-31 | End: 2023-04-03 | Stop reason: HOSPADM

## 2023-03-31 RX ORDER — MUPIROCIN 20 MG/G
OINTMENT TOPICAL 2 TIMES DAILY
Status: DISCONTINUED | OUTPATIENT
Start: 2023-03-31 | End: 2023-04-03 | Stop reason: HOSPADM

## 2023-03-31 RX ORDER — FUROSEMIDE 10 MG/ML
80 INJECTION INTRAMUSCULAR; INTRAVENOUS ONCE
Status: COMPLETED | OUTPATIENT
Start: 2023-03-31 | End: 2023-03-31

## 2023-03-31 RX ORDER — SULFAMETHOXAZOLE AND TRIMETHOPRIM 400; 80 MG/1; MG/1
1 TABLET ORAL
Qty: 12 TABLET | Refills: 5 | Status: SHIPPED | OUTPATIENT
Start: 2023-03-30 | End: 2023-09-26

## 2023-03-31 RX ORDER — DIPHENHYDRAMINE HCL 25 MG
25 CAPSULE ORAL ONCE
Status: COMPLETED | OUTPATIENT
Start: 2023-03-31 | End: 2023-03-31

## 2023-03-31 RX ORDER — ACETAMINOPHEN 325 MG/1
650 TABLET ORAL ONCE
Status: COMPLETED | OUTPATIENT
Start: 2023-03-31 | End: 2023-03-31

## 2023-03-31 RX ORDER — ALBUTEROL SULFATE 90 UG/1
2 AEROSOL, METERED RESPIRATORY (INHALATION)
Status: DISCONTINUED | OUTPATIENT
Start: 2023-04-01 | End: 2023-04-03

## 2023-03-31 RX ORDER — FUROSEMIDE 10 MG/ML
80 INJECTION INTRAMUSCULAR; INTRAVENOUS ONCE
Status: DISCONTINUED | OUTPATIENT
Start: 2023-03-31 | End: 2023-03-31

## 2023-03-31 RX ORDER — ALBUTEROL SULFATE 90 UG/1
2 AEROSOL, METERED RESPIRATORY (INHALATION) EVERY 6 HOURS
Status: DISCONTINUED | OUTPATIENT
Start: 2023-04-01 | End: 2023-03-31

## 2023-03-31 RX ADMIN — HEPARIN SODIUM 5000 UNITS: 5000 INJECTION INTRAVENOUS; SUBCUTANEOUS at 06:03

## 2023-03-31 RX ADMIN — THERA TABS 1 TABLET: TAB at 09:03

## 2023-03-31 RX ADMIN — ACETAMINOPHEN 650 MG: 325 TABLET ORAL at 12:03

## 2023-03-31 RX ADMIN — CARVEDILOL 25 MG: 25 TABLET, FILM COATED ORAL at 09:03

## 2023-03-31 RX ADMIN — METHYLPREDNISOLONE SODIUM SUCCINATE 200 MG: 125 INJECTION, POWDER, FOR SOLUTION INTRAMUSCULAR; INTRAVENOUS at 12:03

## 2023-03-31 RX ADMIN — MINOXIDIL 2.5 MG: 2.5 TABLET ORAL at 09:03

## 2023-03-31 RX ADMIN — VALGANCICLOVIR 450 MG: 450 TABLET, FILM COATED ORAL at 09:03

## 2023-03-31 RX ADMIN — ANTI-THYMOCYTE GLOBULIN (RABBIT) 125 MG: 5 INJECTION, POWDER, LYOPHILIZED, FOR SOLUTION INTRAVENOUS at 05:03

## 2023-03-31 RX ADMIN — SODIUM BICARBONATE 1950 MG: 650 TABLET ORAL at 09:03

## 2023-03-31 RX ADMIN — HEPARIN SODIUM 5000 UNITS: 5000 INJECTION INTRAVENOUS; SUBCUTANEOUS at 09:03

## 2023-03-31 RX ADMIN — SODIUM ZIRCONIUM CYCLOSILICATE 10 G: 5 POWDER, FOR SUSPENSION ORAL at 09:03

## 2023-03-31 RX ADMIN — NIFEDIPINE 60 MG: 30 TABLET, FILM COATED, EXTENDED RELEASE ORAL at 09:03

## 2023-03-31 RX ADMIN — SULFAMETHOXAZOLE AND TRIMETHOPRIM 1 TABLET: 400; 80 TABLET ORAL at 09:03

## 2023-03-31 RX ADMIN — Medication 500 MG: at 09:03

## 2023-03-31 RX ADMIN — DIPHENHYDRAMINE HYDROCHLORIDE 25 MG: 25 CAPSULE ORAL at 12:03

## 2023-03-31 RX ADMIN — TACROLIMUS 4 MG: 1 CAPSULE ORAL at 09:03

## 2023-03-31 RX ADMIN — ALBUTEROL SULFATE 2 PUFF: 108 INHALANT RESPIRATORY (INHALATION) at 12:03

## 2023-03-31 RX ADMIN — SODIUM BICARBONATE: 84 INJECTION, SOLUTION INTRAVENOUS at 12:03

## 2023-03-31 RX ADMIN — CARVEDILOL 25 MG: 25 TABLET, FILM COATED ORAL at 05:03

## 2023-03-31 RX ADMIN — SODIUM BICARBONATE 1950 MG: 650 TABLET ORAL at 03:03

## 2023-03-31 RX ADMIN — HEPARIN SODIUM 5000 UNITS: 5000 INJECTION INTRAVENOUS; SUBCUTANEOUS at 03:03

## 2023-03-31 RX ADMIN — TACROLIMUS 4 MG: 1 CAPSULE ORAL at 05:03

## 2023-03-31 RX ADMIN — FUROSEMIDE 80 MG: 10 INJECTION, SOLUTION INTRAMUSCULAR; INTRAVENOUS at 11:03

## 2023-03-31 RX ADMIN — ALBUTEROL SULFATE 2 PUFF: 108 INHALANT RESPIRATORY (INHALATION) at 09:03

## 2023-03-31 RX ADMIN — ALBUTEROL SULFATE 2 PUFF: 108 INHALANT RESPIRATORY (INHALATION) at 07:03

## 2023-03-31 NOTE — ASSESSMENT & PLAN NOTE
"- S/p DBDKT 10/19/2021, Thymo induction (CMV D-,R+), 0% PRA. -ve DSA at transplant  - See "acute rejection"     "

## 2023-03-31 NOTE — DISCHARGE SUMMARY
[facility] - Telemetry Stepdown  Kidney Transplant  Discharge Summary    Patient Name: Feliz Trevizo  MRN: [id number]  Admission Date: 3/24/2023  Hospital Length of Stay: 9 days  Discharge Date and Time:  04/02/2023 12:30 PM  Attending Physician: Leena Castillo MD   Discharging Provider: JOSE MARIA Mcmullen  Primary Care Provider: SUSAN Vela    HPI:   37 y/o male with PMH of kidney transplant in [id number] (previously had ESRD 2/2 FSGS) and HTN who is transferred for abnormal bloodwork.His creatinine increased to 9.38, BUN 43. Repeat Cr 8.47, BUN 47. K 5, bicarb 17. Patient is on prograf, myfortic, and prednisone and has been out of myfortiq and tacrolimus for 2 weeks. He has been adherent with his BP meds and prednsone. He fills all of his medications at [place] pharmacy other than tacrolimus and myfortic, which are filled at [facility]. The medications are usually mailed out to him, but when he called the line was busy. So he came to pick them up in person. He was unable to drive here earlier due to work obligations, thus missing doses for about 2 weeks. Reports good urine output, no urinary symptoms. Home blood pressures are 100-130s systolic with occasional values in 150s per his home BP cuff reads.      Case was discussed with Dr. Madden of [facility] and agreed for patient to transfer for concerns for rejection. Vitals stable upon transfer other than hypertension up to 165/94. Admitted due to concern for acute renal transplant rejection and KT input. Initial labs significant for K 4.9, Cr 6.8, BUN 39, bicarb 13.       Hospital Course:    Patient admitted to IM for SEBASTIAN. Patient COVID + on admit. Reported a mild cough and had a CXR showing bilateral infiltrates. Had some mild hypoxia that has resolved, did not require oxygen. S/p treatment with remdesivir and dexa. Symptoms improved. He had a kidney bx on 3/27 which showed severe ACR. Patient was treated with thymo x 3 (3/30-4/1). Cr improved to  3.9. UOP remains adequate. Did require treatment for hyperkalemia with lasix and lokelma.   Pt is now stable and ready for discharge. He has no complaints. He has met with PharmD and received education. He will follow up with labs and clinic appointment 2 weeks will need appointment with  for medication compliance.  Pt expressed understanding of discharge instructions and importance of follow up.       Goals of Care Treatment Preferences:  Code Status: Full Code      Final Active Diagnoses:    Diagnosis Date Noted POA    PRINCIPAL PROBLEM:  Acute rejection of kidney transplant [T86.11] 03/25/2023 Yes    Hypomagnesemia [E83.42] 03/30/2023 Yes    Acute kidney injury superimposed on chronic kidney disease [N17.9, N18.9] 03/27/2023 Yes    Metabolic acidosis [E87.20] 03/27/2023 Yes    COVID-19 [U07.1] 03/26/2023 Yes    Hyperkalemia [E87.5] 11/15/2021 Yes    S/P kidney transplant 10/19/21 [Z94.0] 10/19/2021 Not Applicable     Chronic    Long-term use of immunosuppressant medication [Z79.60] 10/19/2021 Not Applicable     Chronic    Anemia of chronic disease [D63.8] 10/19/2021 Yes     Chronic    Prophylactic immunotherapy [Z29.8] 10/19/2021 Not Applicable    At risk for opportunistic infections [Z91.89] 10/19/2021 Yes    Renovascular hypertension [I15.0] 08/02/2017 Yes     Chronic      Problems Resolved During this Admission:         Consults (From admission, onward)          Status Ordering Provider     Inpatient consult to Interventional Radiology  Once        Provider:  (Not yet assigned)    Completed ENRRIQUE DAILEY Consult to Kidney/Pancreas Transplant Medicine  Once        Provider:  (Not yet assigned)    Completed CAREN GUILLORY            Pending Diagnostic Studies:       Procedure Component Value Units Date/Time    Specimen to Pathology, Radiology Kidney, [place] biopsy [[id number]] Collected: 03/27/23 1822    Order Status: Sent Lab Status: In process Updated: 03/27/23 1831          Significant  Diagnostic Studies: Labs: BMP:   Recent Labs   Lab 03/31/23  1437 04/01/23  0420 04/02/23  0433   GLU  --  126* 155*   NA  --  138 138   K 5.2* 4.9 5.0   CL  --  106 109   CO2  --  22* 21*   BUN  --  68* 62*   CREATININE  --  4.2* 3.9*   CALCIUM  --  9.2 8.9   MG  --  1.6 1.6   , CMP   Recent Labs   Lab 03/31/23  1437 04/01/23  0420 04/02/23  0433   NA  --  138 138   K 5.2* 4.9 5.0   CL  --  106 109   CO2  --  22* 21*   GLU  --  126* 155*   BUN  --  68* 62*   CREATININE  --  4.2* 3.9*   CALCIUM  --  9.2 8.9   PROT  --  6.6 6.4   ALBUMIN  --  3.2* 3.1*   BILITOT  --  0.2 0.2   ALKPHOS  --  70 63   AST  --  30 20   ALT  --  67* 57*   ANIONGAP  --  10 8   , CBC   Recent Labs   Lab 04/01/23 0420 04/02/23 0433   WBC 17.58* 11.82   HGB 9.3* 9.1*   HCT 30.1* 30.4*    308   , and All labs within the past 24 hours have been reviewed    Discharged Condition: stable    Patient Instructions:   No discharge procedures on file.  Medications:  Reconciled Home Medications:      Medication List        START taking these medications      sodium bicarbonate 650 MG tablet  Take 3 tablets (1,950 mg total) by mouth 3 (three) times daily.     sodium zirconium cyclosilicate 10 gram packet  Commonly known as: Lokelma  Take 1 packet (10 g total) by mouth once daily. Mix entire contents of packet(s) into drinking glass containing 3 tablespoons of water; stir well and drink immediately. Add water and repeat until no powder remains to receive entire dose.     sulfamethoxazole-trimethoprim 400-80mg 400-80 mg per tablet  Commonly known as: BACTRIM,SEPTRA  Take 1 tablet by mouth every Mon, Wed, Fri. Stop: 9/26/23     valGANciclovir 450 mg Tab  Commonly known as: VALCYTE  Take 1 tablet (450 mg total) by mouth every Mon, Wed, Fri. Stop: 6/28/23            CHANGE how you take these medications      aspirin 81 MG EC tablet  Commonly known as: ECOTRIN  Take 1 tablet (81 mg total) by mouth once daily.  What changed:   when to take this  reasons  to take this     NIFEdipine 60 MG (OSM) 24 hr tablet  Commonly known as: PROCARDIA-XL  Take 1 tablet (60 mg total) by mouth 2 (two) times a day.  What changed:   medication strength  how much to take     predniSONE 5 MG tablet  Commonly known as: DELTASONE  Take 20 mg by mouth daily 4/2-4/8; 15 mg by mouth daily 4/9-4/15; 10 mg by mouth daily 4/16-4/22; 5 mg by mouth daily thereafter 4/23/23  What changed: additional instructions     tacrolimus 1 MG Cap  Commonly known as: PROGRAF  Take 6 capsules (6 mg total) by mouth every 12 (twelve) hours.  What changed: how much to take            CONTINUE taking these medications      carvediloL 25 MG tablet  Commonly known as: COREG  Take 1 tablet (25 mg total) by mouth 2 (two) times daily with meals.     minoxidiL 2.5 MG tablet  Commonly known as: LONITEN  Take 1 tablet (2.5 mg total) by mouth once daily.     mycophenolate 180 MG Tbec  Commonly known as: MYFORTIC  Take 2 tablets (360 mg total) by mouth 2 (two) times daily.  Start taking on: April 3, 2023            Time spent caring for patient (Greater than 1/2 spent in direct face-to-face contact): > 30 minutes    JOSE MARIA Mcmullen  Kidney Transplant  [facility] - Telemetry Stepdown

## 2023-03-31 NOTE — ASSESSMENT & PLAN NOTE
- K 5.6 on am labs  - EKG NS, no peaked t waves  - Treating with lasix, lokelma, and bicarb gtt  - Repeat K @ 1400

## 2023-03-31 NOTE — PLAN OF CARE
Problem: Adult Inpatient Plan of Care  Goal: Plan of Care Review  Outcome: Ongoing, Progressing  Goal: Patient-Specific Goal (Individualized)  Outcome: Ongoing, Progressing  Goal: Absence of Hospital-Acquired Illness or Injury  Outcome: Ongoing, Progressing  Goal: Optimal Comfort and Wellbeing  Outcome: Ongoing, Progressing  Goal: Readiness for Transition of Care  Outcome: Ongoing, Progressing     Problem: Communication Impairment  Goal: Effective Communication Skills  Outcome: Ongoing, Progressing     Problem: Fluid and Electrolyte Imbalance (Acute Kidney Injury/Impairment)  Goal: Fluid and Electrolyte Balance  Outcome: Ongoing, Progressing   Aox4. Antithymocyte given. Vital signs are stable. Pt hasn't experienced any adverse effects.Pt in no apparent distress. Pt POC reviewed. Questions and concerns answered. Safety interventions maintained. Call bell within reach. Care ongoing.

## 2023-03-31 NOTE — HOSPITAL COURSE
Patient admitted to IM for SEBASTIAN. Patient COVID + on admit. Reported a mild cough and had a CXR showing bilateral infiltrates. Had some mild hypoxia that has resolved, did not require oxygen. S/p treatment with remdesivir and dexa. Symptoms improved. He had a kidney bx on 3/27 which showed severe ACR. Plan for thymo x 3.       Interval history: No acute events overnight. Pt received first dose of thymo yesterday 3/31, tolerated well. Cr trending down. UOP 2300 cc/24 hours. Plan for thymo #2 today. Pts K 5.6 this am. EKG NS with no peaked T waves. Treating with lasix, lokelma, and 1L bicarb gtt. Will recheck K this afternoon. VSS. Will continue to monitor.

## 2023-03-31 NOTE — PLAN OF CARE
Problem: Adult Inpatient Plan of Care  Goal: Plan of Care Review  Outcome: Ongoing, Progressing  Goal: Patient-Specific Goal (Individualized)  Outcome: Ongoing, Progressing  Goal: Absence of Hospital-Acquired Illness or Injury  Outcome: Ongoing, Progressing  Goal: Optimal Comfort and Wellbeing  Outcome: Ongoing, Progressing  Goal: Readiness for Transition of Care  Outcome: Ongoing, Progressing     Problem: Communication Impairment  Goal: Effective Communication Skills  Outcome: Ongoing, Progressing     Problem: Fluid and Electrolyte Imbalance (Acute Kidney Injury/Impairment)  Goal: Fluid and Electrolyte Balance  Outcome: Ongoing, Progressing     Problem: Oral Intake Inadequate (Acute Kidney Injury/Impairment)  Goal: Optimal Nutrition Intake  Outcome: Ongoing, Progressing     Problem: Renal Function Impairment (Acute Kidney Injury/Impairment)  Goal: Effective Renal Function  Outcome: Ongoing, Progressing     Problem: Donor Organ Function (Kidney Transplant)  Goal: Effective Renal Function  Outcome: Ongoing, Progressing     Problem: Fluid and Electrolyte Imbalance (Kidney Transplant)  Goal: Fluid and Electrolyte Balance  Outcome: Ongoing, Progressing

## 2023-03-31 NOTE — SUBJECTIVE & OBJECTIVE
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 Repeat Cr 8.47, BUN 47. K 5, bicarb 17. Patient is on prograf, myfortic, and prednisone and has been out of myfortiq and tacrolimus for 2 weeks. He has been adherent with his BP meds and prednsone. He fills all of his medications at [facility] other than tacrolimus and myfortic, which are filled at [facility]. The medications are usually mailed out to him, but when he called the line was busy. So he came to pick them up in person. He was unable to drive here earlier due to work obligations, thus missing doses for about 2 weeks. Reports good urine output, no urinary symptoms. Home blood pressures are 100-130s systolic with occasional values in 150s per his home BP cuff reads.      Case was discussed with Dr. Madden of [facility] and agreed for patient to transfer for concerns for rejection. Vitals stable upon transfer other than hypertension up to 165/94. Admitted due to concern for acute renal transplant rejection and KTM input. Initial labs significant for K 4.9, Cr 6.8, BUN 39, bicarb 13.       Mr. Trevizo is a 38 y.o. year old male who is status post Kidney Transplant - 10/19/2021  (#1).    His maintenance immunosuppression consists of:   Immunosuppressants (From admission, onward)      Start     Stop Route Frequency Ordered    03/31/23 1200  antithymocyte globulin (rabbit) 125 mg, hydrocortisone sodium succinate (SOLU-CORTEF) 20 mg in sodium chloride 0.9% 500 mL (FOR PERIPHERAL LINE ADMINISTRATION ONLY)         -- IV Once 03/31/23 0828    03/25/23 1545  tacrolimus capsule 4 mg         -- Oral 2 times daily 03/25/23 1534            Hospital Course:  Patient admitted to IM for [facility]. Patient COVID + on admit. Reported a mild cough and had a CXR showing bilateral infiltrates. Had some mild  hypoxia that has resolved, did not require oxygen. S/p treatment with remdesivir and dexa. Symptoms improved. He had a kidney bx on 3/27 which showed severe ACR. Plan for thymo x 3.       Interval history: No acute events overnight. Pt received first dose of thymo yesterday 3/31, tolerated well. Cr trending down. UOP 2300 cc/24 hours. Plan for thymo #2 today. Pts K 5.6 this am. EKG NS with no peaked T waves. Treating with lasix, lokelma, and 1L bicarb gtt. Will recheck K this afternoon. VSS. Will continue to monitor.     Past Medical, Surgical, Family, and Social History:   Unchanged from H&P.    Scheduled Meds:   acetaminophen  650 mg Oral Once    albuterol  2 puff Inhalation Q6H    antithymocyte globulin (rabbit), hydrocortisone 20mg, with optional heparin 1000 units in NS 500ml (FOR PERIPHERAL LINE ADMINISTRATION ONLY)  1.5 mg/kg (Adjusted) Intravenous Once    ascorbic acid (vitamin C)  500 mg Oral BID    carvediloL  25 mg Oral BID WM    diphenhydrAMINE  25 mg Oral Once    furosemide (LASIX) injection  80 mg Intravenous Once    heparin (porcine)  5,000 Units Subcutaneous Q8H    methylPREDNISolone sodium succinate injection  200 mg Intravenous Once    minoxidiL  2.5 mg Oral Daily    multivitamin  1 tablet Oral Daily    NIFEdipine  60 mg Oral BID    [START ON 4/3/2023] predniSONE  20 mg Oral Daily    sodium bicarbonate  1,950 mg Oral TID    sulfamethoxazole-trimethoprim 400-80mg  1 tablet Oral Every Mon, Wed, Fri    tacrolimus  4 mg Oral BID    valGANciclovir  450 mg Oral Every Mon, Wed, Fri     Continuous Infusions:   sodium bicarbonate drip       PRN Meds:acetaminophen, acetaminophen, benzonatate, dextrose 10%, dextrose 10%, dextrose, dextrose, diphenhydrAMINE, EPINEPHrine (PF), glucagon (human recombinant), hydrocortisone sodium succinate, melatonin, naloxone, ondansetron, senna-docusate 8.6-50 mg, sodium chloride 0.9%    Intake/Output - Last 3 Shifts         03/29 0700 03/30 0659 03/30 0700 03/31 0659 03/31  "0700  04/01 0659    P.O. 300      Total Intake(mL/kg) 300 (3.8)      Urine (mL/kg/hr) 1200 (0.6) 2300 (1.2) 400 (1.5)    Emesis/NG output 0      Stool 0 0     Total Output 1200 2300 400    Net -900 -2300 -400           Urine Occurrence 0 x 2 x     Stool Occurrence 0 x 1 x     Emesis Occurrence 0 x               Review of Systems   Constitutional:  Negative for chills and fever.   HENT:  Negative for congestion and rhinorrhea.    Eyes:  Negative for photophobia and visual disturbance.   Respiratory:  Positive for cough (improved). Negative for shortness of breath.    Cardiovascular:  Negative for chest pain, palpitations and leg swelling.   Gastrointestinal:  Negative for abdominal distention, abdominal pain, constipation, diarrhea, nausea and vomiting.   Genitourinary:  Negative for dysuria, flank pain, hematuria and urgency.   Musculoskeletal:  Negative for arthralgias, back pain and myalgias.   Skin:  Negative for wound.   Allergic/Immunologic: Positive for immunocompromised state.   Neurological:  Negative for dizziness, weakness and headaches.   Psychiatric/Behavioral:  Negative for behavioral problems and confusion. The patient is not nervous/anxious.     Objective:     Vital Signs (Most Recent):  Temp: 98.2 °F (36.8 °C) (03/31/23 0800)  Pulse: 76 (03/31/23 0751)  Resp: 20 (03/31/23 0751)  BP: 131/80 (03/31/23 0911)  SpO2: 95 % (03/31/23 0751) Vital Signs (24h Range):  Temp:  [97.3 °F (36.3 °C)-98.4 °F (36.9 °C)] 98.2 °F (36.8 °C)  Pulse:  [71-86] 76  Resp:  [18-20] 20  SpO2:  [92 %-98 %] 95 %  BP: (131-148)/(71-87) 131/80     Weight: 79 kg (174 lb 2.6 oz)  Height: 5' 10" (177.8 cm)  Body mass index is 24.99 kg/m².    Physical Exam  Vitals and nursing note reviewed.   Constitutional:       General: He is not in acute distress.     Appearance: Normal appearance. He is not ill-appearing.   HENT:      Head: Normocephalic.   Eyes:      Conjunctiva/sclera: Conjunctivae normal.   Cardiovascular:      Rate and Rhythm: " Normal rate.      Pulses: Normal pulses.   Pulmonary:      Effort: Pulmonary effort is normal.   Abdominal:      General: A surgical scar is present. Bowel sounds are normal. There is no distension.      Palpations: Abdomen is soft.      Tenderness: There is no abdominal tenderness.   Musculoskeletal:         General: Normal range of motion.      Right lower leg: No edema.      Left lower leg: No edema.   Skin:     General: Skin is warm and dry.   Neurological:      Mental Status: He is alert and oriented to person, place, and time.      Motor: No weakness.   Psychiatric:         Behavior: Behavior normal.         Thought Content: Thought content normal.       Laboratory:  CBC:   Recent Labs   Lab 03/29/23 0440 03/30/23 0417 03/31/23  0525   WBC 9.85 11.06 16.17*   RBC 3.74* 3.62* 3.75*   HGB 8.9* 8.6* 9.0*   HCT 29.7* 28.9* 29.5*    356 339   MCV 79* 80* 79*   MCH 23.8* 23.8* 24.0*   MCHC 30.0* 29.8* 30.5*     BMP:   Recent Labs   Lab 03/29/23 0440 03/30/23 0417 03/31/23  0525    99 124*    138 137   K 5.1 5.2* 5.6*   * 110 110   CO2 19* 19* 17*   BUN 59* 59* 60*   CREATININE 5.0* 4.6* 4.2*   CALCIUM 9.0 9.1 9.6     Labs within the past 24 hours have been reviewed.    Diagnostic Results:  US - Kidney: Results for orders placed during the hospital encounter of 03/24/23    US Transplant Kidney With Doppler    Narrative  EXAMINATION:  US TRANSPLANT KIDNEY WITH DOPPLER    CLINICAL HISTORY:  concern for acute rejection. Cr 8-9;    TECHNIQUE:  Real time gray scale and doppler ultrasound was performed over the patient's renal allograft.    COMPARISON:  Transplant kidney 08/01/2022.    FINDINGS:  Renal allograft in the right lower quadrant.  The allograft measures 12.1 cm. Normal perfusion. No hydronephrosis.    No fluid collections.    Vasculature:    Resistive indices ranged from 0.75 to 0.79 (previously 0.75-0.79).    Main renal artery peak systolic velocity: 199 (previously 187)cm/sec with  normal waveform.    Renal artery/iliac ratio: 1.8 (previously 1.3).    The main renal vein is patent.    Impression  Unchanged borderline elevated intrarenal resistive indices with normal waveforms.  Findings remain nonspecific but can be seen in the setting of drug toxicity, rejection, or ATN.    Electronically signed by resident: Roel Castellanos  Date:    03/25/2023  Time:    12:05    Electronically signed by: Bertin Stubbs Jr  Date:    03/25/2023  Time:    12:52

## 2023-03-31 NOTE — HPI
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 Repeat Cr 8.47, BUN 47. K 5, bicarb 17. Patient is on prograf, myfortic, and prednisone and has been out of myfortiq and tacrolimus for 2 weeks. He has been adherent with his BP meds and prednsone. He fills all of his medications at [facility] other than tacrolimus and myfortic, which are filled at [facility]. The medications are usually mailed out to him, but when he called the line was busy. So he came to pick them up in person. He was unable to drive here earlier due to work obligations, thus missing doses for about 2 weeks. Reports good urine output, no urinary symptoms. Home blood pressures are 100-130s systolic with occasional values in 150s per his home BP cuff reads.      Case was discussed with Dr. Madden of [facility] and agreed for patient to transfer for concerns for rejection. Vitals stable upon transfer other than hypertension up to 165/94. Admitted due to concern for acute renal transplant rejection and KTM input. Initial labs significant for K 4.9, Cr 6.8, BUN 39, bicarb 13.

## 2023-03-31 NOTE — PROGRESS NOTES
"Transplant Social Work Admit / Tentative Weekend Discharge Note     Spoke with  Paulette (patient's niece)  to assess patients needs due to per EMR patient has a hearing impairment and is on isolation.  Patient is a 38 y.o. single male, admitted :    Kidney failure [N19]      Patient admitted from outside hospital on 3/24/2023 .   was able to speak with patient over the phone. Patient had difficulty understanding this  over the phone, patient gave permission for this  to speak with his niece Paulette. At this time, patient presents as alert and oriented x 4, pleasant, recall good, concentration/judgement good, average intelligence, calm, communicative, cooperative, and asking and answering questions appropriately.  At this time, patients caregiver presents as alert and oriented x 4, pleasant, recall good, concentration/judgement good, average intelligence, calm, communicative, cooperative, and asking and answering questions appropriately.      Household/Family Systems (as reported by patients caregiver)     Patient resides with patient's sister and niece , at [address] [place] [address] [place] [id number].  Support system includes his brothers, his sister Saira, and his niece Paulette.  Patient does not have dependents that are need of being cared for.     Patients primary caregiver is self, patients phone number is [phone]  .    During admission, patient's caregiver plans to stay at home.  Confirmed patient and patients caregivers do have access to reliable transportation.    Cognitive Status/Learning     Patients caregiver reports patients reading ability as 12th grade and states patient does have difficulty with hearing. Per emr patient is deaf, past social work admit note states that "patient can hear if you speak loudly and patient can read lips"  Patients caregiver reports patient learns best by a combination of written, verbal, and hands on.   Needed: " No.   Highest education level: High School (9-12) or GED    Vocation/Disability (as reported by patients caregiver)    Working for Income: No  If no, reason not working: Unknown  Patient's niece reports patient does not work.     Adherence     Patients caregiver reports patient has a high level of adherence to patients health care regimen.  Adherence counseling and education provided.  Patient's caregiver verbalizes understanding.    Substance Use    Patients caregiver reports patients substance usage as the following:    Tobacco: none, patient denies any use.  Alcohol: none, patient denies any use.  Illicit Drugs/Non-prescribed Medications: none, patient denies any use.  Patients caregiver states clear understanding of the potential impact of substance use.  Substance abstinence/cessation counseling, education and resources provided and reviewed.     Services Utilizing/ADLS (as reported by patients caregiver)    Infusion Service: Prior to admission, patient utilizing? no  Home Health: Prior to admission, patient utilizing? yes , patient has used [facility] in the past.   DME: Prior to admission, yes [facility]  Pulmonary/Cardiac Rehab: Prior to admission, no  Dialysis:  Prior to admission, no  Transplant Specialty Pharmacy:  Prior to admission, yes; [facility].    Prior to admission, patients caregiver reports patient was independent with ADLS and was driving.  Patients caregiver reports patient is able to care for self at this time..  Patients caregiver reports patient indicates a willingness to care for self once medically cleared to do so.    Insurance/Medications    Insured by   Payer/Plan Subscr  Sex Relation Sub. Ins. ID Effective Group Num   1. MEDICARE - ME* JOSÉ DOMINGUEZ 1985 Male Self [id number] 7/1/10                                    [address]      Primary Insurance (for UNOS reporting): Public Insurance - Medicare FFS (Fee For Service)  Secondary Insurance (for UNOS reporting):  None    Patients caregiver reports patient is able to obtain and afford medications at this time and at time of discharge. Patient's niece reports she and her mother live with the patient and assist him financially.     Living Will/Healthcare Power of     Patients caregiver reports patient does not have a LW and/or HCPA.   provided education regarding LW and HCPA and the completion of forms.    Coping/Mental Health (as reported by patients caregiver)    Patient is coping adequately with the aid of  family members. When this  spoke with patient over the phone, patient denied mental health concerns.     Discharge Planning (as reported by patients caregiver)    At time of discharge, patient plans to return to patient's home under the care of self and family.  Patients  niece  will transport patient.  Per rounds today, expected discharge date has not been medically determined at this time. Patients caretaker verbalizes understanding and is involved in treatment planning and discharge process.    Additional Concerns    Patient's caretaker denies additional needs and/or concerns at this time.  providing ongoing psychosocial support, education, resources and d/c planning as needed.  SW remains available.  remains available. Patient's caregiver verbalizes understanding and agreement with information reviewed,  availability and how to access available resources as needed. Patient denies additional needs and/or concerns at this time. Patient verbalizes understanding and agreement with information reviewed, social work availability, and how to access available resources as needed.

## 2023-03-31 NOTE — PROGRESS NOTES
[facility] - Telemetry Stepdown  Kidney Transplant  Progress Note      Reason for Follow-up: Reassessment of Kidney Transplant - 10/19/2021  (#1) recipient and management of immunosuppression.    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 Repeat Cr 8.47, BUN 47. K 5, bicarb 17. Patient is on prograf, myfortic, and prednisone and has been out of myfortiq and tacrolimus for 2 weeks. He has been adherent with his BP meds and prednsone. He fills all of his medications at [facility] other than tacrolimus and myfortic, which are filled at [facility]. The medications are usually mailed out to him, but when he called the line was busy. So he came to pick them up in person. He was unable to drive here earlier due to work obligations, thus missing doses for about 2 weeks. Reports good urine output, no urinary symptoms. Home blood pressures are 100-130s systolic with occasional values in 150s per his home BP cuff reads.      Case was discussed with Dr. Madden of [facility] and agreed for patient to transfer for concerns for rejection. Vitals stable upon transfer other than hypertension up to 165/94. Admitted due to concern for acute renal transplant rejection and KTM input. Initial labs significant for K 4.9, Cr 6.8, BUN 39, bicarb 13.       Mr. Trevizo is a 38 y.o. year old male who is status post Kidney Transplant - 10/19/2021  (#1).    His maintenance immunosuppression consists of:   Immunosuppressants (From admission, onward)      Start     Stop Route Frequency Ordered    03/31/23 1200  antithymocyte globulin (rabbit) 125 mg, hydrocortisone sodium succinate (SOLU-CORTEF) 20 mg in sodium chloride 0.9% 500 mL (FOR PERIPHERAL LINE ADMINISTRATION ONLY)         -- IV Once 03/31/23  0828    03/25/23 1545  tacrolimus capsule 4 mg         -- Oral 2 times daily 03/25/23 1534            Hospital Course:  Patient admitted to IM for [facility]. Patient COVID + on admit. Reported a mild cough and had a CXR showing bilateral infiltrates. Had some mild hypoxia that has resolved, did not require oxygen. S/p treatment with remdesivir and dexa. Symptoms improved. He had a kidney bx on 3/27 which showed severe ACR. Plan for thymo x 3.       Interval history: No acute events overnight. Pt received first dose of thymo yesterday 3/31, tolerated well. Cr trending down. UOP 2300 cc/24 hours. Plan for thymo #2 today. Pts K 5.6 this am. EKG NS with no peaked T waves. Treating with lasix, lokelma, and 1L bicarb gtt. Will recheck K this afternoon. VSS. Will continue to monitor.     Past Medical, Surgical, Family, and Social History:   Unchanged from H&P.    Scheduled Meds:   acetaminophen  650 mg Oral Once    albuterol  2 puff Inhalation Q6H    antithymocyte globulin (rabbit), hydrocortisone 20mg, with optional heparin 1000 units in NS 500ml (FOR PERIPHERAL LINE ADMINISTRATION ONLY)  1.5 mg/kg (Adjusted) Intravenous Once    ascorbic acid (vitamin C)  500 mg Oral BID    carvediloL  25 mg Oral BID WM    diphenhydrAMINE  25 mg Oral Once    furosemide (LASIX) injection  80 mg Intravenous Once    heparin (porcine)  5,000 Units Subcutaneous Q8H    methylPREDNISolone sodium succinate injection  200 mg Intravenous Once    minoxidiL  2.5 mg Oral Daily    multivitamin  1 tablet Oral Daily    NIFEdipine  60 mg Oral BID    [START ON 4/3/2023] predniSONE  20 mg Oral Daily    sodium bicarbonate  1,950 mg Oral TID    sulfamethoxazole-trimethoprim 400-80mg  1 tablet Oral Every Mon, Wed, Fri    tacrolimus  4 mg Oral BID    valGANciclovir  450 mg Oral Every Mon, Wed, Fri     Continuous Infusions:   sodium bicarbonate drip       PRN Meds:acetaminophen, acetaminophen, benzonatate, dextrose 10%, dextrose 10%, dextrose,  "dextrose, diphenhydrAMINE, EPINEPHrine (PF), glucagon (human recombinant), hydrocortisone sodium succinate, melatonin, naloxone, ondansetron, senna-docusate 8.6-50 mg, sodium chloride 0.9%    Intake/Output - Last 3 Shifts         03/29 0700 03/30 0659 03/30 0700 03/31 0659 03/31 0700  04/01 0659    P.O. 300      Total Intake(mL/kg) 300 (3.8)      Urine (mL/kg/hr) 1200 (0.6) 2300 (1.2) 400 (1.5)    Emesis/NG output 0      Stool 0 0     Total Output 1200 2300 400    Net -900 -2300 -400           Urine Occurrence 0 x 2 x     Stool Occurrence 0 x 1 x     Emesis Occurrence 0 x               Review of Systems   Constitutional:  Negative for chills and fever.   HENT:  Negative for congestion and rhinorrhea.    Eyes:  Negative for photophobia and visual disturbance.   Respiratory:  Positive for cough (improved). Negative for shortness of breath.    Cardiovascular:  Negative for chest pain, palpitations and leg swelling.   Gastrointestinal:  Negative for abdominal distention, abdominal pain, constipation, diarrhea, nausea and vomiting.   Genitourinary:  Negative for dysuria, flank pain, hematuria and urgency.   Musculoskeletal:  Negative for arthralgias, back pain and myalgias.   Skin:  Negative for wound.   Allergic/Immunologic: Positive for immunocompromised state.   Neurological:  Negative for dizziness, weakness and headaches.   Psychiatric/Behavioral:  Negative for behavioral problems and confusion. The patient is not nervous/anxious.     Objective:     Vital Signs (Most Recent):  Temp: 98.2 °F (36.8 °C) (03/31/23 0800)  Pulse: 76 (03/31/23 0751)  Resp: 20 (03/31/23 0751)  BP: 131/80 (03/31/23 0911)  SpO2: 95 % (03/31/23 0751) Vital Signs (24h Range):  Temp:  [97.3 °F (36.3 °C)-98.4 °F (36.9 °C)] 98.2 °F (36.8 °C)  Pulse:  [71-86] 76  Resp:  [18-20] 20  SpO2:  [92 %-98 %] 95 %  BP: (131-148)/(71-87) 131/80     Weight: 79 kg (174 lb 2.6 oz)  Height: 5' 10" (177.8 cm)  Body mass index is 24.99 kg/m².    Physical " Exam  Vitals and nursing note reviewed.   Constitutional:       General: He is not in acute distress.     Appearance: Normal appearance. He is not ill-appearing.   HENT:      Head: Normocephalic.   Eyes:      Conjunctiva/sclera: Conjunctivae normal.   Cardiovascular:      Rate and Rhythm: Normal rate.      Pulses: Normal pulses.   Pulmonary:      Effort: Pulmonary effort is normal.   Abdominal:      General: A surgical scar is present. Bowel sounds are normal. There is no distension.      Palpations: Abdomen is soft.      Tenderness: There is no abdominal tenderness.   Musculoskeletal:         General: Normal range of motion.      Right lower leg: No edema.      Left lower leg: No edema.   Skin:     General: Skin is warm and dry.   Neurological:      Mental Status: He is alert and oriented to person, place, and time.      Motor: No weakness.   Psychiatric:         Behavior: Behavior normal.         Thought Content: Thought content normal.       Laboratory:  CBC:   Recent Labs   Lab 03/29/23 0440 03/30/23 0417 03/31/23  0525   WBC 9.85 11.06 16.17*   RBC 3.74* 3.62* 3.75*   HGB 8.9* 8.6* 9.0*   HCT 29.7* 28.9* 29.5*    356 339   MCV 79* 80* 79*   MCH 23.8* 23.8* 24.0*   MCHC 30.0* 29.8* 30.5*     BMP:   Recent Labs   Lab 03/29/23 0440 03/30/23  0417 03/31/23  0525    99 124*    138 137   K 5.1 5.2* 5.6*   * 110 110   CO2 19* 19* 17*   BUN 59* 59* 60*   CREATININE 5.0* 4.6* 4.2*   CALCIUM 9.0 9.1 9.6     Labs within the past 24 hours have been reviewed.    Diagnostic Results:  US - Kidney: Results for orders placed during the hospital encounter of 03/24/23    US Transplant Kidney With Doppler    Narrative  EXAMINATION:  US TRANSPLANT KIDNEY WITH DOPPLER    CLINICAL HISTORY:  concern for acute rejection. Cr 8-9;    TECHNIQUE:  Real time gray scale and doppler ultrasound was performed over the patient's renal allograft.    COMPARISON:  Transplant kidney 08/01/2022.    FINDINGS:  Renal  "allograft in the right lower quadrant.  The allograft measures 12.1 cm. Normal perfusion. No hydronephrosis.    No fluid collections.    Vasculature:    Resistive indices ranged from 0.75 to 0.79 (previously 0.75-0.79).    Main renal artery peak systolic velocity: 199 (previously 187)cm/sec with normal waveform.    Renal artery/iliac ratio: 1.8 (previously 1.3).    The main renal vein is patent.    Impression  Unchanged borderline elevated intrarenal resistive indices with normal waveforms.  Findings remain nonspecific but can be seen in the setting of drug toxicity, rejection, or ATN.    Electronically signed by resident: Roel Castellanos  Date:    03/25/2023  Time:    12:05    Electronically signed by: Bertin Stubbs Jr  Date:    03/25/2023  Time:    12:52    Assessment/Plan:     * Acute rejection of kidney transplant  - Kidney bx 3/27 showing severe ACR  - Plan for thymo x 3  - #1 3/30 and plan for #2 today 3/31  - Cr improving and continues to have adequate UOP                Hypomagnesemia  - Replace with PO mag as needed  - Monitor with daily labs       Metabolic acidosis  - 1L bicarb gtt   - Cont PO bicarb  - Monitor with daily labs       Acute kidney injury superimposed on chronic kidney disease  - See "acute rejection of kidney transplant"      COVID  - S/p remdesivir and dexa  - Symptoms improving  - On RA       Hyperkalemia  - K 5.6 on am labs  - EKG NS, no peaked t waves  - Treating with lasix, lokelma, and bicarb gtt  - Repeat K @ 1400      Anemia of chronic disease  - H/H stable. Will continue to monitor with daily cbc.       At risk for opportunistic infections  - Cont appropriate OI prophylaxis per protocol.       Prophylactic immunotherapy  - see long term use of immunosuppression       Long-term use of immunosuppressant medication  - Continue prograf, cellcept, steroids.  - Continue to monitor prograf levels daily, monitor for toxic side effects, and adjust for therapeutic dose.       S/P kidney " "transplant 10/19/21  - S/p DBDKT 10/19/2021, Thymo induction (CMV D-,R+), 0% PRA. -ve DSA at transplant  - See "acute rejection"       Renovascular hypertension  - Cont coreg and nifedipine with hold parameters       Focal segmental glomerulosclerosis  See s/p kidney transplant      Discharge Planning: Not a candidate for d/c at this time.     Li Cordova PA-C  Kidney Transplant  [facility] - Telemetry Stepdown  "

## 2023-03-31 NOTE — ASSESSMENT & PLAN NOTE
- Kidney bx 3/27 showing severe ACR  - Plan for thymo x 3  - #1 3/30 and plan for #2 today 3/31  - Cr improving and continues to have adequate UOP

## 2023-04-01 LAB
ALBUMIN SERPL BCP-MCNC: 3.2 G/DL (ref 3.5–5.2)
ALP SERPL-CCNC: 70 U/L (ref 55–135)
ALT SERPL W/O P-5'-P-CCNC: 67 U/L (ref 10–44)
ANION GAP SERPL CALC-SCNC: 10 MMOL/L (ref 8–16)
AST SERPL-CCNC: 30 U/L (ref 10–40)
BASOPHILS # BLD AUTO: 0.04 K/UL (ref 0–0.2)
BASOPHILS NFR BLD: 0.2 % (ref 0–1.9)
BILIRUB SERPL-MCNC: 0.2 MG/DL (ref 0.1–1)
BUN SERPL-MCNC: 68 MG/DL (ref 6–20)
CALCIUM SERPL-MCNC: 9.2 MG/DL (ref 8.7–10.5)
CHLORIDE SERPL-SCNC: 106 MMOL/L (ref 95–110)
CO2 SERPL-SCNC: 22 MMOL/L (ref 23–29)
CREAT SERPL-MCNC: 4.2 MG/DL (ref 0.5–1.4)
DIFFERENTIAL METHOD: ABNORMAL
EOSINOPHIL # BLD AUTO: 0 K/UL (ref 0–0.5)
EOSINOPHIL NFR BLD: 0 % (ref 0–8)
ERYTHROCYTE [DISTWIDTH] IN BLOOD BY AUTOMATED COUNT: 13.4 % (ref 11.5–14.5)
EST. GFR  (NO RACE VARIABLE): 17.4 ML/MIN/1.73 M^2
GLUCOSE SERPL-MCNC: 126 MG/DL (ref 70–110)
HCT VFR BLD AUTO: 30.1 % (ref 40–54)
HGB BLD-MCNC: 9.3 G/DL (ref 14–18)
IMM GRANULOCYTES # BLD AUTO: 0.55 K/UL (ref 0–0.04)
IMM GRANULOCYTES NFR BLD AUTO: 3.1 % (ref 0–0.5)
LYMPHOCYTES # BLD AUTO: 0.2 K/UL (ref 1–4.8)
LYMPHOCYTES NFR BLD: 1.4 % (ref 18–48)
MAGNESIUM SERPL-MCNC: 1.6 MG/DL (ref 1.6–2.6)
MCH RBC QN AUTO: 24.1 PG (ref 27–31)
MCHC RBC AUTO-ENTMCNC: 30.9 G/DL (ref 32–36)
MCV RBC AUTO: 78 FL (ref 82–98)
MONOCYTES # BLD AUTO: 1.2 K/UL (ref 0.3–1)
MONOCYTES NFR BLD: 7.1 % (ref 4–15)
NEUTROPHILS # BLD AUTO: 15.5 K/UL (ref 1.8–7.7)
NEUTROPHILS NFR BLD: 88.2 % (ref 38–73)
NRBC BLD-RTO: 0 /100 WBC
PHOSPHATE SERPL-MCNC: 4 MG/DL (ref 2.7–4.5)
PLATELET # BLD AUTO: 334 K/UL (ref 150–450)
PMV BLD AUTO: 11 FL (ref 9.2–12.9)
POTASSIUM SERPL-SCNC: 4.9 MMOL/L (ref 3.5–5.1)
PROT SERPL-MCNC: 6.6 G/DL (ref 6–8.4)
RBC # BLD AUTO: 3.86 M/UL (ref 4.6–6.2)
SODIUM SERPL-SCNC: 138 MMOL/L (ref 136–145)
TACROLIMUS BLD-MCNC: 6.5 NG/ML (ref 5–15)
WBC # BLD AUTO: 17.58 K/UL (ref 3.9–12.7)

## 2023-04-01 PROCEDURE — 25000003 PHARM REV CODE 250

## 2023-04-01 PROCEDURE — 99900035 HC TECH TIME PER 15 MIN (STAT)

## 2023-04-01 PROCEDURE — 83735 ASSAY OF MAGNESIUM: CPT

## 2023-04-01 PROCEDURE — 84100 ASSAY OF PHOSPHORUS: CPT

## 2023-04-01 PROCEDURE — 94761 N-INVAS EAR/PLS OXIMETRY MLT: CPT

## 2023-04-01 PROCEDURE — 94799 UNLISTED PULMONARY SVC/PX: CPT

## 2023-04-01 PROCEDURE — 25000003 PHARM REV CODE 250: Performed by: STUDENT IN AN ORGANIZED HEALTH CARE EDUCATION/TRAINING PROGRAM

## 2023-04-01 PROCEDURE — 94640 AIRWAY INHALATION TREATMENT: CPT

## 2023-04-01 PROCEDURE — 27000207 HC ISOLATION

## 2023-04-01 PROCEDURE — 36415 COLL VENOUS BLD VENIPUNCTURE: CPT | Performed by: STUDENT IN AN ORGANIZED HEALTH CARE EDUCATION/TRAINING PROGRAM

## 2023-04-01 PROCEDURE — 85025 COMPLETE CBC W/AUTO DIFF WBC: CPT

## 2023-04-01 PROCEDURE — 25000003 PHARM REV CODE 250: Performed by: PHYSICIAN ASSISTANT

## 2023-04-01 PROCEDURE — 80053 COMPREHEN METABOLIC PANEL: CPT

## 2023-04-01 PROCEDURE — 99233 SBSQ HOSP IP/OBS HIGH 50: CPT | Mod: GC,,, | Performed by: INTERNAL MEDICINE

## 2023-04-01 PROCEDURE — 99233 PR SUBSEQUENT HOSPITAL CARE,LEVL III: ICD-10-PCS | Mod: GC,,, | Performed by: INTERNAL MEDICINE

## 2023-04-01 PROCEDURE — 27000646 HC AEROBIKA DEVICE

## 2023-04-01 PROCEDURE — 80197 ASSAY OF TACROLIMUS: CPT | Performed by: STUDENT IN AN ORGANIZED HEALTH CARE EDUCATION/TRAINING PROGRAM

## 2023-04-01 PROCEDURE — 20600001 HC STEP DOWN PRIVATE ROOM

## 2023-04-01 PROCEDURE — 94664 DEMO&/EVAL PT USE INHALER: CPT

## 2023-04-01 PROCEDURE — 63600175 PHARM REV CODE 636 W HCPCS: Performed by: INTERNAL MEDICINE

## 2023-04-01 PROCEDURE — 25000003 PHARM REV CODE 250: Performed by: INTERNAL MEDICINE

## 2023-04-01 PROCEDURE — 63600175 PHARM REV CODE 636 W HCPCS

## 2023-04-01 RX ORDER — TACROLIMUS 1 MG/1
1 CAPSULE ORAL ONCE
Status: COMPLETED | OUTPATIENT
Start: 2023-04-01 | End: 2023-04-01

## 2023-04-01 RX ORDER — ACETAMINOPHEN 325 MG/1
650 TABLET ORAL ONCE AS NEEDED
Status: DISCONTINUED | OUTPATIENT
Start: 2023-04-01 | End: 2023-04-03 | Stop reason: HOSPADM

## 2023-04-01 RX ORDER — MYCOPHENOLIC ACID 180 MG/1
360 TABLET, DELAYED RELEASE ORAL 2 TIMES DAILY
Status: DISCONTINUED | OUTPATIENT
Start: 2023-04-03 | End: 2023-04-03 | Stop reason: HOSPADM

## 2023-04-01 RX ORDER — DIPHENHYDRAMINE HCL 50 MG
50 CAPSULE ORAL ONCE AS NEEDED
Status: DISCONTINUED | OUTPATIENT
Start: 2023-04-01 | End: 2023-04-03 | Stop reason: HOSPADM

## 2023-04-01 RX ORDER — ACETAMINOPHEN 325 MG/1
650 TABLET ORAL ONCE
Status: COMPLETED | OUTPATIENT
Start: 2023-04-01 | End: 2023-04-01

## 2023-04-01 RX ORDER — METHYLPREDNISOLONE SOD SUCC 125 MG
1.25 VIAL (EA) INJECTION ONCE
Status: COMPLETED | OUTPATIENT
Start: 2023-04-01 | End: 2023-04-01

## 2023-04-01 RX ORDER — PREDNISONE 20 MG/1
20 TABLET ORAL DAILY
Status: DISCONTINUED | OUTPATIENT
Start: 2023-04-02 | End: 2023-04-03 | Stop reason: HOSPADM

## 2023-04-01 RX ORDER — EPINEPHRINE 1 MG/ML
1 INJECTION, SOLUTION, CONCENTRATE INTRAVENOUS ONCE AS NEEDED
Status: DISCONTINUED | OUTPATIENT
Start: 2023-04-01 | End: 2023-04-03 | Stop reason: HOSPADM

## 2023-04-01 RX ORDER — SODIUM CHLORIDE 9 MG/ML
INJECTION, SOLUTION INTRAVENOUS CONTINUOUS
Status: ACTIVE | OUTPATIENT
Start: 2023-04-01 | End: 2023-04-02

## 2023-04-01 RX ORDER — TACROLIMUS 5 MG/1
5 CAPSULE ORAL 2 TIMES DAILY
Status: DISCONTINUED | OUTPATIENT
Start: 2023-04-01 | End: 2023-04-02

## 2023-04-01 RX ORDER — DIPHENHYDRAMINE HCL 25 MG
25 CAPSULE ORAL ONCE
Status: COMPLETED | OUTPATIENT
Start: 2023-04-01 | End: 2023-04-01

## 2023-04-01 RX ADMIN — NIFEDIPINE 60 MG: 30 TABLET, FILM COATED, EXTENDED RELEASE ORAL at 10:04

## 2023-04-01 RX ADMIN — SODIUM ZIRCONIUM CYCLOSILICATE 10 G: 5 POWDER, FOR SUSPENSION ORAL at 08:04

## 2023-04-01 RX ADMIN — SODIUM BICARBONATE 1950 MG: 650 TABLET ORAL at 08:04

## 2023-04-01 RX ADMIN — METHYLPREDNISOLONE SODIUM SUCCINATE 98.8 MG: 125 INJECTION, POWDER, FOR SOLUTION INTRAMUSCULAR; INTRAVENOUS at 12:04

## 2023-04-01 RX ADMIN — SODIUM BICARBONATE 1950 MG: 650 TABLET ORAL at 10:04

## 2023-04-01 RX ADMIN — NIFEDIPINE 60 MG: 30 TABLET, FILM COATED, EXTENDED RELEASE ORAL at 08:04

## 2023-04-01 RX ADMIN — MUPIROCIN: 20 OINTMENT TOPICAL at 10:04

## 2023-04-01 RX ADMIN — Medication 500 MG: at 10:04

## 2023-04-01 RX ADMIN — ALBUTEROL SULFATE 2 PUFF: 90 AEROSOL, METERED RESPIRATORY (INHALATION) at 08:04

## 2023-04-01 RX ADMIN — CARVEDILOL 25 MG: 25 TABLET, FILM COATED ORAL at 04:04

## 2023-04-01 RX ADMIN — SODIUM CHLORIDE: 9 INJECTION, SOLUTION INTRAVENOUS at 09:04

## 2023-04-01 RX ADMIN — DIPHENHYDRAMINE HYDROCHLORIDE 25 MG: 25 CAPSULE ORAL at 04:04

## 2023-04-01 RX ADMIN — ACETAMINOPHEN 650 MG: 325 TABLET ORAL at 04:04

## 2023-04-01 RX ADMIN — MUPIROCIN: 20 OINTMENT TOPICAL at 12:04

## 2023-04-01 RX ADMIN — SODIUM BICARBONATE 1950 MG: 650 TABLET ORAL at 03:04

## 2023-04-01 RX ADMIN — ALBUTEROL SULFATE 2 PUFF: 90 AEROSOL, METERED RESPIRATORY (INHALATION) at 02:04

## 2023-04-01 RX ADMIN — HEPARIN SODIUM 5000 UNITS: 5000 INJECTION INTRAVENOUS; SUBCUTANEOUS at 04:04

## 2023-04-01 RX ADMIN — HEPARIN SODIUM 5000 UNITS: 5000 INJECTION INTRAVENOUS; SUBCUTANEOUS at 05:04

## 2023-04-01 RX ADMIN — Medication 6 MG: at 10:04

## 2023-04-01 RX ADMIN — ANTI-THYMOCYTE GLOBULIN (RABBIT) 125 MG: 5 INJECTION, POWDER, LYOPHILIZED, FOR SOLUTION INTRAVENOUS at 06:04

## 2023-04-01 RX ADMIN — CARVEDILOL 25 MG: 25 TABLET, FILM COATED ORAL at 08:04

## 2023-04-01 RX ADMIN — HEPARIN SODIUM 5000 UNITS: 5000 INJECTION INTRAVENOUS; SUBCUTANEOUS at 10:04

## 2023-04-01 RX ADMIN — Medication 500 MG: at 08:04

## 2023-04-01 RX ADMIN — MINOXIDIL 2.5 MG: 2.5 TABLET ORAL at 09:04

## 2023-04-01 RX ADMIN — TACROLIMUS 1 MG: 1 CAPSULE ORAL at 04:04

## 2023-04-01 RX ADMIN — TACROLIMUS 5 MG: 5 CAPSULE ORAL at 06:04

## 2023-04-01 RX ADMIN — THERA TABS 1 TABLET: TAB at 08:04

## 2023-04-01 RX ADMIN — MUPIROCIN: 20 OINTMENT TOPICAL at 08:04

## 2023-04-01 RX ADMIN — TACROLIMUS 4 MG: 1 CAPSULE ORAL at 09:04

## 2023-04-01 NOTE — PROGRESS NOTES
[facility] - Telemetry Stepdown  Kidney Transplant  Progress Note      Reason for Follow-up: Reassessment of Kidney Transplant - 10/19/2021  (#1) recipient and management of immunosuppression.    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 Repeat Cr 8.47, BUN 47. K 5, bicarb 17. Patient is on prograf, myfortic, and prednisone and has been out of myfortiq and tacrolimus for 2 weeks. He has been adherent with his BP meds and prednsone. He fills all of his medications at [facility] other than tacrolimus and myfortic, which are filled at [facility]. The medications are usually mailed out to him, but when he called the line was busy. So he came to pick them up in person. He was unable to drive here earlier due to work obligations, thus missing doses for about 2 weeks. Reports good urine output, no urinary symptoms. Home blood pressures are 100-130s systolic with occasional values in 150s per his home BP cuff reads.      Case was discussed with Dr. Madden of [facility] and agreed for patient to transfer for concerns for rejection. Vitals stable upon transfer other than hypertension up to 165/94. Admitted due to concern for acute renal transplant rejection and KTM input. Initial labs significant for K 4.9, Cr 6.8, BUN 39, bicarb 13.       Mr. Trevizo is a 38 y.o. year old male who is status post Kidney Transplant - 10/19/2021  (#1).    His maintenance immunosuppression consists of:   Immunosuppressants (From admission, onward)      Start     Stop Route Frequency Ordered    04/03/23 0900  mycophenolate EC tablet 360 mg         -- Oral 2 times daily 04/01/23 1211    04/01/23 1800  tacrolimus capsule 5 mg         -- Oral 2 times daily 04/01/23 1211    04/01/23 1300  antithymocyte  globulin (rabbit) 125 mg, hydrocortisone sodium succinate (SOLU-CORTEF) 20 mg in sodium chloride 0.9% 500 mL (FOR PERIPHERAL LINE ADMINISTRATION ONLY)         -- IV Once 04/01/23 1211    04/01/23 1215  tacrolimus capsule 1 mg         -- Oral Once 04/01/23 1211            Hospital Course:  Patient admitted to IM for [facility]BASTIAN. Patient COVID + on admit. Reported a mild cough and had a CXR showing bilateral infiltrates. Had some mild hypoxia that has resolved, did not require oxygen. S/p treatment with remdesivir and dexa. Symptoms improved. He had a kidney bx on 3/27 which showed severe ACR. Plan for thymo x 3.       Interval history: No acute events overnight. Pt received first dose of thymo yesterday 3/31, tolerated well. Cr trending down. UOP 2300 cc/24 hours. Plan for thymo #2 today. Pts K 5.6 this am. EKG NS with no peaked T waves. Treating with lasix, lokelma, and 1L bicarb gtt. Will recheck K this afternoon. VSS. Will continue to monitor.     Interval History:  No issues overnight. Will receive 3rd dose of thymo today. No improvement in serum creat.    Past Medical, Surgical, Family, and Social History:   Unchanged from H&P.    Scheduled Meds:   acetaminophen  650 mg Oral Once    albuterol  2 puff Inhalation Q6H WAKE    antithymocyte globulin (rabbit), hydrocortisone 20mg, with optional heparin 1000 units in NS 500ml (FOR PERIPHERAL LINE ADMINISTRATION ONLY)  1.5 mg/kg (Adjusted) Intravenous Once    ascorbic acid (vitamin C)  500 mg Oral BID    carvediloL  25 mg Oral BID WM    diphenhydrAMINE  25 mg Oral Once    heparin (porcine)  5,000 Units Subcutaneous Q8H    methylPREDNISolone sodium succinate injection  1.25 mg/kg Intravenous Once    minoxidiL  2.5 mg Oral Daily    multivitamin  1 tablet Oral Daily    mupirocin   Nasal BID    [START ON 4/3/2023] mycophenolate  360 mg Oral BID    NIFEdipine  60 mg Oral BID    [START ON 4/2/2023] predniSONE  20 mg Oral Daily    sodium bicarbonate  1,950 mg Oral TID  "   sodium zirconium cyclosilicate  10 g Oral Daily    sulfamethoxazole-trimethoprim 400-80mg  1 tablet Oral Every Mon, Wed, Fri    tacrolimus  1 mg Oral Once    tacrolimus  5 mg Oral BID    valGANciclovir  450 mg Oral Every Mon, Wed, Fri     Continuous Infusions:   sodium chloride 0.9% 50 mL/hr at 04/01/23 0912     PRN Meds:acetaminophen, acetaminophen, acetaminophen, benzonatate, dextrose 10%, dextrose 10%, dextrose, dextrose, diphenhydrAMINE, diphenhydrAMINE, EPINEPHrine (PF), EPINEPHrine (PF), glucagon (human recombinant), hydrocortisone sodium succinate, hydrocortisone sodium succinate, melatonin, naloxone, ondansetron, senna-docusate 8.6-50 mg, sodium chloride 0.9%    Intake/Output - Last 3 Shifts         03/30 0700 03/31 0659 03/31 0700 04/01 0659 04/01 0700 04/02 0659    P.O.       Total Intake(mL/kg)       Urine (mL/kg/hr) 2300 (1.2) 1300 (0.7) 900 (1.6)    Emesis/NG output       Stool 0 0     Total Output 2300 1300 900    Net -2300 -1300 -900           Urine Occurrence 2 x      Stool Occurrence 1 x 1 x              Review of Systems   Constitutional: Negative.    HENT: Negative.     Eyes: Negative.    Respiratory: Negative.     Cardiovascular: Negative.    Gastrointestinal: Negative.    Endocrine: Negative.    Genitourinary: Negative.    Musculoskeletal: Negative.    Skin: Negative.    Neurological: Negative.    Psychiatric/Behavioral: Negative.      Objective:     Vital Signs (Most Recent):  Temp: 98.1 °F (36.7 °C) (04/01/23 1147)  Pulse: 72 (04/01/23 1147)  Resp: 19 (04/01/23 1147)  BP: 137/74 (04/01/23 1147)  SpO2: 95 % (04/01/23 1147) Vital Signs (24h Range):  Temp:  [96.6 °F (35.9 °C)-98.8 °F (37.1 °C)] 98.1 °F (36.7 °C)  Pulse:  [68-80] 72  Resp:  [16-22] 19  SpO2:  [94 %-97 %] 95 %  BP: (122-142)/(73-86) 137/74     Weight: 79 kg (174 lb 2.6 oz)  Height: 5' 10" (177.8 cm)  Body mass index is 24.99 kg/m².    Physical Exam  Vitals and nursing note reviewed.   Constitutional:       General: He " "is not in acute distress.     Appearance: He is obese.   Eyes:      General: No scleral icterus.  Cardiovascular:      Rate and Rhythm: Normal rate.   Pulmonary:      Effort: Pulmonary effort is normal. No respiratory distress.   Abdominal:      General: There is no distension.      Palpations: Abdomen is soft.   Musculoskeletal:      Right lower leg: No edema.      Left lower leg: No edema.   Skin:     Coloration: Skin is not jaundiced.   Neurological:      Mental Status: He is alert.       Laboratory:  Labs within the past 24 hours have been reviewed.    Diagnostic Results:  None    Assessment/Plan:     * Acute rejection of kidney transplant  - Kidney bx 3/27 showing severe ACR  - Plan for thymo x 3 3/30, 3/31, 4/1  - CD3 on Monday and restart EC-MPS on Monday   - adequate UO, but no real sCr improvement    Hypomagnesemia  - Replace with PO mag as needed  - Monitor with daily labs         Metabolic acidosis  - 1L bicarb gtt   - Cont PO bicarb  - Monitor with daily labs       Acute kidney injury superimposed on chronic kidney disease  - See "acute rejection of kidney transplant"    Hypertensive cardiovascular disease  BERNARDA, EF 65%, cLVH, and mild LA enlargement    On coreg 25BID, minoxidil 2.5, procardia 30 BID at home  Cont as feasible per primary         COVID-19  - S/p remdesivir and dexa  - Symptoms improving  - On RA     Hyperkalemia  - K 5.6 on am labs  - EKG NS, no peaked t waves  - improved      Anemia of chronic disease  - H/H stable. Will continue to monitor with daily cbc.       At risk for opportunistic infections  - Cont appropriate OI prophylaxis per protocol.         Prophylactic immunotherapy  - see long term use of immunosuppression         Long-term use of immunosuppressant medication  - Continue prograf, steroids.  - Continue to monitor prograf levels daily, monitor for toxic side effects, and adjust for therapeutic dose.         S/P kidney transplant 10/19/21  - S/p DBDKT 10/19/2021, Thymo " "induction (CMV D-,R+), 0% PRA. -ve DSA at transplant  - See "acute rejection"       Renovascular hypertension  - Cont coreg and nifedipine with hold parameters       Focal segmental glomerulosclerosis  See s/p kidney transplant            Remigio Giles Jr., MD  Kidney Transplant  [facility] - Telemetry Stepdown  "

## 2023-04-01 NOTE — SUBJECTIVE & OBJECTIVE
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 Repeat Cr 8.47, BUN 47. K 5, bicarb 17. Patient is on prograf, myfortic, and prednisone and has been out of myfortiq and tacrolimus for 2 weeks. He has been adherent with his BP meds and prednsone. He fills all of his medications at [facility] other than tacrolimus and myfortic, which are filled at [facility]. The medications are usually mailed out to him, but when he called the line was busy. So he came to pick them up in person. He was unable to drive here earlier due to work obligations, thus missing doses for about 2 weeks. Reports good urine output, no urinary symptoms. Home blood pressures are 100-130s systolic with occasional values in 150s per his home BP cuff reads.      Case was discussed with Dr. Madden of [facility] and agreed for patient to transfer for concerns for rejection. Vitals stable upon transfer other than hypertension up to 165/94. Admitted due to concern for acute renal transplant rejection and KTM input. Initial labs significant for K 4.9, Cr 6.8, BUN 39, bicarb 13.       Mr. Trevizo is a 38 y.o. year old male who is status post Kidney Transplant - 10/19/2021  (#1).    His maintenance immunosuppression consists of:   Immunosuppressants (From admission, onward)      Start     Stop Route Frequency Ordered    04/03/23 0900  mycophenolate EC tablet 360 mg         -- Oral 2 times daily 04/01/23 1211    04/01/23 1800  tacrolimus capsule 5 mg         -- Oral 2 times daily 04/01/23 1211    04/01/23 1300  antithymocyte globulin (rabbit) 125 mg, hydrocortisone sodium succinate (SOLU-CORTEF) 20 mg in sodium chloride 0.9% 500 mL (FOR PERIPHERAL LINE ADMINISTRATION ONLY)         -- IV Once 04/01/23 1211    04/01/23 1215  tacrolimus capsule 1 mg         -- Oral Once 04/01/23 1211             Hospital Course:  Patient admitted to IM for SEBASTIAN. Patient COVID + on admit. Reported a mild cough and had a CXR showing bilateral infiltrates. Had some mild hypoxia that has resolved, did not require oxygen. S/p treatment with remdesivir and dexa. Symptoms improved. He had a kidney bx on 3/27 which showed severe ACR. Plan for thymo x 3.       Interval history: No acute events overnight. Pt received first dose of thymo yesterday 3/31, tolerated well. Cr trending down. UOP 2300 cc/24 hours. Plan for thymo #2 today. Pts K 5.6 this am. EKG NS with no peaked T waves. Treating with lasix, lokelma, and 1L bicarb gtt. Will recheck K this afternoon. VSS. Will continue to monitor.     Interval History:  No issues overnight. Will receive 3rd dose of thymo today. No improvement in serum creat.    Past Medical, Surgical, Family, and Social History:   Unchanged from H&P.    Scheduled Meds:   acetaminophen  650 mg Oral Once    albuterol  2 puff Inhalation Q6H WAKE    antithymocyte globulin (rabbit), hydrocortisone 20mg, with optional heparin 1000 units in NS 500ml (FOR PERIPHERAL LINE ADMINISTRATION ONLY)  1.5 mg/kg (Adjusted) Intravenous Once    ascorbic acid (vitamin C)  500 mg Oral BID    carvediloL  25 mg Oral BID WM    diphenhydrAMINE  25 mg Oral Once    heparin (porcine)  5,000 Units Subcutaneous Q8H    methylPREDNISolone sodium succinate injection  1.25 mg/kg Intravenous Once    minoxidiL  2.5 mg Oral Daily    multivitamin  1 tablet Oral Daily    mupirocin   Nasal BID    [START ON 4/3/2023] mycophenolate  360 mg Oral BID    NIFEdipine  60 mg Oral BID    [START ON 4/2/2023] predniSONE  20 mg Oral Daily    sodium bicarbonate  1,950 mg Oral TID    sodium zirconium cyclosilicate  10 g Oral Daily    sulfamethoxazole-trimethoprim 400-80mg  1 tablet Oral Every Mon, Wed, Fri    tacrolimus  1 mg Oral Once    tacrolimus  5 mg Oral BID    valGANciclovir  450 mg Oral Every Mon, Wed, Fri     Continuous Infusions:   sodium  "chloride 0.9% 50 mL/hr at 04/01/23 0912     PRN Meds:acetaminophen, acetaminophen, acetaminophen, benzonatate, dextrose 10%, dextrose 10%, dextrose, dextrose, diphenhydrAMINE, diphenhydrAMINE, EPINEPHrine (PF), EPINEPHrine (PF), glucagon (human recombinant), hydrocortisone sodium succinate, hydrocortisone sodium succinate, melatonin, naloxone, ondansetron, senna-docusate 8.6-50 mg, sodium chloride 0.9%    Intake/Output - Last 3 Shifts         03/30 0700 03/31 0659 03/31 0700 04/01 0659 04/01 0700 04/02 0659    P.O.       Total Intake(mL/kg)       Urine (mL/kg/hr) 2300 (1.2) 1300 (0.7) 900 (1.6)    Emesis/NG output       Stool 0 0     Total Output 2300 1300 900    Net -2300 -1300 -900           Urine Occurrence 2 x      Stool Occurrence 1 x 1 x              Review of Systems   Constitutional: Negative.    HENT: Negative.     Eyes: Negative.    Respiratory: Negative.     Cardiovascular: Negative.    Gastrointestinal: Negative.    Endocrine: Negative.    Genitourinary: Negative.    Musculoskeletal: Negative.    Skin: Negative.    Neurological: Negative.    Psychiatric/Behavioral: Negative.      Objective:     Vital Signs (Most Recent):  Temp: 98.1 °F (36.7 °C) (04/01/23 1147)  Pulse: 72 (04/01/23 1147)  Resp: 19 (04/01/23 1147)  BP: 137/74 (04/01/23 1147)  SpO2: 95 % (04/01/23 1147) Vital Signs (24h Range):  Temp:  [96.6 °F (35.9 °C)-98.8 °F (37.1 °C)] 98.1 °F (36.7 °C)  Pulse:  [68-80] 72  Resp:  [16-22] 19  SpO2:  [94 %-97 %] 95 %  BP: (122-142)/(73-86) 137/74     Weight: 79 kg (174 lb 2.6 oz)  Height: 5' 10" (177.8 cm)  Body mass index is 24.99 kg/m².    Physical Exam  Vitals and nursing note reviewed.   Constitutional:       General: He is not in acute distress.     Appearance: He is obese.   Eyes:      General: No scleral icterus.  Cardiovascular:      Rate and Rhythm: Normal rate.   Pulmonary:      Effort: Pulmonary effort is normal. No respiratory distress.   Abdominal:      General: There is no distension. "      Palpations: Abdomen is soft.   Musculoskeletal:      Right lower leg: No edema.      Left lower leg: No edema.   Skin:     Coloration: Skin is not jaundiced.   Neurological:      Mental Status: He is alert.       Laboratory:  Labs within the past 24 hours have been reviewed.    Diagnostic Results:  None

## 2023-04-01 NOTE — ASSESSMENT & PLAN NOTE
- Kidney bx 3/27 showing severe ACR  - Plan for thymo x 3 3/30, 3/31, 4/1  - CD3 on Monday and restart EC-MPS on Monday   - adequate UO, but no real sCr improvement

## 2023-04-01 NOTE — ASSESSMENT & PLAN NOTE
- Continue prograf, steroids.  - Continue to monitor prograf levels daily, monitor for toxic side effects, and adjust for therapeutic dose.

## 2023-04-01 NOTE — CLINICAL REVIEW
Sepsis Screen (most recent)       Sepsis Screen (IP) - 04/01/23 0940       Is the patient's history or complaint suggestive of a possible infection? Yes  -    Are there at least two of the following signs and symptoms present? Yes  -    Sepsis signs/symptoms - Tachypnea Tachypnea     >20  -    Sepsis signs/symptoms - WBC WBC < 4,000 or WBC > 12,000  -    Are any of the following organ dysfunction criteria present and not considered to be due to a chronic condition? Yes  -    Organ Dysfunction Criteria Creatinine > 2.0  -    Organ Dysfunction Criteria - O2 O2 Saturation < 95% on room air  -    Initiate Sepsis Protocol No  -JM    Reason sepsis not considered Directed by provider   secure chat md -MARIANNA              User Key  (r) = Recorded By, (t) = Taken By, (c) = Cosigned By      Initials Name    MARIANNA Birch RN

## 2023-04-01 NOTE — PLAN OF CARE
Problem: Adult Inpatient Plan of Care  Goal: Plan of Care Review  Outcome: Ongoing, Progressing  Goal: Patient-Specific Goal (Individualized)  Outcome: Ongoing, Progressing  Goal: Absence of Hospital-Acquired Illness or Injury  Outcome: Ongoing, Progressing  Goal: Optimal Comfort and Wellbeing  Outcome: Ongoing, Progressing  Goal: Readiness for Transition of Care  Outcome: Ongoing, Progressing     Problem: Communication Impairment  Goal: Effective Communication Skills  Outcome: Ongoing, Progressing     Problem: Fluid and Electrolyte Imbalance (Acute Kidney Injury/Impairment)  Goal: Fluid and Electrolyte Balance  Outcome: Ongoing, Progressing   Aox4. POC Reviewed. Rabbit given. Vitals stable. Safety interventions maintained. Call bell within reach. Care ongoing.

## 2023-04-02 LAB
ALBUMIN SERPL BCP-MCNC: 3.1 G/DL (ref 3.5–5.2)
ALP SERPL-CCNC: 63 U/L (ref 55–135)
ALT SERPL W/O P-5'-P-CCNC: 57 U/L (ref 10–44)
ANION GAP SERPL CALC-SCNC: 8 MMOL/L (ref 8–16)
ANISOCYTOSIS BLD QL SMEAR: SLIGHT
AST SERPL-CCNC: 20 U/L (ref 10–40)
BASOPHILS # BLD AUTO: ABNORMAL K/UL (ref 0–0.2)
BASOPHILS NFR BLD: 0 % (ref 0–1.9)
BILIRUB SERPL-MCNC: 0.2 MG/DL (ref 0.1–1)
BUN SERPL-MCNC: 62 MG/DL (ref 6–20)
CALCIUM SERPL-MCNC: 8.9 MG/DL (ref 8.7–10.5)
CHLORIDE SERPL-SCNC: 109 MMOL/L (ref 95–110)
CO2 SERPL-SCNC: 21 MMOL/L (ref 23–29)
CREAT SERPL-MCNC: 3.9 MG/DL (ref 0.5–1.4)
DIFFERENTIAL METHOD: ABNORMAL
EOSINOPHIL # BLD AUTO: ABNORMAL K/UL (ref 0–0.5)
EOSINOPHIL NFR BLD: 0 % (ref 0–8)
ERYTHROCYTE [DISTWIDTH] IN BLOOD BY AUTOMATED COUNT: 13.3 % (ref 11.5–14.5)
EST. GFR  (NO RACE VARIABLE): 19.1 ML/MIN/1.73 M^2
GLUCOSE SERPL-MCNC: 155 MG/DL (ref 70–110)
HCT VFR BLD AUTO: 30.4 % (ref 40–54)
HGB BLD-MCNC: 9.1 G/DL (ref 14–18)
HYPOCHROMIA BLD QL SMEAR: ABNORMAL
IMM GRANULOCYTES # BLD AUTO: ABNORMAL K/UL (ref 0–0.04)
IMM GRANULOCYTES NFR BLD AUTO: ABNORMAL % (ref 0–0.5)
LYMPHOCYTES # BLD AUTO: ABNORMAL K/UL (ref 1–4.8)
LYMPHOCYTES NFR BLD: 3 % (ref 18–48)
MAGNESIUM SERPL-MCNC: 1.6 MG/DL (ref 1.6–2.6)
MCH RBC QN AUTO: 23.8 PG (ref 27–31)
MCHC RBC AUTO-ENTMCNC: 29.9 G/DL (ref 32–36)
MCV RBC AUTO: 79 FL (ref 82–98)
METAMYELOCYTES NFR BLD MANUAL: 1 %
MONOCYTES # BLD AUTO: ABNORMAL K/UL (ref 0.3–1)
MONOCYTES NFR BLD: 4 % (ref 4–15)
NEUTROPHILS NFR BLD: 90 % (ref 38–73)
NEUTS BAND NFR BLD MANUAL: 2 %
NRBC BLD-RTO: 0 /100 WBC
OVALOCYTES BLD QL SMEAR: ABNORMAL
PHOSPHATE SERPL-MCNC: 3.5 MG/DL (ref 2.7–4.5)
PLATELET # BLD AUTO: 308 K/UL (ref 150–450)
PMV BLD AUTO: 11.3 FL (ref 9.2–12.9)
POIKILOCYTOSIS BLD QL SMEAR: SLIGHT
POLYCHROMASIA BLD QL SMEAR: ABNORMAL
POTASSIUM SERPL-SCNC: 5 MMOL/L (ref 3.5–5.1)
PROT SERPL-MCNC: 6.4 G/DL (ref 6–8.4)
RBC # BLD AUTO: 3.83 M/UL (ref 4.6–6.2)
SODIUM SERPL-SCNC: 138 MMOL/L (ref 136–145)
TACROLIMUS BLD-MCNC: 4.4 NG/ML (ref 5–15)
WBC # BLD AUTO: 11.82 K/UL (ref 3.9–12.7)

## 2023-04-02 PROCEDURE — 63600175 PHARM REV CODE 636 W HCPCS

## 2023-04-02 PROCEDURE — 25000003 PHARM REV CODE 250: Performed by: STUDENT IN AN ORGANIZED HEALTH CARE EDUCATION/TRAINING PROGRAM

## 2023-04-02 PROCEDURE — 25000003 PHARM REV CODE 250

## 2023-04-02 PROCEDURE — 99239 HOSP IP/OBS DSCHRG MGMT >30: CPT | Mod: ,,, | Performed by: NURSE PRACTITIONER

## 2023-04-02 PROCEDURE — 27000207 HC ISOLATION

## 2023-04-02 PROCEDURE — 94761 N-INVAS EAR/PLS OXIMETRY MLT: CPT

## 2023-04-02 PROCEDURE — 25000003 PHARM REV CODE 250: Performed by: PHYSICIAN ASSISTANT

## 2023-04-02 PROCEDURE — 99239 PR HOSPITAL DISCHARGE DAY,>30 MIN: ICD-10-PCS | Mod: ,,, | Performed by: NURSE PRACTITIONER

## 2023-04-02 PROCEDURE — 94664 DEMO&/EVAL PT USE INHALER: CPT

## 2023-04-02 PROCEDURE — 83735 ASSAY OF MAGNESIUM: CPT

## 2023-04-02 PROCEDURE — 27000646 HC AEROBIKA DEVICE

## 2023-04-02 PROCEDURE — 94640 AIRWAY INHALATION TREATMENT: CPT

## 2023-04-02 PROCEDURE — 94799 UNLISTED PULMONARY SVC/PX: CPT

## 2023-04-02 PROCEDURE — 99900035 HC TECH TIME PER 15 MIN (STAT)

## 2023-04-02 PROCEDURE — 20600001 HC STEP DOWN PRIVATE ROOM

## 2023-04-02 PROCEDURE — 85007 BL SMEAR W/DIFF WBC COUNT: CPT

## 2023-04-02 PROCEDURE — 84100 ASSAY OF PHOSPHORUS: CPT

## 2023-04-02 PROCEDURE — 80053 COMPREHEN METABOLIC PANEL: CPT

## 2023-04-02 PROCEDURE — 80197 ASSAY OF TACROLIMUS: CPT | Performed by: STUDENT IN AN ORGANIZED HEALTH CARE EDUCATION/TRAINING PROGRAM

## 2023-04-02 PROCEDURE — 85027 COMPLETE CBC AUTOMATED: CPT

## 2023-04-02 PROCEDURE — 63600175 PHARM REV CODE 636 W HCPCS: Performed by: INTERNAL MEDICINE

## 2023-04-02 RX ORDER — TACROLIMUS 1 MG/1
1 CAPSULE ORAL ONCE
Status: COMPLETED | OUTPATIENT
Start: 2023-04-02 | End: 2023-04-02

## 2023-04-02 RX ORDER — TACROLIMUS 1 MG/1
6 CAPSULE ORAL EVERY 12 HOURS
Qty: 360 CAPSULE | Refills: 11 | Status: SHIPPED | OUTPATIENT
Start: 2023-04-02 | End: 2023-08-25 | Stop reason: SDUPTHER

## 2023-04-02 RX ORDER — ASPIRIN 81 MG/1
81 TABLET ORAL DAILY
Refills: 0 | COMMUNITY
Start: 2023-04-02

## 2023-04-02 RX ORDER — MYCOPHENOLIC ACID 180 MG/1
360 TABLET, DELAYED RELEASE ORAL 2 TIMES DAILY
Qty: 120 TABLET | Refills: 11 | Status: SHIPPED | OUTPATIENT
Start: 2023-04-03 | End: 2023-08-25 | Stop reason: SDUPTHER

## 2023-04-02 RX ADMIN — MINOXIDIL 2.5 MG: 2.5 TABLET ORAL at 08:04

## 2023-04-02 RX ADMIN — SODIUM BICARBONATE 1950 MG: 650 TABLET ORAL at 09:04

## 2023-04-02 RX ADMIN — HEPARIN SODIUM 5000 UNITS: 5000 INJECTION INTRAVENOUS; SUBCUTANEOUS at 02:04

## 2023-04-02 RX ADMIN — CARVEDILOL 25 MG: 25 TABLET, FILM COATED ORAL at 08:04

## 2023-04-02 RX ADMIN — SODIUM BICARBONATE 1950 MG: 650 TABLET ORAL at 02:04

## 2023-04-02 RX ADMIN — TACROLIMUS 5 MG: 5 CAPSULE ORAL at 08:04

## 2023-04-02 RX ADMIN — SODIUM BICARBONATE 1950 MG: 650 TABLET ORAL at 08:04

## 2023-04-02 RX ADMIN — PREDNISONE 20 MG: 20 TABLET ORAL at 08:04

## 2023-04-02 RX ADMIN — THERA TABS 1 TABLET: TAB at 08:04

## 2023-04-02 RX ADMIN — TACROLIMUS 6 MG: 5 CAPSULE ORAL at 05:04

## 2023-04-02 RX ADMIN — CARVEDILOL 25 MG: 25 TABLET, FILM COATED ORAL at 05:04

## 2023-04-02 RX ADMIN — Medication 500 MG: at 08:04

## 2023-04-02 RX ADMIN — NIFEDIPINE 60 MG: 30 TABLET, FILM COATED, EXTENDED RELEASE ORAL at 09:04

## 2023-04-02 RX ADMIN — HEPARIN SODIUM 5000 UNITS: 5000 INJECTION INTRAVENOUS; SUBCUTANEOUS at 06:04

## 2023-04-02 RX ADMIN — ALBUTEROL SULFATE 2 PUFF: 90 AEROSOL, METERED RESPIRATORY (INHALATION) at 08:04

## 2023-04-02 RX ADMIN — MUPIROCIN: 20 OINTMENT TOPICAL at 09:04

## 2023-04-02 RX ADMIN — ALBUTEROL SULFATE 2 PUFF: 90 AEROSOL, METERED RESPIRATORY (INHALATION) at 03:04

## 2023-04-02 RX ADMIN — NIFEDIPINE 60 MG: 30 TABLET, FILM COATED, EXTENDED RELEASE ORAL at 08:04

## 2023-04-02 RX ADMIN — ALBUTEROL SULFATE 2 PUFF: 90 AEROSOL, METERED RESPIRATORY (INHALATION) at 10:04

## 2023-04-02 RX ADMIN — Medication 500 MG: at 09:04

## 2023-04-02 RX ADMIN — TACROLIMUS 1 MG: 1 CAPSULE ORAL at 12:04

## 2023-04-02 RX ADMIN — HEPARIN SODIUM 5000 UNITS: 5000 INJECTION INTRAVENOUS; SUBCUTANEOUS at 09:04

## 2023-04-02 RX ADMIN — SODIUM ZIRCONIUM CYCLOSILICATE 10 G: 5 POWDER, FOR SUSPENSION ORAL at 12:04

## 2023-04-02 NOTE — PROGRESS NOTES
Discharge Medication Note:    Hospital Course:  Mr Trevizo is s/p kidney transplant form 10/19/21 readmitted with SEBASTIAN with concern for rejection due to missed doses of meds.  Patient also found to be COVID+ on admit - treated with remdesivir x5 and dexamethasone (3/26-3/30).  For pt's rejection he received thymo x3 (3/31-4/2), prednisone taper and OI prophy were restarted.  Myfortic was restarted at discharge due to rejection.  SCr downtrending at time of discharge.  Home nifedipine adjusted this admission and lokelma started for hyperkalemia.  Pt given new blue card and new meds at discharge.  Discharge plan reviewed with pt's niece as pt is hard of hearing.  Pt to follow up in clinic with nephrologist and SW in 2 weeks    Met with Feliz Trevizo at discharge to review discharge medications and to update the blue medication card.           Medication List        START taking these medications      LOKELMA 10 gram packet  Generic drug: sodium zirconium cyclosilicate  Take 1 packet (10 g total) by mouth once daily. Mix entire contents of packet(s) into drinking glass containing 3 tablespoons of water; stir well and drink immediately. Add water and repeat until no powder remains to receive entire dose.     sodium bicarbonate 650 MG tablet  Take 3 tablets (1,950 mg total) by mouth 3 (three) times daily.     sulfamethoxazole-trimethoprim 400-80mg 400-80 mg per tablet  Commonly known as: BACTRIM,SEPTRA  Take 1 tablet by mouth every Mon, Wed, Fri. Stop: 9/26/23     valGANciclovir 450 mg Tab  Commonly known as: VALCYTE  Take 1 tablet (450 mg total) by mouth every Mon, Wed, Fri. Stop: 6/28/23            CHANGE how you take these medications      aspirin 81 MG EC tablet  Commonly known as: ECOTRIN  What changed:   when to take this  reasons to take this     NIFEdipine 60 MG (OSM) 24 hr tablet  Commonly known as: PROCARDIA-XL  Take 1 tablet (60 mg total) by mouth 2 (two) times a day.  What changed:   medication strength  how  much to take     predniSONE 5 MG tablet  Commonly known as: DELTASONE  Take 20 mg by mouth daily 4/2-4/8; 15 mg by mouth daily 4/9-4/15; 10 mg by mouth daily 4/16-4/22; 5 mg by mouth daily thereafter 4/23/23  What changed: additional instructions     tacrolimus 1 MG Cap  Commonly known as: PROGRAF  Take 6 capsules (6 mg total) by mouth every 12 (twelve) hours.  What changed: how much to take            CONTINUE taking these medications      carvediloL 25 MG tablet  Commonly known as: COREG  Take 1 tablet (25 mg total) by mouth 2 (two) times daily with meals.     minoxidiL 2.5 MG tablet  Commonly known as: LONITEN  Take 1 tablet (2.5 mg total) by mouth once daily.     mycophenolate 180 MG Tbec  Commonly known as: MYFORTIC  Take 2 tablets (360 mg total) by mouth 2 (two) times daily.  Start taking on: April 3, 2023               Where to Get Your Medications        These medications were sent to [facility] [address] [address]      Hours: Mon-Fri 7a-7p, Sat-Sun 10a-4p Phone: [phone]   carvediloL 25 MG tablet  LOKELMA 10 gram packet  minoxidiL 2.5 MG tablet  mycophenolate 180 MG Tbec  NIFEdipine 60 MG (OSM) 24 hr tablet  predniSONE 5 MG tablet  sodium bicarbonate 650 MG tablet  sulfamethoxazole-trimethoprim 400-80mg 400-80 mg per tablet  tacrolimus 1 MG Cap  valGANciclovir 450 mg Tab            The following medications have been placed on HOLD and should be restarted in the outpatient setting (when appropriate): shawnee Trevizo verbalized understanding and had the opportunity to ask questions.

## 2023-04-03 ENCOUNTER — TELEPHONE (OUTPATIENT)
Dept: TRANSPLANT | Facility: CLINIC | Age: 38
End: 2023-04-03
Payer: MEDICARE

## 2023-04-03 VITALS
WEIGHT: 174.19 LBS | DIASTOLIC BLOOD PRESSURE: 86 MMHG | HEIGHT: 70 IN | HEART RATE: 70 BPM | TEMPERATURE: 98 F | SYSTOLIC BLOOD PRESSURE: 137 MMHG | RESPIRATION RATE: 18 BRPM | BODY MASS INDEX: 24.94 KG/M2 | OXYGEN SATURATION: 97 %

## 2023-04-03 LAB
ABSOLUTE CD3: 39 CELLS/UL (ref 700–2100)
ALBUMIN SERPL BCP-MCNC: 3 G/DL (ref 3.5–5.2)
ALP SERPL-CCNC: 58 U/L (ref 55–135)
ALT SERPL W/O P-5'-P-CCNC: 36 U/L (ref 10–44)
ANION GAP SERPL CALC-SCNC: 8 MMOL/L (ref 8–16)
ANISOCYTOSIS BLD QL SMEAR: SLIGHT
AST SERPL-CCNC: 11 U/L (ref 10–40)
BASOPHILS NFR BLD: 0 % (ref 0–1.9)
BILIRUB SERPL-MCNC: 0.2 MG/DL (ref 0.1–1)
BUN SERPL-MCNC: 61 MG/DL (ref 6–20)
BURR CELLS BLD QL SMEAR: ABNORMAL
CALCIUM SERPL-MCNC: 9.2 MG/DL (ref 8.7–10.5)
CD3%: 8.1 % (ref 55–83)
CHLORIDE SERPL-SCNC: 110 MMOL/L (ref 95–110)
CO2 SERPL-SCNC: 22 MMOL/L (ref 23–29)
CREAT SERPL-MCNC: 3.6 MG/DL (ref 0.5–1.4)
DIFFERENTIAL METHOD: ABNORMAL
EOSINOPHIL NFR BLD: 0 % (ref 0–8)
ERYTHROCYTE [DISTWIDTH] IN BLOOD BY AUTOMATED COUNT: 13.6 % (ref 11.5–14.5)
EST. GFR  (NO RACE VARIABLE): 21 ML/MIN/1.73 M^2
GLUCOSE SERPL-MCNC: 100 MG/DL (ref 70–110)
HCT VFR BLD AUTO: 29.4 % (ref 40–54)
HGB BLD-MCNC: 8.7 G/DL (ref 14–18)
HYPOCHROMIA BLD QL SMEAR: ABNORMAL
IMM GRANULOCYTES # BLD AUTO: ABNORMAL K/UL (ref 0–0.04)
IMM GRANULOCYTES NFR BLD AUTO: ABNORMAL % (ref 0–0.5)
LYMPHOCYTES NFR BLD: 4 % (ref 18–48)
MAGNESIUM SERPL-MCNC: 2 MG/DL (ref 1.6–2.6)
MCH RBC QN AUTO: 23.6 PG (ref 27–31)
MCHC RBC AUTO-ENTMCNC: 29.6 G/DL (ref 32–36)
MCV RBC AUTO: 80 FL (ref 82–98)
METAMYELOCYTES NFR BLD MANUAL: 1 %
MONOCYTES NFR BLD: 9 % (ref 4–15)
NEUTROPHILS NFR BLD: 83 % (ref 38–73)
NEUTS BAND NFR BLD MANUAL: 3 %
NRBC BLD-RTO: 0 /100 WBC
OVALOCYTES BLD QL SMEAR: ABNORMAL
PHOSPHATE SERPL-MCNC: 3.2 MG/DL (ref 2.7–4.5)
PLATELET # BLD AUTO: 311 K/UL (ref 150–450)
PMV BLD AUTO: 11.8 FL (ref 9.2–12.9)
POIKILOCYTOSIS BLD QL SMEAR: SLIGHT
POLYCHROMASIA BLD QL SMEAR: ABNORMAL
POTASSIUM SERPL-SCNC: 4.6 MMOL/L (ref 3.5–5.1)
PROT SERPL-MCNC: 6.1 G/DL (ref 6–8.4)
RBC # BLD AUTO: 3.68 M/UL (ref 4.6–6.2)
SODIUM SERPL-SCNC: 140 MMOL/L (ref 136–145)
TACROLIMUS BLD-MCNC: 6 NG/ML (ref 5–15)
WBC # BLD AUTO: 14.09 K/UL (ref 3.9–12.7)

## 2023-04-03 PROCEDURE — 63600175 PHARM REV CODE 636 W HCPCS

## 2023-04-03 PROCEDURE — 84100 ASSAY OF PHOSPHORUS: CPT

## 2023-04-03 PROCEDURE — 63600175 PHARM REV CODE 636 W HCPCS: Performed by: INTERNAL MEDICINE

## 2023-04-03 PROCEDURE — 80053 COMPREHEN METABOLIC PANEL: CPT

## 2023-04-03 PROCEDURE — 25000003 PHARM REV CODE 250: Performed by: STUDENT IN AN ORGANIZED HEALTH CARE EDUCATION/TRAINING PROGRAM

## 2023-04-03 PROCEDURE — 36415 COLL VENOUS BLD VENIPUNCTURE: CPT | Performed by: STUDENT IN AN ORGANIZED HEALTH CARE EDUCATION/TRAINING PROGRAM

## 2023-04-03 PROCEDURE — 83735 ASSAY OF MAGNESIUM: CPT

## 2023-04-03 PROCEDURE — 85027 COMPLETE CBC AUTOMATED: CPT

## 2023-04-03 PROCEDURE — 85007 BL SMEAR W/DIFF WBC COUNT: CPT

## 2023-04-03 PROCEDURE — 86359 T CELLS TOTAL COUNT: CPT | Performed by: INTERNAL MEDICINE

## 2023-04-03 PROCEDURE — 25000003 PHARM REV CODE 250

## 2023-04-03 PROCEDURE — 25000003 PHARM REV CODE 250: Performed by: PHYSICIAN ASSISTANT

## 2023-04-03 PROCEDURE — 94761 N-INVAS EAR/PLS OXIMETRY MLT: CPT

## 2023-04-03 PROCEDURE — 80197 ASSAY OF TACROLIMUS: CPT | Performed by: STUDENT IN AN ORGANIZED HEALTH CARE EDUCATION/TRAINING PROGRAM

## 2023-04-03 RX ORDER — ALBUTEROL SULFATE 90 UG/1
2 AEROSOL, METERED RESPIRATORY (INHALATION) EVERY 6 HOURS PRN
Status: DISCONTINUED | OUTPATIENT
Start: 2023-04-03 | End: 2023-04-03 | Stop reason: HOSPADM

## 2023-04-03 RX ADMIN — CARVEDILOL 25 MG: 25 TABLET, FILM COATED ORAL at 09:04

## 2023-04-03 RX ADMIN — PREDNISONE 20 MG: 20 TABLET ORAL at 09:04

## 2023-04-03 RX ADMIN — VALGANCICLOVIR 450 MG: 450 TABLET, FILM COATED ORAL at 09:04

## 2023-04-03 RX ADMIN — SULFAMETHOXAZOLE AND TRIMETHOPRIM 1 TABLET: 400; 80 TABLET ORAL at 09:04

## 2023-04-03 RX ADMIN — HEPARIN SODIUM 5000 UNITS: 5000 INJECTION INTRAVENOUS; SUBCUTANEOUS at 06:04

## 2023-04-03 RX ADMIN — TACROLIMUS 6 MG: 5 CAPSULE ORAL at 09:04

## 2023-04-03 RX ADMIN — Medication 500 MG: at 09:04

## 2023-04-03 RX ADMIN — MYCOPHENILIC ACID 360 MG: 180 TABLET, DELAYED RELEASE ORAL at 09:04

## 2023-04-03 RX ADMIN — MINOXIDIL 2.5 MG: 2.5 TABLET ORAL at 09:04

## 2023-04-03 RX ADMIN — NIFEDIPINE 60 MG: 30 TABLET, FILM COATED, EXTENDED RELEASE ORAL at 09:04

## 2023-04-03 RX ADMIN — MUPIROCIN: 20 OINTMENT TOPICAL at 09:04

## 2023-04-03 RX ADMIN — THERA TABS 1 TABLET: TAB at 09:04

## 2023-04-03 RX ADMIN — SODIUM BICARBONATE 1950 MG: 650 TABLET ORAL at 09:04

## 2023-04-03 NOTE — PLAN OF CARE
CHW met with patient/family at bedside. Patient experience rounding completed and reviewed the following.     Do you know your discharge plan? Yes,    If yes, what is the plan? Home    If you are discharging home, do you have help at home? Yes    Do you think you will need help at home at discharge? No     Have you discussed your needs and preferences with your SW/CM? Yes    Assigned SW/CM notified of any patient/family needs or concerns.

## 2023-04-03 NOTE — NURSING
Discharge orders placed today. Pt states he does not have a ride home until tomorrow, notified KTS. Will continue to monitor.

## 2023-04-03 NOTE — PROGRESS NOTES
Discharge Note:    LIBIA attempted to call pt at all numbers listed in chart with no answer. LIBIA attempted to call pt's brother, Giuseppe, at [phone]- no answer, no vm.    LIBIA Wilkerson spoke with floor nurseMimi who reports pt is in process of figuring out a ride home.    LIBIA spoke with floor nurse, Yvette, who reports pt left floor with belongings and stated pt's 1st cousin was driving pt home.    Pt discharged with no needs.    Liz Woodard LMSW

## 2023-04-03 NOTE — PLAN OF CARE
Patient rested well in between care. VS were WNL with no SS of distress. Patient bed is in low position, call light is within reach and room is free of clutter. Will continue to monitor until family picks up patient, Will update on coming nurse of care.

## 2023-04-03 NOTE — NURSING
Pt left the floor with all his belongs. Given information on D/C home instructions. Pt verbalized understanding. IVs and tele removed.

## 2023-04-03 NOTE — TELEPHONE ENCOUNTER
LIBIA called pt's niece, Paulette, at [phone]. To discuss pt's discharge planning. Paulette reports she will not be driving pt home. Paulette advised LIBIA call pt's sister, Saira Trevizo, at [phone]. Paulette reports possibly pt's Uncle Cheryl will transport pt home.    LIBIA called pt's sister, Saira, who did not answer, no vm set up.    LIBIA will call Saira again at 9:00 AM.

## 2023-04-04 ENCOUNTER — PATIENT OUTREACH (OUTPATIENT)
Dept: ADMINISTRATIVE | Facility: CLINIC | Age: 38
End: 2023-04-04
Payer: MEDICARE

## 2023-04-06 LAB
FINAL PATHOLOGIC DIAGNOSIS: NORMAL
GROSS: NORMAL
Lab: NORMAL

## 2023-04-13 NOTE — ASSESSMENT & PLAN NOTE
Hb normally 9-10. Hb on 3/22 was 8.6. MCV was 79    - daily CBC  - transfuse for Hb<7  - f/u iron labs and retic     4 = No assist / stand by assistance

## 2023-05-08 ENCOUNTER — TELEPHONE (OUTPATIENT)
Dept: TRANSPLANT | Facility: CLINIC | Age: 38
End: 2023-05-08
Payer: MEDICARE

## 2023-05-08 ENCOUNTER — PATIENT MESSAGE (OUTPATIENT)
Dept: TRANSPLANT | Facility: CLINIC | Age: 38
End: 2023-05-08
Payer: MEDICARE

## 2023-05-08 NOTE — TELEPHONE ENCOUNTER
Attempting to contact patient regarding blood work and clinic visits necessary following his recent treatment for rejection. Both his cell phone and his sister Yany's cell phone numbers listed are out of service. Phone number listed as home phone was a wrong number. These numbers have been removed from patient's contact list to prevent further confusion. Message sent to patient via [facility] and letter sent to patient's home requesting patient to contact transplant department for follow up.

## 2023-05-28 NOTE — TELEPHONE ENCOUNTER
----- Message from Leena Castillo MD sent at 4/14/2022  3:28 PM CDT -----  Thank you!  ----- Message -----  From: Cindy Harris RN  Sent: 4/14/2022   3:20 PM CDT  To: Leena Castillo MD    Ok- just got to the bottom of it- they only sam the tubes for the allosure test, nothing else was done that day.  Tying to get him scheduled for labs on Monday 4/18/22  ----- Message -----  From: Leena Castillo MD  Sent: 4/14/2022   3:02 PM CDT  To: Cindy Harris RN    Gotcha, did we adjust the dose for level of 1.7 ?   ----- Message -----  From: Cindy Harris RN  Sent: 4/14/2022   3:01 PM CDT  To: Leena Castillo MD    He went on 4/8/22, been trying to get the results all week, still waiting  ----- Message -----  From: Leena Castillo MD  Sent: 4/14/2022   2:53 PM CDT  To: Cindy Harris RN    That is from a month ago, right? Any lab after this result?  ----- Message -----  From: Cindy Harris RN  Sent: 4/14/2022   2:47 PM CDT  To: Leena Castillo MD    Unfortunately yes, that was not a typo  ----- Message -----  From: Leena Castillo MD  Sent: 4/14/2022   2:27 PM CDT  To: [facility] Post-Kidney Transplant Clinical    Tac level is 1.7?                [email or web address] Rep contacted for need for pt monitoring. Awaiting callback at this time.

## 2023-06-01 ENCOUNTER — TELEPHONE (OUTPATIENT)
Dept: TRANSPLANT | Facility: CLINIC | Age: 38
End: 2023-06-01
Payer: MEDICARE

## 2023-06-01 NOTE — TELEPHONE ENCOUNTER
LIBIA called pt's brother, Giuseppe, at ([phone]) in an attempt to speak with pt. Giuseppe states pt is living in [place] with pt's sister, Saira and her children. Giuseppe reports pt has not paid phone bill and phone is turned off.    LIBIA called pt's sister, Saira, at ([phone]) who was at work and agreed to ask pt to contact transplant team to coordinate care. LIBIA educated Saira on lillie for transplant team to be able to contact pt and coordinate care. Saira asked that transplant team call her with any urgent requests.     Liz Woodard LMSW

## 2023-06-07 ENCOUNTER — TELEPHONE (OUTPATIENT)
Dept: TRANSPLANT | Facility: CLINIC | Age: 38
End: 2023-06-07
Payer: MEDICARE

## 2023-06-07 NOTE — TELEPHONE ENCOUNTER
Patient's sister, Saira contacted regarding attempts to contact patient. Saira states she told patient last week that transplant team was attempting to contact him after she spoke with . Patient has not attempting to contact transplant team however. Saira states she is at work currently but will have patient call coordinator from her phone when she gets home today.

## 2023-07-03 PROBLEM — N18.9 ACUTE KIDNEY INJURY SUPERIMPOSED ON CHRONIC KIDNEY DISEASE: Status: RESOLVED | Noted: 2023-03-27 | Resolved: 2023-07-03

## 2023-07-03 PROBLEM — N17.9 ACUTE KIDNEY INJURY SUPERIMPOSED ON CHRONIC KIDNEY DISEASE: Status: RESOLVED | Noted: 2023-03-27 | Resolved: 2023-07-03

## 2023-08-11 NOTE — TELEPHONE ENCOUNTER
"Pt presents with care giver relating that pt wants to "cut off foot" and in general talking out of head  claiming spirits are in the room, past events with sister. Pt caregiver believer medication Abilify may be causing these concerns. Pt has not been eating food because she thinks sister is trying to poison on her, yet there is no contact.  " Spoke to pt, confirmed that he stopped taking valcyte when he was instructed to hold a few weeks ago, has not restarted.   Lab entry documentation checked, error corrected: Grans, not Bands, 51.8%, no bands.   He states that he is feeling fine, no complaints.  Will decrease myfortic to 180mg bid, pt will repeat cbc with diff on 2/10 and will get allosure then and then monthly x 3.  Waiting for determination if insurance will cover atovaquone, will notify pt with final answer and any new instructions.      ----- Message from Jay Velasco MD sent at 2/7/2022 12:42 PM CST -----  He should be OFF valcyte   Please verify the report of BANDS ?58%. Ask for any sign of infection fever cough UTI symptoms  Lower Myfortic to 180 bid  Let's get an allosure now and monthly x 3 months  See if patient has coverage to switch bactrim to atovaquone to complete PJP prophylaxis until 4/17/2022  Hydration  Repeat cbc with diff Thursday 2/10

## 2023-08-25 DIAGNOSIS — Z94.0 S/P KIDNEY TRANSPLANT: Chronic | ICD-10-CM

## 2023-08-25 DIAGNOSIS — Z94.9 HYPERTENSION ASSOCIATED WITH TRANSPLANTATION: ICD-10-CM

## 2023-08-25 DIAGNOSIS — Z94.0 KIDNEY REPLACED BY TRANSPLANT: ICD-10-CM

## 2023-08-25 DIAGNOSIS — I15.8 HYPERTENSION ASSOCIATED WITH TRANSPLANTATION: ICD-10-CM

## 2023-08-25 RX ORDER — MINOXIDIL 2.5 MG/1
2.5 TABLET ORAL DAILY
Qty: 30 TABLET | Refills: 11 | Status: SHIPPED | OUTPATIENT
Start: 2023-08-25

## 2023-08-25 RX ORDER — PREDNISONE 5 MG/1
TABLET ORAL
Qty: 70 TABLET | Refills: 11 | Status: SHIPPED | OUTPATIENT
Start: 2023-08-25 | End: 2023-08-25 | Stop reason: DRUGHIGH

## 2023-08-25 RX ORDER — MYCOPHENOLIC ACID 180 MG/1
360 TABLET, DELAYED RELEASE ORAL 2 TIMES DAILY
Qty: 120 TABLET | Refills: 11 | Status: SHIPPED | OUTPATIENT
Start: 2023-08-25 | End: 2023-09-05 | Stop reason: SDUPTHER

## 2023-08-25 RX ORDER — TACROLIMUS 1 MG/1
6 CAPSULE ORAL EVERY 12 HOURS
Qty: 360 CAPSULE | Refills: 11 | Status: SHIPPED | OUTPATIENT
Start: 2023-08-25 | End: 2023-09-05 | Stop reason: SDUPTHER

## 2023-08-25 NOTE — TELEPHONE ENCOUNTER
Patient states he needs refills of Myfortic, Prednisone, Prograf and Minoxidil sent to his local pharmacy- [facility]. Patient has not reestablished with his general nephrologist, . Advised patient to contact 's office ASAP to get an appointment. Patient to receive future refills of non-transplant medication via general nephrology. Informed patient he needs labs and clinic visit for transplant ASAP as well. Patient states he does not have transportation to [place] at this time. Virtual visit scheduled with  for 9/13 at 1:30pm. Educated patient on how to sign into [facility] to be able to complete virtual visit. He states understanding. Will do transplant labs on 9/4 at [facility]. Lab orders also sent to patient's home. Confirmed current address with patient.

## 2023-08-28 RX ORDER — PREDNISONE 5 MG/1
5 TABLET ORAL DAILY
Qty: 70 TABLET | Refills: 11 | Status: SHIPPED | OUTPATIENT
Start: 2023-08-28

## 2023-09-05 DIAGNOSIS — Z94.0 S/P KIDNEY TRANSPLANT: Chronic | ICD-10-CM

## 2023-09-05 DIAGNOSIS — Z94.0 KIDNEY REPLACED BY TRANSPLANT: ICD-10-CM

## 2023-09-05 RX ORDER — MYCOPHENOLIC ACID 180 MG/1
360 TABLET, DELAYED RELEASE ORAL 2 TIMES DAILY
Qty: 120 TABLET | Refills: 11 | Status: SHIPPED | OUTPATIENT
Start: 2023-09-05

## 2023-09-05 RX ORDER — TACROLIMUS 1 MG/1
6 CAPSULE ORAL EVERY 12 HOURS
Qty: 360 CAPSULE | Refills: 11 | Status: SHIPPED | OUTPATIENT
Start: 2023-09-05

## 2023-09-13 ENCOUNTER — TELEPHONE (OUTPATIENT)
Dept: TRANSPLANT | Facility: CLINIC | Age: 38
End: 2023-09-13
Payer: MEDICARE

## 2023-09-13 NOTE — TELEPHONE ENCOUNTER
Spoke with patient on 9/5/23 regarding missed lab appointment on 9/4. Patient had stated at that time that his car was broken down and he did not have a ride to lab. Advised patient at that time to notify coordinator when he was able to get a ride to lab as he had a virtual visit with  on 9/13/23. Patient had verbalized understanding of need for blood work prior to this virtual visit. Today coordinator attempting to contact patient regarding fact that virtual visit was cancelled due to patient not getting blood work done. However, patient's current phone number in chart is now out of service.

## 2023-10-03 NOTE — PLAN OF CARE
Daily Note     Today's date: 10/3/2023  Patient name: Tyson Singh  : 1951  MRN: [id number]  Referring provider: Willa Leo 8/22 8/29  9/5  9/12  9/19  9/26  10/3       Pt arrived to IR room 189 for transplant kidney biopsy. Pt oriented to unit and staff. Plan of care reviewed with patient, patient verbalizes understanding. Comfort measures utilized.  Fall risk reviewed with patient, fall risk interventions maintained. SBlankets applied. Pt prepped and draped utilizing standard sterile technique. Patient placed on continuous monitoring, as required by sedation policy. Timeouts completed utilizing standard universal time-out, per department and facility policy. RN to remain at bedside, continuous monitoring maintained. Pt resting comfortably. Denies pain/discomfort. Will continue to monitor. See flow sheets for monitoring, medication administration, and updates.      Ther Activity                                       Gait Training                                       Modalities

## 2024-06-25 ENCOUNTER — TELEPHONE (OUTPATIENT)
Dept: TRANSPLANT | Facility: CLINIC | Age: 39
End: 2024-06-25
Payer: MEDICARE

## 2024-06-25 DIAGNOSIS — Z94.0 KIDNEY REPLACED BY TRANSPLANT: Primary | ICD-10-CM

## 2024-06-25 RX ORDER — MYCOPHENOLIC ACID 180 MG/1
360 TABLET, DELAYED RELEASE ORAL 2 TIMES DAILY
Qty: 12 TABLET | Refills: 0 | Status: SHIPPED | OUTPATIENT
Start: 2024-06-25 | End: 2024-06-28

## 2024-06-25 RX ORDER — TACROLIMUS 1 MG/1
6 CAPSULE ORAL EVERY 12 HOURS
Qty: 36 CAPSULE | Refills: 0 | Status: SHIPPED | OUTPATIENT
Start: 2024-06-25 | End: 2024-06-28

## 2024-06-25 NOTE — TELEPHONE ENCOUNTER
Notified by pharmacists that patient is requesting refills on immunosuppression medications. Coordinator was unable to contact patient in September for annual visit due to phone number being out of service. Contacted phone number in chart regarding needing a clinic visit and lab work in order to continue prescribing medications. Patient's family member who answered phone states patient is at work and she will have him return call when he gets home around 3pm. Coordinator provided contact number.

## 2024-06-26 ENCOUNTER — TELEPHONE (OUTPATIENT)
Dept: TRANSPLANT | Facility: CLINIC | Age: 39
End: 2024-06-26
Payer: MEDICARE

## 2024-06-26 NOTE — TELEPHONE ENCOUNTER
Patient states understanding that he needs lab work and a clinic visit. Contacted [facility] regarding lab work and faxed lab orders. Patient states he works until 2pm every day and cannot go to lab any morning to get prograf level drawn. He states he will go to lab tomorrow when he gets off of work. Patient does not have access to his [facility] and does not have transportation to get [facility] for a clinic visit.

## 2024-06-28 ENCOUNTER — TELEPHONE (OUTPATIENT)
Dept: TRANSPLANT | Facility: CLINIC | Age: 39
End: 2024-06-28
Payer: MEDICARE

## 2024-06-28 ENCOUNTER — DOCUMENTATION ONLY (OUTPATIENT)
Dept: TRANSPLANT | Facility: CLINIC | Age: 39
End: 2024-06-28
Payer: MEDICARE

## 2024-06-28 LAB
EXT ALBUMIN: 4.3
EXT ALKALINE PHOSPHATASE: ABNORMAL
EXT ALLOSURE: ABNORMAL
EXT ALT: ABNORMAL
EXT AMYLASE: ABNORMAL
EXT ANC: ABNORMAL
EXT AST: ABNORMAL
EXT BACTERIA UA: ABNORMAL
EXT BANDS%: 0
EXT BILIRUBIN DIRECT: ABNORMAL
EXT BILIRUBIN TOTAL: ABNORMAL
EXT BK VIRUS DNA QN PCR: ABNORMAL
EXT BUN: 23
EXT C PEPTIDE: ABNORMAL
EXT CALCIUM: 8.8
EXT CHLORIDE: 107
EXT CHOLESTEROL: ABNORMAL
EXT CMV DNA QUANT. BY PCR: ABNORMAL
EXT CO2: 26
EXT CREATININE UA: ABNORMAL
EXT CREATININE: 2.91 MG/DL
EXT CYCLOSPORINE LVL: ABNORMAL
EXT EBV DNA BY PCR: ABNORMAL
EXT EBV IGG: ABNORMAL
EXT EGFR NO RACE VARIABLE: 27
EXT EOSINOPHIL%: 1.9
EXT FERRITIN: ABNORMAL
EXT GFR MDRD AF AMER: ABNORMAL
EXT GFR MDRD NON AF AMER: ABNORMAL
EXT GLUCOSE UA: ABNORMAL
EXT GLUCOSE: 87
EXT HBV DNA QUANT PCR: ABNORMAL
EXT HCV QUANT: ABNORMAL
EXT HDL: ABNORMAL
EXT HEMATOCRIT: 28.4
EXT HEMOGLOBIN A1C: ABNORMAL
EXT HEMOGLOBIN: 9
EXT HIV RNA QUANT PCR: ABNORMAL
EXT IMMUNKNOW (STIMULATED): ABNORMAL
EXT IRON SATURATION: ABNORMAL
EXT LDH, TOTAL: ABNORMAL
EXT LDL CHOLESTEROL: ABNORMAL
EXT LEFLUNOMIDE METABOLITE: ABNORMAL
EXT LIPASE: ABNORMAL
EXT LYMPH%: 8.8
EXT MAGNESIUM: 1.6
EXT MONOCYTES%: 10.1
EXT NITRITES UA: ABNORMAL
EXT PHOSPHORUS: 2.7
EXT PLATELETS: 210
EXT POTASSIUM: 4.1
EXT PROT/CREAT RATIO UR: ABNORMAL
EXT PROTEIN TOTAL: ABNORMAL
EXT PROTEIN UA: ABNORMAL
EXT PTH, INTACT: ABNORMAL
EXT RBC UA: ABNORMAL
EXT SEGS%: 79
EXT SERUM IRON: ABNORMAL
EXT SIROLIMUS LVL: ABNORMAL
EXT SODIUM: 141 MMOL/L
EXT TACROLIMUS LVL: ABNORMAL
EXT TIBC: ABNORMAL
EXT TRIGLYCERIDES: ABNORMAL
EXT URIC ACID: ABNORMAL
EXT URINE CULTURE: ABNORMAL
EXT URINE PROTEIN: ABNORMAL
EXT VIT D 25 HYDROXY: ABNORMAL
EXT WBC UA: ABNORMAL
EXT WBC: 6.3
PARVOVIRUS B19 DNA BY PCR: ABNORMAL
QUANTIFERON GOLD TB: ABNORMAL

## 2024-06-28 NOTE — TELEPHONE ENCOUNTER
Voicemail left for patient. Lab results received from [facility] drawn yesterday. Voicemail left for patient's PCP NP Bianca Mclean regarding his care and labs/medications. Spoke with 's staff who states patient has not followed with  for general nephrology since transplant.

## 2024-06-28 NOTE — TELEPHONE ENCOUNTER
Spoke with staff at [facility]- Bianca Mclean NP seeing patient for primary care is out of office until Monday. Staff states patient last saw Bianca Mclean on 1/31/24 but was unsure if she has been doing lab work on patient. States she will send Bianca Mclean's RN a message to follow up with transplant team.

## 2024-06-28 NOTE — TELEPHONE ENCOUNTER
Unable to reach patient by phone. Two voicemails have been left for patient this afternoon requesting a call back regarding patient's lab work. Patient's last creatinine on record from April 2023 was 3.6 following rejection treatment. patient has not been following with general nephrology and has been unreachable by transplant until this week.     ----- Message from Leena Castillo MD sent at 6/28/2024  3:05 PM CDT -----  Cr went to ~ 3.. he needs to go to local ER. Pending tac level

## 2024-07-01 ENCOUNTER — TELEPHONE (OUTPATIENT)
Dept: TRANSPLANT | Facility: CLINIC | Age: 39
End: 2024-07-01
Payer: MEDICARE

## 2024-07-01 DIAGNOSIS — Z94.0 KIDNEY REPLACED BY TRANSPLANT: ICD-10-CM

## 2024-07-01 LAB — EXT TACROLIMUS LVL: <1.5

## 2024-07-01 NOTE — PROGRESS NOTES
How many doses of prograf did he miss? Any new medication that lowers tacro level?  Any acute illness, difficulty refilling tacro ?  He needs to re-start tacro  at the dose he was taking last and repeat labs in 1 week, unless the patient was taking prograf correctly (unlikely due to undetectable level), if this is the case increase tacro to 7 mg PO bid   His  creatinine in 2023 was 3.6, now is 2.9. will follow up for the next few weeks to see if he needs a kidney biopsy

## 2024-07-01 NOTE — TELEPHONE ENCOUNTER
----- Message from Lashaun Deutsch sent at 7/1/2024  8:34 AM CDT -----  Regarding: Returning a Missed Call  Contact: Manda @ [facility] [place]  Returning a Missed Call    Caller: Manda @ [facility] [place]      Returning call to: Ary Stern)       Caller can be reached @: [phone]    Nature of the call:  Returning call to Ary and was told to reach out to Marielena regarding labs. Requesting a call back

## 2024-07-02 RX ORDER — TACROLIMUS 1 MG/1
6 CAPSULE ORAL EVERY 12 HOURS
Qty: 360 CAPSULE | Refills: 11 | Status: SHIPPED | OUTPATIENT
Start: 2024-07-02

## 2024-07-08 ENCOUNTER — DOCUMENTATION ONLY (OUTPATIENT)
Dept: TRANSPLANT | Facility: CLINIC | Age: 39
End: 2024-07-08
Payer: MEDICARE

## 2024-07-08 ENCOUNTER — TELEPHONE (OUTPATIENT)
Dept: TRANSPLANT | Facility: CLINIC | Age: 39
End: 2024-07-08
Payer: MEDICARE

## 2024-07-08 DIAGNOSIS — Z94.0 KIDNEY REPLACED BY TRANSPLANT: Primary | ICD-10-CM

## 2024-07-08 LAB
EXT BACTERIA UA: ABNORMAL
EXT BANDS%: 0
EXT BUN: 27
EXT CALCIUM: 9.5
EXT CHLORIDE: 109
EXT CO2: 26
EXT CREATININE UA: 117
EXT CREATININE: 3.2 MG/DL
EXT EGFR NO RACE VARIABLE: 24
EXT EOSINOPHIL%: 1.7
EXT GLUCOSE UA: NEGATIVE
EXT GLUCOSE: 96
EXT HEMATOCRIT: 33.8
EXT HEMOGLOBIN: 10.4
EXT LYMPH%: 15.2
EXT MAGNESIUM: 1.5
EXT MONOCYTES%: 9.8
EXT NITRITES UA: NEGATIVE
EXT PHOSPHORUS: 3.8
EXT PLATELETS: 229
EXT POTASSIUM: 4.6
EXT PROT/CREAT RATIO UR: 0.13
EXT PROTEIN UA: NEGATIVE
EXT RBC UA: ABNORMAL
EXT SEGS%: 73
EXT SODIUM: 143 MMOL/L
EXT TACROLIMUS LVL: 17.5
EXT URINE PROTEIN: 15
EXT WBC UA: ABNORMAL
EXT WBC: 4.3

## 2024-07-08 NOTE — PROGRESS NOTES
Does he have an acute illness?  Tacro level is pending  Please order a kidney US this week and a pr/cr ratio  Plan for a kidney biopsy next week, verify he is not taking ASA or anticoagulants please

## 2024-07-08 NOTE — TELEPHONE ENCOUNTER
Patient denies any current illness. Is not sure how much water he drinks everyday and reports that he is taking his medications as prescribed. Patient does not have a car to come to [place] for an ultrasound this week and will have to wait for his uncle to return from vacation to give him a ride for biopsy and ultrasound. Patient states he is not on aspirin or other blood thinners. Explained plan for biopsy and ultrasound to patient and he states understanding. Order placed and scheduling requested. Advised patient that IR will be calling him to schedule.      ----- Message from Jay Velasco MD sent at 7/8/2024 11:22 AM CDT -----  Does he have an acute illness?  Tacro level is pending  Please order a kidney US this week and a pr/cr ratio  Plan for a kidney biopsy next week, verify he is not taking ASA or anticoagulants please

## 2024-07-10 ENCOUNTER — TELEPHONE (OUTPATIENT)
Dept: TRANSPLANT | Facility: CLINIC | Age: 39
End: 2024-07-10
Payer: MEDICARE

## 2024-07-10 ENCOUNTER — DOCUMENTATION ONLY (OUTPATIENT)
Dept: TRANSPLANT | Facility: CLINIC | Age: 39
End: 2024-07-10
Payer: MEDICARE

## 2024-07-10 DIAGNOSIS — Z94.0 KIDNEY REPLACED BY TRANSPLANT: ICD-10-CM

## 2024-07-10 RX ORDER — TACROLIMUS 1 MG/1
4 CAPSULE ORAL EVERY 12 HOURS
Qty: 240 CAPSULE | Refills: 11 | Status: SHIPPED | OUTPATIENT
Start: 2024-07-11 | End: 2025-07-11

## 2024-07-10 NOTE — PROGRESS NOTES
Let's hold prograf for 2 doses and lower prograf to 4 mg PO bid, no need for biopsy for now, likely tacro level explained increase creatinine  Labs next week  Encourage hydration

## 2024-07-12 ENCOUNTER — DOCUMENTATION ONLY (OUTPATIENT)
Dept: TRANSPLANT | Facility: CLINIC | Age: 39
End: 2024-07-12
Payer: MEDICARE

## 2024-07-12 LAB — EXT BK VIRUS DNA QN PCR: NEGATIVE

## 2024-07-17 ENCOUNTER — TELEPHONE (OUTPATIENT)
Dept: TRANSPLANT | Facility: CLINIC | Age: 39
End: 2024-07-17
Payer: MEDICARE

## 2024-07-17 ENCOUNTER — HOSPITAL ENCOUNTER (INPATIENT)
Facility: HOSPITAL | Age: 39
LOS: 3 days | Discharge: HOME | DRG: 699 | End: 2024-07-20
Attending: HOSPITALIST | Admitting: HOSPITALIST
Payer: MEDICARE

## 2024-07-17 ENCOUNTER — DOCUMENTATION ONLY (OUTPATIENT)
Dept: TRANSPLANT | Facility: CLINIC | Age: 39
End: 2024-07-17
Payer: MEDICARE

## 2024-07-17 DIAGNOSIS — T86.11 ACUTE REJECTION OF KIDNEY TRANSPLANT: Primary | ICD-10-CM

## 2024-07-17 DIAGNOSIS — N17.9 ACUTE KIDNEY INJURY: ICD-10-CM

## 2024-07-17 DIAGNOSIS — R07.9 CHEST PAIN: ICD-10-CM

## 2024-07-17 DIAGNOSIS — Z94.0 KIDNEY REPLACED BY TRANSPLANT: ICD-10-CM

## 2024-07-17 LAB
EXT BANDS%: 0
EXT BUN: 30
EXT CALCIUM: 9
EXT CHLORIDE: 111
EXT CO2: 22
EXT CREATININE: 3.74 MG/DL
EXT EGFR NO RACE VARIABLE: 20
EXT EOSINOPHIL%: 1.2
EXT GLUCOSE: 0.2
EXT HEMATOCRIT: 31.8
EXT HEMOGLOBIN: 9.7
EXT LYMPH%: 10.6
EXT MAGNESIUM: 1.2
EXT MONOCYTES%: 16.4
EXT PHOSPHORUS: 3.7
EXT PLATELETS: 226
EXT POTASSIUM: 4.7
EXT SEGS%: 71.6
EXT SODIUM: 142 MMOL/L
EXT TACROLIMUS LVL: 9
EXT WBC: 4.8

## 2024-07-17 PROCEDURE — 20600001 HC STEP DOWN PRIVATE ROOM

## 2024-07-17 RX ORDER — POLYETHYLENE GLYCOL 3350 17 G/17G
17 POWDER, FOR SOLUTION ORAL 2 TIMES DAILY PRN
Status: DISCONTINUED | OUTPATIENT
Start: 2024-07-18 | End: 2024-07-20 | Stop reason: HOSPADM

## 2024-07-17 RX ORDER — NALOXONE HCL 0.4 MG/ML
0.02 VIAL (ML) INJECTION
Status: DISCONTINUED | OUTPATIENT
Start: 2024-07-18 | End: 2024-07-20 | Stop reason: HOSPADM

## 2024-07-17 RX ORDER — CARVEDILOL 25 MG/1
25 TABLET ORAL 2 TIMES DAILY WITH MEALS
Status: DISCONTINUED | OUTPATIENT
Start: 2024-07-18 | End: 2024-07-20 | Stop reason: HOSPADM

## 2024-07-17 RX ORDER — ONDANSETRON 8 MG/1
8 TABLET, ORALLY DISINTEGRATING ORAL EVERY 8 HOURS PRN
Status: DISCONTINUED | OUTPATIENT
Start: 2024-07-18 | End: 2024-07-20 | Stop reason: HOSPADM

## 2024-07-17 RX ORDER — MYCOPHENOLIC ACID 180 MG/1
360 TABLET, DELAYED RELEASE ORAL 2 TIMES DAILY
Status: DISCONTINUED | OUTPATIENT
Start: 2024-07-18 | End: 2024-07-20 | Stop reason: HOSPADM

## 2024-07-17 RX ORDER — MINOXIDIL 2.5 MG/1
2.5 TABLET ORAL DAILY
Status: DISCONTINUED | OUTPATIENT
Start: 2024-07-18 | End: 2024-07-20 | Stop reason: HOSPADM

## 2024-07-17 RX ORDER — ACETAMINOPHEN 325 MG/1
650 TABLET ORAL EVERY 4 HOURS PRN
Status: DISCONTINUED | OUTPATIENT
Start: 2024-07-18 | End: 2024-07-20 | Stop reason: HOSPADM

## 2024-07-17 RX ORDER — TALC
6 POWDER (GRAM) TOPICAL NIGHTLY PRN
Status: DISCONTINUED | OUTPATIENT
Start: 2024-07-18 | End: 2024-07-20 | Stop reason: HOSPADM

## 2024-07-17 RX ORDER — IBUPROFEN 200 MG
24 TABLET ORAL
Status: DISCONTINUED | OUTPATIENT
Start: 2024-07-18 | End: 2024-07-20 | Stop reason: HOSPADM

## 2024-07-17 RX ORDER — NIFEDIPINE 30 MG/1
60 TABLET, EXTENDED RELEASE ORAL 2 TIMES DAILY
Status: DISCONTINUED | OUTPATIENT
Start: 2024-07-18 | End: 2024-07-20 | Stop reason: HOSPADM

## 2024-07-17 RX ORDER — IPRATROPIUM BROMIDE AND ALBUTEROL SULFATE 2.5; .5 MG/3ML; MG/3ML
3 SOLUTION RESPIRATORY (INHALATION) EVERY 4 HOURS PRN
Status: DISCONTINUED | OUTPATIENT
Start: 2024-07-18 | End: 2024-07-20 | Stop reason: HOSPADM

## 2024-07-17 RX ORDER — IBUPROFEN 200 MG
16 TABLET ORAL
Status: DISCONTINUED | OUTPATIENT
Start: 2024-07-18 | End: 2024-07-20 | Stop reason: HOSPADM

## 2024-07-17 RX ORDER — TACROLIMUS 1 MG/1
4 CAPSULE ORAL 2 TIMES DAILY
Status: DISCONTINUED | OUTPATIENT
Start: 2024-07-18 | End: 2024-07-20

## 2024-07-17 RX ORDER — SODIUM CHLORIDE 0.9 % (FLUSH) 0.9 %
5 SYRINGE (ML) INJECTION
Status: DISCONTINUED | OUTPATIENT
Start: 2024-07-18 | End: 2024-07-20 | Stop reason: HOSPADM

## 2024-07-17 RX ORDER — PREDNISONE 5 MG/1
5 TABLET ORAL DAILY
Status: DISCONTINUED | OUTPATIENT
Start: 2024-07-18 | End: 2024-07-20 | Stop reason: HOSPADM

## 2024-07-17 RX ORDER — GLUCAGON 1 MG
1 KIT INJECTION
Status: DISCONTINUED | OUTPATIENT
Start: 2024-07-18 | End: 2024-07-20 | Stop reason: HOSPADM

## 2024-07-17 NOTE — TELEPHONE ENCOUNTER
Spoke with patient and sister regarding elevated creatinine. Patient's sister states she will let patient borrow her car while she's at work today to drive to [place] ED.      ----- Message from Jay Velasco MD sent at 7/17/2024 10:29 AM CDT -----  Ok see if we can this man here (ideal) or go to a local ER of a big hospital. My concern is that we need to get a kidney US tacro level in am alba possible biopsy, I doubt his local hospital can do those things. Great if he come here.  ----- Message -----  From: Ary Rubio RN  Sent: 7/17/2024  10:23 AM CDT  To: Jay Velasco MD    The prograf level was drawn today so it'll be a few days before that comes back  ----- Message -----  From: Jay Velasco MD  Sent: 7/17/2024  10:20 AM CDT  To: Ary Rubio RN    Yes you are correct I remember, Can we get the most recent prograf level? Let me know before we take action on the creatinine  ----- Message -----  From: Ary Rubio RN  Sent: 7/17/2024  10:17 AM CDT  To: Jay Velasco MD    We had decided to hold off on his biopsy due to thinking his increase in creatinine was related to his high prograf level. I'll call him and see if he can get a ride here today (has transportation issues). If not do you want him to go to his local ED?  ----- Message -----  From: Jay Velasco MD  Sent: 7/17/2024  10:12 AM CDT  To: Ary Rubio RN; #    Is his kidney biopsy scheduled, otherwise needs to be admitted for a kidney US and a kidney biopsy

## 2024-07-17 NOTE — PLAN OF CARE
Outside Transfer Acceptance Note / Regional Referral Center    Referring facility: [facility]   Referring provider: CHRISTINA, PROVIDER  GIGIYSTEM, PROVIDER  Accepting facility: [facility]  Accepting provider: DUSTY GOODE DAVID H.  Admitting provider: TBDYLON  Reason for transfer: Higher Level of Care   Transfer diagnosis: acute kidney injury  Transfer specialty requested: Transplant Kidney  Transplant Kidney  Transfer specialty notified: Yes  Transfer level: NUMBER 1-5: 2  Bed type requested: TSU, if not available - med-surg  Isolation status: No active isolations   Admission class or status: IP- Inpatient  IP- Inpatient      Narrative     Patient of concern is a 39M HTN, HLD, FSGS with kidney txp 2021 currently at [facility] for evaluation of acute kidney injury. Patient was called with abnormal labs by nephrologist and told to present to the ED, as Cr was 3.74 on a BLCr of 1.5-1.7 per referring. Patient reports was in USOH. He had a bout of N/V several days ago of unclear etiology that self-resolved without intervention. He was also told to recently decrease his prograf dose. There is no mention of nephrotoxic medications. I do not see an ACEi or ARB on med list. CBC is unremarkable and we await a prograf level. He is HDS and triaged to our TSU if feasible, otherwise a med-surg bed may be appropriate. Patient should be made NPO after midnight as precaution as likely will require US kidney txp +/- IR guided bx.    Objective     Vitals:    Recent Labs: All pertinent labs within the past 24 hours have been reviewed.  Recent imaging: See above   Airway:     Vent settings:         IV access:        Hemodialysis AV Fistula Left forearm (Active)   Site Assessment Clean;Dry;Intact;No redness;No swelling 04/03/23 0802   Patency Present;Thrill;Bruit 04/03/23 0802   Status Deaccessed 04/03/23 0802   Site Condition No  complications 03/29/23 0935   Dressing Open to air (None) 04/01/23 2000     Infusions: See above  Allergies: Review of patient's allergies indicates:  No Known Allergies   NPO: No    Anticoagulation:   Anticoagulants       None             Instructions      [facility]-  Admit to Hospital Medicine  Upon patient arrival to floor, please send SecureChat to [facility] or call extension [phone] (if no answer, do NOT leave a callback number after the beep, rather please send a SecureChat to [facility]), for Hospital Medicine admit team assignment and for additional admit orders for the patient.  Do not page the attending physician associated with the patient on arrival (this physician may not be on duty at the time of arrival).  Rather, always send a SecureChat to [facility] or call [phone] to reach the triage physician for orders and team assignment.

## 2024-07-18 ENCOUNTER — DOCUMENTATION ONLY (OUTPATIENT)
Dept: TRANSPLANT | Facility: CLINIC | Age: 39
End: 2024-07-18
Payer: MEDICARE

## 2024-07-18 LAB
ANION GAP SERPL CALC-SCNC: 7 MMOL/L (ref 8–16)
ANION GAP SERPL CALC-SCNC: 9 MMOL/L (ref 8–16)
BASOPHILS # BLD AUTO: 0.01 K/UL (ref 0–0.2)
BASOPHILS # BLD AUTO: 0.02 K/UL (ref 0–0.2)
BASOPHILS NFR BLD: 0.2 % (ref 0–1.9)
BASOPHILS NFR BLD: 0.4 % (ref 0–1.9)
BILIRUB UR QL STRIP: NEGATIVE
BUN SERPL-MCNC: 30 MG/DL (ref 6–20)
BUN SERPL-MCNC: 31 MG/DL (ref 6–20)
CALCIUM SERPL-MCNC: 8.7 MG/DL (ref 8.7–10.5)
CALCIUM SERPL-MCNC: 8.9 MG/DL (ref 8.7–10.5)
CHLORIDE SERPL-SCNC: 113 MMOL/L (ref 95–110)
CHLORIDE SERPL-SCNC: 113 MMOL/L (ref 95–110)
CLARITY UR REFRACT.AUTO: CLEAR
CO2 SERPL-SCNC: 16 MMOL/L (ref 23–29)
CO2 SERPL-SCNC: 18 MMOL/L (ref 23–29)
COLOR UR AUTO: COLORLESS
CREAT SERPL-MCNC: 3.1 MG/DL (ref 0.5–1.4)
CREAT SERPL-MCNC: 3.3 MG/DL (ref 0.5–1.4)
CREAT UR-MCNC: 102 MG/DL (ref 23–375)
DIFFERENTIAL METHOD BLD: ABNORMAL
DIFFERENTIAL METHOD BLD: ABNORMAL
EOSINOPHIL # BLD AUTO: 0.1 K/UL (ref 0–0.5)
EOSINOPHIL # BLD AUTO: 0.1 K/UL (ref 0–0.5)
EOSINOPHIL NFR BLD: 1.3 % (ref 0–8)
EOSINOPHIL NFR BLD: 2 % (ref 0–8)
ERYTHROCYTE [DISTWIDTH] IN BLOOD BY AUTOMATED COUNT: 12.9 % (ref 11.5–14.5)
ERYTHROCYTE [DISTWIDTH] IN BLOOD BY AUTOMATED COUNT: 13 % (ref 11.5–14.5)
EST. GFR  (NO RACE VARIABLE): 23.1 ML/MIN/1.73 M^2
EST. GFR  (NO RACE VARIABLE): 24.9 ML/MIN/1.73 M^2
GLUCOSE SERPL-MCNC: 89 MG/DL (ref 70–110)
GLUCOSE SERPL-MCNC: 95 MG/DL (ref 70–110)
GLUCOSE UR QL STRIP: NEGATIVE
HCT VFR BLD AUTO: 31.8 % (ref 40–54)
HCT VFR BLD AUTO: 32.2 % (ref 40–54)
HGB BLD-MCNC: 9.3 G/DL (ref 14–18)
HGB BLD-MCNC: 9.6 G/DL (ref 14–18)
HGB UR QL STRIP: NEGATIVE
IMM GRANULOCYTES # BLD AUTO: 0.01 K/UL (ref 0–0.04)
IMM GRANULOCYTES # BLD AUTO: 0.01 K/UL (ref 0–0.04)
IMM GRANULOCYTES NFR BLD AUTO: 0.2 % (ref 0–0.5)
IMM GRANULOCYTES NFR BLD AUTO: 0.2 % (ref 0–0.5)
INR PPP: 1.1 (ref 0.8–1.2)
KETONES UR QL STRIP: NEGATIVE
LEUKOCYTE ESTERASE UR QL STRIP: NEGATIVE
LYMPHOCYTES # BLD AUTO: 0.6 K/UL (ref 1–4.8)
LYMPHOCYTES # BLD AUTO: 0.7 K/UL (ref 1–4.8)
LYMPHOCYTES NFR BLD: 12.9 % (ref 18–48)
LYMPHOCYTES NFR BLD: 14.1 % (ref 18–48)
MAGNESIUM SERPL-MCNC: 1.2 MG/DL (ref 1.6–2.6)
MAGNESIUM SERPL-MCNC: 1.8 MG/DL (ref 1.6–2.6)
MCH RBC QN AUTO: 24.2 PG (ref 27–31)
MCH RBC QN AUTO: 24.2 PG (ref 27–31)
MCHC RBC AUTO-ENTMCNC: 29.2 G/DL (ref 32–36)
MCHC RBC AUTO-ENTMCNC: 29.8 G/DL (ref 32–36)
MCV RBC AUTO: 81 FL (ref 82–98)
MCV RBC AUTO: 83 FL (ref 82–98)
MONOCYTES # BLD AUTO: 0.8 K/UL (ref 0.3–1)
MONOCYTES # BLD AUTO: 0.8 K/UL (ref 0.3–1)
MONOCYTES NFR BLD: 16.4 % (ref 4–15)
MONOCYTES NFR BLD: 17.1 % (ref 4–15)
NEUTROPHILS # BLD AUTO: 3.1 K/UL (ref 1.8–7.7)
NEUTROPHILS # BLD AUTO: 3.2 K/UL (ref 1.8–7.7)
NEUTROPHILS NFR BLD: 67.1 % (ref 38–73)
NEUTROPHILS NFR BLD: 68.1 % (ref 38–73)
NITRITE UR QL STRIP: NEGATIVE
NRBC BLD-RTO: 0 /100 WBC
NRBC BLD-RTO: 0 /100 WBC
PH UR STRIP: 6 [PH] (ref 5–8)
PHOSPHATE SERPL-MCNC: 3.5 MG/DL (ref 2.7–4.5)
PHOSPHATE SERPL-MCNC: 3.9 MG/DL (ref 2.7–4.5)
PLATELET # BLD AUTO: 219 K/UL (ref 150–450)
PLATELET # BLD AUTO: 220 K/UL (ref 150–450)
PMV BLD AUTO: 11.3 FL (ref 9.2–12.9)
PMV BLD AUTO: 11.4 FL (ref 9.2–12.9)
POTASSIUM SERPL-SCNC: 3.8 MMOL/L (ref 3.5–5.1)
POTASSIUM SERPL-SCNC: 3.9 MMOL/L (ref 3.5–5.1)
PROT UR QL STRIP: NEGATIVE
PROT UR-MCNC: 12 MG/DL (ref 0–15)
PROT/CREAT UR: 0.12 MG/G{CREAT} (ref 0–0.2)
PROTHROMBIN TIME: 12.2 SEC (ref 9–12.5)
RBC # BLD AUTO: 3.85 M/UL (ref 4.6–6.2)
RBC # BLD AUTO: 3.97 M/UL (ref 4.6–6.2)
SODIUM SERPL-SCNC: 138 MMOL/L (ref 136–145)
SODIUM SERPL-SCNC: 138 MMOL/L (ref 136–145)
SP GR UR STRIP: 1.01 (ref 1–1.03)
TACROLIMUS BLD-MCNC: 11.1 NG/ML (ref 5–15)
URN SPEC COLLECT METH UR: ABNORMAL
WBC # BLD AUTO: 4.57 K/UL (ref 3.9–12.7)
WBC # BLD AUTO: 4.74 K/UL (ref 3.9–12.7)

## 2024-07-18 PROCEDURE — 84100 ASSAY OF PHOSPHORUS: CPT | Mod: 91

## 2024-07-18 PROCEDURE — 86832 HLA CLASS I HIGH DEFIN QUAL: CPT

## 2024-07-18 PROCEDURE — 85610 PROTHROMBIN TIME: CPT | Performed by: HOSPITALIST

## 2024-07-18 PROCEDURE — 83735 ASSAY OF MAGNESIUM: CPT | Mod: 91

## 2024-07-18 PROCEDURE — 20600001 HC STEP DOWN PRIVATE ROOM

## 2024-07-18 PROCEDURE — 99223 1ST HOSP IP/OBS HIGH 75: CPT | Mod: GC,,, | Performed by: STUDENT IN AN ORGANIZED HEALTH CARE EDUCATION/TRAINING PROGRAM

## 2024-07-18 PROCEDURE — 63600175 PHARM REV CODE 636 W HCPCS

## 2024-07-18 PROCEDURE — 82570 ASSAY OF URINE CREATININE: CPT

## 2024-07-18 PROCEDURE — 36415 COLL VENOUS BLD VENIPUNCTURE: CPT | Performed by: HOSPITALIST

## 2024-07-18 PROCEDURE — 25000003 PHARM REV CODE 250

## 2024-07-18 PROCEDURE — 83735 ASSAY OF MAGNESIUM: CPT

## 2024-07-18 PROCEDURE — 81003 URINALYSIS AUTO W/O SCOPE: CPT

## 2024-07-18 PROCEDURE — 80197 ASSAY OF TACROLIMUS: CPT

## 2024-07-18 PROCEDURE — 84100 ASSAY OF PHOSPHORUS: CPT

## 2024-07-18 PROCEDURE — 85025 COMPLETE CBC W/AUTO DIFF WBC: CPT | Mod: 91

## 2024-07-18 PROCEDURE — 86977 RBC SERUM PRETX INCUBJ/INHIB: CPT | Mod: 59

## 2024-07-18 PROCEDURE — 86833 HLA CLASS II HIGH DEFIN QUAL: CPT

## 2024-07-18 PROCEDURE — 36415 COLL VENOUS BLD VENIPUNCTURE: CPT

## 2024-07-18 PROCEDURE — 80048 BASIC METABOLIC PNL TOTAL CA: CPT | Mod: 91

## 2024-07-18 PROCEDURE — 80048 BASIC METABOLIC PNL TOTAL CA: CPT

## 2024-07-18 RX ORDER — SODIUM CHLORIDE 9 MG/ML
INJECTION, SOLUTION INTRAVENOUS CONTINUOUS
Status: ACTIVE | OUTPATIENT
Start: 2024-07-18 | End: 2024-07-18

## 2024-07-18 RX ORDER — MAGNESIUM SULFATE HEPTAHYDRATE 40 MG/ML
2 INJECTION, SOLUTION INTRAVENOUS ONCE
Status: COMPLETED | OUTPATIENT
Start: 2024-07-18 | End: 2024-07-18

## 2024-07-18 RX ADMIN — NIFEDIPINE 60 MG: 30 TABLET, FILM COATED, EXTENDED RELEASE ORAL at 12:07

## 2024-07-18 RX ADMIN — NIFEDIPINE 60 MG: 30 TABLET, FILM COATED, EXTENDED RELEASE ORAL at 08:07

## 2024-07-18 RX ADMIN — PREDNISONE 5 MG: 5 TABLET ORAL at 08:07

## 2024-07-18 RX ADMIN — CARVEDILOL 25 MG: 25 TABLET, FILM COATED ORAL at 08:07

## 2024-07-18 RX ADMIN — MAGNESIUM SULFATE HEPTAHYDRATE 2 G: 40 INJECTION, SOLUTION INTRAVENOUS at 02:07

## 2024-07-18 RX ADMIN — CARVEDILOL 25 MG: 25 TABLET, FILM COATED ORAL at 12:07

## 2024-07-18 RX ADMIN — MYCOPHENOLIC ACID 360 MG: 180 TABLET, DELAYED RELEASE ORAL at 08:07

## 2024-07-18 RX ADMIN — MINOXIDIL 2.5 MG: 2.5 TABLET ORAL at 08:07

## 2024-07-18 RX ADMIN — TACROLIMUS 4 MG: 1 CAPSULE ORAL at 05:07

## 2024-07-18 RX ADMIN — MYCOPHENOLIC ACID 360 MG: 180 TABLET, DELAYED RELEASE ORAL at 12:07

## 2024-07-18 RX ADMIN — CARVEDILOL 25 MG: 25 TABLET, FILM COATED ORAL at 05:07

## 2024-07-18 RX ADMIN — TACROLIMUS 4 MG: 1 CAPSULE ORAL at 07:07

## 2024-07-18 RX ADMIN — TACROLIMUS 4 MG: 1 CAPSULE ORAL at 12:07

## 2024-07-18 RX ADMIN — SODIUM CHLORIDE: 9 INJECTION, SOLUTION INTRAVENOUS at 02:07

## 2024-07-18 NOTE — H&P
Dictation #1  MRN:[id number]  CSN:[id number]   [facility] - Transplant [facility] Medicine  History & Physical    Patient Name: Feliz Trevizo  MRN: [id number]  Patient Class: IP- Inpatient  Admission Date: 7/17/2024  Attending Physician: Galindo Maria MD   Primary Care Provider: Dara Mclean FNP-C         Patient information was obtained from patient, past medical records, and ER records.     Subjective:     Principal Problem:Acute kidney injury superimposed on chronic kidney disease    Chief Complaint: No chief complaint on file.       HPI: Feliz Trevizo is a 39 y.o. male with PMHx of HTN, HLD, FSGS with kidney txp 2021 presenting to [facility] ED as a transfer for KTM evaluation of renal injury. Patient was called with abnormal labs by nephrologist and told to present to the ED. Per report, Cr was 3.74 on baseline of 1.5-1.7. Patient reports some nausea and vomiting over the weekend that has resolved. He reports one episode of small amount of bloody emesis but no further or previous episodes. No bloody bowel movements. No hematuria or urinary changes. Denies fever, chills, abdominal pain, chest pain, shortness of breath, flank pain. He was also told to decrease his prograf dose from 6 mg BID to 4 mg BID. He is compliant with his prednisone, Cellcept, prograf. Case was discussed with nephrology who recommended NPO at midnight for possible IR biopsy in morning, UA, UPC, US kidney transplant and DSA. HDS on admission.      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     Glomerulonephritis     Hypertension     Seizures     Sensorineural hearing loss     Stage 3a chronic kidney disease 10/29/2021    TIA (transient ischemic attack)        Past Surgical History:   Procedure Laterality Date    ABDOMINOPLASTY  10/19/2021    Procedure: ABDOMINOPLASTY;  Surgeon: Ajay Persaud MD;  Location:  [facility] OR 2ND FLR;  Service: Transplant;;    AV FISTULA PLACEMENT Left     CYSTOSCOPY      stent removal    KIDNEY TRANSPLANT N/A 10/19/2021    Procedure: TRANSPLANT, KIDNEY;  Surgeon: Ajay Persaud MD;  [facility];  Service: Transplant;  Laterality: N/A;  Out of Ice 0107  Reperfusion 0133       Review of patient's allergies indicates:  No Known Allergies    No current facility-administered medications on file prior to encounter.     Current Outpatient Medications on File Prior to Encounter   Medication Sig    aspirin (ECOTRIN) 81 MG EC tablet Take 1 tablet (81 mg total) by mouth once daily.    carvediloL (COREG) 25 MG tablet Take 1 tablet (25 mg total) by mouth 2 (two) times daily with meals.    minoxidiL (LONITEN) 2.5 MG tablet Take 1 tablet (2.5 mg total) by mouth once daily.    mycophenolate sodium 180 MG TbEC Take 2 tablets (360 mg total) by mouth 2 (two) times daily.    NIFEdipine (PROCARDIA-XL) 60 MG (OSM) 24 hr tablet Take 1 tablet (60 mg total) by mouth 2 (two) times a day.    predniSONE (DELTASONE) 5 MG tablet Take 1 tablet (5 mg total) by mouth once daily.    sodium bicarbonate 650 MG tablet Take 3 tablets (1,950 mg total) by mouth 3 (three) times daily.    sodium zirconium cyclosilicate (LOKELMA) 10 gram packet Take 1 packet (10 g total) by mouth once daily. Mix entire contents of packet(s) into drinking glass containing 3 tablespoons of water; stir well and drink immediately. Add water and repeat until no powder remains to receive entire dose.    tacrolimus (PROGRAF) 1 MG Cap Take 4 capsules (4 mg total) by mouth every 12 (twelve) hours.    valGANciclovir (VALCYTE) 450 mg Tab Take 1 tablet (450 mg total) by mouth every Mon, Wed, Fri. Stop: 6/28/23     Family History       Problem Relation (Age of Onset)    Birth defects Maternal Grandmother, Maternal Grandfather    Cancer Mother    Heart defect Brother    Heart disease Mother, Maternal Grandmother, Maternal Grandfather    Hypertension  Mother, Sister, Maternal Grandmother, Maternal Grandfather    Kidney disease Father    Lung cancer Maternal Grandfather    No Known Problems Sister    Seizures Brother    Sickle cell trait Brother    Stomach cancer Maternal Grandmother          Tobacco Use    Smoking status: Never    Smokeless tobacco: Never   Substance and Sexual Activity    Alcohol use: No    Drug use: No    Sexual activity: Not on file     Review of Systems   Constitutional:  Negative for chills and fever.   HENT:  Negative for trouble swallowing.    Eyes:  Negative for visual disturbance.   Respiratory:  Negative for cough and shortness of breath.    Cardiovascular:  Negative for chest pain and leg swelling.   Gastrointestinal:  Positive for nausea (now resolved) and vomiting (now resolved). Negative for abdominal pain, blood in stool and diarrhea.   Genitourinary:  Negative for dysuria, flank pain, frequency and hematuria.   Musculoskeletal:  Negative for arthralgias and back pain.   Skin:  Negative for rash and wound.   Neurological:  Negative for light-headedness and headaches.   Psychiatric/Behavioral:  Negative for agitation and confusion.      Objective:     Vital Signs (Most Recent):  Temp: 98 °F (36.7 °C) (07/18/24 0117)  Pulse: 80 (07/18/24 0117)  Resp: 18 (07/18/24 0117)  BP: 135/71 (07/18/24 0045)  SpO2: 98 % (07/18/24 0117) Vital Signs (24h Range):  Temp:  [98 °F (36.7 °C)-98.9 °F (37.2 °C)] 98 °F (36.7 °C)  Pulse:  [70-84] 80  Resp:  [18-20] 18  SpO2:  [97 %-100 %] 98 %  BP: (118-135)/(71-76) 135/71     Weight: 66.1 kg (145 lb 11.6 oz)  Body mass index is 20.91 kg/m².     Physical Exam  Vitals and nursing note reviewed.   Constitutional:       General: He is not in acute distress.  HENT:      Head: Normocephalic and atraumatic.      Nose: Nose normal.      Mouth/Throat:      Mouth: Mucous membranes are dry.      Pharynx: No oropharyngeal exudate.   Eyes:      Extraocular Movements: Extraocular movements intact.       Conjunctiva/sclera: Conjunctivae normal.   Cardiovascular:      Rate and Rhythm: Normal rate and regular rhythm.      Heart sounds: Normal heart sounds.   Pulmonary:      Effort: Pulmonary effort is normal.      Breath sounds: Normal breath sounds.   Abdominal:      General: Bowel sounds are normal. There is no distension.      Palpations: Abdomen is soft.      Tenderness: There is no abdominal tenderness.   Musculoskeletal:         General: Normal range of motion.      Cervical back: Normal range of motion.      Right lower leg: No edema.      Left lower leg: No edema.   Skin:     General: Skin is warm and dry.   Neurological:      General: No focal deficit present.      Mental Status: He is alert and oriented to person, place, and time.   Psychiatric:         Mood and Affect: Mood normal.         Behavior: Behavior normal.                Significant Labs: All pertinent labs within the past 24 hours have been reviewed.    Significant Imaging: I have reviewed all pertinent imaging results/findings within the past 24 hours.    Assessment/Plan:     * Acute kidney injury superimposed on chronic kidney disease  S/P kidney transplant 10/19/21   39M w/ FSGS with kidney txp 2021 presenting to [facility] ED as a transfer for KTM evaluation of renal injury. Cr on outpatient labs was reportedly 3.74 on baseline of 1.5-1.7. Patient has some nausea/vomiting over the weekend that resolved. Was also told recently to decrease his prograf dose from 6 mg BID to 4 mg BID. Denies any nephrotoxic medications. VSS.    - Continue home prednisone 5 mg, Cellcept 360 mg BID, prograf 4 mg BID  - Daily prograf level  - NPO at midnight for possible IR biopsy in morning  - , C, US kidney transplant and DSA ordered  - KTM consulted, appreciate recs  - Daily BMP, phos, mag  - IV hydration overnight     Anemia of chronic disease  Patient's anemia is currently stable. Has not received any PRBCs to date. Etiology likely d/t chronic disease due to  Chronic Kidney Disease  Current CBC reviewed-   Lab Results   Component Value Date    HGB 9.6 (L) 07/18/2024    HCT 32.2 (L) 07/18/2024     Monitor serial CBC and transfuse if patient becomes hemodynamically unstable, symptomatic or H/H drops below 7/21.    Sensorineural hearing loss (SNHL) of both ears  Chronic history noted. He is hard of hearing    Renovascular hypertension  - BP well controlled don admission  - Continue home coreg, nifedipine, minoxidil       VTE Risk Mitigation (From admission, onward)           Ordered     IP VTE HIGH RISK PATIENT  Once         07/17/24 2304     Place sequential compression device  Until discontinued         07/17/24 2304     Reason for No Pharmacological VTE Prophylaxis  Once        Comments: Possible biopsy 7/18   Question:  Reasons:  Answer:  Physician Provided (leave comment)    07/17/24 2304                                    Nirali Sanches PA-C  Department of Hospital Medicine  [facility] - Transplant Stepdown

## 2024-07-18 NOTE — PROGRESS NOTES
[facility] - Transplant [facility] Medicine  Progress Note    Patient Name: Feliz Trevizo  MRN: [id number]  Patient Class: IP- Inpatient   Admission Date: 7/17/2024  Length of Stay: 1 days  Attending Physician: Shell Sandoval MD  Primary Care Provider: Dara Mclean FNP-C        Subjective:     Principal Problem:Acute kidney injury superimposed on chronic kidney disease        HPI:  Feliz Trevizo is a 39 y.o. male with PMHx of HTN, HLD, FSGS with kidney txp 2021 presenting to [facility] ED as a transfer for KTM evaluation of renal injury. Patient was called with abnormal labs by nephrologist and told to present to the ED. Per report, Cr was 3.74 on baseline of 1.5-1.7. Patient reports some nausea and vomiting over the weekend that has resolved. He reports one episode of small amount of bloody emesis but no further or previous episodes. No bloody bowel movements. No hematuria or urinary changes. Denies fever, chills, abdominal pain, chest pain, shortness of breath, flank pain. He was also told to decrease his prograf dose from 6 mg BID to 4 mg BID. He is compliant with his prednisone, Cellcept, prograf. Case was discussed with nephrology who recommended NPO at midnight for possible IR biopsy in morning, , [facility] and DSA. HDS on admission.    Overview/Hospital Course:  Ultrasound showed elevated renal resistance indices concerning for rejection.  Transplant Nephrology team following.  Planning for in-patient biopsy.    Interval history: No overnight events.  Creatinine improved to 3.1    Review of Systems   Constitutional:  Negative for chills and fever.   HENT:  Negative for trouble swallowing.    Eyes:  Negative for visual disturbance.   Respiratory:  Negative for cough and shortness of breath.    Cardiovascular:  Negative for chest pain and leg swelling.   Gastrointestinal:  Positive for nausea (now resolved) and vomiting (now resolved). Negative for abdominal pain, blood in stool  and diarrhea.   Genitourinary:  Negative for dysuria, flank pain, frequency and hematuria.   Musculoskeletal:  Negative for arthralgias and back pain.   Skin:  Negative for rash and wound.   Neurological:  Negative for light-headedness and headaches.   Psychiatric/Behavioral:  Negative for agitation and confusion.      Objective:     Vital Signs (Most Recent):  Temp: 98 °F (36.7 °C) (07/18/24 1132)  Pulse: 73 (07/18/24 1132)  Resp: 20 (07/18/24 1132)  BP: 109/63 (07/18/24 1132)  SpO2: 99 % (07/18/24 1132) Vital Signs (24h Range):  Temp:  [97.5 °F (36.4 °C)-98.9 °F (37.2 °C)] 98 °F (36.7 °C)  Pulse:  [67-84] 73  Resp:  [16-20] 20  SpO2:  [97 %-100 %] 99 %  BP: (109-135)/(63-76) 109/63     Weight: 66.1 kg (145 lb 11.6 oz)  Body mass index is 20.91 kg/m².     Physical Exam  Vitals and nursing note reviewed.   Constitutional:       General: He is not in acute distress.  HENT:      Head: Normocephalic and atraumatic.      Nose: Nose normal.      Mouth/Throat:      Mouth: Mucous membranes are dry.      Pharynx: No oropharyngeal exudate.   Eyes:      Extraocular Movements: Extraocular movements intact.      Conjunctiva/sclera: Conjunctivae normal.   Cardiovascular:      Rate and Rhythm: Normal rate and regular rhythm.      Heart sounds: Normal heart sounds.   Pulmonary:      Effort: Pulmonary effort is normal.      Breath sounds: Normal breath sounds.   Abdominal:      General: Bowel sounds are normal. There is no distension.      Palpations: Abdomen is soft.      Tenderness: There is no abdominal tenderness.   Musculoskeletal:         General: Normal range of motion.      Cervical back: Normal range of motion.      Right lower leg: No edema.      Left lower leg: No edema.   Skin:     General: Skin is warm and dry.   Neurological:      General: No focal deficit present.      Mental Status: He is alert and oriented to person, place, and time.   Psychiatric:         Mood and Affect: Mood normal.         Behavior: Behavior  normal.                Significant Labs: All pertinent labs within the past 24 hours have been reviewed.    Significant Imaging: I have reviewed all pertinent imaging results/findings within the past 24 hours.    Assessment/Plan:      * Acute kidney injury superimposed on chronic kidney disease  S/P kidney transplant 10/19/21   39M w/ FSGS with kidney txp 2021 presenting to [facility] ED as a transfer for KTM evaluation of renal injury. Cr on outpatient labs was reportedly 3.74 on baseline of 1.5-1.7. Patient has some nausea/vomiting over the weekend that resolved. Was also told recently to decrease his prograf dose from 6 mg BID to 4 mg BID. Denies any nephrotoxic medications. VSS.    - Continue home prednisone 5 mg, Cellcept 360 mg BID, prograf 4 mg BID  - Daily prograf level  - KTM consulted, appreciate recs  - Daily BMP, phos, mag       Anemia of chronic disease  Patient's anemia is currently stable. Has not received any PRBCs to date. Etiology likely d/t chronic disease due to Chronic Kidney Disease  Current CBC reviewed-   Lab Results   Component Value Date    HGB 9.3 (L) 07/18/2024    HCT 31.8 (L) 07/18/2024     Monitor serial CBC and transfuse if patient becomes hemodynamically unstable, symptomatic or H/H drops below 7/21.    S/P kidney transplant 10/19/21        Sensorineural hearing loss (SNHL) of both ears  Chronic history noted. He is hard of hearing    Renovascular hypertension  - BP well controlled don admission  - Continue home coreg, nifedipine, minoxidil       VTE Risk Mitigation (From admission, onward)           Ordered     IP VTE HIGH RISK PATIENT  Once         07/17/24 2304     Place sequential compression device  Until discontinued         07/17/24 2304     Reason for No Pharmacological VTE Prophylaxis  Once        Comments: Possible biopsy 7/18   Question:  Reasons:  Answer:  Physician Provided (leave comment)    07/17/24 2304                    Discharge Planning   MARSHALL: 7/20/2024     Code Status:  Full Code   Is the patient medically ready for discharge?:     Reason for patient still in hospital (select all that apply): Patient trending condition, Laboratory test, Treatment, and Consult recommendations  Discharge Plan A: Home, Home with family                  Shell Sandoval MD  Department of Hospital Medicine   [facility] - Transplant Stepdown

## 2024-07-18 NOTE — PLAN OF CARE
[facility] - Transplant Stepdown  Initial Discharge Assessment       Primary Care Provider: Dara Mclean FNP-C    Admission Diagnosis: Acute kidney injury [N17.9]    Admission Date: 7/17/2024  Expected Discharge Date: 7/20/2024    Transition of Care Barriers: None    Payor: MEDICARE / Plan: MEDICARE PART A & B / Product Type: Government /     No emergency contact information on file.    Discharge Plan A: Home, Home with family  Discharge Plan B: Home Health      [facility]  [address]  [facility] [address]  Phone: [phone] Fax: [phone]    [facility] - [place], [place] - [address]  [address] [address]  Phone: [phone] Fax: [phone]      Initial Assessment (most recent)       Adult Discharge Assessment - 07/18/24 1000          Discharge Assessment    Assessment Type Discharge Planning Assessment     Confirmed/corrected address, phone number and insurance Yes     Confirmed Demographics Correct on Facesheet     Source of Information patient     Communicated MARSHALL with patient/caregiver Date not available/Unable to determine     Reason For Admission Acute kidney injury superimposed on chronic kidney disease     People in Home sibling(s)     Do you expect to return to your current living situation? Yes     Do you have help at home or someone to help you manage your care at home? Yes     Who are your caregiver(s) and their phone number(s)? Saira ( Sister) [phone]     Prior to hospitilization cognitive status: Alert/Oriented     Current cognitive status: Alert/Oriented     Walking or Climbing Stairs Difficulty no     Dressing/Bathing Difficulty no     Equipment Currently Used at Home none     Readmission within 30 days? No     Patient currently being followed by outpatient case management? No     Do you currently have service(s) that help you manage your care at home? No     Do you take prescription medications? Yes     Do you have  prescription coverage? Yes     Coverage Acute kidney injury superimposed on chronic kidney disease     Is the patient taking medications as prescribed? yes     Are you on dialysis? No     Do you take coumadin? No     Discharge Plan A Home;Home with family     Discharge Plan B Home Health     DME Needed Upon Discharge  other (see comments)   TBD    Discharge Plan discussed with: Patient     Transition of Care Barriers None        Physical Activity    On average, how many days per week do you engage in moderate to strenuous exercise (like a brisk walk)? Patient declined     On average, how many minutes do you engage in exercise at this level? Patient declined        Financial Resource Strain    How hard is it for you to pay for the very basics like food, housing, medical care, and heating? Patient declined        Housing Stability    In the last 12 months, was there a time when you were not able to pay the mortgage or rent on time? Patient declined     At any time in the past 12 months, were you homeless or living in a shelter (including now)? Patient declined        Transportation Needs    Has the lack of transportation kept you from medical appointments, meetings, work or from getting things needed for daily living? Patient declined        Food Insecurity    Within the past 12 months, you worried that your food would run out before you got the money to buy more. Patient declined     Within the past 12 months, the food you bought just didn't last and you didn't have money to get more. Patient declined        Stress    Do you feel stress - tense, restless, nervous, or anxious, or unable to sleep at night because your mind is troubled all the time - these days? Patient declined        Social Isolation    How often do you feel lonely or isolated from those around you?  Patient declined        Alcohol Use    Q1: How often do you have a drink containing alcohol? Patient declined     Q2: How many drinks containing alcohol  do you have on a typical day when you are drinking? Patient declined     Q3: How often do you have six or more drinks on one occasion? Patient declined        Utilities    In the past 12 months has the electric, gas, oil, or water company threatened to shut off services in your home? Patient declined        Health Literacy    How often do you need to have someone help you when you read instructions, pamphlets, or other written material from your doctor or pharmacy? Patient declines to respond                     This SW met with patient at bedside to complete DPA. Questions answered / contact numbers provided.  Use PREFERRED PHARMACY / BEDSIDE DELIVERY for any necessary medications at time of discharge. The patient is independent with all ADLs - does not use DME, In-home equipment, is not on HD, Coumadin or home oxygen.The patient's sister will be assisting with help upon discharge. The patient reported that one of his family members will be coming to get him from the hospital upon discharge.      Will continue to follow for course of hospitalization.    Discharge Plan A and Plan B have been determined by review of patient's clinical status, future medical and therapeutic needs, and coverage/benefits for post-acute care in coordination with multidisciplinary team members.    Bette Baron LCSW  Case Management [facility]

## 2024-07-18 NOTE — PLAN OF CARE
"K+=3.8. CO2=18 inc from 16. Cr=3.1 dec from 3.3. NS inf at 75ml/hr to stop today. NPO all day for possible kidney bx. Dr. Newell cancelled bx. Regular diet ordered. Lunch served. Voiding adequate amount urine without difficulty. C/O abd pain that dec to "1" after having BM this am. Stated BM was watery. No further BM since this am. Afebrile. Dr. Newell at  this afternoon to discuss plans for pt to continue follow-up with his nephrologist Dr. Crouch in [facility]. Referral sent to Dr. Crouch per . Sister called pt after speaking with Dr. Newell on phone. Sister asked to speak to RN. Reinforced plan pt needed to follow-up with Dr. Crouch and obtain needed labs when ordered for monitoring kidney. Explained  would be sending referral. Also explained that Dr. Newell told pt he did not need HD now but may need it in a year or two. Agreeable to help pt with transportation for appts with Dr. Crouch and labs. Discussed with pt follow-up plan. Stated he would see Dr. Crouch and have labs done when ordered. Stated he would communicate with his sister when they were scheduled to obtain transportation.    "

## 2024-07-18 NOTE — NURSING
Nurses Note -- 4 Eyes      7/18/2024   6:41 AM      Skin assessed during: Admit      [x] No Altered Skin Integrity Present    []Prevention Measures Documented      [] Yes- Altered Skin Integrity Present or Discovered   [] LDA Added if Not in Epic (Describe Wound)   [] New Altered Skin Integrity was Present on Admit and Documented in LDA   [] Wound Image Taken    Wound Care Consulted? No    Attending Nurse:  Tae Ellison RN/Staff Member:     Parish

## 2024-07-18 NOTE — CONSULTS
"KTM Consult Note     Feliz Trevizo is a 39 y.o. male with PMHx of HTN, HLD, FSGS with kidney txp 2021 [place] [facility] [place] for KTM evaluation of renal injury. Patient was called with abnormal labs by nephrologist and told to present to the ED. Per report, Cr was 3.74. Patient reports some nausea and vomiting over the weekend that has resolved. He reports one episode of small amount of bloody emesis but no further or previous episodes. No bloody bowel movements. No hematuria or urinary changes. Denies fever, chills, abdominal pain, chest pain, shortness of breath, flank pain. He was also told to decrease his prograf dose from 6 mg BID to 4 mg BID. He is compliant with his prednisone, Cellcept, prograf.     S/p renal transplant 10/19/21     Patient is from [place], [place] and has not been following his nephrologist for more than an year. He mentions following with General Nephrologist Dr. Ralf Andres in [place].     He mentions having a single episode of diarrhoea this AM but his abdominal pain has largely abated. He has one episode of mild ?hemoptysis earlier this week- but that hasn't recurred.     No dysuria or hematuria. Mentions no decrease in his urine output.     No RFPs are available since 2023. His Cr on this admission was 3.6 and has decreased today.     Renal Bx 3/27/23 s/o AMR    Transplant Nephrology was consulted for:   "Rejection concerns, ?SEBASTIAN on CKD, Immunosuppression management"  Baseline sCr: No labs since 1 year  Admission Scr 3.6  Home immunosuppression: Prograf 4 mg BID, Cellcept 360 BID and Prednisone 5 mg    Review of Systems   Constitutional:  Negative for chills and fever.   HENT:  Negative for trouble swallowing.    Eyes:  Negative for visual disturbance.   Respiratory:  Negative for cough and shortness of breath.    Cardiovascular:  Negative for chest pain and leg swelling.   Gastrointestinal:  Positive for nausea (now resolved) and vomiting (now resolved). Negative for " abdominal pain, blood in stool and diarrhea.   Genitourinary:  Negative for dysuria, flank pain, frequency and hematuria.   Musculoskeletal:  Negative for arthralgias and back pain.   Skin:  Negative for rash and wound.   Neurological:  Negative for light-headedness and headaches.   Psychiatric/Behavioral:  Negative for agitation and confusion.    In the ED, pt presented with the following VS:     Physical Exam  Vitals and nursing note reviewed.   Constitutional:       General: He is not in acute distress.  HENT:      Head: Normocephalic and atraumatic.      Nose: Nose normal.      Mouth/Throat:      Mouth: Mucous membranes are dry.      Pharynx: No oropharyngeal exudate.   Eyes:      Extraocular Movements: Extraocular movements intact.      Conjunctiva/sclera: Conjunctivae normal.   Cardiovascular:      Rate and Rhythm: Normal rate and regular rhythm.      Heart sounds: Normal heart sounds.   Pulmonary:      Effort: Pulmonary effort is normal.      Breath sounds: Normal breath sounds.   Abdominal:      General: Bowel sounds are normal. There is no distension.      Palpations: Abdomen is soft.      Tenderness: There is no abdominal tenderness.   Musculoskeletal:         General: Normal range of motion.      Cervical back: Normal range of motion.      Right lower leg: No edema.      Left lower leg: No edema.   Skin:     General: Skin is warm and dry.   Neurological:      General: No focal deficit present.      Mental Status: He is alert and oriented to person, place, and time.   Psychiatric:         Mood and Affect: Mood normal.         Behavior: Behavior normal.     Initial Vitals [07/17/24 2215]   BP Pulse Resp Temp SpO2   126/71 80 19 98 °F (36.7 °C) 98 %      MAP       --           I/O last 3 completed shifts:  In: 714.7 [P.O.:500; I.V.:214.7]  Out: 100 [Urine:100]  I/O this shift:  In: -   Out: 250 [Urine:250]     Scheduled Meds:   carvediloL  25 mg Oral BID WM    minoxidiL  2.5 mg Oral Daily    mycophenolate  sodium  360 mg Oral BID    NIFEdipine  60 mg Oral BID    predniSONE  5 mg Oral Daily    tacrolimus  4 mg Oral BID     Continuous Infusions:  PRN Meds:.  Current Facility-Administered Medications:     acetaminophen, 650 mg, Oral, Q4H PRN    albuterol-ipratropium, 3 mL, Nebulization, Q4H PRN    dextrose 10%, 12.5 g, Intravenous, PRN    dextrose 10%, 25 g, Intravenous, PRN    glucagon (human recombinant), 1 mg, Intramuscular, PRN    glucose, 16 g, Oral, PRN    glucose, 24 g, Oral, PRN    melatonin, 6 mg, Oral, Nightly PRN    naloxone, 0.02 mg, Intravenous, PRN    ondansetron, 8 mg, Oral, Q8H PRN    polyethylene glycol, 17 g, Oral, BID PRN    sodium chloride 0.9%, 5 mL, Intravenous, PRN     ASSESSMENT AND PLAN:   - Continue home immunosuppressive regimen of Prograf 4 mg BID, Cellcept 360 BID and Prednisone 5 mg at this time   - Please admin Prograf at 8 am and 8 pm with levels at 6 am   - Discussed with primary team regarding holding biopsy for now. Last Bx in 2023 showed AMR. Also, lightly improved Cr levels.   - Will attempt connecting to his Nephrologist in [place] and obtain more information   - Patient has been counseled on the possibility of requiring dialysis in the future (not immediate) and seems to understand. Will also speak to his sister and relay the same information unto her.       General recs:  - Strict I&Os q shift  - monitor closely for signs of IS toxicity  - Avoid nephrotoxic agents such as NSAIDs, gadolinium and IV radiocontrast.  - Renally dose meds to current GFR.  -Avoid using agents that can change IS metabolism and levels; if you have any concerns about medication interactions, contact a KTM or pharmD.     Case discussed with Dr. Giles.     Carole Villarreal MD   Visiting Nephrology Fellow, PGY-5  [facility]

## 2024-07-18 NOTE — PLAN OF CARE
3:39 PM  The SW received a secure chat from the patient's MD requesting that a referral be sent to Dr. Crouch on behalf of this patient for an initial appointment. The SW met with the patient and obtained a MARGARITA to release his information to Dr. Crouch.  The SW contacted Dr. Crouhc's office to inquire about the information needed in order for the patient to obtain a initial visit/ referral process. The representative informed the SW that the following information is needed: MD progress and Labs.     The SW faxed the patient's referral to Dr. Crouch's office for review.     The SW notified the patient's care team that the patient's referral was sent out to Dr. Crouch's office.    3:58 PM  Your fax has been successfully sent to [phone] at [phone].  ------------------------------------------------------------  From: [id number]  ------------------------------------------------------------  7/18/2024 3:37:10 PM Transmission Record    Bette Baron LCSW  Case Management [facility]

## 2024-07-18 NOTE — SUBJECTIVE & OBJECTIVE
Interval history: No overnight events.  Creatinine improved to 3.1    Review of Systems   Constitutional:  Negative for chills and fever.   HENT:  Negative for trouble swallowing.    Eyes:  Negative for visual disturbance.   Respiratory:  Negative for cough and shortness of breath.    Cardiovascular:  Negative for chest pain and leg swelling.   Gastrointestinal:  Positive for nausea (now resolved) and vomiting (now resolved). Negative for abdominal pain, blood in stool and diarrhea.   Genitourinary:  Negative for dysuria, flank pain, frequency and hematuria.   Musculoskeletal:  Negative for arthralgias and back pain.   Skin:  Negative for rash and wound.   Neurological:  Negative for light-headedness and headaches.   Psychiatric/Behavioral:  Negative for agitation and confusion.      Objective:     Vital Signs (Most Recent):  Temp: 98 °F (36.7 °C) (07/18/24 1132)  Pulse: 73 (07/18/24 1132)  Resp: 20 (07/18/24 1132)  BP: 109/63 (07/18/24 1132)  SpO2: 99 % (07/18/24 1132) Vital Signs (24h Range):  Temp:  [97.5 °F (36.4 °C)-98.9 °F (37.2 °C)] 98 °F (36.7 °C)  Pulse:  [67-84] 73  Resp:  [16-20] 20  SpO2:  [97 %-100 %] 99 %  BP: (109-135)/(63-76) 109/63     Weight: 66.1 kg (145 lb 11.6 oz)  Body mass index is 20.91 kg/m².     Physical Exam  Vitals and nursing note reviewed.   Constitutional:       General: He is not in acute distress.  HENT:      Head: Normocephalic and atraumatic.      Nose: Nose normal.      Mouth/Throat:      Mouth: Mucous membranes are dry.      Pharynx: No oropharyngeal exudate.   Eyes:      Extraocular Movements: Extraocular movements intact.      Conjunctiva/sclera: Conjunctivae normal.   Cardiovascular:      Rate and Rhythm: Normal rate and regular rhythm.      Heart sounds: Normal heart sounds.   Pulmonary:      Effort: Pulmonary effort is normal.      Breath sounds: Normal breath sounds.   Abdominal:      General: Bowel sounds are normal. There is no distension.      Palpations: Abdomen is  soft.      Tenderness: There is no abdominal tenderness.   Musculoskeletal:         General: Normal range of motion.      Cervical back: Normal range of motion.      Right lower leg: No edema.      Left lower leg: No edema.   Skin:     General: Skin is warm and dry.   Neurological:      General: No focal deficit present.      Mental Status: He is alert and oriented to person, place, and time.   Psychiatric:         Mood and Affect: Mood normal.         Behavior: Behavior normal.                Significant Labs: All pertinent labs within the past 24 hours have been reviewed.    Significant Imaging: I have reviewed all pertinent imaging results/findings within the past 24 hours.

## 2024-07-18 NOTE — ASSESSMENT & PLAN NOTE
S/P kidney transplant 10/19/21   39M w/ FSGS with kidney txp 2021 presenting to [facility] ED as a transfer for KTM evaluation of renal injury. Cr on outpatient labs was reportedly 3.74 on baseline of 1.5-1.7. Patient has some nausea/vomiting over the weekend that resolved. Was also told recently to decrease his prograf dose from 6 mg BID to 4 mg BID. Denies any nephrotoxic medications. VSS.    - Continue home prednisone 5 mg, Cellcept 360 mg BID, prograf 4 mg BID  - Daily prograf level  - NPO at midnight for possible IR biopsy in morning  - UA, UPC, US kidney transplant and DSA ordered  - KTM consulted, appreciate recs  - Daily BMP, phos, mag  - IV hydration overnight

## 2024-07-18 NOTE — HPI
Feliz Trevizo is a 39 y.o. male with PMHx of HTN, HLD, FSGS with kidney txp 2021 presenting to [facility] ED as a transfer for KTM evaluation of renal injury. Patient was called with abnormal labs by nephrologist and told to present to the ED. Per report, Cr was 3.74 on baseline of 1.5-1.7. Patient reports some nausea and vomiting over the weekend that has resolved. He reports one episode of small amount of bloody emesis but no further or previous episodes. No bloody bowel movements. No hematuria or urinary changes. Denies fever, chills, abdominal pain, chest pain, shortness of breath, flank pain. He was also told to decrease his prograf dose from 6 mg BID to 4 mg BID. He is compliant with his prednisone, Cellcept, prograf. Case was discussed with nephrology who recommended NPO at midnight for possible IR biopsy in morning, UA, UPC, US kidney transplant and DSA. HDS on admission.

## 2024-07-18 NOTE — ASSESSMENT & PLAN NOTE
Patient's anemia is currently stable. Has not received any PRBCs to date. Etiology likely d/t chronic disease due to Chronic Kidney Disease  Current CBC reviewed-   Lab Results   Component Value Date    HGB 9.6 (L) 07/18/2024    HCT 32.2 (L) 07/18/2024     Monitor serial CBC and transfuse if patient becomes hemodynamically unstable, symptomatic or H/H drops below 7/21.

## 2024-07-18 NOTE — HOSPITAL COURSE
Ultrasound showed elevated renal resistance indices concerning for rejection.  Transplant Nephrology team following.  No plan for inpatient biopsy.  Continue Prograf.  Patient on 07/19 complaining of diffuse abdominal pain, CTA abdomen was unremarkable.  Abdominal pain resolved.  Transplant nephrology team recommended lowering Prograf levels to 3 mg b.i.d..  And follow up with transplant Nephrology as outpatient.    Feliz Trevizo was deemed appropriate for discharge.  At the time of discharge, the plan of care was discussed with patient/family, who were agreeable and amenable.  All medications were verbally reviewed and discussed with the patient/family.  They endorsed understanding and compliance.  Informed them that these changes will be available on their discharge paperwork, as well.  Outpatient follow-ups were scheduled, or if unable to be scheduled ambulatory referrals were placed, and ER return precautions were given.  All questions were answered to the patient/family's satisfaction.  He was subsequently discharged in stable condition.

## 2024-07-18 NOTE — ASSESSMENT & PLAN NOTE
S/P kidney transplant 10/19/21   39M w/ FSGS with kidney txp 2021 [place] to [facility] [place] as a transfer for KTM evaluation of renal injury. Cr on outpatient labs was reportedly 3.74 on baseline of 1.5-1.7. Patient has some nausea/vomiting over the weekend that resolved. Was also told recently to decrease his prograf dose from 6 mg BID to 4 mg BID. Denies any nephrotoxic medications. VSS.    - Continue home prednisone 5 mg, Cellcept 360 mg BID, prograf 4 mg BID  - Daily prograf level  - KTM consulted, appreciate recs  - Daily BMP, phos, mag

## 2024-07-18 NOTE — ASSESSMENT & PLAN NOTE
Patient's anemia is currently stable. Has not received any PRBCs to date. Etiology likely d/t chronic disease due to Chronic Kidney Disease  Current CBC reviewed-   Lab Results   Component Value Date    HGB 9.3 (L) 07/18/2024    HCT 31.8 (L) 07/18/2024     Monitor serial CBC and transfuse if patient becomes hemodynamically unstable, symptomatic or H/H drops below 7/21.

## 2024-07-18 NOTE — PLAN OF CARE
- On 07/17/2024 patient was transferred to [facility] from a hospital in [place]  -Patient admitted with DX SEBASTIAN superimposed on chronic kidney disease   -Patient complained intermitted  stomach pain and nausea.  -Patient had an ultrasound on 07/18/2018 it has not been analyzed yet  -Patient has been NPO since midnight 07/18/2024 pending a decision for a possible kidney biopsy   -Patient has a moderate hearing deficit  -Patient received a bag of Mg

## 2024-07-18 NOTE — SUBJECTIVE & OBJECTIVE
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     Glomerulonephritis     Hypertension     Seizures     Sensorineural hearing loss     Stage 3a chronic kidney disease 10/29/2021    TIA (transient ischemic attack)        Past Surgical History:   Procedure Laterality Date    ABDOMINOPLASTY  10/19/2021    Procedure: ABDOMINOPLASTY;  Surgeon: Ajay Persaud MD;  Location: [facility] OR [address] [place] [address];  Service: Transplant;;    AV FISTULA PLACEMENT Left     CYSTOSCOPY      stent removal    KIDNEY TRANSPLANT N/A 10/19/2021    Procedure: TRANSPLANT, KIDNEY;  Surgeon: Ajay Persaud MD;  Location: [facility] OR [address] [place] [address];  Service: Transplant;  Laterality: N/A;  Out of Ice 0107  Reperfusion 0133       Review of patient's allergies indicates:  No Known Allergies    No current facility-administered medications on file prior to encounter.     Current Outpatient Medications on File Prior to Encounter   Medication Sig    aspirin (ECOTRIN) 81 MG EC tablet Take 1 tablet (81 mg total) by mouth once daily.    carvediloL (COREG) 25 MG tablet Take 1 tablet (25 mg total) by mouth 2 (two) times daily with meals.    minoxidiL (LONITEN) 2.5 MG tablet Take 1 tablet (2.5 mg total) by mouth once daily.    mycophenolate sodium 180 MG TbEC Take 2 tablets (360 mg total) by mouth 2 (two) times daily.    NIFEdipine (PROCARDIA-XL) 60 MG (OSM) 24 hr tablet Take 1 tablet (60 mg total) by mouth 2 (two) times a day.    predniSONE (DELTASONE) 5 MG tablet Take 1 tablet (5 mg total) by mouth once daily.    sodium bicarbonate 650 MG tablet Take 3 tablets (1,950 mg total) by mouth 3 (three) times daily.    sodium zirconium cyclosilicate (LOKELMA) 10 gram packet Take 1 packet (10 g total) by mouth once daily. Mix entire contents of packet(s) into drinking glass containing 3 tablespoons of water; stir well and drink immediately. Add water and repeat  until no powder remains to receive entire dose.    tacrolimus (PROGRAF) 1 MG Cap Take 4 capsules (4 mg total) by mouth every 12 (twelve) hours.    valGANciclovir (VALCYTE) 450 mg Tab Take 1 tablet (450 mg total) by mouth every Mon, Wed, Fri. Stop: 6/28/23     Family History       Problem Relation (Age of Onset)    Birth defects Maternal Grandmother, Maternal Grandfather    Cancer Mother    Heart defect Brother    Heart disease Mother, Maternal Grandmother, Maternal Grandfather    Hypertension Mother, Sister, Maternal Grandmother, Maternal Grandfather    Kidney disease Father    Lung cancer Maternal Grandfather    No Known Problems Sister    Seizures Brother    Sickle cell trait Brother    Stomach cancer Maternal Grandmother          Tobacco Use    Smoking status: Never    Smokeless tobacco: Never   Substance and Sexual Activity    Alcohol use: No    Drug use: No    Sexual activity: Not on file     Review of Systems   Constitutional:  Negative for chills and fever.   HENT:  Negative for trouble swallowing.    Eyes:  Negative for visual disturbance.   Respiratory:  Negative for cough and shortness of breath.    Cardiovascular:  Negative for chest pain and leg swelling.   Gastrointestinal:  Positive for nausea (now resolved) and vomiting (now resolved). Negative for abdominal pain, blood in stool and diarrhea.   Genitourinary:  Negative for dysuria, flank pain, frequency and hematuria.   Musculoskeletal:  Negative for arthralgias and back pain.   Skin:  Negative for rash and wound.   Neurological:  Negative for light-headedness and headaches.   Psychiatric/Behavioral:  Negative for agitation and confusion.      Objective:     Vital Signs (Most Recent):  Temp: 98 °F (36.7 °C) (07/18/24 0117)  Pulse: 80 (07/18/24 0117)  Resp: 18 (07/18/24 0117)  BP: 135/71 (07/18/24 0045)  SpO2: 98 % (07/18/24 0117) Vital Signs (24h Range):  Temp:  [98 °F (36.7 °C)-98.9 °F (37.2 °C)] 98 °F (36.7 °C)  Pulse:  [70-84] 80  Resp:  [18-20]  18  SpO2:  [97 %-100 %] 98 %  BP: (118-135)/(71-76) 135/71     Weight: 66.1 kg (145 lb 11.6 oz)  Body mass index is 20.91 kg/m².     Physical Exam  Vitals and nursing note reviewed.   Constitutional:       General: He is not in acute distress.  HENT:      Head: Normocephalic and atraumatic.      Nose: Nose normal.      Mouth/Throat:      Mouth: Mucous membranes are dry.      Pharynx: No oropharyngeal exudate.   Eyes:      Extraocular Movements: Extraocular movements intact.      Conjunctiva/sclera: Conjunctivae normal.   Cardiovascular:      Rate and Rhythm: Normal rate and regular rhythm.      Heart sounds: Normal heart sounds.   Pulmonary:      Effort: Pulmonary effort is normal.      Breath sounds: Normal breath sounds.   Abdominal:      General: Bowel sounds are normal. There is no distension.      Palpations: Abdomen is soft.      Tenderness: There is no abdominal tenderness.   Musculoskeletal:         General: Normal range of motion.      Cervical back: Normal range of motion.      Right lower leg: No edema.      Left lower leg: No edema.   Skin:     General: Skin is warm and dry.   Neurological:      General: No focal deficit present.      Mental Status: He is alert and oriented to person, place, and time.   Psychiatric:         Mood and Affect: Mood normal.         Behavior: Behavior normal.                Significant Labs: All pertinent labs within the past 24 hours have been reviewed.    Significant Imaging: I have reviewed all pertinent imaging results/findings within the past 24 hours.

## 2024-07-19 LAB
ANION GAP SERPL CALC-SCNC: 9 MMOL/L (ref 8–16)
BASOPHILS # BLD AUTO: 0.01 K/UL (ref 0–0.2)
BASOPHILS # BLD AUTO: 0.01 K/UL (ref 0–0.2)
BASOPHILS NFR BLD: 0.1 % (ref 0–1.9)
BASOPHILS NFR BLD: 0.2 % (ref 0–1.9)
BUN SERPL-MCNC: 31 MG/DL (ref 6–20)
CALCIUM SERPL-MCNC: 9.1 MG/DL (ref 8.7–10.5)
CHLORIDE SERPL-SCNC: 112 MMOL/L (ref 95–110)
CLASS I ANTIBODY COMMENTS - LUMINEX: NORMAL
CLASS II ANTIBODY COMMENTS - LUMINEX: NORMAL
CO2 SERPL-SCNC: 17 MMOL/L (ref 23–29)
CREAT SERPL-MCNC: 3.4 MG/DL (ref 0.5–1.4)
DIFFERENTIAL METHOD BLD: ABNORMAL
DIFFERENTIAL METHOD BLD: ABNORMAL
DSA1 TESTING DATE: NORMAL
DSA12 TESTING DATE: NORMAL
DSA2 TESTING DATE: NORMAL
EOSINOPHIL # BLD AUTO: 0.1 K/UL (ref 0–0.5)
EOSINOPHIL # BLD AUTO: 0.1 K/UL (ref 0–0.5)
EOSINOPHIL NFR BLD: 0.9 % (ref 0–8)
EOSINOPHIL NFR BLD: 1.2 % (ref 0–8)
ERYTHROCYTE [DISTWIDTH] IN BLOOD BY AUTOMATED COUNT: 12.8 % (ref 11.5–14.5)
ERYTHROCYTE [DISTWIDTH] IN BLOOD BY AUTOMATED COUNT: 13.1 % (ref 11.5–14.5)
EST. GFR  (NO RACE VARIABLE): 22.3 ML/MIN/1.73 M^2
GLUCOSE SERPL-MCNC: 103 MG/DL (ref 70–110)
HCT VFR BLD AUTO: 34.4 % (ref 40–54)
HCT VFR BLD AUTO: 34.8 % (ref 40–54)
HGB BLD-MCNC: 10.1 G/DL (ref 14–18)
HGB BLD-MCNC: 10.1 G/DL (ref 14–18)
IMM GRANULOCYTES # BLD AUTO: 0.02 K/UL (ref 0–0.04)
IMM GRANULOCYTES # BLD AUTO: 0.02 K/UL (ref 0–0.04)
IMM GRANULOCYTES NFR BLD AUTO: 0.3 % (ref 0–0.5)
IMM GRANULOCYTES NFR BLD AUTO: 0.3 % (ref 0–0.5)
LYMPHOCYTES # BLD AUTO: 0.7 K/UL (ref 1–4.8)
LYMPHOCYTES # BLD AUTO: 0.7 K/UL (ref 1–4.8)
LYMPHOCYTES NFR BLD: 10 % (ref 18–48)
LYMPHOCYTES NFR BLD: 11.2 % (ref 18–48)
MAGNESIUM SERPL-MCNC: 1.5 MG/DL (ref 1.6–2.6)
MCH RBC QN AUTO: 23.8 PG (ref 27–31)
MCH RBC QN AUTO: 24 PG (ref 27–31)
MCHC RBC AUTO-ENTMCNC: 29 G/DL (ref 32–36)
MCHC RBC AUTO-ENTMCNC: 29.4 G/DL (ref 32–36)
MCV RBC AUTO: 82 FL (ref 82–98)
MCV RBC AUTO: 82 FL (ref 82–98)
MONOCYTES # BLD AUTO: 1 K/UL (ref 0.3–1)
MONOCYTES # BLD AUTO: 1.1 K/UL (ref 0.3–1)
MONOCYTES NFR BLD: 15.8 % (ref 4–15)
MONOCYTES NFR BLD: 17.6 % (ref 4–15)
NEUTROPHILS # BLD AUTO: 4 K/UL (ref 1.8–7.7)
NEUTROPHILS # BLD AUTO: 4.9 K/UL (ref 1.8–7.7)
NEUTROPHILS NFR BLD: 69.5 % (ref 38–73)
NEUTROPHILS NFR BLD: 72.9 % (ref 38–73)
NRBC BLD-RTO: 0 /100 WBC
NRBC BLD-RTO: 0 /100 WBC
PHOSPHATE SERPL-MCNC: 3.7 MG/DL (ref 2.7–4.5)
PLATELET # BLD AUTO: 234 K/UL (ref 150–450)
PLATELET # BLD AUTO: 252 K/UL (ref 150–450)
PMV BLD AUTO: 10.7 FL (ref 9.2–12.9)
PMV BLD AUTO: 11.4 FL (ref 9.2–12.9)
POTASSIUM SERPL-SCNC: 4 MMOL/L (ref 3.5–5.1)
RBC # BLD AUTO: 4.2 M/UL (ref 4.6–6.2)
RBC # BLD AUTO: 4.25 M/UL (ref 4.6–6.2)
SERUM COLLECTION DT - LUMINEX CLASS I: NORMAL
SERUM COLLECTION DT - LUMINEX CLASS II: NORMAL
SODIUM SERPL-SCNC: 138 MMOL/L (ref 136–145)
TACROLIMUS BLD-MCNC: 12.4 NG/ML (ref 5–15)
WBC # BLD AUTO: 5.78 K/UL (ref 3.9–12.7)
WBC # BLD AUTO: 6.71 K/UL (ref 3.9–12.7)

## 2024-07-19 PROCEDURE — 85025 COMPLETE CBC W/AUTO DIFF WBC: CPT | Mod: 91

## 2024-07-19 PROCEDURE — 36415 COLL VENOUS BLD VENIPUNCTURE: CPT

## 2024-07-19 PROCEDURE — 80048 BASIC METABOLIC PNL TOTAL CA: CPT

## 2024-07-19 PROCEDURE — 83735 ASSAY OF MAGNESIUM: CPT

## 2024-07-19 PROCEDURE — 80197 ASSAY OF TACROLIMUS: CPT

## 2024-07-19 PROCEDURE — 99233 SBSQ HOSP IP/OBS HIGH 50: CPT | Mod: GC,,, | Performed by: STUDENT IN AN ORGANIZED HEALTH CARE EDUCATION/TRAINING PROGRAM

## 2024-07-19 PROCEDURE — 20600001 HC STEP DOWN PRIVATE ROOM

## 2024-07-19 PROCEDURE — 25000003 PHARM REV CODE 250

## 2024-07-19 PROCEDURE — 85025 COMPLETE CBC W/AUTO DIFF WBC: CPT | Performed by: STUDENT IN AN ORGANIZED HEALTH CARE EDUCATION/TRAINING PROGRAM

## 2024-07-19 PROCEDURE — 63600175 PHARM REV CODE 636 W HCPCS: Performed by: STUDENT IN AN ORGANIZED HEALTH CARE EDUCATION/TRAINING PROGRAM

## 2024-07-19 PROCEDURE — 63600175 PHARM REV CODE 636 W HCPCS

## 2024-07-19 PROCEDURE — 36415 COLL VENOUS BLD VENIPUNCTURE: CPT | Performed by: STUDENT IN AN ORGANIZED HEALTH CARE EDUCATION/TRAINING PROGRAM

## 2024-07-19 PROCEDURE — 84100 ASSAY OF PHOSPHORUS: CPT

## 2024-07-19 RX ORDER — SODIUM CHLORIDE, SODIUM LACTATE, POTASSIUM CHLORIDE, CALCIUM CHLORIDE 600; 310; 30; 20 MG/100ML; MG/100ML; MG/100ML; MG/100ML
INJECTION, SOLUTION INTRAVENOUS CONTINUOUS
Status: DISCONTINUED | OUTPATIENT
Start: 2024-07-19 | End: 2024-07-20 | Stop reason: HOSPADM

## 2024-07-19 RX ORDER — MAGNESIUM SULFATE HEPTAHYDRATE 40 MG/ML
2 INJECTION, SOLUTION INTRAVENOUS ONCE
Status: COMPLETED | OUTPATIENT
Start: 2024-07-19 | End: 2024-07-19

## 2024-07-19 RX ADMIN — MYCOPHENOLIC ACID 360 MG: 180 TABLET, DELAYED RELEASE ORAL at 08:07

## 2024-07-19 RX ADMIN — SODIUM CHLORIDE, POTASSIUM CHLORIDE, SODIUM LACTATE AND CALCIUM CHLORIDE: 600; 310; 30; 20 INJECTION, SOLUTION INTRAVENOUS at 04:07

## 2024-07-19 RX ADMIN — MAGNESIUM SULFATE 2 G: 2 INJECTION INTRAVENOUS at 04:07

## 2024-07-19 RX ADMIN — TACROLIMUS 4 MG: 1 CAPSULE ORAL at 05:07

## 2024-07-19 RX ADMIN — CARVEDILOL 25 MG: 25 TABLET, FILM COATED ORAL at 08:07

## 2024-07-19 RX ADMIN — MINOXIDIL 2.5 MG: 2.5 TABLET ORAL at 08:07

## 2024-07-19 RX ADMIN — TACROLIMUS 4 MG: 1 CAPSULE ORAL at 08:07

## 2024-07-19 RX ADMIN — PREDNISONE 5 MG: 5 TABLET ORAL at 08:07

## 2024-07-19 RX ADMIN — NIFEDIPINE 60 MG: 30 TABLET, FILM COATED, EXTENDED RELEASE ORAL at 08:07

## 2024-07-19 NOTE — PLAN OF CARE
-Patient AOX 4  -Fully independent  - Patients risk of kidney biopsy and aggressive IS treatment outweigh benefit.   - Because of failure with follow up appointment patient has been referred to nephrologist in [facility].because he  is high risk of needing dialysis in future.

## 2024-07-19 NOTE — PROGRESS NOTES
[facility] - Transplant [facility] Medicine  Progress Note    Patient Name: Feliz Trevizo  MRN: [id number]  Patient Class: IP- Inpatient   Admission Date: 7/17/2024  Length of Stay: 2 days  Attending Physician: Shell Sandoval MD  Primary Care Provider: Dara Mclean FNP-C        Subjective:     Principal Problem:Acute kidney injury superimposed on chronic kidney disease        HPI:  Feliz Trevizo is a 39 y.o. male with PMHx of HTN, HLD, FSGS with kidney txp 2021 presenting to [facility] ED as a transfer for KTM evaluation of renal injury. Patient was called with abnormal labs by nephrologist and told to present to the ED. Per report, Cr was 3.74 on baseline of 1.5-1.7. Patient reports some nausea and vomiting over the weekend that has resolved. He reports one episode of small amount of bloody emesis but no further or previous episodes. No bloody bowel movements. No hematuria or urinary changes. Denies fever, chills, abdominal pain, chest pain, shortness of breath, flank pain. He was also told to decrease his prograf dose from 6 mg BID to 4 mg BID. He is compliant with his prednisone, Cellcept, prograf. Case was discussed with nephrology who recommended NPO at midnight for possible IR biopsy in morning, , [facility], US kidney transplant and DSA. HDS on admission.    Overview/Hospital Course:  Ultrasound showed elevated renal resistance indices concerning for rejection.  Transplant Nephrology team following.  No plan for inpatient biopsy.  Continue Prograf.  Patient this morning was complaining of diffuse abdominal pain, ordered CT abdomen    Interval history: No overnight events.  Creatinine trending, started patient on IV fluids.    Patient this morning was complaining of diffuse abdominal pain, ordered CT abdomen    Review of Systems   Constitutional:  Negative for chills and fever.   HENT:  Negative for trouble swallowing.    Eyes:  Negative for visual disturbance.   Respiratory:  Negative for cough and  shortness of breath.    Cardiovascular:  Negative for chest pain and leg swelling.   Gastrointestinal:  Positive for nausea (now resolved) and vomiting (now resolved). Negative for abdominal pain, blood in stool and diarrhea.   Genitourinary:  Negative for dysuria, flank pain, frequency and hematuria.   Musculoskeletal:  Negative for arthralgias and back pain.   Skin:  Negative for rash and wound.   Neurological:  Negative for light-headedness and headaches.   Psychiatric/Behavioral:  Negative for agitation and confusion.      Objective:     Vital Signs (Most Recent):  Temp: 98.6 °F (37 °C) (07/19/24 0758)  Pulse: 83 (07/19/24 0758)  Resp: 18 (07/19/24 0758)  BP: 132/76 (07/19/24 0758)  SpO2: 97 % (07/19/24 0758) Vital Signs (24h Range):  Temp:  [97.3 °F (36.3 °C)-99 °F (37.2 °C)] 98.6 °F (37 °C)  Pulse:  [75-85] 83  Resp:  [16-18] 18  SpO2:  [96 %-100 %] 97 %  BP: (115-132)/(63-77) 132/76     Weight: 66.1 kg (145 lb 11.6 oz)  Body mass index is 20.91 kg/m².     Physical Exam  Vitals and nursing note reviewed.   Constitutional:       General: He is not in acute distress.  HENT:      Head: Normocephalic and atraumatic.      Nose: Nose normal.      Mouth/Throat:      Mouth: Mucous membranes are dry.      Pharynx: No oropharyngeal exudate.   Eyes:      Extraocular Movements: Extraocular movements intact.      Conjunctiva/sclera: Conjunctivae normal.   Cardiovascular:      Rate and Rhythm: Normal rate and regular rhythm.      Heart sounds: Normal heart sounds.   Pulmonary:      Effort: Pulmonary effort is normal.      Breath sounds: Normal breath sounds.   Abdominal:      General: Bowel sounds are normal. There is no distension.      Palpations: Abdomen is soft.      Tenderness: There is abdominal tenderness.   Musculoskeletal:         General: Normal range of motion.      Cervical back: Normal range of motion.      Right lower leg: No edema.      Left lower leg: No edema.   Skin:     General: Skin is warm and dry.    Neurological:      General: No focal deficit present.      Mental Status: He is alert and oriented to person, place, and time.   Psychiatric:         Mood and Affect: Mood normal.         Behavior: Behavior normal.                Significant Labs: All pertinent labs within the past 24 hours have been reviewed.    Significant Imaging: I have reviewed all pertinent imaging results/findings within the past 24 hours.    Assessment/Plan:      * Acute kidney injury superimposed on chronic kidney disease  S/P kidney transplant 10/19/21   39M w/ FSGS with kidney txp 2021 presenting to [facility] ED as a transfer for KTM evaluation of renal injury. Cr on outpatient labs was reportedly 3.74 on baseline of 1.5-1.7. Patient has some nausea/vomiting over the weekend that resolved. Was also told recently to decrease his prograf dose from 6 mg BID to 4 mg BID. Denies any nephrotoxic medications. VSS.    - Continue home prednisone 5 mg, Cellcept 360 mg BID, prograf 4 mg BID  - Daily prograf level  - KTM consulted, appreciate recs  - Daily BMP, phos, mag       Anemia of chronic disease  Patient's anemia is currently stable. Has not received any PRBCs to date. Etiology likely d/t chronic disease due to Chronic Kidney Disease  Current CBC reviewed-   Lab Results   Component Value Date    HGB 10.1 (L) 07/19/2024    HCT 34.4 (L) 07/19/2024     Monitor serial CBC and transfuse if patient becomes hemodynamically unstable, symptomatic or H/H drops below 7/21.    S/P kidney transplant 10/19/21        Sensorineural hearing loss (SNHL) of both ears  Chronic history noted. He is hard of hearing    Renovascular hypertension  - BP well controlled don admission  - Continue home coreg, nifedipine, minoxidil       VTE Risk Mitigation (From admission, onward)           Ordered     IP VTE HIGH RISK PATIENT  Once         07/17/24 2304     Place sequential compression device  Until discontinued         07/17/24 2304     Reason for No Pharmacological VTE  Prophylaxis  Once        Comments: Possible biopsy 7/18   Question:  Reasons:  Answer:  Physician Provided (leave comment)    07/17/24 4350                    Discharge Planning   MARSHALL: 7/20/2024     Code Status: Full Code   Is the patient medically ready for discharge?:     Reason for patient still in hospital (select all that apply): Patient trending condition, Laboratory test, and Treatment  Discharge Plan A: Home, Home with family                  Shell Sandoval MD  Department of Hospital Medicine   [facility] - Transplant Stepdown

## 2024-07-19 NOTE — PROGRESS NOTES
"KTM Consult Note     Feliz Trevizo is a 39 y.o. male with PMHx of HTN, HLD, FSGS with kidney txp 2021 presenting to [facility] ED as a transfer for KTM evaluation of renal injury. Patient was called with abnormal labs by nephrologist and told to present to the ED. Per report, Cr was 3.74. Patient reports some nausea and vomiting over the weekend that has resolved. He reports one episode of small amount of bloody emesis but no further or previous episodes. No bloody bowel movements. No hematuria or urinary changes. Denies fever, chills, abdominal pain, chest pain, shortness of breath, flank pain. He was also told to decrease his prograf dose from 6 mg BID to 4 mg BID. He is compliant with his prednisone, Cellcept, prograf.     S/p renal transplant 10/19/21     Patient is from [place], [place] and has not been following his nephrologist for more than an year. He mentions following with General Nephrologist Dr. Ralf Andres in [place].     He mentions having a single episode of diarrhoea this AM but his abdominal pain has largely abated. He has one episode of mild ?hemoptysis earlier this week- but that hasn't recurred.     No dysuria or hematuria. Mentions no decrease in his urine output.     No RFPs are available since 2023. His Cr on this admission was 3.6 and has decreased today.     Renal Bx 3/27/23 s/o AMR    Transplant Nephrology was consulted for:   "Rejection concerns, ?SEBASTIAN on CKD, Immunosuppression management"  Baseline sCr: No labs since 1 year  Admission Scr 3.6  Home immunosuppression: Prograf 4 mg BID, Cellcept 360 BID and Prednisone 5 mg    Interval Hx:  Patient says he has a 1/10 abdominal pain and 1 episode of loose stools but otherwise feels better. Contact has been made with his Gen. Nephrologist in [place] and coordinated with the  to help patient keep his appointment, Patient's sister has also been apprised of his condition. No need for HD at this time though patient may be getting there in 6 " month to a year (indicative only).  Cr 3.4 from 3.1 yesterday. Tac at 12.4 from 11.1.    Review of Systems   Constitutional:  Negative for chills and fever.   HENT:  Negative for trouble swallowing.    Eyes:  Negative for visual disturbance.   Respiratory:  Negative for cough and shortness of breath.    Cardiovascular:  Negative for chest pain and leg swelling.   Gastrointestinal:  Positive for nausea (now resolved) and vomiting (now resolved). Negative for abdominal pain, blood in stool and diarrhea.   Genitourinary:  Negative for dysuria, flank pain, frequency and hematuria.   Musculoskeletal:  Negative for arthralgias and back pain.   Skin:  Negative for rash and wound.   Neurological:  Negative for light-headedness and headaches.   Psychiatric/Behavioral:  Negative for agitation and confusion.    In the ED, pt presented with the following VS:     Physical Exam  Vitals and nursing note reviewed.   Constitutional:       General: He is not in acute distress.  HENT:      Head: Normocephalic and atraumatic.      Nose: Nose normal.      Mouth/Throat:      Mouth: Mucous membranes are dry.      Pharynx: No oropharyngeal exudate.   Eyes:      Extraocular Movements: Extraocular movements intact.      Conjunctiva/sclera: Conjunctivae normal.   Cardiovascular:      Rate and Rhythm: Normal rate and regular rhythm.      Heart sounds: Normal heart sounds.   Pulmonary:      Effort: Pulmonary effort is normal.      Breath sounds: Normal breath sounds.   Abdominal:      General: Bowel sounds are normal. There is no distension.      Palpations: Abdomen is soft.      Tenderness: There is no abdominal tenderness.   Musculoskeletal:         General: Normal range of motion.      Cervical back: Normal range of motion.      Right lower leg: No edema.      Left lower leg: No edema.   Skin:     General: Skin is warm and dry.   Neurological:      General: No focal deficit present.      Mental Status: He is alert and oriented to person,  place, and time.   Psychiatric:         Mood and Affect: Mood normal.         Behavior: Behavior normal.     Initial Vitals [07/17/24 2215]   BP Pulse Resp Temp SpO2   126/71 80 19 98 °F (36.7 °C) 98 %      MAP       --           I/O last 3 completed shifts:  In: 2466.8 [P.O.:1440; I.V.:1026.8]  Out: 725 [Urine:725]  No intake/output data recorded.     Scheduled Meds:   carvediloL  25 mg Oral BID WM    minoxidiL  2.5 mg Oral Daily    mycophenolate sodium  360 mg Oral BID    NIFEdipine  60 mg Oral BID    predniSONE  5 mg Oral Daily    tacrolimus  4 mg Oral BID     Continuous Infusions:  PRN Meds:.  Current Facility-Administered Medications:     acetaminophen, 650 mg, Oral, Q4H PRN    albuterol-ipratropium, 3 mL, Nebulization, Q4H PRN    dextrose 10%, 12.5 g, Intravenous, PRN    dextrose 10%, 25 g, Intravenous, PRN    glucagon (human recombinant), 1 mg, Intramuscular, PRN    glucose, 16 g, Oral, PRN    glucose, 24 g, Oral, PRN    melatonin, 6 mg, Oral, Nightly PRN    naloxone, 0.02 mg, Intravenous, PRN    ondansetron, 8 mg, Oral, Q8H PRN    polyethylene glycol, 17 g, Oral, BID PRN    sodium chloride 0.9%, 5 mL, Intravenous, PRN     ASSESSMENT AND PLAN:   - Continue home immunosuppressive regimen of Prograf 4 mg BID, Cellcept 360 BID and Prednisone 5 mg at this time   - Please admin Prograf at 8 am and 8 pm with levels at 6 am   - Discussed with primary team regarding holding biopsy for now. Last Bx in 2023 showed AMR. Also, lightly improved Cr levels.   - Contact has been established with patient's Gen. Nephrologist in [place] and the  to help facilitate transport for his periodic appointments.   - Patient has been counseled on the possibility of requiring dialysis in the future (not immediate) and seems to understand. Will also speak to his sister and relay the same information unto her.       General recs:  - Strict I&Os q shift  - monitor closely for signs of IS toxicity  - Avoid nephrotoxic agents such as  NSAIDs, gadolinium and IV radiocontrast.  - Renally dose meds to current GFR.  -Avoid using agents that can change IS metabolism and levels; if you have any concerns about medication interactions, contact a KTM or pharmD.     Case discussed with Dr. Giles.     Carole Villarreal MD   Visiting Nephrology Fellow, PGY-5  [facility]

## 2024-07-19 NOTE — ASSESSMENT & PLAN NOTE
Patient's anemia is currently stable. Has not received any PRBCs to date. Etiology likely d/t chronic disease due to Chronic Kidney Disease  Current CBC reviewed-   Lab Results   Component Value Date    HGB 10.1 (L) 07/19/2024    HCT 34.4 (L) 07/19/2024     Monitor serial CBC and transfuse if patient becomes hemodynamically unstable, symptomatic or H/H drops below 7/21.

## 2024-07-19 NOTE — PLAN OF CARE
Patient aao, independent with ambulation. [place]. Creatinine continues to rise 3.4 today. Patient has complaints of abd pain CT of abd ordered and completed.

## 2024-07-19 NOTE — ASSESSMENT & PLAN NOTE
S/P kidney transplant 10/19/21   39M w/ FSGS with kidney txp 2021 presenting to [facility] ED as a transfer for KTM evaluation of renal injury. Cr on outpatient labs was reportedly 3.74 on baseline of 1.5-1.7. Patient has some nausea/vomiting over the weekend that resolved. Was also told recently to decrease his prograf dose from 6 mg BID to 4 mg BID. Denies any nephrotoxic medications. VSS.    - Continue home prednisone 5 mg, Cellcept 360 mg BID, prograf 4 mg BID  - Daily prograf level  - KTM consulted, appreciate recs  - Daily BMP, phos, mag

## 2024-07-19 NOTE — SUBJECTIVE & OBJECTIVE
Interval history: No overnight events.  Creatinine trending, started patient on IV fluids.    Patient this morning was complaining of diffuse abdominal pain, ordered CT abdomen    Review of Systems   Constitutional:  Negative for chills and fever.   HENT:  Negative for trouble swallowing.    Eyes:  Negative for visual disturbance.   Respiratory:  Negative for cough and shortness of breath.    Cardiovascular:  Negative for chest pain and leg swelling.   Gastrointestinal:  Positive for nausea (now resolved) and vomiting (now resolved). Negative for abdominal pain, blood in stool and diarrhea.   Genitourinary:  Negative for dysuria, flank pain, frequency and hematuria.   Musculoskeletal:  Negative for arthralgias and back pain.   Skin:  Negative for rash and wound.   Neurological:  Negative for light-headedness and headaches.   Psychiatric/Behavioral:  Negative for agitation and confusion.      Objective:     Vital Signs (Most Recent):  Temp: 98.6 °F (37 °C) (07/19/24 0758)  Pulse: 83 (07/19/24 0758)  Resp: 18 (07/19/24 0758)  BP: 132/76 (07/19/24 0758)  SpO2: 97 % (07/19/24 0758) Vital Signs (24h Range):  Temp:  [97.3 °F (36.3 °C)-99 °F (37.2 °C)] 98.6 °F (37 °C)  Pulse:  [75-85] 83  Resp:  [16-18] 18  SpO2:  [96 %-100 %] 97 %  BP: (115-132)/(63-77) 132/76     Weight: 66.1 kg (145 lb 11.6 oz)  Body mass index is 20.91 kg/m².     Physical Exam  Vitals and nursing note reviewed.   Constitutional:       General: He is not in acute distress.  HENT:      Head: Normocephalic and atraumatic.      Nose: Nose normal.      Mouth/Throat:      Mouth: Mucous membranes are dry.      Pharynx: No oropharyngeal exudate.   Eyes:      Extraocular Movements: Extraocular movements intact.      Conjunctiva/sclera: Conjunctivae normal.   Cardiovascular:      Rate and Rhythm: Normal rate and regular rhythm.      Heart sounds: Normal heart sounds.   Pulmonary:      Effort: Pulmonary effort is normal.      Breath sounds: Normal breath sounds.    Abdominal:      General: Bowel sounds are normal. There is no distension.      Palpations: Abdomen is soft.      Tenderness: There is abdominal tenderness.   Musculoskeletal:         General: Normal range of motion.      Cervical back: Normal range of motion.      Right lower leg: No edema.      Left lower leg: No edema.   Skin:     General: Skin is warm and dry.   Neurological:      General: No focal deficit present.      Mental Status: He is alert and oriented to person, place, and time.   Psychiatric:         Mood and Affect: Mood normal.         Behavior: Behavior normal.                Significant Labs: All pertinent labs within the past 24 hours have been reviewed.    Significant Imaging: I have reviewed all pertinent imaging results/findings within the past 24 hours.

## 2024-07-20 VITALS
OXYGEN SATURATION: 98 % | RESPIRATION RATE: 18 BRPM | TEMPERATURE: 98 F | BODY MASS INDEX: 20.87 KG/M2 | HEIGHT: 70 IN | SYSTOLIC BLOOD PRESSURE: 126 MMHG | HEART RATE: 76 BPM | WEIGHT: 145.75 LBS | DIASTOLIC BLOOD PRESSURE: 75 MMHG

## 2024-07-20 LAB
ANION GAP SERPL CALC-SCNC: 9 MMOL/L (ref 8–16)
BUN SERPL-MCNC: 28 MG/DL (ref 6–20)
CALCIUM SERPL-MCNC: 8.7 MG/DL (ref 8.7–10.5)
CHLORIDE SERPL-SCNC: 113 MMOL/L (ref 95–110)
CO2 SERPL-SCNC: 16 MMOL/L (ref 23–29)
CREAT SERPL-MCNC: 3.2 MG/DL (ref 0.5–1.4)
EST. GFR  (NO RACE VARIABLE): 24 ML/MIN/1.73 M^2
GLUCOSE SERPL-MCNC: 99 MG/DL (ref 70–110)
POTASSIUM SERPL-SCNC: 3.7 MMOL/L (ref 3.5–5.1)
SODIUM SERPL-SCNC: 138 MMOL/L (ref 136–145)
TACROLIMUS BLD-MCNC: 15.6 NG/ML (ref 5–15)

## 2024-07-20 PROCEDURE — 80197 ASSAY OF TACROLIMUS: CPT

## 2024-07-20 PROCEDURE — 63600175 PHARM REV CODE 636 W HCPCS

## 2024-07-20 PROCEDURE — 36415 COLL VENOUS BLD VENIPUNCTURE: CPT | Performed by: STUDENT IN AN ORGANIZED HEALTH CARE EDUCATION/TRAINING PROGRAM

## 2024-07-20 PROCEDURE — 80048 BASIC METABOLIC PNL TOTAL CA: CPT | Performed by: STUDENT IN AN ORGANIZED HEALTH CARE EDUCATION/TRAINING PROGRAM

## 2024-07-20 PROCEDURE — 36415 COLL VENOUS BLD VENIPUNCTURE: CPT

## 2024-07-20 PROCEDURE — 25000003 PHARM REV CODE 250

## 2024-07-20 RX ORDER — LEVALBUTEROL 1.25 MG/.5ML
1.25 SOLUTION, CONCENTRATE RESPIRATORY (INHALATION) EVERY 8 HOURS
Status: DISCONTINUED | OUTPATIENT
Start: 2024-07-20 | End: 2024-07-20

## 2024-07-20 RX ORDER — TACROLIMUS 1 MG/1
3 CAPSULE ORAL EVERY 12 HOURS
Start: 2024-07-20 | End: 2024-07-26 | Stop reason: SDUPTHER

## 2024-07-20 RX ADMIN — MYCOPHENOLIC ACID 360 MG: 180 TABLET, DELAYED RELEASE ORAL at 08:07

## 2024-07-20 RX ADMIN — NIFEDIPINE 60 MG: 30 TABLET, FILM COATED, EXTENDED RELEASE ORAL at 08:07

## 2024-07-20 RX ADMIN — TACROLIMUS 4 MG: 1 CAPSULE ORAL at 08:07

## 2024-07-20 RX ADMIN — MINOXIDIL 2.5 MG: 2.5 TABLET ORAL at 08:07

## 2024-07-20 RX ADMIN — PREDNISONE 5 MG: 5 TABLET ORAL at 08:07

## 2024-07-20 RX ADMIN — CARVEDILOL 25 MG: 25 TABLET, FILM COATED ORAL at 08:07

## 2024-07-20 NOTE — DISCHARGE SUMMARY
[facility]ideon [facility]za - Transplant [facility] Medicine  Discharge Summary      Patient Name: Feliz Trevizo  MRN: [id number]  NAJMA: [id number]  Patient Class: IP- Inpatient  Admission Date: 7/17/2024  Hospital Length of Stay: 3 days  Discharge Date and Time: No discharge date for patient encounter.  Attending Physician: Shell Sandoval MD   Discharging Provider: Shell Sandoval MD  Primary [facility]  [facility] Medicine Team: [facility] A Shell Sandoval MD  Primary Care Team: [facility] MED A    HPI:   Feliz Trevizo is a 39 y.o. male with PMHx of HTN, HLD, FSGS with kidney txp 2021 [place] [facility] [place] for KTM evaluation of renal injury. Patient was called with abnormal labs by nephrologist and told to present to the ED. Per report, Cr was 3.74 on baseline of 1.5-1.7. Patient reports some nausea and vomiting over the weekend that has resolved. He reports one episode of small amount of bloody emesis but no further or previous episodes. No bloody bowel movements. No hematuria or urinary changes. Denies fever, chills, abdominal pain, chest pain, shortness of breath, flank pain. He was also told to decrease his prograf dose from 6 mg BID to 4 mg BID. He is compliant with his prednisone, Cellcept, prograf. Case was discussed with nephrology who recommended NPO at midnight for possible IR biopsy in morning, , [facility], US kidney transplant and DSA. HDS on admission.    * No surgery found *      Hospital Course:   Ultrasound showed elevated renal resistance indices concerning for rejection.  Transplant Nephrology team following.  No plan for inpatient biopsy.  Continue Prograf.  Patient on 07/19 complaining of diffuse abdominal pain, CTA abdomen was unremarkable.  Abdominal pain resolved.  Transplant nephrology team recommended lowering Prograf levels to 3 mg b.i.d..  And follow up with transplant Nephrology as outpatient.    Feliz Trevizo was deemed appropriate for  discharge.  At the time of discharge, the plan of care was discussed with patient/family, who were agreeable and amenable.  All medications were verbally reviewed and discussed with the patient/family.  They endorsed understanding and compliance.  Informed them that these changes will be available on their discharge paperwork, as well.  Outpatient follow-ups were scheduled, or if unable to be scheduled ambulatory referrals were placed, and ER return precautions were given.  All questions were answered to the patient/family's satisfaction.  He was subsequently discharged in stable condition.      Goals of Care Treatment Preferences:  Code Status: Full Code      Consults:   Consults (From admission, onward)          Status Ordering Provider     Inpatient consult to Kidney/Pancreas Transplant Medicine  Once        Provider:  (Not yet assigned)    POLLO Deutsch            No new Assessment & Plan notes have been filed under this hospital service since the last note was generated.  Service: Hospital Medicine    Final Active Diagnoses:    Diagnosis Date Noted POA    PRINCIPAL PROBLEM:  Acute kidney injury superimposed on chronic kidney disease [N17.9, N18.9] 03/27/2023 Yes    Anemia of chronic disease [D63.8] 10/19/2021 Yes     Chronic    S/P kidney transplant 10/19/21 [Z94.0] 10/19/2021 Not Applicable     Chronic    Sensorineural hearing loss (SNHL) of both ears [H90.3] 08/02/2017 Yes     Chronic    Renovascular hypertension [I15.0] 08/02/2017 Yes     Chronic      Problems Resolved During this Admission:       Discharged Condition: good    Disposition: Home or Self Care    Follow Up:   Follow-up Information       Dara Mclean FNP-C Follow up in 1 week(s).    Specialty: Family Medicine  Contact information:  [address] [place] [id number] [phone]                           Patient Instructions:      Ambulatory referral/consult to Transplant, Kidney   Standing Status: Future   Referral  "Priority: Routine Referral Type: Transplants   Number of Visits Requested: 1       Significant Diagnostic Studies: Labs: BMP:   Recent Labs   Lab 07/19/24  0618 07/20/24  0945    99    138   K 4.0 3.7   * 113*   CO2 17* 16*   BUN 31* 28*   CREATININE 3.4* 3.2*   CALCIUM 9.1 8.7   MG 1.5*  --    , CMP   Recent Labs   Lab 07/19/24  0618 07/20/24  0945    138   K 4.0 3.7   * 113*   CO2 17* 16*    99   BUN 31* 28*   CREATININE 3.4* 3.2*   CALCIUM 9.1 8.7   ANIONGAP 9 9   , CBC   Recent Labs   Lab 07/19/24  0618 07/19/24  0934   WBC 5.78 6.71   HGB 10.1* 10.1*   HCT 34.8* 34.4*    234   , INR   Lab Results   Component Value Date    INR 1.1 07/18/2024    INR 1.2 03/27/2023    INR 1.1 07/12/2022   , Lipid Panel   Lab Results   Component Value Date    CHOL 144 10/18/2021    HDL 26 (L) 10/18/2021    LDLCALC 91.6 10/18/2021    TRIG 132 10/18/2021    CHOLHDL 18.1 (L) 10/18/2021   , Troponin No results for input(s): "TROPONINI" in the last 168 hours., and A1C: No results for input(s): "HGBA1C" in the last 4320 hours.  Microbiology: Blood Culture   Lab Results   Component Value Date    LABBLOO No growth after 5 days. 03/26/2023    and Sputum Culture No results found for: "GSRESP", "RESPIRATORYC"  Radiology: X-Ray: CXR: X-Ray Chest 1 View (CXR): No results found for this visit on 07/17/24. and X-Ray Chest PA and Lateral (CXR): No results found for this visit on 07/17/24. and KUB: X-Ray Abdomen AP 1 View (KUB): No results found for this visit on 07/17/24.  CT scan: CT ABDOMEN PELVIS WITH CONTRAST: No results found for this visit on 07/17/24. and CT ABDOMEN PELVIS WITHOUT CONTRAST:   Results for orders placed or performed during the hospital encounter of 07/17/24   CT Abdomen Pelvis  Without Contrast    Narrative    EXAMINATION:  CT ABDOMEN PELVIS WITHOUT CONTRAST    CLINICAL HISTORY:  Abdominal pain, acute, nonlocalized;    TECHNIQUE:  Low dose axial images, sagittal and coronal " reformations were obtained from the lung bases to the pubic symphysis.  Oral contrast was not administered.    COMPARISON:  Ultrasound transplant kidney July 18, 2024 and CT abdomen pelvis November 2021    FINDINGS:  In the chest, small volume pericardial fluid similar.  No significant pleural fluid.  Some air in the esophagus either reflux or dysmotility.  Some volume loss at the lung bases.  No consolidation or mass lesion.    In the abdomen, liver is normal in overall size.  No focal mass.  Gallbladder not distended.  Pancreas appears unremarkable.  Spleen not enlarged.  No adrenal mass.  Atrophic native kidneys.    Aorta tapers normally.  No convincing para-aortic adenopathy.  No pelvic adenopathy.  No bladder wall thickening.  Prostate unremarkable.  Mild distention of the seminal vesicles doubtful significance.    Evaluation of the bowel demonstrates fluid distension of the colon.  Ingested material in the stomach.  No convincing small bowel dilatation.  Right lower quadrant renal transplant.  Appendix appears unremarkable.  No convincing mesenteric adenopathy.    Bones are fairly well mineralized and alignment is satisfactory.  7 mm sclerotic focus right ilium similar.  Mixed lucent and sclerotic lesion left ilium also similar.  Degenerative change L4-5 similar.      Impression    There is fluid distension throughout the colon extending to the rectum.  No convincing wall thickening.  Correlation for diarrheal illness.    Additional findings above.      Electronically signed by: Lc Park MD  Date:    07/19/2024  Time:    12:23     Cardiac Graphics: Echocardiogram: 2D echo with color flow doppler:   Results for orders placed or performed during the hospital encounter of 08/28/18   2D echo with color flow doppler   Result Value Ref Range    EF + QEF 60 55 - 65    Mitral Valve Regurgitation TRIVIAL     Diastolic Dysfunction No     Est. PA Systolic Pressure 20.14     Pericardial Effusion TRIVIAL     Mitral  Valve Mobility NORMAL     Tricuspid Valve Regurgitation TRIVIAL     Narrative    Date of Procedure: 08/28/2018        TEST DESCRIPTION   Technical Quality: This is a technically good study.     Aorta: The aortic root is normal in size, measuring 2.8 cm at sinotubular junction and 3.6 cm at Sinuses of Valsalva. The proximal ascending aorta is normal in size, measuring 3.1 cm across.     Left Atrium: The left atrial volume index is mildly enlarged, measuring 39.38 cc/m2.     Left Ventricle: The left ventricle is normal in size, with an end-diastolic diameter of 4.6 cm, and an end-systolic diameter of 3.2 cm. LV wall thickness is normal, with the septum measuring 0.9 cm and the posterior wall measuring 1.0 cm across. Relative   wall thickness was normal at 0.43, and the LV mass index was 90.4 g/m2 consistent with normal left ventricular mass. There are no regional wall motion abnormalities. Left ventricular systolic function appears normal. Visually estimated ejection fraction   is 60-65%. The LV Doppler derived stroke volume equals 134.0 ccs.     Diastolic indices: E wave velocity 0.8 m/s, E/A ratio 1.4,  msec., E/e' ratio(avg) 8. Pulmonary vein systolic/diastolic ratio is normal. Mean left atrial pressures are normal. Diastolic function is normal.     Right Atrium: The right atrium is moderately enlarged, measuring 5.6 cm in length and 4.4 cm in width in the apical view.     Right Ventricle: The right ventricle is normal in size measuring 4.1 cm at the base in the apical right ventricle-focused view. Global right ventricular systolic function appears normal. Tricuspid annular plane systolic excursion (TAPSE) is 2.7 cm.   Tissue Doppler-derived tricuspid annular peak systolic velocity (S prime) is 14.6 cm/s. The estimated PA systolic pressure is 20 mmHg.     Aortic Valve:  The aortic valve is normal in structure with normal leaflet mobility. The aortic valve is tri-leaflet in structure. There is aortic annular  calcification.     Mitral Valve:  The mitral valve is normal in structure with normal leaflet mobility. There is trivial mitral regurgitation. There is mitral annular calcification.     Tricuspid Valve:  The tricuspid valve is normal in structure with normal leaflet mobility. There is trivial tricuspid regurgitation.     Pulmonary Valve:  The pulmonic valve is normal in structure with normal leaflet mobility.     Pericardium: There is evidence of a trivial pericardial effusion.     IVC: IVC is normal in size and collapses > 50% with a sniff, suggesting normal right atrial pressure of 3 mmHg.     Atrial Septum: The atrial septum is intact.     Intracavitary: There is no evidence of intracavity mass, thrombi, or vegetation.         CONCLUSIONS     1 - Normal left ventricular systolic function (EF 60-65%).     2 - Normal right ventricular systolic function .     3 - Normal left ventricular diastolic function.     4 - Biatrial enlargement.     5 - The estimated PA systolic pressure is 20 mmHg.             This document has been electronically    SIGNED BY: Roel Bethea MD On: 08/28/2018 12:56    and Transthoracic echo (TTE) complete (Cupid Only):   Results for orders placed or performed during the hospital encounter of 10/18/21   Echo   Result Value Ref Range    BSA 1.91 m2    TDI SEPTAL 0.11 m/s    LV LATERAL E/E' RATIO 7.38 m/s    LV SEPTAL E/E' RATIO 8.73 m/s    LA WIDTH 3.41 cm    TDI LATERAL 0.13 m/s    LVIDd 4.39 3.5 - 6.0 cm    IVS 1.26 (A) 0.6 - 1.1 cm    Posterior Wall 1.24 (A) 0.6 - 1.1 cm    LVIDs 2.59 2.1 - 4.0 cm    FS 41 28 - 44 %    LA volume 67.43 cm3    Sinus 3.37 cm    STJ 3.51 cm    Ascending aorta 3.22 cm    LV mass 202.34 g    LA size 3.84 cm    RVDD 3.24 cm    TAPSE 2.70 cm    Left Ventricle Relative Wall Thickness 0.56 cm    AV mean gradient 4 mmHg    AV valve area 4.28 cm2    AV Velocity Ratio 0.91     AV index (prosthetic) 1.02     MV valve area p 1/2 method 3.19 cm2    E/A ratio 1.00     Mean  e' 0.12 m/s    E wave deceleration time 237.82 msec    LVOT diameter 2.31 cm    LVOT area 4.2 cm2    LVOT peak lowell 1.19 m/s    LVOT peak VTI 27.08 cm    Ao peak lowell 1.31 m/s    Ao VTI 26.52 cm    LVOT stroke volume 113.43 cm3    AV peak gradient 7 mmHg    E/E' ratio 8.00 m/s    MV Peak E [place] 0.96 m/s    TR Max Lowell 1.92 m/s    MV stenosis pressure 1/2 time 68.97 ms    MV Peak A [place]ell 0.96 m/s    LV Systolic Volume 24.29 mL    LV Systolic Volume Index 12.8 mL/m2    LV Diastolic Volume 87.33 mL    LV Diastolic Volume Index 45.96 mL/m2    LA Volume Index 35.5 mL/m2    LV Mass Index 106 g/m2    RA Major Axis 4.86 cm    Left Atrium Minor Axis 5.68 cm    Left Atrium Major Axis 6.49 cm    Triscuspid Valve Regurgitation Peak Gradient 15 mmHg    RA Width 3.18 cm    Right Atrial Pressure (from IVC) 8 mmHg    EF 65 %    TV resting pulmonary artery pressure 23 mmHg    Narrative    · The left ventricle is normal in size with concentric remodeling and   normal systolic function.  · Mild left atrial enlargement.  · The estimated ejection fraction is 65%.  · Normal left ventricular diastolic function.  · Trivial circumferential pericardial effusion.  · Normal right ventricular size with normal right ventricular systolic   function.  · Thickened myocardium, likely related to ESRD, strain performed on prior   study.  · Mild tricuspid regurgitation.  · Intermediate central venous pressure (8 mmHg).  · The estimated PA systolic pressure is 23 mmHg.          Pending Diagnostic Studies:       None           Medications:  Reconciled Home Medications:      Medication List        CHANGE how you take these medications      tacrolimus 1 MG Cap  Commonly known as: PROGRAF  Take 3 capsules (3 mg total) by mouth every 12 (twelve) hours.  What changed: how much to take            CONTINUE taking these medications      aspirin 81 MG EC tablet  Commonly known as: ECOTRIN  Take 1 tablet (81 mg total) by mouth once daily.     carvediloL 25 MG  tablet  Commonly known as: COREG  Take 1 tablet (25 mg total) by mouth 2 (two) times daily with meals.     LOKELMA 10 gram packet  Generic drug: sodium zirconium cyclosilicate  Take 1 packet (10 g total) by mouth once daily. Mix entire contents of packet(s) into drinking glass containing 3 tablespoons of water; stir well and drink immediately. Add water and repeat until no powder remains to receive entire dose.     minoxidiL 2.5 MG tablet  Commonly known as: LONITEN  Take 1 tablet (2.5 mg total) by mouth once daily.     mycophenolate sodium 180 MG Tbec  Take 2 tablets (360 mg total) by mouth 2 (two) times daily.     NIFEdipine 60 MG (OSM) 24 hr tablet  Commonly known as: PROCARDIA-XL  Take 1 tablet (60 mg total) by mouth 2 (two) times a day.     predniSONE 5 MG tablet  Commonly known as: DELTASONE  Take 1 tablet (5 mg total) by mouth once daily.     sodium bicarbonate 650 MG tablet  Take 3 tablets (1,950 mg total) by mouth 3 (three) times daily.     valGANciclovir 450 mg Tab  Commonly known as: VALCYTE  Take 1 tablet (450 mg total) by mouth every Mon, Wed, Fri. Stop: 6/28/23              Indwelling Lines/Drains at time of discharge:   Lines/Drains/Airways       Drain  Duration                  Hemodialysis AV Fistula Left forearm -- days                    Time spent on the discharge of patient: 38 minutes         Shell Sandoval MD  Department of Hospital Medicine  [facility] - Transplant Stepdown

## 2024-07-20 NOTE — PLAN OF CARE
Problem: Adult Inpatient Plan of Care  Goal: Plan of Care Review  Outcome: Progressing  Goal: Patient-Specific Goal (Individualized)  Outcome: Progressing  Goal: Absence of Hospital-Acquired Illness or Injury  Outcome: Progressing  Goal: Optimal Comfort and Wellbeing  Outcome: Progressing  Goal: Readiness for Transition of Care  Outcome: Progressing     Problem: Infection  Goal: Absence of Infection Signs and Symptoms  Outcome: Progressing     Problem: Acute Kidney Injury/Impairment  Goal: Fluid and Electrolyte Balance  Outcome: Progressing  Goal: Improved Oral Intake  Outcome: Progressing  Goal: Effective Renal Function  Outcome: Progressing     Problem: Kidney Transplant  Goal: Effective Renal Function  Outcome: Progressing  Goal: Absence of Infection Signs and Symptoms  Outcome: Progressing

## 2024-07-20 NOTE — PROGRESS NOTES
Patient dcd per md orders, no acute distress noted. Went over AVS with the patient, noted changes in prograf dose. Instructed patient of the importance of follow up with kidney transplant team. Allowed time for questions. Discussed s/s of worsening kidney function with the patient. Patient gave full verbal understanding on all verbal and written dc instructions.

## 2024-07-22 NOTE — PLAN OF CARE
[facility] - Transplant Stepdown  Discharge Final Note    Primary Care Provider: Dara Mclean FNP-C    Expected Discharge Date: 7/20/2024    Patient discharged to home via personal transportation.     Patient's bedside nurse and patient notified of the above.    Discharge Plan A and Plan B have been determined by review of patient's clinical status, future medical and therapeutic needs, and coverage/benefits for post-acute care in coordination with multidisciplinary team members.        Final Discharge Note (most recent)       Final Note - 07/22/24 0933          Final Note    Assessment Type Final Discharge Note (P)      Anticipated Discharge Disposition Home or Self Care (P)         Post-Acute Status    Post-Acute Authorization Other (P)      Other Status No Post-Acute Service Needs (P)                      Important Message from Medicare             Contact Info       Dara Mclean FNP-C   Specialty: Family Medicine   Relationship: PCP - [address] [address]   Phone: [phone]       Next Steps: Follow up in 1 week(s)            No future appointments.    SW scheduled post-discharge follow-up appointment and information added to AVS.     Holli Bejarano LMSW  [facility]  Ext. [phone]

## 2024-07-23 ENCOUNTER — TELEPHONE (OUTPATIENT)
Dept: TRANSPLANT | Facility: CLINIC | Age: 39
End: 2024-07-23
Payer: MEDICARE

## 2024-07-24 ENCOUNTER — DOCUMENTATION ONLY (OUTPATIENT)
Dept: TRANSPLANT | Facility: CLINIC | Age: 39
End: 2024-07-24
Payer: MEDICARE

## 2024-07-24 LAB
EXT ALBUMIN: 4.2
EXT ALKALINE PHOSPHATASE: ABNORMAL
EXT ALLOSURE: ABNORMAL
EXT ALT: ABNORMAL
EXT AMYLASE: ABNORMAL
EXT ANC: ABNORMAL
EXT AST: ABNORMAL
EXT BACTERIA UA: ABNORMAL
EXT BANDS%: 0
EXT BILIRUBIN DIRECT: ABNORMAL
EXT BILIRUBIN TOTAL: ABNORMAL
EXT BK VIRUS DNA QN PCR: ABNORMAL
EXT BUN: 27
EXT C PEPTIDE: ABNORMAL
EXT CALCIUM: 8.9
EXT CHLORIDE: 110
EXT CHOLESTEROL: ABNORMAL
EXT CMV DNA QUANT. BY PCR: ABNORMAL
EXT CO2: 20
EXT CREATININE UA: ABNORMAL
EXT CREATININE: 3.46 MG/DL
EXT CYCLOSPORINE LVL: ABNORMAL
EXT EBV DNA BY PCR: ABNORMAL
EXT EBV IGG: ABNORMAL
EXT EGFR NO RACE VARIABLE: 22
EXT EOSINOPHIL%: 1.3
EXT FERRITIN: ABNORMAL
EXT GFR MDRD AF AMER: ABNORMAL
EXT GFR MDRD NON AF AMER: ABNORMAL
EXT GLUCOSE UA: ABNORMAL
EXT GLUCOSE: 101
EXT HBV DNA QUANT PCR: ABNORMAL
EXT HCV QUANT: ABNORMAL
EXT HDL: ABNORMAL
EXT HEMATOCRIT: 31.4
EXT HEMOGLOBIN A1C: ABNORMAL
EXT HEMOGLOBIN: 9.8
EXT HIV RNA QUANT PCR: ABNORMAL
EXT IMMUNKNOW (STIMULATED): ABNORMAL
EXT IRON SATURATION: ABNORMAL
EXT LDH, TOTAL: ABNORMAL
EXT LDL CHOLESTEROL: ABNORMAL
EXT LEFLUNOMIDE METABOLITE: ABNORMAL
EXT LIPASE: ABNORMAL
EXT LYMPH%: 11.6
EXT MAGNESIUM: 1.4
EXT MONOCYTES%: 11.6
EXT NITRITES UA: ABNORMAL
EXT PHOSPHORUS: 3.8
EXT PLATELETS: 277
EXT POTASSIUM: 3.4
EXT PROT/CREAT RATIO UR: ABNORMAL
EXT PROTEIN TOTAL: ABNORMAL
EXT PROTEIN UA: ABNORMAL
EXT PTH, INTACT: ABNORMAL
EXT RBC UA: ABNORMAL
EXT SEGS%: 75.1
EXT SERUM IRON: ABNORMAL
EXT SIROLIMUS LVL: ABNORMAL
EXT SODIUM: 139 MMOL/L
EXT TACROLIMUS LVL: ABNORMAL
EXT TIBC: ABNORMAL
EXT TRIGLYCERIDES: ABNORMAL
EXT URIC ACID: ABNORMAL
EXT URINE CULTURE: ABNORMAL
EXT URINE PROTEIN: ABNORMAL
EXT VIT D 25 HYDROXY: ABNORMAL
EXT WBC UA: ABNORMAL
EXT WBC: 6.4
PARVOVIRUS B19 DNA BY PCR: ABNORMAL
QUANTIFERON GOLD TB: ABNORMAL

## 2024-07-25 NOTE — PROGRESS NOTES
This patient was in the hospital, the decision was made to continue with management of CKD by his local nephrologist.

## 2024-07-26 DIAGNOSIS — Z94.0 KIDNEY REPLACED BY TRANSPLANT: ICD-10-CM

## 2024-07-26 RX ORDER — TACROLIMUS 1 MG/1
3 CAPSULE ORAL EVERY 12 HOURS
Qty: 180 CAPSULE | Refills: 11 | Status: SHIPPED | OUTPATIENT
Start: 2024-07-26 | End: 2025-07-26

## 2024-07-29 ENCOUNTER — DOCUMENTATION ONLY (OUTPATIENT)
Dept: TRANSPLANT | Facility: CLINIC | Age: 39
End: 2024-07-29
Payer: MEDICARE

## 2024-07-30 ENCOUNTER — TELEPHONE (OUTPATIENT)
Dept: TRANSPLANT | Facility: CLINIC | Age: 39
End: 2024-07-30

## 2024-07-31 ENCOUNTER — TELEPHONE (OUTPATIENT)
Dept: TRANSPLANT | Facility: CLINIC | Age: 39
End: 2024-07-31
Payer: MEDICARE

## 2024-07-31 NOTE — TELEPHONE ENCOUNTER
Patient states he saw his general nephrologist,  and has another appointment with him next month. States he is not taking Lokelma and is not sure about Sodium Bicarbonate. Will repeat lab next Tuesday.    ----- Message from Jay Velasco MD sent at 7/29/2024 11:52 AM CDT -----  Please verify he is taking sodium bicarbonate   Please tell the patient to D/C DEBRA  Follow up with local nephrologist for management of advanced CKD and electrolytes   Repeat K next week

## 2024-08-06 ENCOUNTER — DOCUMENTATION ONLY (OUTPATIENT)
Dept: TRANSPLANT | Facility: CLINIC | Age: 39
End: 2024-08-06
Payer: MEDICARE

## 2024-08-06 LAB
EXT ALBUMIN: 3.9
EXT BANDS%: 0
EXT BUN: 27
EXT CALCIUM: 8.8
EXT CHLORIDE: 113
EXT CO2: 20
EXT CREATININE: 3.06 MG/DL
EXT EGFR NO RACE VARIABLE: 25
EXT EOSINOPHIL%: 1.8
EXT GLUCOSE: 137
EXT HEMATOCRIT: 27.3
EXT HEMOGLOBIN: 8.7
EXT LYMPH%: 4.3
EXT MAGNESIUM: 1.5
EXT MONOCYTES%: 8.2
EXT PHOSPHORUS: 2.4
EXT PLATELETS: 280
EXT POTASSIUM: 3.6
EXT SEGS%: 85.4
EXT SODIUM: 139 MMOL/L
EXT TACROLIMUS LVL: 8.1
EXT WBC: 7.6

## 2024-08-07 ENCOUNTER — DOCUMENTATION ONLY (OUTPATIENT)
Dept: TRANSPLANT | Facility: CLINIC | Age: 39
End: 2024-08-07
Payer: MEDICARE

## 2024-08-12 ENCOUNTER — TELEPHONE (OUTPATIENT)
Dept: TRANSPLANT | Facility: CLINIC | Age: 39
End: 2024-08-12
Payer: MEDICARE

## 2024-08-13 NOTE — TELEPHONE ENCOUNTER
Patient reports that level was accurate and that he did not take prograf before blood was drawn. Patient states understanding of dosage change. Will recheck level on Monday with labs. Advised to increase water intake and that biopsy will be held for now. IR notified that biopsy plan is on hold.    ----- Message from Jay Velasco MD sent at 7/10/2024 10:30 AM CDT -----  Let's hold prograf for 2 doses and lower prograf to 4 mg PO bid, no need for biopsy for now, likely tacro level explained increase creatinine  Labs next week  Encourage hydration  
Discharged

## 2024-09-06 DIAGNOSIS — Z94.0 S/P KIDNEY TRANSPLANT: Chronic | ICD-10-CM

## 2024-09-06 RX ORDER — MYCOPHENOLIC ACID 180 MG/1
360 TABLET, DELAYED RELEASE ORAL 2 TIMES DAILY
Qty: 120 TABLET | Refills: 11 | Status: SHIPPED | OUTPATIENT
Start: 2024-09-06

## 2024-10-21 PROBLEM — N18.9 ACUTE KIDNEY INJURY SUPERIMPOSED ON CHRONIC KIDNEY DISEASE: Status: RESOLVED | Noted: 2023-03-27 | Resolved: 2024-10-21

## 2024-10-21 PROBLEM — N17.9 ACUTE KIDNEY INJURY SUPERIMPOSED ON CHRONIC KIDNEY DISEASE: Status: RESOLVED | Noted: 2023-03-27 | Resolved: 2024-10-21

## 2024-11-11 ENCOUNTER — EPISODE CHANGES (OUTPATIENT)
Dept: TRANSPLANT | Facility: CLINIC | Age: 39
End: 2024-11-11

## 2024-12-08 NOTE — PROGRESS NOTES
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 Repeat Cr 8.47, BUN 47. K 5, bicarb 17. Patient is on prograf, myfortic, and prednisone and has been out of myfortiq and tacrolimus for 2 weeks. He has been adherent with his BP meds and prednsone. He fills all of his medications at [facility] other than tacrolimus and myfortic, which are filled at [facility]. The medications are usually mailed out to him, but when he called the line was busy. So he came to pick them up in person. He was unable to drive here earlier due to work obligations, thus missing doses for about 2 weeks. Reports good urine output, no urinary symptoms. Home blood pressures are 100-130s systolic with occasional values in 150s per his home BP cuff reads.     Case was discussed with Dr. Madden of [facility] and agreed for patient to transfer for concerns for rejection. Vitals stable upon transfer other than hypertension up to 165/94. Admitted due to concern for acute renal transplant rejection and KTM input. Initial labs significant for K 4.9, Cr 6.8, BUN 39, bicarb 13.             Overview/Hospital Course:  Patient admitted to  for management of SEBASTIAN with concern for acute rejection as patient was out of medications prior to admission. Restarted on tacro and prednisone (transitioned to dex). Patient also found to be covid positive and started on remdesivir and dexemethasone. CXR with infiltrates bilaterally with mild borderline hypoxia. Plan for  kidney transplant biopsy on 3/27/23. Pt tolerated procedure well.       Interval History: Pt's breathing much improved this morning. No longer coughing like he had been.     Review of Systems   Constitutional:  Positive for appetite change. Negative for chills and fever.   HENT:  Negative for congestion and rhinorrhea.    Eyes:  Negative for photophobia and visual disturbance.   Respiratory:  Negative for cough and shortness of breath.    Cardiovascular:  Negative for chest pain, palpitations and leg swelling.   Gastrointestinal:  Negative for abdominal distention, abdominal pain, blood in stool, constipation, diarrhea, nausea and vomiting.   Genitourinary:  Negative for dysuria, flank pain, hematuria and urgency.   Musculoskeletal:  Negative for arthralgias and back pain.   Allergic/Immunologic: Positive for immunocompromised state.   Neurological:  Positive for headaches. Negative for dizziness and weakness.   Psychiatric/Behavioral:  Negative for agitation and behavioral problems.    Objective:     Vital Signs (Most Recent):  Temp: 98.3 °F (36.8 °C) (03/29/23 1106)  Pulse: 76 (03/29/23 1214)  Resp: 20 (03/29/23 1214)  BP: 135/81 (03/29/23 1106)  SpO2: 95 % (03/29/23 1214) Vital Signs (24h Range):  Temp:  [97.1 °F (36.2 °C)-98.4 °F (36.9 °C)] 98.3 °F (36.8 °C)  Pulse:  [68-85] 76  Resp:  [16-22] 20  SpO2:  [94 %-97 %] 95 %  BP: (135-151)/(77-86) 135/81     Weight: 79 kg (174 lb 2.6 oz)  Body mass index is 24.99 kg/m².    Intake/Output Summary (Last 24 hours) at 3/29/2023 1506  Last data filed at 3/29/2023 0938  Gross per 24 hour   Intake 200 ml   Output 2500 ml   Net -2300 ml      Physical Exam  Vitals and nursing note reviewed.   Constitutional:       Appearance: Normal appearance.   HENT:      Head: Normocephalic and atraumatic.      Nose: Nose normal.      Mouth/Throat:      Mouth: Mucous membranes are moist.   Eyes:      General: No scleral icterus.     Extraocular Movements: Extraocular movements intact.       Conjunctiva/sclera: Conjunctivae normal.   Cardiovascular:      Rate and Rhythm: Normal rate and regular rhythm.      Pulses: Normal pulses.      Heart sounds: Murmur heard.   Pulmonary:      Effort: Pulmonary effort is normal. No respiratory distress.      Breath sounds: Normal breath sounds. No wheezing or rales.      Comments: Transmitted upper airway sounds  Abdominal:      General: Bowel sounds are normal. There is no distension.      Palpations: Abdomen is soft. There is no mass.      Tenderness: There is no abdominal tenderness. There is no guarding.   Musculoskeletal:         General: No swelling, deformity or signs of injury. Normal range of motion.      Cervical back: Normal range of motion. No rigidity.   Skin:     General: Skin is warm and dry.      Coloration: Skin is not jaundiced.      Findings: No bruising.      Comments: Subungual hematoma on left thumb   Neurological:      General: No focal deficit present.      Mental Status: He is alert and oriented to person, place, and time.      Motor: No weakness.   Psychiatric:         Mood and Affect: Mood normal.         Behavior: Behavior normal.         Thought Content: Thought content normal.       Significant Labs: All pertinent labs within the past 24 hours have been reviewed.    Significant Imaging: I have reviewed all pertinent imaging results/findings within the past 24 hours.      Assessment/Plan:      * Acute rejection of kidney transplant  Patient has been out of tacrolimus and myfortic for 2 weeks. Cr with steep increase in labs from 3/22. On outside labs Cr 8.47, BUN 47, up from baseline Cr 1.9. Concern for acute rejection. Still having good urine output. Bicarb decreased to 13 on admission, potassium high-normal. Patient reports mild loss of appetite and nausea now resolved.   - US kidney transplant unremarkable.  - UA at OSH noted to only have trace blood  - pending labs    Plan  - IR for biopsy; f/u results  - KTM consulted, appreciate  recs.   - continue tacro and changed pred to dex given COVID  - holding MMF given covid      Metabolic acidosis  Bicarb 1950 BID    Acute kidney injury superimposed on chronic kidney disease  F/u nephrology recommendations  IVF  Strict Is/Os  Renally dose medications        Hypertensive cardiovascular disease  See primary problem      COVID  Patient is identified as Severe COVID-19 based on hypoxemia with O2 saturations <94% on room air or on ambulation   Initiate standard COVID protocols; COVID-19 testing ,Infection Control notification  and isolation- respiratory, contact and droplet per protocol    Diagnostics: (leukopenia, hyponatremia, hyperferritinemia, elevated troponin, elevated d-dimer, age, and comorbidities are significant predictors of poor clinical outcome)  CBC, CMP, Ferritin, CRP and Portable CXR    Management: Initiate targeted therapy with Remdesivir, 200mg IV x1, followed by 100mg IV daily x5 days total and Dexamethasone PO/IV 6mg daily x10 days, Maintain oxygen saturations 92-96% via Nasal Cannula  LPM and monitor with continuous/intermittent pulse oximetry.  and Inhaled bronchodilators as needed for shortness of breath.    New infiltrates on CXR. SpO2 93-4% on RA and associated symptoms of HA, sore throat, chills and fever.     Subungual hematoma of left thumb  Patient reports jamming thumb, no pain. Hematoma has been present for a month. Lower concern for subungual melanoma given preceding trauma.       Benign essential HTN  Continue nifed, coreg and minoxidil. Hypertensive due to pain and COVID    Increased nifedipine to 60 bid       Hyperkalemia  Will monitor    -Lokelma if continues to rise      Stage 3a chronic kidney disease  Baseline Cr 1.9      Anemia of chronic disease  Hb normally 9-10. Hb on 3/22 was 8.6. MCV was 79    - daily CBC  - transfuse for Hb<7  - f/u iron labs and retic      Long-term use of immunosuppressant medication  Takes prednisone, tacrolimus, mycophenolate.     Holding  mycophenolate given COVID  Continue tacro   Change pred to dex given COVID    S/P kidney transplant 10/19/21  S/p renal transplant      Hx-TIA (transient ischemic attack)  See primary problem      Hx of seizure disorder  Remote history in childhood, no home AEDs      Renovascular hypertension  Takes minoxidil, coreg, and nifedipine. Home Bps are well controlled around 100-130s systolic mostly.     - restarted home meds.   - increase nifedipine to 60 BID    Focal segmental glomerulosclerosis  Follow KTM recs      VTE Risk Mitigation (From admission, onward)         Ordered     heparin (porcine) injection 5,000 Units  Every 8 hours         03/25/23 0047     IP VTE LOW RISK PATIENT  Once         03/25/23 0013     Place sequential compression device  Until discontinued         03/25/23 0013                Discharge Planning   MARSHALL: 3/31/2023     Code Status: Full Code   Is the patient medically ready for discharge?:     Reason for patient still in hospital (select all that apply): Patient trending condition and Laboratory test  Discharge Plan A: Home with family        Andrea Encinas MD  Department of Hospital Medicine   [facility] - Telemetry Stepdown     0 (no pain/absence of nonverbal indicators of pain)

## (undated) DEVICE — SET IRR URLGY 2LINE UNIV SPIKE

## (undated) DEVICE — SOL NS 1000CC

## (undated) DEVICE — SEE MEDLINE ITEM 146417

## (undated) DEVICE — DRAPE SLUSH WARMER WITH DISC

## (undated) DEVICE — SUT 4-0 12-18IN SILK BLACK

## (undated) DEVICE — PUNCH AORTIC 4.0MM 6/CASE

## (undated) DEVICE — PUNCH AORTIC 4.8MM

## (undated) DEVICE — DRESSING ADH ISLAND 3.6 X 14

## (undated) DEVICE — SUT PROLENE 5-0 36IN C-1

## (undated) DEVICE — SUT SILK 2-0 STRANDS 30IN

## (undated) DEVICE — SUT 3-0 12-18IN SILK

## (undated) DEVICE — SET DECANTER MEDICHOICE

## (undated) DEVICE — SUT PROLENE 6-0 BV-1 30IN

## (undated) DEVICE — SUT PDS BV 6-0

## (undated) DEVICE — STOCKINETTE 2INX36

## (undated) DEVICE — STAPLER SKIN PROXIMATE WIDE

## (undated) DEVICE — DRESSING ABSRBNT ISLAND 3.6X8

## (undated) DEVICE — ELECTRODE REM PLYHSV RETURN 9

## (undated) DEVICE — SUT SILK 3-0 STRANDS 30IN

## (undated) DEVICE — TOWEL OR XRAY WHITE 17X26IN

## (undated) DEVICE — ADHESIVE DERMABOND ADVANCED

## (undated) DEVICE — SUT 1 36IN PDS II VIO MONO

## (undated) DEVICE — BELLOW CANN HEMOBLAST 1.65GR

## (undated) DEVICE — FOLEY BLLN 20FR 3WAY 5CC

## (undated) DEVICE — SEE MEDLINE ITEM 156900

## (undated) DEVICE — TRAY FOLEY 16FR INFECTION CONT

## (undated) DEVICE — SUT ETHILON 3-0 PS2 18 BLK

## (undated) DEVICE — PLUG CATHETER STERILE FOLEY

## (undated) DEVICE — CLIPPER BLADE MOD 4406 (CAREF)

## (undated) DEVICE — SUT 2-0 12-18IN SILK

## (undated) DEVICE — HEMOSTAT SURGICEL NU-KNIT 6X9

## (undated) DEVICE — SYR ONLY LUER LOCK 20CC